# Patient Record
Sex: MALE | Race: BLACK OR AFRICAN AMERICAN | NOT HISPANIC OR LATINO | Employment: OTHER | URBAN - NONMETROPOLITAN AREA
[De-identification: names, ages, dates, MRNs, and addresses within clinical notes are randomized per-mention and may not be internally consistent; named-entity substitution may affect disease eponyms.]

---

## 2017-02-10 ENCOUNTER — OFFICE VISIT (OUTPATIENT)
Dept: OPHTHALMOLOGY | Facility: CLINIC | Age: 57
End: 2017-02-10

## 2017-02-10 DIAGNOSIS — H52.203 ASTIGMATISM, BILATERAL: ICD-10-CM

## 2017-02-10 DIAGNOSIS — E11.9 DIABETES MELLITUS WITHOUT COMPLICATION (HCC): Primary | ICD-10-CM

## 2017-02-10 PROBLEM — H52.209 ASTIGMATISM: Status: ACTIVE | Noted: 2017-02-10

## 2017-02-10 PROCEDURE — 92004 COMPRE OPH EXAM NEW PT 1/>: CPT | Performed by: OPHTHALMOLOGY

## 2017-02-10 NOTE — PROGRESS NOTES
Subjective   Jameel Dozier is a 56 y.o. male.   Chief Complaint   Patient presents with   • Eye Exam     HPI     Eye Exam   Laterality: both eyes   Quality: blurry vision   Severity: moderate           Comments   BV years       Last edited by Castillo Burch MD on 2/10/2017  2:21 PM. (History)          Review of Systems    Objective   Visual Acuity (Snellen - Linear)      Right Left   Dist sc 20/40 -1 20/30 -1   Near sc J16 J16            Manifest Refraction      Sphere Cylinder Axis Dist Add   Right [place] +0.50 150 20/25 +2.25   Left [place] +0.50 030 20/25 +2.25            Final Rx      Sphere Cylinder Axis Add   Right [place] +0.50 150 +2.25   Left [place] +0.50 030 +2.25            Pupils      Pupils   Right PERRL   Left PERRL           Confrontational Visual Fields     Visual Fields      Left Right   Result Full Full                  Extraocular Movement      Right Left   Result Full Full              Tonometry (Applanation, 2:22 PM)     Unable to assess:  Yes           Main Ophthalmology Exam     External Exam      Right Left    External Normal Normal      Slit Lamp Exam      Right Left    Lids/Lashes Normal Normal    Conjunctiva/Sclera White and quiet White and quiet    Cornea Clear Clear    Anterior Chamber Deep and quiet Deep and quiet    Iris Round and reactive Round and reactive    Lens Clear Clear    Vitreous Normal Normal      Fundus Exam      Right Left    Disc Normal Normal    Macula Normal Normal    Vessels Normal Normal    Periphery Normal Normal                Assessment/Plan   Diagnoses and all orders for this visit:    Diabetes mellitus without complication    Astigmatism, bilateral      Eye disease risks with diabetes discussed  Glasses Rx given per refraction      Return in about 1 year (around 2/10/2018), or if symptoms worsen or fail to improve.

## 2017-03-23 RX ORDER — POTASSIUM CHLORIDE 750 MG/1
10 CAPSULE, EXTENDED RELEASE ORAL DAILY
Qty: 35 CAPSULE | Refills: 2 | Status: SHIPPED | OUTPATIENT
Start: 2017-03-23 | End: 2017-05-25 | Stop reason: SDUPTHER

## 2017-03-23 RX ORDER — POTASSIUM CHLORIDE 750 MG/1
CAPSULE, EXTENDED RELEASE ORAL
Qty: 35 CAPSULE | Refills: 1 | OUTPATIENT
Start: 2017-03-23

## 2017-03-28 PROCEDURE — 99283 EMERGENCY DEPT VISIT LOW MDM: CPT

## 2017-03-29 ENCOUNTER — HOSPITAL ENCOUNTER (EMERGENCY)
Facility: HOSPITAL | Age: 57
Discharge: HOME | End: 2017-03-29
Attending: FAMILY MEDICINE | Admitting: FAMILY MEDICINE

## 2017-03-29 VITALS
DIASTOLIC BLOOD PRESSURE: 78 MMHG | TEMPERATURE: 98 F | SYSTOLIC BLOOD PRESSURE: 128 MMHG | RESPIRATION RATE: 18 BRPM | OXYGEN SATURATION: 97 % | WEIGHT: 243 LBS | BODY MASS INDEX: 32.2 KG/M2 | HEART RATE: 98 BPM | HEIGHT: 73 IN

## 2017-03-29 DIAGNOSIS — B02.9 HERPES ZOSTER WITHOUT COMPLICATION: Primary | ICD-10-CM

## 2017-03-29 RX ORDER — HYDROCODONE BITARTRATE AND ACETAMINOPHEN 5; 325 MG/1; MG/1
1 TABLET ORAL EVERY 6 HOURS PRN
Qty: 15 TABLET | Refills: 0 | Status: SHIPPED | OUTPATIENT
Start: 2017-03-29 | End: 2017-05-25

## 2017-03-29 RX ORDER — ACYCLOVIR 400 MG/1
400 TABLET ORAL
Qty: 30 TABLET | Refills: 0 | Status: SHIPPED | OUTPATIENT
Start: 2017-03-29 | End: 2017-05-25 | Stop reason: SDUPTHER

## 2017-03-29 NOTE — ED PROVIDER NOTES
Subjective   Patient is a 56 y.o. male presenting with rash.   History provided by:  Patient  Rash   Location:  Torso  Torso rash location:  L flank and upper back  Quality: blistering, painful and swelling    Pain details:     Quality:  Aching    Onset quality:  Sudden    Timing:  Constant    Progression:  Waxing and waning  Severity:  Moderate  Context: not chemical exposure, not exposure to similar rash and not insect bite/sting    Relieved by:  Nothing  Worsened by:  Nothing  Associated symptoms: no abdominal pain, no diarrhea, no fatigue, no fever, no headaches, no myalgias, no nausea, no periorbital edema, no shortness of breath, no sore throat, no URI, not vomiting and not wheezing        Review of Systems   Constitutional: Negative for appetite change, chills, diaphoresis, fatigue and fever.   HENT: Negative for congestion, ear discharge, ear pain, nosebleeds, rhinorrhea, sinus pressure, sore throat and trouble swallowing.    Eyes: Negative for discharge and redness.   Respiratory: Negative for apnea, cough, chest tightness, shortness of breath and wheezing.    Cardiovascular: Negative for chest pain.   Gastrointestinal: Negative for abdominal pain, diarrhea, nausea and vomiting.   Endocrine: Negative for polyuria.   Genitourinary: Negative for dysuria, frequency and urgency.   Musculoskeletal: Negative for myalgias and neck pain.   Skin: Positive for rash. Negative for color change.   Allergic/Immunologic: Negative for immunocompromised state.   Neurological: Negative for dizziness, seizures, syncope, weakness, light-headedness and headaches.   Hematological: Negative for adenopathy. Does not bruise/bleed easily.   Psychiatric/Behavioral: Negative for behavioral problems and confusion.   All other systems reviewed and are negative.      Past Medical History:   Diagnosis Date   • Abdominal pain    • Acquired achalasia of esophagus    • Adenomatous polyp of colon    • Adverse drug effect    • Alcohol  dependence with alcohol-induced disorder    • Allergic rhinitis    • Anal fissure    • Anemia due to blood loss    • Benign essential hypertension    • Central obesity    • Cerebrovascular disease    • Chronic obstructive lung disease    • Claudication    • Disorder of peripheral nervous system    • Diverticular disease of colon     completed antibx, ? neoplasm on CT   • Dysphagia    • Dyspnea    • ED (erectile dysfunction)    • Epigastric pain    • Esophageal dysmotility    • Esophageal reflux    • Esophagitis    • Essential hypertension    • External hemorrhoids    • Gastritis    • GERD with esophagitis    • Heavy tobacco smoker    • Hemorrhoids    • Hiatal hernia    • Hiccough    • History of colon polyps    • History of echocardiogram     Normal LV funciton with Ef of 65% to 70% without regional wall motion abnormalities.Mild CLVH. Normal RV size and function. No significant valvular regurgitation or stenosis. 09/19/2014       • History of EKG    • History of TIA (transient ischemic attack)    • Hypercholesterolemia    • Hypertension    • Left ventricular hypertrophy    • Male erectile disorder    • Obstructive sleep apnea syndrome    • Primary osteoarthritis of knees, bilateral    • Rectal bleed     painful   • Rectal hemorrhage    • Sleep disorder    • Transient cerebral ischemia    • Upper respiratory infection    • Weakness     Attacks of weakness       Allergies   Allergen Reactions   • Ace Inhibitors Angioedema     hypotension   • Lisinopril    • Vistaril [Hydroxyzine Hcl]        Past Surgical History:   Procedure Laterality Date   • COLONOSCOPY      Internal and external hemorrhoids found. 04/13/2015    • ENDOSCOPY      Internal & external hemorrhoids found. 1 polyp in sigmoid colon; removed by snare cautery polypectomy. 09/26/2012    • ENDOSCOPY      Gastritis found in the stomach. Biopsy taken.Enlarged folds were found in the body of the stomach.Biopsy taken.Normal duodenum.A hiatus hernia was found in  the esophagus. 04/13/2015    • ENDOSCOPY      Hiatus hernia in esophagus. Gastritis in stomach. Biopsy taken. Normal duodenum. Biopsy taken. 09/26/2012           Family History   Problem Relation Age of Onset   • Cancer Mother    • Cirrhosis Father    • Diabetes Other    • Hypertension Other        Social History     Social History   • Marital status: Single     Spouse name: N/A   • Number of children: 0   • Years of education: 12     Occupational History   • retired      Social History Main Topics   • Smoking status: Current Every Day Smoker     Packs/day: 2.00     Years: 40.00     Types: Cigarettes   • Smokeless tobacco: None   • Alcohol use 3.6 oz/week     6 Cans of beer per week      Comment: when can get   • Drug use: No   • Sexual activity: Defer     Other Topics Concern   • None     Social History Narrative   • None           Objective   Physical Exam   Constitutional: He is oriented to person, place, and time. He appears well-developed and well-nourished.   HENT:   Head: Normocephalic and atraumatic.   Nose: Nose normal.   Mouth/Throat: Oropharynx is clear and moist.   Eyes: Conjunctivae and EOM are normal. Pupils are equal, round, and reactive to light. Right eye exhibits no discharge. Left eye exhibits no discharge. No scleral icterus.   Neck: Normal range of motion. Neck supple. No tracheal deviation present.   Cardiovascular: Normal rate, regular rhythm and normal heart sounds.    No murmur heard.  Pulmonary/Chest: Effort normal and breath sounds normal. No stridor. No respiratory distress. He has no wheezes. He has no rales.   Abdominal: Soft. Bowel sounds are normal. He exhibits no distension and no mass. There is no tenderness. There is no rebound and no guarding.   Musculoskeletal: He exhibits no edema.   Neurological: He is alert and oriented to person, place, and time. Coordination normal.   Skin: Skin is warm and dry. Rash noted. Rash is papular and vesicular. No erythema.        Psychiatric: He  has a normal mood and affect. His behavior is normal. Thought content normal.   Nursing note and vitals reviewed.      Procedures         ED Course  ED Course          Labs Reviewed - No data to display    No orders to display                 MDM    Final diagnoses:   Herpes zoster without complication            Flynn Espinoza MD  03/29/17 0224

## 2017-03-29 NOTE — ED NOTES
Pt presents to ED with c/o lt side flank pain, pt stated the pain started about 3-4 days ago, now the pain is worse and he hurts all over.     Michelle Ralph RN  03/29/17 0014

## 2017-03-29 NOTE — ED NOTES
Discharge instructions reviewed with no additional questions at this time. Scripts x2.  NAD.  VSS.  Pt. Alert and oriented x4. No other needs voiced at this time.  Pt. Instructed on medications and pt. Verbalized understanding.  No other questions at this time. Pt. Left ambulatory with steady gait.         Nicole Carpenter RN  03/29/17 0233

## 2017-05-25 ENCOUNTER — OFFICE VISIT (OUTPATIENT)
Dept: FAMILY MEDICINE CLINIC | Facility: CLINIC | Age: 57
End: 2017-05-25

## 2017-05-25 VITALS
BODY MASS INDEX: 34.06 KG/M2 | HEART RATE: 77 BPM | DIASTOLIC BLOOD PRESSURE: 80 MMHG | SYSTOLIC BLOOD PRESSURE: 144 MMHG | OXYGEN SATURATION: 96 % | WEIGHT: 237.9 LBS | HEIGHT: 70 IN

## 2017-05-25 DIAGNOSIS — R73.03 PRE-DIABETES: ICD-10-CM

## 2017-05-25 DIAGNOSIS — E78.00 HYPERCHOLESTEROLEMIA: ICD-10-CM

## 2017-05-25 DIAGNOSIS — E65 CENTRAL OBESITY: ICD-10-CM

## 2017-05-25 DIAGNOSIS — I10 ESSENTIAL HYPERTENSION: ICD-10-CM

## 2017-05-25 DIAGNOSIS — G47.33 OBSTRUCTIVE SLEEP APNEA SYNDROME: ICD-10-CM

## 2017-05-25 DIAGNOSIS — Z00.00 PREVENTATIVE HEALTH CARE: ICD-10-CM

## 2017-05-25 DIAGNOSIS — K21.00 GERD WITH ESOPHAGITIS: ICD-10-CM

## 2017-05-25 PROCEDURE — 99213 OFFICE O/P EST LOW 20 MIN: CPT | Performed by: FAMILY MEDICINE

## 2017-05-25 RX ORDER — ASPIRIN 81 MG/1
81 TABLET ORAL DAILY
Qty: 30 TABLET | Refills: 5 | Status: SHIPPED | OUTPATIENT
Start: 2017-05-25 | End: 2017-10-30 | Stop reason: SDUPTHER

## 2017-05-25 RX ORDER — LOSARTAN POTASSIUM 100 MG/1
100 TABLET ORAL DAILY
Qty: 30 TABLET | Refills: 5 | Status: SHIPPED | OUTPATIENT
Start: 2017-05-25 | End: 2017-10-30 | Stop reason: SDUPTHER

## 2017-05-25 RX ORDER — CETIRIZINE HYDROCHLORIDE 10 MG/1
10 TABLET ORAL DAILY
Qty: 30 TABLET | Refills: 5 | Status: SHIPPED | OUTPATIENT
Start: 2017-05-25 | End: 2017-10-30 | Stop reason: SDUPTHER

## 2017-05-25 RX ORDER — HYDROCHLOROTHIAZIDE 25 MG/1
25 TABLET ORAL DAILY
Qty: 30 TABLET | Refills: 5 | Status: SHIPPED | OUTPATIENT
Start: 2017-05-25 | End: 2017-10-27 | Stop reason: HOSPADM

## 2017-05-25 RX ORDER — FERROUS SULFATE 325(65) MG
325 TABLET ORAL
Qty: 90 TABLET | Refills: 5 | Status: SHIPPED | OUTPATIENT
Start: 2017-05-25 | End: 2018-05-18 | Stop reason: SDUPTHER

## 2017-05-25 RX ORDER — ALBUTEROL SULFATE 90 UG/1
2 AEROSOL, METERED RESPIRATORY (INHALATION) EVERY 4 HOURS PRN
Qty: 3.7 G | Refills: 11 | Status: SHIPPED | OUTPATIENT
Start: 2017-05-25 | End: 2017-10-30 | Stop reason: SDUPTHER

## 2017-05-25 RX ORDER — POTASSIUM CHLORIDE 750 MG/1
10 CAPSULE, EXTENDED RELEASE ORAL DAILY
Qty: 35 CAPSULE | Refills: 2 | Status: SHIPPED | OUTPATIENT
Start: 2017-05-25 | End: 2017-10-26 | Stop reason: SDUPTHER

## 2017-05-25 RX ORDER — OMEPRAZOLE 20 MG/1
20 CAPSULE, DELAYED RELEASE ORAL EVERY MORNING
Qty: 30 CAPSULE | Refills: 5 | Status: SHIPPED | OUTPATIENT
Start: 2017-05-25 | End: 2017-10-30 | Stop reason: SDUPTHER

## 2017-05-25 RX ORDER — LOVASTATIN 40 MG/1
40 TABLET ORAL DAILY
Qty: 30 TABLET | Refills: 5 | Status: SHIPPED | OUTPATIENT
Start: 2017-05-25 | End: 2017-10-30 | Stop reason: SDUPTHER

## 2017-05-25 RX ORDER — ACYCLOVIR 400 MG/1
400 TABLET ORAL
Qty: 30 TABLET | Refills: 0 | Status: SHIPPED | OUTPATIENT
Start: 2017-05-25 | End: 2018-05-18

## 2017-10-20 PROBLEM — E11.8 TYPE 2 DIABETES MELLITUS WITH COMPLICATION, WITHOUT LONG-TERM CURRENT USE OF INSULIN: Status: ACTIVE | Noted: 2017-02-10

## 2017-10-20 RX ORDER — POTASSIUM CHLORIDE 750 MG/1
CAPSULE, EXTENDED RELEASE ORAL
Qty: 35 CAPSULE | Refills: 1 | OUTPATIENT
Start: 2017-10-20

## 2017-10-26 ENCOUNTER — HOSPITAL ENCOUNTER (OUTPATIENT)
Facility: HOSPITAL | Age: 57
Setting detail: OBSERVATION
Discharge: HOME | End: 2017-10-27
Attending: EMERGENCY MEDICINE | Admitting: FAMILY MEDICINE

## 2017-10-26 ENCOUNTER — APPOINTMENT (OUTPATIENT)
Dept: LAB | Facility: HOSPITAL | Age: 57
End: 2017-10-26

## 2017-10-26 DIAGNOSIS — R13.19 OTHER DYSPHAGIA: ICD-10-CM

## 2017-10-26 DIAGNOSIS — R13.10 DYSPHAGIA, UNSPECIFIED TYPE: ICD-10-CM

## 2017-10-26 DIAGNOSIS — I10 HYPERTENSION, UNSPECIFIED TYPE: Primary | ICD-10-CM

## 2017-10-26 DIAGNOSIS — K21.00 GERD WITH ESOPHAGITIS: ICD-10-CM

## 2017-10-26 DIAGNOSIS — E87.6 HYPOKALEMIA: Primary | ICD-10-CM

## 2017-10-26 PROBLEM — R06.2 WHEEZING: Status: ACTIVE | Noted: 2017-10-26

## 2017-10-26 PROBLEM — E83.42 HYPOMAGNESEMIA: Status: ACTIVE | Noted: 2017-10-26

## 2017-10-26 PROBLEM — Z72.0 NICOTINE ABUSE: Status: ACTIVE | Noted: 2017-10-26

## 2017-10-26 LAB
GLUCOSE BLDC GLUCOMTR-MCNC: 122 MG/DL (ref 70–130)
GLUCOSE BLDC GLUCOMTR-MCNC: 87 MG/DL (ref 70–130)
MAGNESIUM SERPL-MCNC: 0.9 MG/DL (ref 1.6–2.3)
MAGNESIUM SERPL-MCNC: 0.9 MG/DL (ref 1.6–2.3)
MAGNESIUM SERPL-MCNC: 1.3 MG/DL (ref 1.6–2.3)
MAGNESIUM SERPL-MCNC: 1.4 MG/DL (ref 1.6–2.3)
POTASSIUM BLD-SCNC: 2.8 MMOL/L (ref 3.5–5.1)
POTASSIUM BLD-SCNC: 3.1 MMOL/L (ref 3.5–5.1)
TROPONIN I SERPL-MCNC: 0.02 NG/ML

## 2017-10-26 PROCEDURE — 96367 TX/PROPH/DG ADDL SEQ IV INF: CPT

## 2017-10-26 PROCEDURE — G0378 HOSPITAL OBSERVATION PER HR: HCPCS

## 2017-10-26 PROCEDURE — 99284 EMERGENCY DEPT VISIT MOD MDM: CPT

## 2017-10-26 PROCEDURE — 25010000002 MAGNESIUM SULFATE 2 GM/50ML SOLUTION: Performed by: STUDENT IN AN ORGANIZED HEALTH CARE EDUCATION/TRAINING PROGRAM

## 2017-10-26 PROCEDURE — 83735 ASSAY OF MAGNESIUM: CPT | Performed by: EMERGENCY MEDICINE

## 2017-10-26 PROCEDURE — 82962 GLUCOSE BLOOD TEST: CPT

## 2017-10-26 PROCEDURE — 93010 ELECTROCARDIOGRAM REPORT: CPT | Performed by: INTERNAL MEDICINE

## 2017-10-26 PROCEDURE — 93005 ELECTROCARDIOGRAM TRACING: CPT | Performed by: EMERGENCY MEDICINE

## 2017-10-26 PROCEDURE — 83735 ASSAY OF MAGNESIUM: CPT | Performed by: STUDENT IN AN ORGANIZED HEALTH CARE EDUCATION/TRAINING PROGRAM

## 2017-10-26 PROCEDURE — 25810000003 SODIUM CHLORIDE 0.9 % WITH KCL 20 MEQ 20-0.9 MEQ/L-% SOLUTION: Performed by: STUDENT IN AN ORGANIZED HEALTH CARE EDUCATION/TRAINING PROGRAM

## 2017-10-26 PROCEDURE — 96366 THER/PROPH/DIAG IV INF ADDON: CPT

## 2017-10-26 PROCEDURE — 96365 THER/PROPH/DIAG IV INF INIT: CPT

## 2017-10-26 PROCEDURE — 84484 ASSAY OF TROPONIN QUANT: CPT | Performed by: EMERGENCY MEDICINE

## 2017-10-26 PROCEDURE — 84132 ASSAY OF SERUM POTASSIUM: CPT | Performed by: STUDENT IN AN ORGANIZED HEALTH CARE EDUCATION/TRAINING PROGRAM

## 2017-10-26 PROCEDURE — 25010000003 POTASSIUM CHLORIDE 10 MEQ/100ML SOLUTION: Performed by: EMERGENCY MEDICINE

## 2017-10-26 RX ORDER — CETIRIZINE HYDROCHLORIDE 10 MG/1
10 TABLET ORAL DAILY
Status: DISCONTINUED | OUTPATIENT
Start: 2017-10-26 | End: 2017-10-27 | Stop reason: HOSPADM

## 2017-10-26 RX ORDER — ALBUTEROL SULFATE 2.5 MG/3ML
2.5 SOLUTION RESPIRATORY (INHALATION) EVERY 4 HOURS PRN
Status: DISCONTINUED | OUTPATIENT
Start: 2017-10-26 | End: 2017-10-27 | Stop reason: HOSPADM

## 2017-10-26 RX ORDER — NICOTINE POLACRILEX 4 MG
15 LOZENGE BUCCAL
Status: DISCONTINUED | OUTPATIENT
Start: 2017-10-26 | End: 2017-10-27 | Stop reason: HOSPADM

## 2017-10-26 RX ORDER — BISACODYL 5 MG/1
5 TABLET, DELAYED RELEASE ORAL DAILY PRN
Status: DISCONTINUED | OUTPATIENT
Start: 2017-10-26 | End: 2017-10-27 | Stop reason: HOSPADM

## 2017-10-26 RX ORDER — POTASSIUM CHLORIDE 750 MG/1
40 CAPSULE, EXTENDED RELEASE ORAL
Qty: 84 CAPSULE | Refills: 2 | Status: SHIPPED | OUTPATIENT
Start: 2017-10-26 | End: 2017-10-27 | Stop reason: HOSPADM

## 2017-10-26 RX ORDER — POTASSIUM CHLORIDE 7.45 MG/ML
10 INJECTION INTRAVENOUS
Status: DISPENSED | OUTPATIENT
Start: 2017-10-26 | End: 2017-10-26

## 2017-10-26 RX ORDER — ASPIRIN 81 MG/1
81 TABLET ORAL DAILY
Status: DISCONTINUED | OUTPATIENT
Start: 2017-10-26 | End: 2017-10-27 | Stop reason: HOSPADM

## 2017-10-26 RX ORDER — SODIUM CHLORIDE 0.9 % (FLUSH) 0.9 %
1-10 SYRINGE (ML) INJECTION AS NEEDED
Status: DISCONTINUED | OUTPATIENT
Start: 2017-10-26 | End: 2017-10-27 | Stop reason: HOSPADM

## 2017-10-26 RX ORDER — ONDANSETRON 2 MG/ML
4 INJECTION INTRAMUSCULAR; INTRAVENOUS EVERY 6 HOURS PRN
Status: DISCONTINUED | OUTPATIENT
Start: 2017-10-26 | End: 2017-10-27 | Stop reason: HOSPADM

## 2017-10-26 RX ORDER — HYDRALAZINE HYDROCHLORIDE 20 MG/ML
10 INJECTION INTRAMUSCULAR; INTRAVENOUS EVERY 6 HOURS PRN
Status: DISCONTINUED | OUTPATIENT
Start: 2017-10-26 | End: 2017-10-27 | Stop reason: HOSPADM

## 2017-10-26 RX ORDER — DEXTROSE MONOHYDRATE 25 G/50ML
25 INJECTION, SOLUTION INTRAVENOUS
Status: DISCONTINUED | OUTPATIENT
Start: 2017-10-26 | End: 2017-10-27 | Stop reason: HOSPADM

## 2017-10-26 RX ORDER — SODIUM CHLORIDE AND POTASSIUM CHLORIDE 150; 900 MG/100ML; MG/100ML
100 INJECTION, SOLUTION INTRAVENOUS CONTINUOUS
Status: DISCONTINUED | OUTPATIENT
Start: 2017-10-26 | End: 2017-10-27 | Stop reason: HOSPADM

## 2017-10-26 RX ORDER — NICOTINE 21 MG/24HR
1 PATCH, TRANSDERMAL 24 HOURS TRANSDERMAL EVERY 24 HOURS
Status: DISCONTINUED | OUTPATIENT
Start: 2017-10-26 | End: 2017-10-27 | Stop reason: HOSPADM

## 2017-10-26 RX ORDER — ACETAMINOPHEN 325 MG/1
650 TABLET ORAL EVERY 4 HOURS PRN
Status: DISCONTINUED | OUTPATIENT
Start: 2017-10-26 | End: 2017-10-27 | Stop reason: HOSPADM

## 2017-10-26 RX ORDER — ALBUTEROL SULFATE 90 UG/1
2 AEROSOL, METERED RESPIRATORY (INHALATION) EVERY 4 HOURS PRN
Status: DISCONTINUED | OUTPATIENT
Start: 2017-10-26 | End: 2017-10-26 | Stop reason: ALTCHOICE

## 2017-10-26 RX ORDER — LOSARTAN POTASSIUM 50 MG/1
100 TABLET ORAL DAILY
Status: DISCONTINUED | OUTPATIENT
Start: 2017-10-26 | End: 2017-10-27 | Stop reason: HOSPADM

## 2017-10-26 RX ORDER — POTASSIUM CHLORIDE 1.5 G/1.77G
40 POWDER, FOR SOLUTION ORAL ONCE
Status: COMPLETED | OUTPATIENT
Start: 2017-10-26 | End: 2017-10-26

## 2017-10-26 RX ORDER — POTASSIUM CHLORIDE 750 MG/1
40 CAPSULE, EXTENDED RELEASE ORAL ONCE
Status: COMPLETED | OUTPATIENT
Start: 2017-10-26 | End: 2017-10-26

## 2017-10-26 RX ORDER — ATORVASTATIN CALCIUM 10 MG/1
10 TABLET, FILM COATED ORAL DAILY
Status: DISCONTINUED | OUTPATIENT
Start: 2017-10-26 | End: 2017-10-27 | Stop reason: HOSPADM

## 2017-10-26 RX ORDER — MAGNESIUM SULFATE HEPTAHYDRATE 40 MG/ML
2 INJECTION, SOLUTION INTRAVENOUS ONCE
Status: COMPLETED | OUTPATIENT
Start: 2017-10-26 | End: 2017-10-26

## 2017-10-26 RX ORDER — CALCIUM CARBONATE 200(500)MG
2 TABLET,CHEWABLE ORAL 2 TIMES DAILY PRN
Status: DISCONTINUED | OUTPATIENT
Start: 2017-10-26 | End: 2017-10-27 | Stop reason: HOSPADM

## 2017-10-26 RX ORDER — PANTOPRAZOLE SODIUM 40 MG/1
40 TABLET, DELAYED RELEASE ORAL EVERY MORNING
Status: DISCONTINUED | OUTPATIENT
Start: 2017-10-27 | End: 2017-10-27 | Stop reason: HOSPADM

## 2017-10-26 RX ADMIN — POTASSIUM CHLORIDE AND SODIUM CHLORIDE 100 ML/HR: 900; 150 INJECTION, SOLUTION INTRAVENOUS at 15:10

## 2017-10-26 RX ADMIN — METOPROLOL TARTRATE 25 MG: 25 TABLET ORAL at 18:21

## 2017-10-26 RX ADMIN — NICOTINE 1 PATCH: 21 PATCH TRANSDERMAL at 18:21

## 2017-10-26 RX ADMIN — MAGNESIUM SULFATE HEPTAHYDRATE 2 G: 40 INJECTION, SOLUTION INTRAVENOUS at 13:07

## 2017-10-26 RX ADMIN — POTASSIUM CHLORIDE 40 MEQ: 1.5 POWDER, FOR SOLUTION ORAL at 18:21

## 2017-10-26 RX ADMIN — METFORMIN HYDROCHLORIDE 500 MG: 500 TABLET ORAL at 18:21

## 2017-10-26 RX ADMIN — POTASSIUM CHLORIDE 40 MEQ: 750 CAPSULE, EXTENDED RELEASE ORAL at 11:50

## 2017-10-26 RX ADMIN — ATORVASTATIN CALCIUM 10 MG: 10 TABLET, FILM COATED ORAL at 18:21

## 2017-10-26 RX ADMIN — POTASSIUM CHLORIDE 10 MEQ: 7.46 INJECTION, SOLUTION INTRAVENOUS at 11:57

## 2017-10-26 NOTE — PROGRESS NOTES
Called pt about lab result. Potassium of 2.3. Very strongly urged pt to come to the ER. Warned of increased risk of fatal cardiac arrhythmias. Pt refused.     Added on Mg to labs. Will send in Rx for potassium 40meq TID. He will see PCP, Dr Flores on 10/30 and get labs rechecked.          This document has been electronically signed by Adarsh Morales MD on October 26, 2017 10:02 AM

## 2017-10-27 VITALS
RESPIRATION RATE: 18 BRPM | WEIGHT: 229.28 LBS | HEART RATE: 83 BPM | HEIGHT: 69 IN | SYSTOLIC BLOOD PRESSURE: 143 MMHG | DIASTOLIC BLOOD PRESSURE: 91 MMHG | TEMPERATURE: 97.5 F | OXYGEN SATURATION: 97 % | BODY MASS INDEX: 33.96 KG/M2

## 2017-10-27 PROBLEM — E83.42 HYPOMAGNESEMIA: Status: RESOLVED | Noted: 2017-10-26 | Resolved: 2017-10-27

## 2017-10-27 PROBLEM — R06.2 WHEEZING: Status: RESOLVED | Noted: 2017-10-26 | Resolved: 2017-10-27

## 2017-10-27 LAB
ANION GAP SERPL CALCULATED.3IONS-SCNC: 13 MMOL/L (ref 5–15)
BUN BLD-MCNC: 8 MG/DL (ref 7–21)
BUN/CREAT SERPL: 8.7 (ref 7–25)
CALCIUM SPEC-SCNC: 8 MG/DL (ref 8.4–10.2)
CHLORIDE SERPL-SCNC: 101 MMOL/L (ref 95–110)
CO2 SERPL-SCNC: 31 MMOL/L (ref 22–31)
CREAT BLD-MCNC: 0.92 MG/DL (ref 0.7–1.3)
GFR SERPL CREATININE-BSD FRML MDRD: 102 ML/MIN/1.73 (ref 56–130)
GLUCOSE BLD-MCNC: 99 MG/DL (ref 60–100)
GLUCOSE BLDC GLUCOMTR-MCNC: 101 MG/DL (ref 70–130)
GLUCOSE BLDC GLUCOMTR-MCNC: 107 MG/DL (ref 70–130)
HBA1C MFR BLD: 5.8 % (ref 4–5.6)
MAGNESIUM SERPL-MCNC: 2 MG/DL (ref 1.6–2.3)
POTASSIUM BLD-SCNC: 3.5 MMOL/L (ref 3.5–5.1)
SODIUM BLD-SCNC: 145 MMOL/L (ref 137–145)

## 2017-10-27 PROCEDURE — 96366 THER/PROPH/DIAG IV INF ADDON: CPT

## 2017-10-27 PROCEDURE — 82962 GLUCOSE BLOOD TEST: CPT

## 2017-10-27 PROCEDURE — 83735 ASSAY OF MAGNESIUM: CPT | Performed by: STUDENT IN AN ORGANIZED HEALTH CARE EDUCATION/TRAINING PROGRAM

## 2017-10-27 PROCEDURE — G8996 SWALLOW CURRENT STATUS: HCPCS | Performed by: SPEECH-LANGUAGE PATHOLOGIST

## 2017-10-27 PROCEDURE — G0378 HOSPITAL OBSERVATION PER HR: HCPCS

## 2017-10-27 PROCEDURE — G8998 SWALLOW D/C STATUS: HCPCS | Performed by: SPEECH-LANGUAGE PATHOLOGIST

## 2017-10-27 PROCEDURE — G8997 SWALLOW GOAL STATUS: HCPCS | Performed by: SPEECH-LANGUAGE PATHOLOGIST

## 2017-10-27 PROCEDURE — 83036 HEMOGLOBIN GLYCOSYLATED A1C: CPT | Performed by: STUDENT IN AN ORGANIZED HEALTH CARE EDUCATION/TRAINING PROGRAM

## 2017-10-27 PROCEDURE — 25810000003 SODIUM CHLORIDE 0.9 % WITH KCL 20 MEQ 20-0.9 MEQ/L-% SOLUTION: Performed by: STUDENT IN AN ORGANIZED HEALTH CARE EDUCATION/TRAINING PROGRAM

## 2017-10-27 PROCEDURE — 25010000002 MAGNESIUM SULFATE 2 GM/50ML SOLUTION: Performed by: FAMILY MEDICINE

## 2017-10-27 PROCEDURE — 80048 BASIC METABOLIC PNL TOTAL CA: CPT | Performed by: STUDENT IN AN ORGANIZED HEALTH CARE EDUCATION/TRAINING PROGRAM

## 2017-10-27 PROCEDURE — 92610 EVALUATE SWALLOWING FUNCTION: CPT | Performed by: SPEECH-LANGUAGE PATHOLOGIST

## 2017-10-27 RX ORDER — MAGNESIUM SULFATE HEPTAHYDRATE 40 MG/ML
2 INJECTION, SOLUTION INTRAVENOUS ONCE
Status: COMPLETED | OUTPATIENT
Start: 2017-10-27 | End: 2017-10-27

## 2017-10-27 RX ORDER — POTASSIUM CHLORIDE 1.5 G/1.77G
60 POWDER, FOR SOLUTION ORAL ONCE
Status: COMPLETED | OUTPATIENT
Start: 2017-10-27 | End: 2017-10-27

## 2017-10-27 RX ORDER — AMLODIPINE BESYLATE 5 MG/1
5 TABLET ORAL DAILY
Qty: 30 TABLET | Refills: 6 | Status: SHIPPED | OUTPATIENT
Start: 2017-10-27 | End: 2018-05-18 | Stop reason: SDUPTHER

## 2017-10-27 RX ORDER — AMLODIPINE BESYLATE 5 MG/1
5 TABLET ORAL
Status: DISCONTINUED | OUTPATIENT
Start: 2017-10-27 | End: 2017-10-27 | Stop reason: HOSPADM

## 2017-10-27 RX ADMIN — POTASSIUM CHLORIDE 60 MEQ: 1.5 POWDER, FOR SOLUTION ORAL at 08:52

## 2017-10-27 RX ADMIN — METOPROLOL TARTRATE 25 MG: 25 TABLET ORAL at 08:46

## 2017-10-27 RX ADMIN — POTASSIUM CHLORIDE 60 MEQ: 1.5 POWDER, FOR SOLUTION ORAL at 03:30

## 2017-10-27 RX ADMIN — POTASSIUM CHLORIDE AND SODIUM CHLORIDE 100 ML/HR: 900; 150 INJECTION, SOLUTION INTRAVENOUS at 03:36

## 2017-10-27 RX ADMIN — AMLODIPINE BESYLATE 5 MG: 5 TABLET ORAL at 08:46

## 2017-10-27 RX ADMIN — LOSARTAN POTASSIUM 100 MG: 50 TABLET, FILM COATED ORAL at 08:46

## 2017-10-27 RX ADMIN — PANTOPRAZOLE SODIUM 40 MG: 40 TABLET, DELAYED RELEASE ORAL at 05:41

## 2017-10-27 RX ADMIN — ASPIRIN 81 MG: 81 TABLET, COATED ORAL at 08:46

## 2017-10-27 RX ADMIN — METFORMIN HYDROCHLORIDE 500 MG: 500 TABLET ORAL at 08:46

## 2017-10-27 RX ADMIN — ATORVASTATIN CALCIUM 10 MG: 10 TABLET, FILM COATED ORAL at 08:46

## 2017-10-27 RX ADMIN — CETIRIZINE HYDROCHLORIDE 10 MG: 10 TABLET, FILM COATED ORAL at 08:46

## 2017-10-27 RX ADMIN — MAGNESIUM SULFATE HEPTAHYDRATE 2 G: 40 INJECTION, SOLUTION INTRAVENOUS at 02:03

## 2017-10-30 ENCOUNTER — APPOINTMENT (OUTPATIENT)
Dept: LAB | Facility: HOSPITAL | Age: 57
End: 2017-10-30

## 2017-10-30 ENCOUNTER — OFFICE VISIT (OUTPATIENT)
Dept: FAMILY MEDICINE CLINIC | Facility: CLINIC | Age: 57
End: 2017-10-30

## 2017-10-30 VITALS
BODY MASS INDEX: 31.68 KG/M2 | DIASTOLIC BLOOD PRESSURE: 86 MMHG | HEIGHT: 72 IN | SYSTOLIC BLOOD PRESSURE: 142 MMHG | HEART RATE: 79 BPM | WEIGHT: 233.9 LBS | OXYGEN SATURATION: 98 %

## 2017-10-30 DIAGNOSIS — I10 ESSENTIAL HYPERTENSION: ICD-10-CM

## 2017-10-30 DIAGNOSIS — F17.210 CIGARETTE SMOKER TWO PACKS A DAY OR LESS: ICD-10-CM

## 2017-10-30 DIAGNOSIS — G47.33 OBSTRUCTIVE SLEEP APNEA SYNDROME: ICD-10-CM

## 2017-10-30 DIAGNOSIS — F17.200 TOBACCO DEPENDENCE: ICD-10-CM

## 2017-10-30 DIAGNOSIS — E87.6 HYPOKALEMIA DUE TO LOSS OF POTASSIUM: Primary | ICD-10-CM

## 2017-10-30 DIAGNOSIS — E65 CENTRAL OBESITY: ICD-10-CM

## 2017-10-30 DIAGNOSIS — K21.00 GERD WITH ESOPHAGITIS: ICD-10-CM

## 2017-10-30 DIAGNOSIS — E11.8 DIABETIC FEET (HCC): ICD-10-CM

## 2017-10-30 DIAGNOSIS — R73.03 PRE-DIABETES: ICD-10-CM

## 2017-10-30 DIAGNOSIS — E78.00 HYPERCHOLESTEROLEMIA: ICD-10-CM

## 2017-10-30 DIAGNOSIS — R06.00 DYSPNEA, UNSPECIFIED TYPE: ICD-10-CM

## 2017-10-30 LAB
ALBUMIN UR-MCNC: 114 MG/L
CREAT UR-MCNC: 421.4 MG/DL
MICROALBUMIN/CREAT UR: 270.5 MG/G (ref 0–30)

## 2017-10-30 PROCEDURE — 82570 ASSAY OF URINE CREATININE: CPT | Performed by: FAMILY MEDICINE

## 2017-10-30 PROCEDURE — 99213 OFFICE O/P EST LOW 20 MIN: CPT | Performed by: FAMILY MEDICINE

## 2017-10-30 PROCEDURE — 82043 UR ALBUMIN QUANTITATIVE: CPT | Performed by: FAMILY MEDICINE

## 2017-10-30 RX ORDER — LOSARTAN POTASSIUM 100 MG/1
100 TABLET ORAL DAILY
Qty: 30 TABLET | Refills: 5 | Status: SHIPPED | OUTPATIENT
Start: 2017-10-30 | End: 2018-05-18 | Stop reason: SDUPTHER

## 2017-10-30 RX ORDER — ALBUTEROL SULFATE 90 UG/1
2 AEROSOL, METERED RESPIRATORY (INHALATION) EVERY 4 HOURS PRN
Qty: 3.7 G | Refills: 11 | Status: SHIPPED | OUTPATIENT
Start: 2017-10-30 | End: 2018-07-17 | Stop reason: SDUPTHER

## 2017-10-30 RX ORDER — CETIRIZINE HYDROCHLORIDE 10 MG/1
10 TABLET ORAL DAILY
Qty: 30 TABLET | Refills: 5 | Status: SHIPPED | OUTPATIENT
Start: 2017-10-30 | End: 2017-12-15 | Stop reason: SDUPTHER

## 2017-10-30 RX ORDER — OMEPRAZOLE 20 MG/1
20 CAPSULE, DELAYED RELEASE ORAL EVERY MORNING
Qty: 30 CAPSULE | Refills: 5 | Status: SHIPPED | OUTPATIENT
Start: 2017-10-30 | End: 2018-05-18 | Stop reason: SDUPTHER

## 2017-10-30 RX ORDER — LOVASTATIN 40 MG/1
40 TABLET ORAL DAILY
Qty: 30 TABLET | Refills: 5 | Status: SHIPPED | OUTPATIENT
Start: 2017-10-30 | End: 2018-05-18 | Stop reason: SDUPTHER

## 2017-10-30 RX ORDER — ASPIRIN 81 MG/1
81 TABLET ORAL DAILY
Qty: 30 TABLET | Refills: 5 | Status: SHIPPED | OUTPATIENT
Start: 2017-10-30 | End: 2018-05-18 | Stop reason: SDUPTHER

## 2017-10-30 NOTE — PATIENT INSTRUCTIONS
"CPAP and BIPAP Information  CPAP and BIPAP are methods of helping you breathe with the use of air pressure. CPAP stands for \"continuous positive airway pressure.\" BIPAP stands for \"bi-level positive airway pressure.\" In both methods, air is blown into your air passages to help keep you breathing well. With CPAP, the amount of pressure stays the same while you breathe in and out. CPAP is most commonly used for obstructive sleep apnea. For obstructive sleep apnea, CPAP works by holding your airways open so that they do not collapse when your muscles relax during sleep. BIPAP is similar to CPAP except the amount of pressure is increased when you inhale. This helps you take larger breaths. Your health care provider will recommend whether CPAP or BIPAP would be more helpful for you.   WHY ARE CPAP AND BIPAP TREATMENTS USED?  CPAP or BIPAP can be helpful if you have:   · Sleep apnea.    · Chronic obstructive pulmonary disease (COPD).    · Diseases that weaken the muscles of the chest, including muscular dystrophy or neurological diseases such as amyotrophic lateral sclerosis (ALS).  · Other problems that cause breathing to be weak, abnormal, or difficult.    HOW IS CPAP OR BIPAP ADMINISTERED?  Both CPAP and BIPAP are provided by a small machine with a flexible plastic tube that attaches to a plastic mask. The mask fits on your face, and air is blown into your air passages through your nose or mouth. The amount of pressure that is used to blow the air into your air passages can be set on the machine. Your health care provider will determine the pressure setting that should be used based on your individual needs.  WHEN SHOULD CPAP OR BIPAP BE USED?  In most cases, the mask is worn only when sleeping. Generally, you will need to wear the mask throughout the night and during the daytime if you take a nap. In a few cases involving certain medical conditions, people also need to wear the mask at other times when they are awake. " Follow your health care provider's instructions for when to use the machine.   USING THE MASK  · Because the mask needs to be snug, some people feel a trapped or closed-in feeling (claustrophobic) when first using the mask. You may need to get used to the mask gradually. To do this, you can first hold the mask loosely over your nose or mouth. Gradually apply the mask more snugly. You can also gradually increase the amount of time that you use the mask.  · Masks are available in various types and sizes. Some fit over your mouth and nose, and some fit over just your nose. If your mask does not fit well, talk to your health care provider about getting a different one.  · If you are using a nasal mask and you tend to breathe through your mouth, a chin strap may be applied to help keep your mouth closed.    · The CPAP and BIPAP machines have alarms that may sound if the mask comes off or develops a leak.  · If you have trouble with the mask, it is very important that you talk to your health care provider about finding a way to make the mask easier to tolerate. Do not stop using the mask. This could have a negative impact on your health.  TIPS FOR USING THE MACHINE  · Place your CPAP or BIPAP machine on a secure table or stand near an electrical outlet.    · Know where the on-off switch is located on the machine.  · Follow your health care provider's instructions for how to set the pressure on your machine and when you should use it.  · Do not eat or drink while the CPAP or BIPAP machine is on. Food or fluids could get pushed into your lungs by the pressure of the CPAP or BIPAP.  · Do not smoke. Tobacco smoke residue can damage the machine.    · For home use, CPAP and BIPAP machines can be rented or purchased through home health care companies. Many different brands of machines are available. Renting a machine before purchasing may help you find out which particular machine works well for you.  SEEK IMMEDIATE MEDICAL CARE  "IF:  · You have redness or open areas around your nose or mouth where the mask fits.    · You have trouble operating the CPAP or BIPAP machine.    · You cannot tolerate wearing the CPAP or BIPAP mask.       This information is not intended to replace advice given to you by your health care provider. Make sure you discuss any questions you have with your health care provider.     Document Released: 09/15/2005 Document Revised: 01/08/2016 Document Reviewed: 07/17/2014  [employer] Interactive Patient Education ©2017 [employer]    Fat and Cholesterol Restricted Diet  Getting too much fat and cholesterol in your diet may cause health problems. Following this diet helps keep your fat and cholesterol at normal levels. This can keep you from getting sick.  WHAT TYPES OF FAT SHOULD I CHOOSE?  · Choose monosaturated and polyunsaturated fats. These are found in foods such as olive oil, canola oil, flaxseeds, walnuts, almonds, and seeds.  · Eat more omega-3 fats. Good choices include salmon, mackerel, sardines, tuna, flaxseed oil, and ground flaxseeds.  · Limit saturated fats. These are in animal products such as meats, butter, and cream. They can also be in plant products such as palm oil, palm kernel oil, and coconut oil.  ·  Avoid foods with partially hydrogenated oils in them. These contain trans fats. Examples of foods that have trans fats are stick margarine, some tub margarines, cookies, crackers, and other baked goods.  WHAT GENERAL GUIDELINES DO I NEED TO FOLLOW?   · Check food labels. Look for the words \"trans fat\" and \"saturated fat.\"  · When preparing a meal:    Fill half of your plate with vegetables and green salads.    Fill one fourth of your plate with whole grains. Look for the word \"whole\" as the first word in the ingredient list.    Fill one fourth of your plate with lean protein foods.  · Limit fruit to two servings a day. Choose fruit instead of juice.  · Eat more foods with soluble fiber. Examples of " foods with this type of fiber are apples, broccoli, carrots, beans, peas, and barley. Try to get 20-30 g (grams) of fiber per day.  · Eat more home-cooked foods. Eat less at restaurants and buffets.  · Limit or avoid alcohol.  · Limit foods high in starch and sugar.  · Limit fried foods.  · Cook foods without frying them. Baking, boiling, grilling, and broiling are all great options.  · Lose weight if you are overweight. Losing even a small amount of weight can help your overall health. It can also help prevent diseases such as diabetes and heart disease.  WHAT FOODS CAN I EAT?  Grains  Whole grains, such as whole wheat or whole grain breads, crackers, cereals, and pasta. Unsweetened oatmeal, bulgur, barley, quinoa, or brown rice. Corn or whole wheat flour tortillas.  Vegetables  Fresh or frozen vegetables (raw, steamed, roasted, or grilled). Green salads.  Fruits  All fresh, canned (in natural juice), or frozen fruits.  Meat and Other Protein Products  Ground beef (85% or leaner), grass-fed beef, or beef trimmed of fat. Skinless chicken or turkey. Ground chicken or turkey. Pork trimmed of fat. All fish and seafood. Eggs. Dried beans, peas, or lentils. Unsalted nuts or seeds. Unsalted canned or dry beans.  Dairy  Low-fat dairy products, such as skim or 1% milk, 2% or reduced-fat cheeses, low-fat ricotta or cottage cheese, or plain low-fat yogurt.  Fats and Oils  Tub margarines without trans fats. Light or reduced-fat mayonnaise and salad dressings. Avocado. Olive, canola, sesame, or safflower oils. Natural peanut or almond butter (choose ones without added sugar and oil).  The items listed above may not be a complete list of recommended foods or beverages. Contact your dietitian for more options.  WHAT FOODS ARE NOT RECOMMENDED?  Grains  White bread. White pasta. White rice. Cornbread. Bagels, pastries, and croissants. Crackers that contain trans fat.  Vegetables  White potatoes. Corn. Creamed or fried vegetables.  Vegetables in a cheese sauce.  Fruits  Dried fruits. Canned fruit in light or heavy syrup. Fruit juice.  Meat and Other Protein Products  Fatty cuts of meat. Ribs, chicken wings, nixon, sausage, bologna, salami, chitterlings, fatback, hot dogs, bratwurst, and packaged luncheon meats. Liver and organ meats.  Dairy  Whole or 2% milk, cream, half-and-half, and cream cheese. Whole milk cheeses. Whole-fat or sweetened yogurt. Full-fat cheeses. Nondairy creamers and whipped toppings. Processed cheese, cheese spreads, or cheese curds.  Sweets and Desserts  Corn syrup, sugars, honey, and molasses. Candy. Jam and jelly. Syrup. Sweetened cereals. Cookies, pies, cakes, donuts, muffins, and ice cream.  Fats and Oils  Butter, stick margarine, lard, shortening, ghee, or nixon fat. Coconut, palm kernel, or palm oils.  Beverages  Alcohol. Sweetened drinks (such as sodas, lemonade, and fruit drinks or punches).  The items listed above may not be a complete list of foods and beverages to avoid. Contact your dietitian for more information.     This information is not intended to replace advice given to you by your health care provider. Make sure you discuss any questions you have with your health care provider.     Document Released: 06/18/2013 Document Revised: 01/08/2016 Document Reviewed: 03/19/2015  [employer] Interactive Patient Education ©2017 [employer]  Food Choices to Lower Your Triglycerides  Triglycerides are a type of fat in your blood. High levels of triglycerides can increase the risk of heart disease and stroke. If your triglyceride levels are high, the foods you eat and your eating habits are very important. Choosing the right foods can help lower your triglycerides.   WHAT GENERAL GUIDELINES DO I NEED TO FOLLOW?  · Lose weight if you are overweight.    · Limit or avoid alcohol.    · Fill one half of your plate with vegetables and green salads.    · Limit fruit to two servings a day. Choose fruit instead of juice.  "   · Make one fourth of your plate whole grains. Look for the word \"whole\" as the first word in the ingredient list.  · Fill one fourth of your plate with lean protein foods.  · Enjoy fatty fish (such as salmon, mackerel, sardines, and tuna) three times a week.    · Choose healthy fats.    · Limit foods high in starch and sugar.  · Eat more home-cooked food and less restaurant, buffet, and fast food.  · Limit fried foods.  · Cook foods using methods other than frying.  · Limit saturated fats.  · Check ingredient lists to avoid foods with partially hydrogenated oils (trans fats) in them.  WHAT FOODS CAN I EAT?   Grains  Whole grains, such as whole wheat or whole grain breads, crackers, cereals, and pasta. Unsweetened oatmeal, bulgur, barley, quinoa, or brown rice. Corn or whole wheat flour tortillas.   Vegetables  Fresh or frozen vegetables (raw, steamed, roasted, or grilled). Green salads.  Fruits  All fresh, canned (in natural juice), or frozen fruits.  Meat and Other Protein Products  Ground beef (85% or leaner), grass-fed beef, or beef trimmed of fat. Skinless chicken or turkey. Ground chicken or turkey. Pork trimmed of fat. All fish and seafood. Eggs. Dried beans, peas, or lentils. Unsalted nuts or seeds. Unsalted canned or dry beans.  Dairy  Low-fat dairy products, such as skim or 1% milk, 2% or reduced-fat cheeses, low-fat ricotta or cottage cheese, or plain low-fat yogurt.  Fats and Oils  Tub margarines without trans fats. Light or reduced-fat mayonnaise and salad dressings. Avocado. Safflower, olive, or canola oils. Natural peanut or almond butter.  The items listed above may not be a complete list of recommended foods or beverages. Contact your dietitian for more options.  WHAT FOODS ARE NOT RECOMMENDED?   Grains  White bread. White pasta. White rice. Cornbread. Bagels, pastries, and croissants. Crackers that contain trans fat.  Vegetables  White potatoes. Corn. Creamed or fried vegetables. Vegetables in a " cheese sauce.  Fruits  Dried fruits. Canned fruit in light or heavy syrup. Fruit juice.  Meat and Other Protein Products  Fatty cuts of meat. Ribs, chicken wings, nixon, sausage, bologna, salami, chitterlings, fatback, hot dogs, bratwurst, and packaged luncheon meats.  Dairy  Whole or 2% milk, cream, half-and-half, and cream cheese. Whole-fat or sweetened yogurt. Full-fat cheeses. Nondairy creamers and whipped toppings. Processed cheese, cheese spreads, or cheese curds.  Sweets and Desserts  Corn syrup, sugars, honey, and molasses. Candy. Jam and jelly. Syrup. Sweetened cereals. Cookies, pies, cakes, donuts, muffins, and ice cream.  Fats and Oils  Butter, stick margarine, lard, shortening, ghee, or nixon fat. Coconut, palm kernel, or palm oils.  Beverages  Alcohol. Sweetened drinks (such as sodas, lemonade, and fruit drinks or punches).  The items listed above may not be a complete list of foods and beverages to avoid. Contact your dietitian for more information.     This information is not intended to replace advice given to you by your health care provider. Make sure you discuss any questions you have with your health care provider.     Document Released: 10/05/2005 Document Revised: 01/08/2016 Document Reviewed: 10/22/2014  [employer] Interactive Patient Education ©2017 [employer]    How to Avoid Diabetes Problems  You can do a lot to prevent or slow down diabetes problems. Following your diabetes plan and taking care of yourself can reduce your risk of serious or life-threatening complications. Below, you will find certain things you can do to prevent diabetes problems.  MANAGE YOUR DIABETES  Follow your health care provider's, nurse educator's, and dietitian's instructions for managing your diabetes. They will teach you the basics of diabetes care. They can help answer questions you may have. Learn about diabetes and make healthy choices regarding eating and physical activity. Monitor your blood glucose level  regularly. Your health care provider will help you decide how often to check your blood glucose level depending on your treatment goals and how well you are meeting them.   DO NOT USE NICOTINE  Nicotine and diabetes are a dangerous combination. Nicotine raises your risk for diabetes problems. If you quit using nicotine, you will lower your risk for heart attack, stroke, nerve disease, and kidney disease. Your cholesterol and your blood pressure levels may improve. Your blood circulation will also improve. Do not use any tobacco products, including cigarettes, chewing tobacco, or electronic cigarettes. If you need help quitting, ask your health care provider.  KEEP YOUR BLOOD PRESSURE UNDER CONTROL  Your health care provider will determine your individualized target blood pressure based on your age, your medicines, how long you have had diabetes, and any other medical conditions you have. Blood pressure consists of two numbers. Generally, the goal is to keep your top number (systolic pressure) at or below 130, and your bottom number (diastolic pressure) at or below 80. Your health care provider may recommend a lower target blood pressure reading, if appropriate. Meal planning, medicines, and exercise can help you reach your target blood pressure. Make sure your health care provider checks your blood pressure at every visit.  KEEP YOUR CHOLESTEROL UNDER CONTROL  Normal cholesterol levels will help prevent heart disease and stroke. These are the biggest health problems for people with diabetes. Keeping cholesterol levels under control can also help with blood flow. Have your cholesterol level checked at least once a year. Your health care provider may prescribe a medicine known as a statin. Statins lower your cholesterol. If you are not taking a statin, ask your health care provider if you should be. Meal planning, exercise, and medicines can help you reach your cholesterol targets.   SCHEDULE AND KEEP YOUR ANNUAL  PHYSICAL EXAMS AND EYE EXAMS  Your health care provider will tell you how often he or she wants to see you depending on your plan of treatment. It is important that you keep these appointments so that possible problems can be identified early and complications can be avoided or treated.  · Every visit with your health care provider should include your weight, blood pressure, and an evaluation of your blood glucose control.  · Your hemoglobin A1c should be checked:    At least twice a year if you are at your goal.    Every 3 months if there are changes in treatment.    If you are not meeting your goals.  · Your blood lipids should be checked yearly. You should also be checked yearly to see if you have protein in your urine (microalbumin).  · If you have type 1 diabetes, have an eye exam 3-5 years after you are diagnosed, and then once per year after your first exam.  · If you have type 2 diabetes, have an eye exam as soon as you are diagnosed, and then once per year after your first exam.  KEEP YOUR VACCINES CURRENT  It is recommended that you receive a flu (influenza) vaccine every year. It is also recommended that you receive a pneumonia (pneumococcal) vaccine and a hepatitis B vaccine. If you are 65 years of age or older and have never received a pneumonia vaccine, this vaccine may be given as a series of two separate shots. Ask your health care provider which additional vaccines may be recommended.  TAKE CARE OF YOUR FEET   Diabetes may cause you to have a poor blood supply (circulation) to your legs and feet. Because of this, the skin may be thinner, break easier, and heal more slowly. You also may have nerve damage in your legs and feet, causing decreased feeling. You may not notice minor injuries to your feet that could lead to serious problems or infections. Taking care of your feet is very important.  Visual foot exams are performed at every routine medical visit. The exams check for cuts, injuries, or other  problems with the feet. A comprehensive foot exam should be done yearly. This includes visual inspection as well as assessing foot pulses and testing for loss of sensation. You should also do the following:  · Inspect your feet daily for cuts, calluses, blisters, ingrown toenails, and signs of infection, such as redness, swelling, or pus.  · Wash and dry your feet thoroughly, especially between the toes.  · Avoid soaking your feet regularly in hot water baths.  · Moisturize dry skin with lotion, avoiding areas between your toes.  · Cut toenails straight across and file the edges.  · Avoid shoes that do not fit well or have areas that irritate your skin.  · Avoid going barefooted or wearing only socks. Your feet need protection.  TAKE CARE OF YOUR TEETH  People with poorly controlled diabetes are more likely to have gum (periodontal) disease. These infections make diabetes harder to control. Periodontal diseases, if left untreated, can lead to tooth loss. Brush your teeth twice a day, floss, and see your dentist for checkups and cleaning every 6 months, or 2 times a year.  ASK YOUR HEALTH CARE PROVIDER ABOUT TAKING ASPIRIN  Taking aspirin daily is recommended to help prevent cardiovascular disease in people with and without diabetes. Ask your health care provider if this would benefit you and what dose he or she would recommend.  DRINK RESPONSIBLY  Moderate amounts of alcohol (less than 1 drink per day for adult women and less than 2 drinks per day for adult men) have a minimal effect on blood glucose if ingested with food. It is important to eat food with alcohol to avoid hypoglycemia. People should avoid alcohol if they have a history of alcohol abuse or dependence, if they are pregnant, and if they have liver disease, pancreatitis, advanced neuropathy, or severe hypertriglyceridemia.  LESSEN STRESS  Living with diabetes can be stressful. When you are under stress, your blood glucose may be affected in two  ways:  · Stress hormones may cause your blood glucose to rise.  · You may be distracted from taking good care of yourself.  It is a good idea to be aware of your stress level and make changes that are necessary to help you better manage challenging situations. Support groups, planned relaxation, a hobby you enjoy, meditation, healthy relationships, and exercise all work to lower your stress level. If your efforts do not seem to be helping, get help from your health care provider or a trained mental health professional.     This information is not intended to replace advice given to you by your health care provider. Make sure you discuss any questions you have with your health care provider.     Document Released: 09/04/2012 Document Revised: 04/10/2017 Document Reviewed: 02/11/2015  [employer] Interactive Patient Education ©2017 [employer]  Hypokalemia  Hypokalemia means that the amount of potassium in the blood is lower than normal. Potassium is a chemical, called an electrolyte, that helps regulate the amount of fluid in the body. It also stimulates muscle contraction and helps nerves function properly. Most of the body's potassium is inside of cells, and only a very small amount is in the blood. Because the amount in the blood is so small, minor changes can be life-threatening.  CAUSES  · Antibiotics.  · Diarrhea or vomiting.  · Using laxatives too much, which can cause diarrhea.  · Chronic kidney disease.  · Water pills (diuretics).  · Eating disorders (bulimia).  · Low magnesium level.  · Sweating a lot.  SIGNS AND SYMPTOMS  · Weakness.  · Constipation.  · Fatigue.  · Muscle cramps.  · Mental confusion.  · Skipped heartbeats or irregular heartbeat (palpitations).  · Tingling or numbness.  DIAGNOSIS   Your health care provider can diagnose hypokalemia with blood tests. In addition to checking your potassium level, your health care provider may also check other lab tests.  TREATMENT  Hypokalemia can be treated  with potassium supplements taken by mouth or adjustments in your current medicines. If your potassium level is very low, you may need to get potassium through a vein (IV) and be monitored in the hospital. A diet high in potassium is also helpful. Foods high in potassium are:  · Nuts, such as peanuts and pistachios.  · Seeds, such as sunflower seeds and pumpkin seeds.  · Peas, lentils, and lima beans.  · Whole grain and bran cereals and breads.  · Fresh fruit and vegetables, such as apricots, avocado, bananas, cantaloupe, kiwi, oranges, tomatoes, asparagus, and potatoes.  · Orange and tomato juices.  · Red meats.  · Fruit yogurt.  HOME CARE INSTRUCTIONS  · Take all medicines as prescribed by your health care provider.  · Maintain a healthy diet by including nutritious food, such as fruits, vegetables, nuts, whole grains, and lean meats.  · If you are taking a laxative, be sure to follow the directions on the label.  SEEK MEDICAL CARE IF:  · Your weakness gets worse.  · You feel your heart pounding or racing.  · You are vomiting or having diarrhea.  · You are diabetic and having trouble keeping your blood glucose in the normal range.  SEEK IMMEDIATE MEDICAL CARE IF:  · You have chest pain, shortness of breath, or dizziness.  · You are vomiting or having diarrhea for more than 2 days.  · You faint.  MAKE SURE YOU:   · Understand these instructions.  · Will watch your condition.  · Will get help right away if you are not doing well or get worse.     This information is not intended to replace advice given to you by your health care provider. Make sure you discuss any questions you have with your health care provider.     Document Released: 12/18/2006 Document Revised: 01/08/2016 Document Reviewed: 06/20/2014  [employer] Interactive Patient Education ©2017 [employer]    Obesity, Adult  Obesity is having too much body fat. If you have a BMI of 30 or more, you are obese. BMI is a number that explains how much body fat you  have. Obesity is often caused by taking in (consuming) more calories than your body uses.  Obesity can cause serious health problems. Changing your lifestyle can help to treat obesity.  HOME CARE  Eating and Drinking  · Follow advice from your doctor about what to eat and drink. Your doctor may tell you to:    Cut down on (limit) fast foods, sweets, and processed snack foods.    Choose low-fat options. For example, choose low-fat milk instead of whole milk.    Eat 5 or more servings of fruits or vegetables every day.    Eat at home more often. This gives you more control over what you eat.    Choose healthy foods when you eat out.    Learn what a healthy portion size is. A portion size is the amount of a certain food that is healthy for you to eat at one time. This is different for each person.    Keep low-fat snacks available.    Avoid sugary drinks. These include soda, fruit juice, iced tea that is sweetened with sugar, and flavored milk.    Eat a healthy breakfast.  · Drink enough water to keep your pee (urine) clear or pale yellow.  · Do not go without eating for long periods of time (do not fast).  · Do not go on popular or trendy diets (fad diets).  Physical Activity  · Exercise often, as told by your doctor. Ask your doctor:    What types of exercise are safe for you.    How often you should exercise.  · Warm up and stretch before being active.  · Do slow stretching after being active (cool down).  · Rest between times of being active.  Lifestyle  · Limit how much time you spend in front of your TV, computer, or video game system (be less sedentary).  · Find ways to reward yourself that do not involve food.  · Limit alcohol intake to no more than 1 drink a day for nonpregnant women and 2 drinks a day for men. One drink equals 12 oz of beer, 5 oz of wine, or 1½ oz of hard liquor.  General Instructions  · Keep a weight loss journal. This can help you keep track of:    The food that you eat.    The exercise that  you do.  · Take over-the-counter and prescription medicines only as told by your doctor.  · Take vitamins and supplements only as told by your doctor.  · Think about joining a support group. Your doctor may be able to help with this.  · Keep all follow-up visits as told by your doctor. This is important.  GET HELP IF:  · You cannot meet your weight loss goal after you have changed your diet and lifestyle for 6 weeks.     This information is not intended to replace advice given to you by your health care provider. Make sure you discuss any questions you have with your health care provider.     Document Released: 03/11/2013 Document Revised: 04/10/2017 Document Reviewed: 10/05/2016  [employer] Interactive Patient Education ©2017 [employer]    Preventing Type 2 Diabetes Mellitus  Type 2 diabetes (type 2 diabetes mellitus) is a long-term (chronic) disease that affects blood sugar (glucose) levels. Normally, a hormone called insulin allows glucose to enter cells in the body. The cells use glucose for energy. In type 2 diabetes, one or both of these problems may be present:  · The body does not make enough insulin.  · The body does not respond properly to insulin that it makes (insulin resistance).  Insulin resistance or lack of insulin causes excess glucose to build up in the blood instead of going into cells. As a result, high blood glucose (hyperglycemia) develops, which can cause many complications. Being overweight or obese and having an inactive (sedentary) lifestyle can increase your risk for diabetes. Type 2 diabetes can be delayed or prevented by making certain nutrition and lifestyle changes.  WHAT NUTRITION CHANGES CAN BE MADE?  · Eat healthy meals and snacks regularly. Keep a healthy snack with you for when you get hungry between meals, such as fruit or a handful of nuts.  · Eat lean meats and proteins that are low in saturated fats, such as chicken, fish, egg whites, and beans. Avoid processed meats.  · Eat  plenty of fruits and vegetables and plenty of grains that have not been processed (whole grains). It is recommended that you eat:    1½-2 cups of fruit every day.    2½-3 cups of vegetables every day.    6-8 oz of whole grains every day, such as oats, whole wheat, bulgur, brown rice, quinoa, and millet.  · Eat low-fat dairy products, such as milk, yogurt, and cheese.  · Eat foods that contain healthy fats, such as nuts, avocado, olive oil, and canola oil.  · Drink water throughout the day. Avoid drinks that contain added sugar, such as soda or sweet tea.  · Follow instructions from your health care provider about specific eating or drinking restrictions.  · Control how much food you eat at a time (portion size).    Check food labels to find out the serving sizes of foods.    Use a kitchen scale to weigh amounts of foods.  · Saute or steam food instead of frying it. Cook with water or broth instead of oils or butter.  · Limit your intake of:    Salt (sodium). Have no more than 1 tsp (2,400 mg) of sodium a day. If you have heart disease or high blood pressure, have less than ½-¾ tsp (1,500 mg) of sodium a day.    Saturated fat. This is fat that is solid at room temperature, such as butter or fat on meat.  WHAT LIFESTYLE CHANGES CAN BE MADE?  Activity  · Do moderate-intensity physical activity for at least 30 minutes on at least 5 days of the week, or as much as told by your health care provider.  · Ask your health care provider what activities are safe for you. A mix of physical activities may be best, such as walking, swimming, cycling, and strength training.  · Try to add physical activity into your day. For example:    Park in spots that are farther away than usual, so that you walk more. For example, park in a far corner of the parking lot when you go to the office or the grocery store.    Take a walk during your lunch break.    Use stairs instead of elevators or escalators.  Weight Loss  · Lose weight as  directed. Your health care provider can determine how much weight loss is best for you and can help you lose weight safely.  · If you are overweight or obese, you may be instructed to lose at least 5-7 % of your body weight.  Alcohol and Tobacco   · Limit alcohol intake to no more than 1 drink a day for nonpregnant women and 2 drinks a day for men. One drink equals 12 oz of beer, 5 oz of wine, or 1½ oz of hard liquor.  · Do not use any tobacco products, such as cigarettes, chewing tobacco, and e-cigarettes. If you need help quitting, ask your health care provider.  Work With Your Health Care Provider  · Have your blood glucose tested regularly, as told by your health care provider.  · Discuss your risk factors and how you can reduce your risk for diabetes.  · Get screening tests as told by your health care provider. You may have screening tests regularly, especially if you have certain risk factors for type 2 diabetes.  · Make an appointment with a diet and nutrition specialist (registered dietitian). A registered dietitian can help you make a healthy eating plan and can help you understand portion sizes and food labels.  WHY ARE THESE CHANGES IMPORTANT?  · It is possible to prevent or delay type 2 diabetes and related health problems by making lifestyle and nutrition changes.  · It can be difficult to recognize signs of type 2 diabetes. The best way to avoid possible damage to your body is to take actions to prevent the disease before you develop symptoms.  WHAT CAN HAPPEN IF CHANGES ARE NOT MADE?  · Your blood glucose levels may keep increasing. Having high blood glucose for a long time is dangerous. Too much glucose in your blood can damage your blood vessels, heart, kidneys, nerves, and eyes.  · You may develop prediabetes or type 2 diabetes. Type 2 diabetes can lead to many chronic health problems and complications, such as:    Heart disease.    Stroke.    Blindness.    Kidney disease.    Depression.    Poor  circulation in the feet and legs, which could lead to surgical removal (amputation) in severe cases.  WHERE TO FIND SUPPORT:  · Ask your health care provider to recommend a registered dietitian, diabetes educator, or weight loss program.  · Look for local or online weight loss groups.  · Join a gym, fitness club, or outdoor activity group, such as a walking club.  WHERE TO FIND MORE INFORMATION:  To learn more about diabetes and diabetes prevention, visit:  · American Diabetes Association (ADA): www.diabetes.org  · National [place] of Diabetes and Digestive and Kidney Diseases: www.niddk.nih.gov/health-information/diabetes  To learn more about healthy eating, visit:  · The U.S. Department of Agriculture ([employer]), Choose My Plate: www.Method.gov/food-groups  · Office of Disease Prevention and Health Promotion (ODPHP), Dietary Guidelines: www.health.gov/dietaryguidelines  SUMMARY  · You can reduce your risk for type 2 diabetes by increasing your physical activity, eating healthy foods, and losing weight as directed.  · Talk with your health care provider about your risk for type 2 diabetes. Ask about any blood tests or screening tests that you need to have.     This information is not intended to replace advice given to you by your health care provider. Make sure you discuss any questions you have with your health care provider.     Document Released: 04/10/2017 Document Reviewed: 02/07/2017  [employer] Interactive Patient Education ©2017 [employer]    Pursed Lip Breathing  Some long-term respiratory conditions like COPD and asthma can make it hard to release all the air in your lungs when you breathe out (exhale). This can result in oxygen-poor air building up in your lungs (air trapping) and make it hard to fill your lungs with fresh air during each breath. This can also cause you to feel short of breath.   Pursed lip breathing can help to relieve some of this shortness of breath. By keeping your airways  open for a longer amount of time during your breath out, you can empty more air out of your lungs. This will make more space for fresh air during the next breath in (inhalation).    HOW TO PERFORM PURSED LIP BREATHING  1. Close your mouth.  2. Breathe in through your nose, taking a normal-sized breath. The rate of breathing in may be your normal rate, or you can try breathing in with a slow count to two or three if you feel you are not getting enough air.  3. Pucker your lips as if you were going to whistle.  4. Gently tighten your stomach muscles to help push the air out.  5. Breathe out slowly through your pursed lips. You should breathe out at least twice as long as you breathe in.  6.  Be sure you breathe out all of the air, but do not force the air out.  GET HELP IF:  Your shortness of breath gets worse.  GET HELP RIGHT AWAY IF:   You are very short of breath.     This information is not intended to replace advice given to you by your health care provider. Make sure you discuss any questions you have with your health care provider.     Document Released: 09/26/2009 Document Revised: 01/08/2016 Document Reviewed: 07/30/2014  [employer] Interactive Patient Education ©2017 [employer] Inc.

## 2017-10-30 NOTE — PROGRESS NOTES
Subjective   Jameel Dozier is a 57 y.o. male.     HPI Comments: Patient had repeat BMP outpatient as part of follow-up for hypokalemia on replacement therapy while on HCTZ prior to scheduled HTN visit and was found to be hypokalemic and hypomagnesemia requiring inpatient potassium and magnesium replacement.  Patient is being seen today for his hospital follow-up and continued monitoring of his potassium, magnesium, and blood pressure control.    Patient has [place] on BiPAP/CPAP, which he does not use.  Patient reports that he has been seen by sleep medicine 3 times for adjustments to no effect and so does not use his machine at all.  He reports consistent issues with insomnia, admitting to difficulty falling asleep, difficulty staying asleep, and having to get up frequently at night to use the restroom.  Says that he sleeps very irregularly, sometimes falling asleep during the day, and then not being able to fall sleep at night.  Certain for poor sleep hygiene in addition to poor compliance for treatment of his [place].   Patient did admit to some dyspnea with frequent albuterol use, but could not describe exacerbating or alleviating factors.  He has significant tobacco exposure history smoking at least 2 packs per day for some time, but says he is now down to a pack per day currently.  Discussed his risk for COPD with recommended referral to pulmonology for testing.  Discussed tobacco cessation for 5-10 minutes.  Patient had significant concerns about affordability of medications to help him quit smoking.  Reports that he has been unable to quit using the patch and lozenges in the past, says they're too expensive.  Patient reports that he trims his nails regularly, but on discussion about his risks for foot care as a prediabetic could convince patient to see podiatry for better foot care.  Patient tends to sit with his tongue sticking slightly out of his mouth, discussed whether this was a new issue.  Patient  reports that he needs a habit that he has.  Denies any swelling of his mouth or lips, any difficulty breathing, or difficulty swallowing, besides issues already discussed.  Discussed risk of angioedema while on losartan, patient says that he's had this for some time and is not  new.       The following portions of the patient's history were reviewed and updated as appropriate: allergies, current medications, past family history, past medical history, past social history, past surgical history and problem list.    Social History     Social History   • Marital status: Single     Spouse name: N/A   • Number of children: 0   • Years of education: 12     Occupational History   • retired      Social History Main Topics   • Smoking status: Current Every Day Smoker     Packs/day: 2.00     Years: 40.00     Types: Cigarettes   • Smokeless tobacco: Never Used   • Alcohol use 3.6 oz/week     6 Cans of beer per week      Comment: when can get   • Drug use: No   • Sexual activity: Defer     Other Topics Concern   • Not on file     Social History Narrative       Immunization History   Administered Date(s) Administered   • Pneumococcal Polysaccharide 08/15/2014   • Tdap 08/15/2014        Current Outpatient Prescriptions on File Prior to Visit   Medication Sig Dispense Refill   • acyclovir (ZOVIRAX) 400 MG tablet Take 1 tablet by mouth 5 (Five) Times a Day. Take no more than 5 doses a day. 30 tablet 0   • amLODIPine (NORVASC) 5 MG tablet Take 1 tablet by mouth Daily. 30 tablet 6   • ferrous sulfate (FEOSOL) 325 (65 FE) MG tablet Take 1 tablet by mouth 3 (Three) Times a Day With Meals. 90 tablet 5   • [DISCONTINUED] albuterol (PROVENTIL HFA;VENTOLIN HFA) 108 (90 BASE) MCG/ACT inhaler Inhale 2 puffs Every 4 (Four) Hours As Needed for Wheezing. 3.7 g 11   • [DISCONTINUED] aspirin 81 MG EC tablet Take 1 tablet by mouth Daily. 30 tablet 5   • [DISCONTINUED] cetirizine (zyrTEC) 10 MG tablet Take 1 tablet by mouth Daily. 30 tablet 5   •  [DISCONTINUED] losartan (COZAAR) 100 MG tablet Take 1 tablet by mouth Daily. 30 tablet 5   • [DISCONTINUED] lovastatin (MEVACOR) 40 MG tablet Take 1 tablet by mouth Daily. 30 tablet 5   • [DISCONTINUED] metFORMIN (GLUCOPHAGE) 500 MG tablet Take 1 tablet by mouth 2 (Two) Times a Day With Meals. 60 tablet 5   • [DISCONTINUED] metoprolol tartrate (LOPRESSOR) 25 MG tablet Take 1 tablet by mouth 2 (Two) Times a Day. 60 tablet 5   • [DISCONTINUED] omeprazole (priLOSEC) 20 MG capsule Take 1 capsule by mouth Every Morning. 30 capsule 5     No current facility-administered medications on file prior to visit.        Review of Systems   Constitutional: Negative for activity change, appetite change, fatigue and fever.   HENT: Positive for trouble swallowing. Negative for ear pain and sore throat.    Eyes: Negative for pain and visual disturbance.   Respiratory: Positive for shortness of breath. Negative for cough.    Cardiovascular: Negative for chest pain and palpitations.   Gastrointestinal: Negative for abdominal pain and nausea.   Endocrine: Negative for cold intolerance and heat intolerance.   Genitourinary: Negative for difficulty urinating and dysuria.   Musculoskeletal: Negative for arthralgias and gait problem.   Skin: Negative for color change and rash.   Neurological: Negative for dizziness, weakness and headaches.   Hematological: Negative for adenopathy. Does not bruise/bleed easily.   Psychiatric/Behavioral: Positive for sleep disturbance. Negative for agitation and confusion.       Objective   Physical Exam   Constitutional: He is oriented to person, place, and time. He appears well-developed and well-nourished.   HENT:   Head: Normocephalic and atraumatic.   Eyes: Conjunctivae, EOM and lids are normal. Pupils are equal, round, and reactive to light.   Neck: Normal range of motion. Neck supple.   Cardiovascular: Normal rate, regular rhythm and normal heart sounds.  Exam reveals no gallop and no friction rub.     No murmur heard.  Pulmonary/Chest: Effort normal and breath sounds normal.   Abdominal: Soft. Normal appearance and bowel sounds are normal. There is no tenderness.   Musculoskeletal: Normal range of motion.    Jameel had a diabetic foot exam performed today.   During the foot exam he had a monofilament test performed.   Skin Integrity  -  He has right foot onychomycosis, callous right foot, right foot ingrown toenail and right heel is dry and cracked.  He hasno right foot ulcer.He has left foot onychomycosis, callous left foot, left foot ingrown toenail and left heel dry and cracked. He has no left foot ulcer..  Neurological: He is alert and oriented to person, place, and time.   Skin: Skin is warm, dry and intact.   Psychiatric: He has a normal mood and affect. His speech is normal and behavior is normal. Judgment and thought content normal. Cognition and memory are normal.   Diabetic foot exam:   Left: Filament test absent   Pulses Dorsalis Pedis:  present  Posterior Tibial:  present   Reflexes 2+    Vibratory sensation normal   Proprioception normal   Sharp/dull discrimination normal       Right: Filament test absent   Pulses Dorsalis Pedis:  present  Posterior Tibial:  present   Reflexes 2+    Vibratory sensation normal   Proprioception normal   Sharp/dull discrimination normal      Lab Results (last 24 hours)     Procedure Component Value Units Date/Time    Microalbumin / Creatinine Urine Ratio - Urine, Clean Catch [[id number]] Collected:  10/30/17 1146    Specimen:  Urine from Urine, Clean Catch Updated:  10/30/17 1146            Vitals:    10/30/17 1017   BP: 142/86   Pulse: 79   SpO2: 98%       Assessment/Plan   Jameel was seen today for diabetes and follow-up.    Diagnoses and all orders for this visit:    Hypokalemia due to loss of potassium  -     Potassium  -     Magnesium    Obstructive sleep apnea syndrome  -     albuterol (PROVENTIL HFA;VENTOLIN HFA) 108 (90 Base) MCG/ACT inhaler; Inhale 2 puffs  Every 4 (Four) Hours As Needed for Wheezing.    Essential hypertension  -     aspirin 81 MG EC tablet; Take 1 tablet by mouth Daily.  -     losartan (COZAAR) 100 MG tablet; Take 1 tablet by mouth Daily.  -     metoprolol tartrate (LOPRESSOR) 25 MG tablet; Take 1 tablet by mouth 2 (Two) Times a Day.    Central obesity  -     aspirin 81 MG EC tablet; Take 1 tablet by mouth Daily.    Hypercholesterolemia  -     aspirin 81 MG EC tablet; Take 1 tablet by mouth Daily.  -     lovastatin (MEVACOR) 40 MG tablet; Take 1 tablet by mouth Daily.    GERD with esophagitis  -     Ambulatory Referral to Gastroenterology  -     cetirizine (zyrTEC) 10 MG tablet; Take 1 tablet by mouth Daily.  -     omeprazole (priLOSEC) 20 MG capsule; Take 1 capsule by mouth Every Morning.    Pre-diabetes  -     Microalbumin / Creatinine Urine Ratio - Urine, Clean Catch  -     metFORMIN (GLUCOPHAGE) 500 MG tablet; Take 1 tablet by mouth 2 (Two) Times a Day With Meals.    Tobacco dependence  -     CT Chest Low Dose Wo; Future  -     Ambulatory Referral to Pulmonology    Diabetic feet  -     Ambulatory Referral to Podiatry    Cigarette smoker two packs a day or less  -     CT Chest Low Dose Wo; Future  -     Ambulatory Referral to Pulmonology    Dyspnea, unspecified type  -     Ambulatory Referral to Pulmonology                   GOALS:  Better control of patients multiple co-morbidities with adherence to treatment. Better meal planning and diet for weight loss as well as improved cardiovascular exercise.    BARRIERS TO GOALS:  Patient is not open to discussion about his concerns with poor trust of physicians. Reports that he's told doctors about issues before and they were ignored as reason he hasn't reported symptoms to me as his PCP. Patient takes significant reassurance and prompting about medical concerns.     Preventative:  Male Preventative: Patient to quit smoking  up to date and documented   Recommended:Influenza  Smoking cessation counseling  was provided.  regular (moderate)  eat more fruits and vegetables, decrease soda or juice intake, increase water intake and increase physical activity    RISK SCORE: 5        This document has been electronically signed by Madonna Flores MD on October 30, 2017 1:44 PM

## 2017-10-30 NOTE — PROGRESS NOTES
I have reviewed the notes, assessments, and/or procedures performed. I concur with her/his documentation of Jameel Dozier.     Riki Kennedy, DO

## 2017-11-08 ENCOUNTER — HOSPITAL ENCOUNTER (OUTPATIENT)
Dept: CT IMAGING | Facility: HOSPITAL | Age: 57
Discharge: HOME | End: 2017-11-08
Attending: FAMILY MEDICINE | Admitting: FAMILY MEDICINE

## 2017-11-08 DIAGNOSIS — F17.210 CIGARETTE SMOKER TWO PACKS A DAY OR LESS: ICD-10-CM

## 2017-11-08 DIAGNOSIS — F17.200 TOBACCO DEPENDENCE: ICD-10-CM

## 2017-11-08 PROCEDURE — G0297 LDCT FOR LUNG CA SCREEN: HCPCS

## 2017-11-14 ENCOUNTER — OFFICE VISIT (OUTPATIENT)
Dept: PODIATRY | Facility: CLINIC | Age: 57
End: 2017-11-14

## 2017-11-14 VITALS
BODY MASS INDEX: 31.68 KG/M2 | DIASTOLIC BLOOD PRESSURE: 103 MMHG | HEART RATE: 104 BPM | HEIGHT: 72 IN | WEIGHT: 233.9 LBS | OXYGEN SATURATION: 97 % | SYSTOLIC BLOOD PRESSURE: 173 MMHG

## 2017-11-14 DIAGNOSIS — M79.674 PAIN IN TOES OF BOTH FEET: ICD-10-CM

## 2017-11-14 DIAGNOSIS — L85.3 XEROSIS CUTIS: ICD-10-CM

## 2017-11-14 DIAGNOSIS — M21.42 PES PLANUS OF BOTH FEET: ICD-10-CM

## 2017-11-14 DIAGNOSIS — M79.675 PAIN IN TOES OF BOTH FEET: ICD-10-CM

## 2017-11-14 DIAGNOSIS — M21.41 PES PLANUS OF BOTH FEET: ICD-10-CM

## 2017-11-14 DIAGNOSIS — E11.8 DIABETIC FOOT (HCC): ICD-10-CM

## 2017-11-14 DIAGNOSIS — B35.1 ONYCHOMYCOSIS: Primary | ICD-10-CM

## 2017-11-14 PROCEDURE — 11721 DEBRIDE NAIL 6 OR MORE: CPT | Performed by: PODIATRIST

## 2017-11-14 PROCEDURE — 99203 OFFICE O/P NEW LOW 30 MIN: CPT | Performed by: PODIATRIST

## 2017-11-14 NOTE — PROGRESS NOTES
Jameel Dozier  1960  57 y.o. male   PCP: Madonna Flores MD 10/30/2017  BS: 107 on 10/27/2017 per labs in chart    11/14/2017  Chief Complaint   Patient presents with   • Left Foot - diabetic foot exam   • Right Foot - diabetic foot exam           History of Present Illness    Jameel Dozier is a 57 y.o. male who presents for routine diabetic foot exam.  He takes metformin for his diabetes.  He is typically well-controlled and his most recent hemoglobin A1c was 5.8.  He denies significant numbness, tingling and burning sensations in his feet.  He denies history of foot ulceration.  He does note some painful dystrophic nails to both of his feet which predominantly cause pain in close toed shoe gear.  He states he does also have a history of flatfoot and dry skin which are all chronic issues for him.  He denies any acute pedal complaints today.      Past Medical History:   Diagnosis Date   • Abdominal pain    • Acquired achalasia of esophagus    • Adenomatous polyp of colon    • Adverse drug effect    • Alcohol dependence with alcohol-induced disorder    • Allergic rhinitis    • Anal fissure    • Anemia due to blood loss    • Benign essential hypertension    • Central obesity    • Cerebrovascular disease    • Chronic obstructive lung disease    • Claudication    • Disorder of peripheral nervous system    • Diverticular disease of colon     completed antibx, ? neoplasm on CT   • Dysphagia    • Dyspnea    • ED (erectile dysfunction)    • Epigastric pain    • Esophageal dysmotility    • Esophageal reflux    • Esophagitis    • Essential hypertension    • External hemorrhoids    • Gastritis    • GERD with esophagitis    • Heavy tobacco smoker    • Hemorrhoids    • Hiatal hernia    • Hiccough    • History of colon polyps    • History of echocardiogram     Normal LV funciton with Ef of 65% to 70% without regional wall motion abnormalities.Mild CLVH. Normal RV size and function. No significant valvular  regurgitation or stenosis. 09/19/2014       • History of EKG    • History of TIA (transient ischemic attack)    • Hypercholesterolemia    • Hypertension    • Left ventricular hypertrophy    • Male erectile disorder    • Obstructive sleep apnea syndrome    • Primary osteoarthritis of knees, bilateral    • Rectal bleed     painful   • Rectal hemorrhage    • Sleep disorder    • Transient cerebral ischemia    • Upper respiratory infection    • Weakness     Attacks of weakness         Past Surgical History:   Procedure Laterality Date   • COLONOSCOPY      Internal and external hemorrhoids found. 04/13/2015    • ENDOSCOPY      Internal & external hemorrhoids found. 1 polyp in sigmoid colon; removed by snare cautery polypectomy. 09/26/2012    • ENDOSCOPY      Gastritis found in the stomach. Biopsy taken.Enlarged folds were found in the body of the stomach.Biopsy taken.Normal duodenum.A hiatus hernia was found in the esophagus. 04/13/2015    • ENDOSCOPY      Hiatus hernia in esophagus. Gastritis in stomach. Biopsy taken. Normal duodenum. Biopsy taken. 09/26/2012             Family History   Problem Relation Age of Onset   • Cancer Mother    • Cirrhosis Father    • Diabetes Other    • Hypertension Other          Social History     Social History   • Marital status: Single     Spouse name: N/A   • Number of children: 0   • Years of education: 12     Occupational History   • retired      Social History Main Topics   • Smoking status: Current Every Day Smoker     Packs/day: 2.00     Years: 40.00     Types: Cigarettes   • Smokeless tobacco: Never Used   • Alcohol use 3.6 oz/week     6 Cans of beer per week      Comment: when can get   • Drug use: No   • Sexual activity: Defer     Other Topics Concern   • Not on file     Social History Narrative         Current Outpatient Prescriptions   Medication Sig Dispense Refill   • acyclovir (ZOVIRAX) 400 MG tablet Take 1 tablet by mouth 5 (Five) Times a Day. Take no more than 5 doses a  "day. 30 tablet 0   • albuterol (PROVENTIL HFA;VENTOLIN HFA) 108 (90 Base) MCG/ACT inhaler Inhale 2 puffs Every 4 (Four) Hours As Needed for Wheezing. 3.7 g 11   • amLODIPine (NORVASC) 5 MG tablet Take 1 tablet by mouth Daily. 30 tablet 6   • aspirin 81 MG EC tablet Take 1 tablet by mouth Daily. 30 tablet 5   • cetirizine (zyrTEC) 10 MG tablet Take 1 tablet by mouth Daily. 30 tablet 5   • ferrous sulfate (FEOSOL) 325 (65 FE) MG tablet Take 1 tablet by mouth 3 (Three) Times a Day With Meals. 90 tablet 5   • losartan (COZAAR) 100 MG tablet Take 1 tablet by mouth Daily. 30 tablet 5   • lovastatin (MEVACOR) 40 MG tablet Take 1 tablet by mouth Daily. 30 tablet 5   • metFORMIN (GLUCOPHAGE) 500 MG tablet Take 1 tablet by mouth 2 (Two) Times a Day With Meals. 60 tablet 5   • metoprolol tartrate (LOPRESSOR) 25 MG tablet Take 1 tablet by mouth 2 (Two) Times a Day. 60 tablet 5   • omeprazole (priLOSEC) 20 MG capsule Take 1 capsule by mouth Every Morning. 30 capsule 5     No current facility-administered medications for this visit.          OBJECTIVE    BP (!) 173/103  Pulse 104  Ht 72\" (182.9 cm)  Wt 233 lb 14.4 oz (106 kg)  SpO2 97%  BMI 31.72 kg/m2      Review of Systems   Constitutional: Negative.    HENT: Positive for trouble swallowing.    Eyes: Positive for visual disturbance.   Respiratory: Positive for cough, choking, chest tightness, shortness of breath, wheezing and stridor.    Cardiovascular: Positive for chest pain.   Gastrointestinal: Positive for blood in stool.   Endocrine: Positive for heat intolerance.   Genitourinary: Negative.    Musculoskeletal: Positive for back pain.   Skin: Negative.    Allergic/Immunologic: Negative.    Neurological: Positive for headaches.   Hematological: Negative.    Psychiatric/Behavioral: Negative.          Physical Exam   Constitutional: he appears well-developed and well-nourished.   HEENT: Normocephalic. Atraumatic  CV: No tenderness. RRR  Resp: Non-labored respiration. No " wheezes.   Psychiatric: he has a normal mood and affect. his   behavior is normal.      Lower Extremity Exam:  Vascular: DP/PT pulses palpable 1+.   Negative hair growth.   Minimal perimalleolar edema  Neuro: Protective sensation intact, b/l.  Returns sensation intact  DTRs intact  Integument: No open wounds or lesions.  Atrophic skin noted b/l with diffuse xerosis  Web spaces dry, intact  No masses  Musculoskeletal: LE muscle strength 5/5.   Gait normal  Mild hammertoe deformity toes 2-5 b/l.  Ankle ROM decreased  Nails 1-5 b/l thickened, elongated with subungual debris. +pain on palpation              ASSESSMENT AND PLAN    Jameel was seen today for diabetic foot exam and diabetic foot exam.    Diagnoses and all orders for this visit:    Onychomycosis    Pain in toes of both feet    Diabetic foot    Pes planus of both feet    Xerosis cutis    -Comprehensive DM foot exam performed. Pt educated on importance of tight glucose control and daily foot checks. Pt education materials dispensed.  -Pt educated on proper extra depth diabetic shoe gear.  Limit barefoot walking.  Skin hydration.  -Nails 1-5 b/l debrided in length and thickness with nail nipper to decrease pain, fungal load and risk of infection  -Follow up 3 months PRN            This document has been electronically signed by Adarsh Chen DPM on November 26, 2017 2:20 PM     EMR Dragon/Transcription disclaimer:   Much of this encounter note is an electronic transcription/translation of spoken language to printed text. The electronic translation of spoken language may permit erroneous, or at times, nonsensical words or phrases to be inadvertently transcribed; Although I have reviewed the note for such errors, some may still exist.    Adarsh Chen DPM  11/26/2017  2:20 PM

## 2017-11-27 ENCOUNTER — OFFICE VISIT (OUTPATIENT)
Dept: PULMONOLOGY | Facility: CLINIC | Age: 57
End: 2017-11-27

## 2017-11-27 VITALS
DIASTOLIC BLOOD PRESSURE: 100 MMHG | OXYGEN SATURATION: 99 % | BODY MASS INDEX: 31.56 KG/M2 | HEART RATE: 88 BPM | HEIGHT: 72 IN | SYSTOLIC BLOOD PRESSURE: 170 MMHG | WEIGHT: 233 LBS

## 2017-11-27 DIAGNOSIS — R06.09 DYSPNEA ON EXERTION: Primary | ICD-10-CM

## 2017-11-27 DIAGNOSIS — J30.89 CHRONIC NON-SEASONAL ALLERGIC RHINITIS, UNSPECIFIED TRIGGER: ICD-10-CM

## 2017-11-27 DIAGNOSIS — J44.9 CHRONIC OBSTRUCTIVE PULMONARY DISEASE, UNSPECIFIED COPD TYPE (HCC): ICD-10-CM

## 2017-11-27 DIAGNOSIS — F17.200 TOBACCO USE DISORDER: ICD-10-CM

## 2017-11-27 DIAGNOSIS — E66.09 CLASS 1 OBESITY DUE TO EXCESS CALORIES WITH BODY MASS INDEX (BMI) OF 31.0 TO 31.9 IN ADULT, UNSPECIFIED WHETHER SERIOUS COMORBIDITY PRESENT: ICD-10-CM

## 2017-11-27 DIAGNOSIS — R53.81 PHYSICAL DECONDITIONING: ICD-10-CM

## 2017-11-27 PROCEDURE — 94060 EVALUATION OF WHEEZING: CPT | Performed by: INTERNAL MEDICINE

## 2017-11-27 PROCEDURE — 99204 OFFICE O/P NEW MOD 45 MIN: CPT | Performed by: INTERNAL MEDICINE

## 2017-11-27 PROCEDURE — 99406 BEHAV CHNG SMOKING 3-10 MIN: CPT | Performed by: INTERNAL MEDICINE

## 2017-11-27 RX ORDER — FLUTICASONE PROPIONATE 50 MCG
2 SPRAY, SUSPENSION (ML) NASAL DAILY
Qty: 1 BOTTLE | Refills: 11 | Status: SHIPPED | OUTPATIENT
Start: 2017-11-27 | End: 2022-07-14

## 2017-11-27 NOTE — PROGRESS NOTES
Pulmonary Consultation    Subjective     Chief Complaint   Patient presents with   • Shortness of Breath     ref by Dr. Flores   • Nicotine Dependence        History of Present Illness  Jameel Dozier is a 57 y.o. male with a PMH significant for tobacco use, [place], obesity, HTN, and GERD who presents for evaluation of dyspnea. Pt states he was referred for his breathing. He admits to progressive dyspnea on minimal exertion for several years. He denies prior history of lung disease but he was given albuterol which he uses rarely. He does not feel that the albuterol helps. Pt admits to intermittent chest pain and chronic issues with nasal allergies for which he takes zyrtec. He continues to have a lot of nasal congestion and postnasal gtt. He admits to wt gain but denies edema. He has [place] but he could not tolerate the CPAP. Pt admits to smoking 1ppd since his teens, but he is pre-contemptlative regarding cessation. He has worked odd labor jobs. There is no known lung disease or lung cancer in the family.    Review of Systems: History obtained from chart review and the patient.  Review of Systems   Constitutional: Positive for fatigue and unexpected weight change. Negative for fever.   HENT: Positive for congestion and postnasal drip.    Respiratory: Positive for cough and shortness of breath. Negative for wheezing.    Cardiovascular: Positive for chest pain. Negative for leg swelling.   Gastrointestinal: Negative for abdominal pain.     As described in the HPI. Otherwise, remainder of ROS (14 systems) were negative.    Patient Active Problem List   Diagnosis   • Central obesity   • Hypercholesterolemia   • Essential hypertension   • GERD with esophagitis   • Astigmatism   • Type 2 diabetes mellitus with complication, without long-term current use of insulin   • Nicotine abuse   • Tobacco use disorder   • Chronic obstructive pulmonary disease   • Class 1 obesity due to excess calories with body mass index  (BMI) of 31.0 to 31.9 in adult   • Physical deconditioning   • Chronic non-seasonal allergic rhinitis         Current Outpatient Prescriptions:   •  acyclovir (ZOVIRAX) 400 MG tablet, Take 1 tablet by mouth 5 (Five) Times a Day. Take no more than 5 doses a day., Disp: 30 tablet, Rfl: 0  •  albuterol (PROVENTIL HFA;VENTOLIN HFA) 108 (90 Base) MCG/ACT inhaler, Inhale 2 puffs Every 4 (Four) Hours As Needed for Wheezing., Disp: 3.7 g, Rfl: 11  •  amLODIPine (NORVASC) 5 MG tablet, Take 1 tablet by mouth Daily., Disp: 30 tablet, Rfl: 6  •  aspirin 81 MG EC tablet, Take 1 tablet by mouth Daily., Disp: 30 tablet, Rfl: 5  •  cetirizine (zyrTEC) 10 MG tablet, Take 1 tablet by mouth Daily., Disp: 30 tablet, Rfl: 5  •  ferrous sulfate (FEOSOL) 325 (65 FE) MG tablet, Take 1 tablet by mouth 3 (Three) Times a Day With Meals., Disp: 90 tablet, Rfl: 5  •  losartan (COZAAR) 100 MG tablet, Take 1 tablet by mouth Daily., Disp: 30 tablet, Rfl: 5  •  lovastatin (MEVACOR) 40 MG tablet, Take 1 tablet by mouth Daily., Disp: 30 tablet, Rfl: 5  •  metFORMIN (GLUCOPHAGE) 500 MG tablet, Take 1 tablet by mouth 2 (Two) Times a Day With Meals., Disp: 60 tablet, Rfl: 5  •  metoprolol tartrate (LOPRESSOR) 25 MG tablet, Take 1 tablet by mouth 2 (Two) Times a Day., Disp: 60 tablet, Rfl: 5  •  omeprazole (priLOSEC) 20 MG capsule, Take 1 capsule by mouth Every Morning., Disp: 30 capsule, Rfl: 5  •  fluticasone (FLONASE) 50 MCG/ACT nasal spray, 2 sprays into each nostril Daily for 30 days., Disp: 1 bottle, Rfl: 11  •  Umeclidinium Bromide 62.5 MCG/INH aerosol powder , Inhale 1 puff Daily., Disp: 1 each, Rfl: 11    Past Medical History:   Diagnosis Date   • Abdominal pain    • Acquired achalasia of esophagus    • Adenomatous polyp of colon    • Adverse drug effect    • Alcohol dependence with alcohol-induced disorder    • Allergic rhinitis    • Anal fissure    • Anemia due to blood loss    • Benign essential hypertension    • Central obesity    •  Cerebrovascular disease    • Chronic obstructive lung disease    • Claudication    • Disorder of peripheral nervous system    • Diverticular disease of colon     completed antibx, ? neoplasm on CT   • Dysphagia    • Dyspnea    • ED (erectile dysfunction)    • Epigastric pain    • Esophageal dysmotility    • Esophageal reflux    • Esophagitis    • Essential hypertension    • External hemorrhoids    • Gastritis    • GERD with esophagitis    • Heavy tobacco smoker    • Hemorrhoids    • Hiatal hernia    • Hiccough    • History of colon polyps    • History of echocardiogram     Normal LV funciton with Ef of 65% to 70% without regional wall motion abnormalities.Mild CLVH. Normal RV size and function. No significant valvular regurgitation or stenosis. 09/19/2014       • History of EKG    • History of TIA (transient ischemic attack)    • Hypercholesterolemia    • Hypertension    • Left ventricular hypertrophy    • Male erectile disorder    • Obstructive sleep apnea syndrome    • Primary osteoarthritis of knees, bilateral    • Rectal bleed     painful   • Rectal hemorrhage    • Sleep disorder    • Transient cerebral ischemia    • Upper respiratory infection    • Weakness     Attacks of weakness     Past Surgical History:   Procedure Laterality Date   • COLONOSCOPY      Internal and external hemorrhoids found. 04/13/2015    • ENDOSCOPY      Internal & external hemorrhoids found. 1 polyp in sigmoid colon; removed by snare cautery polypectomy. 09/26/2012    • ENDOSCOPY      Gastritis found in the stomach. Biopsy taken.Enlarged folds were found in the body of the stomach.Biopsy taken.Normal duodenum.A hiatus hernia was found in the esophagus. 04/13/2015    • ENDOSCOPY      Hiatus hernia in esophagus. Gastritis in stomach. Biopsy taken. Normal duodenum. Biopsy taken. 09/26/2012         Social History     Social History   • Marital status: Single     Spouse name: N/A   • Number of children: 0   • Years of education: 12  "    Occupational History   • retired      Social History Main Topics   • Smoking status: Current Every Day Smoker     Packs/day: 2.00     Years: 40.00     Types: Cigarettes   • Smokeless tobacco: Never Used   • Alcohol use 3.6 oz/week     6 Cans of beer per week      Comment: when can get   • Drug use: No   • Sexual activity: Defer     Other Topics Concern   • None     Social History Narrative     Family History   Problem Relation Age of Onset   • Cancer Mother    • Cirrhosis Father    • Diabetes Other    • Hypertension Other           Objective     Blood pressure 170/100, pulse 88, height 72\" (182.9 cm), weight 233 lb (106 kg), SpO2 99 %.  Physical Exam   Constitutional: He is oriented to person, place, and time. Vital signs are normal. He appears well-developed and well-nourished.   obese   HENT:   Head: Normocephalic and atraumatic.   Nose: Mucosal edema present.   Mouth/Throat: Uvula is midline, oropharynx is clear and moist and mucous membranes are normal.   Mallampati 4, large tongue   Eyes: Conjunctivae, EOM and lids are normal. Pupils are equal, round, and reactive to light.   Neck: Trachea normal and normal range of motion. No tracheal tenderness present. No thyroid mass present.   Cardiovascular: Normal rate, regular rhythm and normal heart sounds.  Exam reveals no gallop.    No murmur heard.  Pulmonary/Chest: Effort normal and breath sounds normal. No respiratory distress. He has no decreased breath sounds. He has no wheezes. He has no rhonchi.   Shallow breathing effort   Abdominal: Soft. Normal appearance and bowel sounds are normal. There is no tenderness.   obese       Vascular Status -  His exam exhibits no right foot edema. His exam exhibits no left foot edema.  Lymphadenopathy:        Head (right side): No submandibular adenopathy present.        Head (left side): No submandibular adenopathy present.     He has no cervical adenopathy.        Right: No supraclavicular adenopathy present.        " Left: No supraclavicular adenopathy present.   Neurological: He is alert and oriented to person, place, and time.   Skin: Skin is warm and dry. No cyanosis. Nails show no clubbing.   Psychiatric: He has a normal mood and affect. His speech is normal and behavior is normal. Judgment normal.   Nursing note and vitals reviewed.      PFTs: 11/27/17  Ratio 89  FVC 2.4/ 55%  FEV1 2.13/ 63%  Poor effort.  Reduced FVC and FEV1 suggesting restriction.  No bronchodilator response.  No comparative data available.     Radiology (independently reviewed and interpreted by me): LDCT 11/8/17 showed motion limitations, mid esophageal thickening, small focus of pleural thickening left lateral hemithorax       Assessment/Plan     Jameel was seen today for shortness of breath and nicotine dependence.    Diagnoses and all orders for this visit:    Dyspnea on exertion  -     Spirometry With Bronchodilator    Chronic obstructive pulmonary disease, unspecified COPD type  -     Umeclidinium Bromide 62.5 MCG/INH aerosol powder ; Inhale 1 puff Daily.    Tobacco use disorder    Class 1 obesity due to excess calories with body mass index (BMI) of 31.0 to 31.9 in adult, unspecified whether serious comorbidity present    Physical deconditioning    Chronic non-seasonal allergic rhinitis, unspecified trigger  -     fluticasone (FLONASE) 50 MCG/ACT nasal spray; 2 sprays into each nostril Daily for 30 days.         Discussion/ Recommendations:   Unfortunately, patient had difficulty with PFT maneuvers so results are unreliable.  I think he does likely have an element of clinical COPD, but the severity is difficult to fully assess.  He also likely has an element of deconditioning contributing to his dyspnea.  I also think he has difficulty with MDI usage which likely limits his perceived benefit from use.  He does continue to smoke and is pre-contemplative regarding cessation.    -Start Incruse daily.  Sample provided and instructed on  use.  -Instructed on proper albuterol MDI technique.  Continue using as needed.  -Counseled on the importance of complete tobacco cessation.  Recommended that he strongly consider cessation and provided with contact information for [facility] [place].  Offered support.  Nicotine patch would be helpful for him once he decides to quit.  Spent 4 minutes.  -Start Flonase in addition to daily Zyrtec.  -Annual LD CT screening in November 2018.  -Counseled on the importance of regular exercise.         Return in about 4 weeks (around 12/25/2017) for Recheck.      Thank you for allowing me to participate in the care of Jameel Woo Dozier. Please do not hesitate to contact me with any questions.         This document has been electronically signed by Yolanda Phan MD on November 27, 2017 2:06 PM      Dictated using Dragon

## 2017-11-27 NOTE — PATIENT INSTRUCTIONS
Consider contacting [facility] [place] at 2-518-QUIT-NOW or [email or web address] for assistance and support with quitting smoking.     Additional resources available at:  https://www.cdc.gov/tobacco/quit_smoking/how_to_quit/resources/index.htm

## 2017-12-14 ENCOUNTER — OFFICE VISIT (OUTPATIENT)
Dept: GASTROENTEROLOGY | Facility: CLINIC | Age: 57
End: 2017-12-14

## 2017-12-14 VITALS
HEART RATE: 83 BPM | SYSTOLIC BLOOD PRESSURE: 158 MMHG | DIASTOLIC BLOOD PRESSURE: 92 MMHG | HEIGHT: 72 IN | WEIGHT: 234.4 LBS | BODY MASS INDEX: 31.75 KG/M2

## 2017-12-14 DIAGNOSIS — R10.10 PAIN OF UPPER ABDOMEN: ICD-10-CM

## 2017-12-14 DIAGNOSIS — R93.3 IMAGING OF GASTROINTESTINAL TRACT ABNORMAL: ICD-10-CM

## 2017-12-14 DIAGNOSIS — K21.00 GASTROESOPHAGEAL REFLUX DISEASE WITH ESOPHAGITIS: Primary | ICD-10-CM

## 2017-12-14 PROCEDURE — 99214 OFFICE O/P EST MOD 30 MIN: CPT | Performed by: PHYSICIAN ASSISTANT

## 2017-12-14 RX ORDER — DEXTROSE AND SODIUM CHLORIDE 5; .45 G/100ML; G/100ML
30 INJECTION, SOLUTION INTRAVENOUS CONTINUOUS PRN
Status: CANCELLED | OUTPATIENT
Start: 2017-12-18

## 2017-12-14 NOTE — PROGRESS NOTES
Chief Complaint   Patient presents with   • Heartburn     Ref. Dr. Flores       ENDO PROCEDURE ORDERED: EGD--attn mid esophagus, abnl on CT imaging    Subjective    Jameel Dozier is a 57 y.o. male. he is being seen for consultation today at the request of Madonna Faust*    History of Present Illness    Patient sent for consultation for GERD, abnormal esophagus on CT imaging by Dr. Flores who saw the patient on 10/30/17.  Patient was last seen for possible achalasia on 7/20/16 underwent EGD with Botox injection on 8/24/16.  Patient states it did help him for several months.  Did not help his chronic hiccups.  He states his hiccups are doing a bit better.  He takes Prilosec 20 mg daily as needed, he thinks his reflux has been doing fairly well.  He denies nausea, vomiting.  He states he is not having significant difficulty swallowing at this time.  He is on iron.  No blood in his stool.  Weight is down 17.5 pounds since last visit.  Last colonoscopy 4/13/15 showed hemorrhoids.    Laboratory on 10/26/17 showed negative cardiac enzymes.  Magnesium was low at 0.9, BMP showed glucose 118, potassium 2.3, CO2 34, otherwise normal.  CBC was fairly normal.  Laboratory on 10/27/17 A1c was 5.8%.  BMP showed a calcium 8.0, magnesium 1.3, otherwise normal.  Low-dose CT scan of the chest on 11/8/17 was limited but showed circumferential wall thickening in the mid esophagus.    A/P: Patient with long-standing reflux, abnormal imaging mid esophagus on CT, possible esophagitis, certainly at risk for an occult lesion, at risk for Guthrie's.  Recommend EGD to evaluate.  Would consider repeat Botox injection as needed.  But we'll see him in follow-up after the above, further pending clinical course and the results of the above.     The following portions of the patient's history were reviewed and updated as appropriate:   Past Medical History:   Diagnosis Date   • Abdominal pain    • Acquired achalasia of  esophagus    • Adenomatous polyp of colon    • Adverse drug effect    • Alcohol dependence with alcohol-induced disorder    • Allergic rhinitis    • Anal fissure    • Anemia due to blood loss    • Benign essential hypertension    • Central obesity    • Cerebrovascular disease    • Chronic obstructive lung disease    • Claudication    • Disorder of peripheral nervous system    • Diverticular disease of colon     completed antibx, ? neoplasm on CT   • Dysphagia    • Dyspnea    • ED (erectile dysfunction)    • Epigastric pain    • Esophageal dysmotility    • Esophageal reflux    • Esophagitis    • Essential hypertension    • External hemorrhoids    • Gastritis    • GERD with esophagitis    • Heavy tobacco smoker    • Hemorrhoids    • Hiatal hernia    • Hiccough    • History of colon polyps    • History of echocardiogram     Normal LV funciton with Ef of 65% to 70% without regional wall motion abnormalities.Mild CLVH. Normal RV size and function. No significant valvular regurgitation or stenosis. 09/19/2014       • History of EKG    • History of TIA (transient ischemic attack)    • Hypercholesterolemia    • Hypertension    • Left ventricular hypertrophy    • Male erectile disorder    • Obstructive sleep apnea syndrome    • Primary osteoarthritis of knees, bilateral    • Rectal bleed     painful   • Rectal hemorrhage    • Sleep disorder    • Transient cerebral ischemia    • Upper respiratory infection    • Weakness     Attacks of weakness     Past Surgical History:   Procedure Laterality Date   • COLONOSCOPY  04/13/2015    Internal and external hemorrhoids found. 04/13/2015    • ENDOSCOPY      Internal & external hemorrhoids found. 1 polyp in sigmoid colon; removed by snare cautery polypectomy. 09/26/2012    • ENDOSCOPY      Gastritis found in the stomach. Biopsy taken.Enlarged folds were found in the body of the stomach.Biopsy taken.Normal duodenum.A hiatus hernia was found in the esophagus. 04/13/2015    • ENDOSCOPY       Hiatus hernia in esophagus. Gastritis in stomach. Biopsy taken. Normal duodenum. Biopsy taken. 09/26/2012       • UPPER GASTROINTESTINAL ENDOSCOPY  08/24/2016   • UPPER GASTROINTESTINAL ENDOSCOPY  04/13/2015     Family History   Problem Relation Age of Onset   • Cancer Mother    • Cirrhosis Father    • Diabetes Other    • Hypertension Other        Allergies   Allergen Reactions   • Ace Inhibitors Angioedema     hypotension   • Lisinopril    • Vistaril [Hydroxyzine Hcl]      Social History     Social History   • Marital status: Single     Spouse name: N/A   • Number of children: 0   • Years of education: 12     Occupational History   • retired      Social History Main Topics   • Smoking status: Current Every Day Smoker     Packs/day: 2.00     Years: 40.00     Types: Cigarettes   • Smokeless tobacco: Never Used   • Alcohol use 3.6 oz/week     6 Cans of beer per week      Comment: when can get   • Drug use: No   • Sexual activity: Defer     Other Topics Concern   • None     Social History Narrative       Current Outpatient Prescriptions:   •  acyclovir (ZOVIRAX) 400 MG tablet, Take 1 tablet by mouth 5 (Five) Times a Day. Take no more than 5 doses a day., Disp: 30 tablet, Rfl: 0  •  albuterol (PROVENTIL HFA;VENTOLIN HFA) 108 (90 Base) MCG/ACT inhaler, Inhale 2 puffs Every 4 (Four) Hours As Needed for Wheezing., Disp: 3.7 g, Rfl: 11  •  amLODIPine (NORVASC) 5 MG tablet, Take 1 tablet by mouth Daily., Disp: 30 tablet, Rfl: 6  •  aspirin 81 MG EC tablet, Take 1 tablet by mouth Daily., Disp: 30 tablet, Rfl: 5  •  cetirizine (zyrTEC) 10 MG tablet, Take 1 tablet by mouth Daily., Disp: 30 tablet, Rfl: 5  •  ferrous sulfate (FEOSOL) 325 (65 FE) MG tablet, Take 1 tablet by mouth 3 (Three) Times a Day With Meals., Disp: 90 tablet, Rfl: 5  •  fluticasone (FLONASE) 50 MCG/ACT nasal spray, 2 sprays into each nostril Daily for 30 days., Disp: 1 bottle, Rfl: 11  •  losartan (COZAAR) 100 MG tablet, Take 1 tablet by mouth Daily.,  "Disp: 30 tablet, Rfl: 5  •  lovastatin (MEVACOR) 40 MG tablet, Take 1 tablet by mouth Daily., Disp: 30 tablet, Rfl: 5  •  metFORMIN (GLUCOPHAGE) 500 MG tablet, Take 1 tablet by mouth 2 (Two) Times a Day With Meals., Disp: 60 tablet, Rfl: 5  •  metoprolol tartrate (LOPRESSOR) 25 MG tablet, Take 1 tablet by mouth 2 (Two) Times a Day., Disp: 60 tablet, Rfl: 5  •  omeprazole (priLOSEC) 20 MG capsule, Take 1 capsule by mouth Every Morning., Disp: 30 capsule, Rfl: 5  •  tiotropium (SPIRIVA) 18 MCG per inhalation capsule, Place 1 capsule into inhaler and inhale Daily., Disp: 30 capsule, Rfl: 11  •  Umeclidinium Bromide 62.5 MCG/INH aerosol powder , Inhale 1 puff Daily., Disp: 1 each, Rfl: 11  Review of Systems  Review of Systems       Objective    /92 (BP Location: Left arm)  Pulse 83  Ht 182.9 cm (72.01\")  Wt 106 kg (234 lb 6.4 oz)  BMI 31.78 kg/m2  Physical Exam   Constitutional: He is oriented to person, place, and time. He appears well-developed and well-nourished. No distress.   AA   HENT:   Head: Normocephalic and atraumatic.   Eyes: EOM are normal. Pupils are equal, round, and reactive to light.   Neck: Normal range of motion.   Cardiovascular: Normal rate, regular rhythm and normal heart sounds.    Pulmonary/Chest: Effort normal and breath sounds normal.   Abdominal: Soft. Bowel sounds are normal. He exhibits no shifting dullness, no distension, no abdominal bruit, no ascites and no mass. There is no hepatosplenomegaly. There is tenderness. There is no rigidity, no rebound, no guarding and no CVA tenderness. No hernia. Hernia confirmed negative in the ventral area.   mild   Musculoskeletal: Normal range of motion.   Neurological: He is alert and oriented to person, place, and time.   Skin: Skin is warm and dry.   Psychiatric: He has a normal mood and affect. His behavior is normal. Judgment and thought content normal.   Nursing note and vitals reviewed.    Assessment/Plan      1. Gastroesophageal reflux " disease with esophagitis    2. Pain of upper abdomen    3. Imaging of gastrointestinal tract abnormal    .   Jameel was seen today for heartburn.    Diagnoses and all orders for this visit:    Gastroesophageal reflux disease with esophagitis  -     Case Request; Standing  -     dextrose 5 % and sodium chloride 0.45 % infusion; Infuse 30 mL/hr into a venous catheter Continuous As Needed (Start Prior to Procedure).  -     Case Request    Pain of upper abdomen  -     Case Request; Standing  -     dextrose 5 % and sodium chloride 0.45 % infusion; Infuse 30 mL/hr into a venous catheter Continuous As Needed (Start Prior to Procedure).  -     Case Request    Imaging of gastrointestinal tract abnormal  Comments:  circumferential thickening mid esophagus on CT  Orders:  -     Case Request; Standing  -     dextrose 5 % and sodium chloride 0.45 % infusion; Infuse 30 mL/hr into a venous catheter Continuous As Needed (Start Prior to Procedure).  -     Case Request    Other orders  -     Obtain Informed Consent; Standing  -     POC Glucose Once; Standing        Orders placed during this encounter include:  No orders of the defined types were placed in this encounter.      Medications prescribed:  No orders of the defined types were placed in this encounter.      Requested Prescriptions      No prescriptions requested or ordered in this encounter       Review and/or summary of lab tests, radiology, procedures, medications. Review and summary of old records and obtaining of history. The risks and benefits of my recommendations, as well as other treatment options were discussed with the patient today. Questions were answered.    Follow-up: Return if symptoms worsen or fail to improve, for After the above.     ESOPHAGOGASTRODUODENOSCOPY--attn mid esophagus, abnl on CT (N/A)      This document has been electronically signed by Tim Chen PA-C on December 15, 2017 2:40 PM      Results for orders placed or performed in visit on  10/30/17   Microalbumin / Creatinine Urine Ratio - Urine, Clean Catch   Result Value Ref Range    Microalbumin/Creatinine Ratio 270.5 (H) 0.0 - 30.0 mg/g    Creatinine, Urine 421.4 mg/dL    Microalbumin, Urine 114.0 mg/L   Results for orders placed or performed during the hospital encounter of 10/26/17   Troponin   Result Value Ref Range    Troponin I 0.019 <=0.034 ng/mL   POC Glucose Fingerstick   Result Value Ref Range    Glucose 107 70 - 130 mg/dL   POC Glucose Fingerstick   Result Value Ref Range    Glucose 101 70 - 130 mg/dL   POC Glucose Fingerstick   Result Value Ref Range    Glucose 122 70 - 130 mg/dL   POC Glucose Fingerstick   Result Value Ref Range    Glucose 87 70 - 130 mg/dL   Potassium   Result Value Ref Range    Potassium 3.1 (L) 3.5 - 5.1 mmol/L   Potassium   Result Value Ref Range    Potassium 2.8 (L) 3.5 - 5.1 mmol/L   Magnesium   Result Value Ref Range    Magnesium 2.0 1.6 - 2.3 mg/dL   Magnesium   Result Value Ref Range    Magnesium 1.3 (L) 1.6 - 2.3 mg/dL   Magnesium   Result Value Ref Range    Magnesium 1.4 (L) 1.6 - 2.3 mg/dL   Magnesium   Result Value Ref Range    Magnesium 0.9 (C) 1.6 - 2.3 mg/dL   Hemoglobin A1c   Result Value Ref Range    Hemoglobin A1C 5.8 (H) 4 - 5.6 %   Basic Metabolic Panel   Result Value Ref Range    Glucose 99 60 - 100 mg/dL    BUN 8 7 - 21 mg/dL    Creatinine 0.92 0.70 - 1.30 mg/dL    Sodium 145 137 - 145 mmol/L    Potassium 3.5 3.5 - 5.1 mmol/L    Chloride 101 95 - 110 mmol/L    CO2 31.0 22.0 - 31.0 mmol/L    Calcium 8.0 (L) 8.4 - 10.2 mg/dL    eGFR  African Amer 102 56 - 130 mL/min/1.73    BUN/Creatinine Ratio 8.7 7.0 - 25.0    Anion Gap 13.0 5.0 - 15.0 mmol/L   Results for orders placed or performed in visit on 10/26/17   Magnesium   Result Value Ref Range    Magnesium 0.9 (C) 1.6 - 2.3 mg/dL   Results for orders placed or performed in visit on 12/13/16   Basic Metabolic Panel   Result Value Ref Range    Glucose 118 (H) 60 - 100 mg/dL    BUN 10 7 - 21 mg/dL     Creatinine 0.92 0.70 - 1.30 mg/dL    Sodium 145 137 - 145 mmol/L    Potassium 2.3 (C) 3.5 - 5.1 mmol/L    Chloride 96 95 - 110 mmol/L    CO2 34.0 (H) 22.0 - 31.0 mmol/L    Calcium 8.6 8.4 - 10.2 mg/dL    eGFR  African Amer 102 56 - 130 mL/min/1.73    BUN/Creatinine Ratio 10.9 7.0 - 25.0    Anion Gap 15.0 5.0 - 15.0 mmol/L   Results for orders placed or performed in visit on 12/09/16   Lipid Panel   Result Value Ref Range    Total Cholesterol 147 0 - 199 mg/dl    Triglycerides 385 (H) 20 - 199 mg/dl    HDL Cholesterol 28 (L) 60 - 200 mg/dl    LDL Cholesterol  42 0 - 129 mg/dl   Basic Metabolic Panel   Result Value Ref Range    Sodium 141 137 - 145 mmol/L    Potassium 3.1 (L) 3.5 - 5.1 mmol/L    Chloride 96 95 - 110 mmol/L    CO2 25 22 - 31 mmol/L    Anion Gap 20.0 (H) 5.0 - 15.0 mmol/L    Glucose 113 (H) 60 - 100 mg/dl    BUN 13 7 - 21 mg/dl    Creatinine 1.1 0.7 - 1.3 mg/dl    GFR MDRD Non  69 56 - 130 mL/min/1.73 sq.M    GFR MDRD  83 56 - 130 mL/min/1.73 sq.M    Calcium 9.6 8.4 - 10.2 mg/dl   Results for orders placed or performed during the hospital encounter of 12/08/16   CBC & Differential   Result Value Ref Range    WBC 6.8 3.2 - 9.8 x1000/uL    RBC 6.01 (H) 4.37 - 5.74 malika/mm3    Hemoglobin 15.5 13.7 - 17.3 gm/dl    Hematocrit 46.9 39.0 - 49.0 %    MCV 78.0 (L) 80.0 - 98.0 fl    MCH 25.8 (L) 26.0 - 34.0 pg    MCHC 33.0 31.5 - 36.3 gm/dl    RDW 17.2 (H) 11.5 - 14.5 %    Platelets 244 150 - 450 x1000/mm3    MPV 10.7 8.0 - 12.0 fl    Neutrophil Rel % 53.1 37.0 - 80.0 %    Lymphocyte Rel % 32.5 10.0 - 50.0 %    Monocyte Rel % 8.5 0.0 - 12.0 %    Eosinophil Rel % 4.8 0.0 - 7.0 %    Basophil Rel % 0.7 0.0 - 2.0 %    Immature Granulocyte Rel % 0.40 0.00 - 0.50 %    Neutrophils Absolute 3.63 2.00 - 8.60 x1000/uL    Lymphocytes Absolute 2.22 0.60 - 4.20 x1000/uL    Monocytes Absolute 0.58 0.00 - 0.90 x1000/uL    Eosinophils Absolute 0.33 0.00 - 0.70 x1000/uL    Basophils Absolute 0.05  0.00 - 0.20 x1000/uL    Immature Granulocytes Absolute 0.030 (H) 0.005 - 0.022 x1000/uL   Results for orders placed or performed in visit on 12/08/16   CBC Auto Differential   Result Value Ref Range    WBC 5.50 3.20 - 9.80 10*3/mm3    RBC 5.67 4.37 - 5.74 10*6/mm3    Hemoglobin 14.3 13.7 - 17.3 g/dL    Hematocrit 44.1 39.0 - 49.0 %    MCV 77.8 (L) 80.0 - 98.0 fL    MCH 25.2 (L) 26.5 - 34.0 pg    MCHC 32.4 31.5 - 36.3 g/dL    RDW 16.1 (H) 11.5 - 14.5 %    RDW-SD 44.2 (H) 35.1 - 43.9 fl    MPV 10.7 8.0 - 12.0 fL    Platelets 207 150 - 450 10*3/mm3    Neutrophil % 50.7 37.0 - 80.0 %    Lymphocyte % 31.3 10.0 - 50.0 %    Monocyte % 10.0 0.0 - 12.0 %    Eosinophil % 7.1 (H) 0.0 - 7.0 %    Basophil % 0.5 0.0 - 2.0 %    Immature Grans % 0.4 0.0 - 0.5 %    Neutrophils, Absolute 2.79 2.00 - 8.60 10*3/mm3    Lymphocytes, Absolute 1.72 0.60 - 4.20 10*3/mm3    Monocytes, Absolute 0.55 0.00 - 0.90 10*3/mm3    Eosinophils, Absolute 0.39 0.00 - 0.70 10*3/mm3    Basophils, Absolute 0.03 0.00 - 0.20 10*3/mm3    Immature Grans, Absolute 0.02 0.00 - 0.02 10*3/mm3   Hepatitis Panel, Acute   Result Value Ref Range    Hepatitis B Surface Ag Negative Negative    Hep C Virus Ab Negative Negative    Hep A IgM Negative Negative    Hep B Core IgM Negative Negative     *Note: Due to a large number of results and/or encounters for the requested time period, some results have not been displayed. A complete set of results can be found in Results Review.       Some portions of this note have been dictated using voice recognition software and may contain errors and/or omissions.

## 2017-12-15 DIAGNOSIS — K21.00 GERD WITH ESOPHAGITIS: ICD-10-CM

## 2017-12-15 RX ORDER — CETIRIZINE HYDROCHLORIDE 10 MG/1
TABLET ORAL
Qty: 30 TABLET | Refills: 4 | Status: SHIPPED | OUTPATIENT
Start: 2017-12-15 | End: 2018-07-09 | Stop reason: SDUPTHER

## 2017-12-18 ENCOUNTER — ANESTHESIA (OUTPATIENT)
Dept: GASTROENTEROLOGY | Facility: HOSPITAL | Age: 57
End: 2017-12-18

## 2017-12-18 ENCOUNTER — ANESTHESIA EVENT (OUTPATIENT)
Dept: GASTROENTEROLOGY | Facility: HOSPITAL | Age: 57
End: 2017-12-18

## 2017-12-18 ENCOUNTER — HOSPITAL ENCOUNTER (OUTPATIENT)
Facility: HOSPITAL | Age: 57
Setting detail: HOSPITAL OUTPATIENT SURGERY
Discharge: HOME | End: 2017-12-18
Attending: INTERNAL MEDICINE | Admitting: INTERNAL MEDICINE

## 2017-12-18 VITALS
TEMPERATURE: 97.7 F | DIASTOLIC BLOOD PRESSURE: 96 MMHG | HEART RATE: 90 BPM | SYSTOLIC BLOOD PRESSURE: 142 MMHG | OXYGEN SATURATION: 96 % | RESPIRATION RATE: 21 BRPM | HEIGHT: 73 IN | WEIGHT: 230 LBS | BODY MASS INDEX: 30.48 KG/M2

## 2017-12-18 DIAGNOSIS — R10.10 PAIN OF UPPER ABDOMEN: ICD-10-CM

## 2017-12-18 DIAGNOSIS — R93.3 IMAGING OF GASTROINTESTINAL TRACT ABNORMAL: ICD-10-CM

## 2017-12-18 DIAGNOSIS — K21.00 GASTROESOPHAGEAL REFLUX DISEASE WITH ESOPHAGITIS: ICD-10-CM

## 2017-12-18 LAB — GLUCOSE BLDC GLUCOMTR-MCNC: 98 MG/DL (ref 70–130)

## 2017-12-18 PROCEDURE — 88305 TISSUE EXAM BY PATHOLOGIST: CPT | Performed by: INTERNAL MEDICINE

## 2017-12-18 PROCEDURE — 88305 TISSUE EXAM BY PATHOLOGIST: CPT | Performed by: PATHOLOGY

## 2017-12-18 PROCEDURE — 25010000002 PROPOFOL 10 MG/ML EMULSION: Performed by: NURSE ANESTHETIST, CERTIFIED REGISTERED

## 2017-12-18 PROCEDURE — 25010000002 MIDAZOLAM PER 1 MG: Performed by: NURSE ANESTHETIST, CERTIFIED REGISTERED

## 2017-12-18 PROCEDURE — 82962 GLUCOSE BLOOD TEST: CPT

## 2017-12-18 PROCEDURE — 43239 EGD BIOPSY SINGLE/MULTIPLE: CPT | Performed by: INTERNAL MEDICINE

## 2017-12-18 RX ORDER — MIDAZOLAM HYDROCHLORIDE 1 MG/ML
INJECTION INTRAMUSCULAR; INTRAVENOUS AS NEEDED
Status: DISCONTINUED | OUTPATIENT
Start: 2017-12-18 | End: 2017-12-18 | Stop reason: SURG

## 2017-12-18 RX ORDER — PROPOFOL 10 MG/ML
VIAL (ML) INTRAVENOUS AS NEEDED
Status: DISCONTINUED | OUTPATIENT
Start: 2017-12-18 | End: 2017-12-18 | Stop reason: SURG

## 2017-12-18 RX ORDER — LIDOCAINE HYDROCHLORIDE 10 MG/ML
INJECTION, SOLUTION INFILTRATION; PERINEURAL AS NEEDED
Status: DISCONTINUED | OUTPATIENT
Start: 2017-12-18 | End: 2017-12-18 | Stop reason: SURG

## 2017-12-18 RX ORDER — DEXTROSE AND SODIUM CHLORIDE 5; .45 G/100ML; G/100ML
30 INJECTION, SOLUTION INTRAVENOUS CONTINUOUS PRN
Status: DISCONTINUED | OUTPATIENT
Start: 2017-12-18 | End: 2017-12-18 | Stop reason: HOSPADM

## 2017-12-18 RX ADMIN — PROPOFOL 30 MG: 10 INJECTION, EMULSION INTRAVENOUS at 12:06

## 2017-12-18 RX ADMIN — PROPOFOL 30 MG: 10 INJECTION, EMULSION INTRAVENOUS at 12:07

## 2017-12-18 RX ADMIN — MIDAZOLAM 2 MG: 1 INJECTION INTRAMUSCULAR; INTRAVENOUS at 12:01

## 2017-12-18 RX ADMIN — LIDOCAINE HYDROCHLORIDE 100 MG: 10 INJECTION, SOLUTION INFILTRATION; PERINEURAL at 12:05

## 2017-12-18 RX ADMIN — PROPOFOL 80 MG: 10 INJECTION, EMULSION INTRAVENOUS at 12:05

## 2017-12-18 RX ADMIN — DEXTROSE AND SODIUM CHLORIDE 30 ML/HR: 5; 450 INJECTION, SOLUTION INTRAVENOUS at 11:44

## 2017-12-18 NOTE — ANESTHESIA PREPROCEDURE EVALUATION
Anesthesia Evaluation     no history of anesthetic complications:  NPO Solid Status: > 8 hours  NPO Liquid Status: > 2 hours     Airway   Mallampati: IV  TM distance: >3 FB  Neck ROM: full  possible difficult intubation  Dental      Comment: No teeth upper, multiple missing lower      Pulmonary - normal exam   (+) a smoker Current Smoked day of surgery, COPD, sleep apnea,   Cardiovascular - normal exam    (+) hypertension well controlled, hyperlipidemia      Neuro/Psych  (+) psychiatric history,    (-) TIA  GI/Hepatic/Renal/Endo    (+) obesity,  hiatal hernia, GERD, diabetes mellitus type 2 well controlled,     Musculoskeletal     Abdominal    Substance History   (+) alcohol use,       Comment: 6 beers 2-3 days weekly   OB/GYN          Other      history of cancer                                    Anesthesia Plan    ASA 3     MAC     intravenous induction   Anesthetic plan and risks discussed with patient.

## 2017-12-18 NOTE — PLAN OF CARE
Problem: GI Endoscopy (Adult)  Goal: Signs and Symptoms of Listed Potential Problems Will be Absent or Manageable (GI Endoscopy)  Outcome: Ongoing (interventions implemented as appropriate)    12/18/17 1227   GI Endoscopy   Problems Assessed (GI Endoscopy) all   Problems Present (GI Endoscopy) none

## 2017-12-18 NOTE — PLAN OF CARE
Problem: Patient Care Overview (Adult)  Goal: Plan of Care Review  Outcome: Ongoing (interventions implemented as appropriate)    12/18/17 1213   Coping/Psychosocial Response Interventions   Plan Of Care Reviewed With patient   Patient Care Overview   Progress no change   Outcome Evaluation   Outcome Summary/Follow up Plan vss         Problem: GI Endoscopy (Adult)  Goal: Signs and Symptoms of Listed Potential Problems Will be Absent or Manageable (GI Endoscopy)  Outcome: Ongoing (interventions implemented as appropriate)    12/18/17 1213   GI Endoscopy   Problems Assessed (GI Endoscopy) all   Problems Present (GI Endoscopy) none

## 2017-12-18 NOTE — PLAN OF CARE
Problem: Patient Care Overview (Adult)  Goal: Plan of Care Review  Outcome: Ongoing (interventions implemented as appropriate)    12/18/17 1227   Coping/Psychosocial Response Interventions   Plan Of Care Reviewed With patient   Patient Care Overview   Progress no change   Outcome Evaluation   Outcome Summary/Follow up Plan vss

## 2017-12-18 NOTE — ANESTHESIA POSTPROCEDURE EVALUATION
Patient: Jameel Dozier    Procedure Summary     Date Anesthesia Start Anesthesia Stop Room / Location    12/18/17 1201 1217 [facility] ENDOSCOPY 1 / [facility] ENDOSCOPY       Procedure Diagnosis Surgeon Provider    ESOPHAGOGASTRODUODENOSCOPY--attn mid esophagus, abnl on CT (N/A Esophagus) Imaging of gastrointestinal tract abnormal; Pain of upper abdomen; Gastroesophageal reflux disease with esophagitis  (Imaging of gastrointestinal tract abnormal [R93.3]; Pain of upper abdomen [R10.10]; Gastroesophageal reflux disease with esophagitis [K21.0]) MD Greta Shine CRNA          Anesthesia Type: MAC  Last vitals  BP   176/98 (12/18/17 1057)   Temp   98.2 °F (36.8 °C) (12/18/17 1057)   Pulse   84 (12/18/17 1057)   Resp   18 (12/18/17 1057)     SpO2   95 % (12/18/17 1057)     Post Anesthesia Care and Evaluation    Patient location during evaluation: bedside  Patient participation: complete - patient participated  Level of consciousness: awake and alert  Pain score: 0  Airway patency: patent  Anesthetic complications: No anesthetic complications  PONV Status: none  Cardiovascular status: acceptable  Respiratory status: acceptable  Hydration status: acceptable

## 2017-12-19 LAB
LAB AP CASE REPORT: NORMAL
Lab: NORMAL
PATH REPORT.FINAL DX SPEC: NORMAL
PATH REPORT.GROSS SPEC: NORMAL

## 2017-12-22 ENCOUNTER — HOSPITAL ENCOUNTER (EMERGENCY)
Facility: HOSPITAL | Age: 57
Discharge: HOME | End: 2017-12-22
Attending: EMERGENCY MEDICINE | Admitting: EMERGENCY MEDICINE

## 2017-12-22 ENCOUNTER — APPOINTMENT (OUTPATIENT)
Dept: GENERAL RADIOLOGY | Facility: HOSPITAL | Age: 57
End: 2017-12-22

## 2017-12-22 VITALS
OXYGEN SATURATION: 99 % | DIASTOLIC BLOOD PRESSURE: 92 MMHG | HEART RATE: 100 BPM | TEMPERATURE: 99.5 F | RESPIRATION RATE: 18 BRPM | SYSTOLIC BLOOD PRESSURE: 174 MMHG | BODY MASS INDEX: 31.64 KG/M2 | WEIGHT: 226 LBS | HEIGHT: 71 IN

## 2017-12-22 DIAGNOSIS — R05.9 COUGH: ICD-10-CM

## 2017-12-22 DIAGNOSIS — J10.1 INFLUENZA A: Primary | ICD-10-CM

## 2017-12-22 DIAGNOSIS — R50.9 FEVER AND CHILLS: ICD-10-CM

## 2017-12-22 LAB
FLUAV AG NPH QL: POSITIVE
FLUBV AG NPH QL IA: NEGATIVE
HOLD SPECIMEN: NORMAL
HOLD SPECIMEN: NORMAL
WHOLE BLOOD HOLD SPECIMEN: NORMAL
WHOLE BLOOD HOLD SPECIMEN: NORMAL

## 2017-12-22 PROCEDURE — 99283 EMERGENCY DEPT VISIT LOW MDM: CPT

## 2017-12-22 PROCEDURE — 25010000002 HYDRALAZINE PER 20 MG: Performed by: PHYSICIAN ASSISTANT

## 2017-12-22 PROCEDURE — 96361 HYDRATE IV INFUSION ADD-ON: CPT

## 2017-12-22 PROCEDURE — 25010000002 METOCLOPRAMIDE PER 10 MG: Performed by: PHYSICIAN ASSISTANT

## 2017-12-22 PROCEDURE — 96375 TX/PRO/DX INJ NEW DRUG ADDON: CPT

## 2017-12-22 PROCEDURE — 71010 HC CHEST PA OR AP: CPT

## 2017-12-22 PROCEDURE — 99284 EMERGENCY DEPT VISIT MOD MDM: CPT

## 2017-12-22 PROCEDURE — 25010000002 DIPHENHYDRAMINE PER 50 MG: Performed by: PHYSICIAN ASSISTANT

## 2017-12-22 PROCEDURE — 96374 THER/PROPH/DIAG INJ IV PUSH: CPT

## 2017-12-22 PROCEDURE — 87804 INFLUENZA ASSAY W/OPTIC: CPT | Performed by: EMERGENCY MEDICINE

## 2017-12-22 PROCEDURE — 25010000002 METHYLPREDNISOLONE PER 125 MG: Performed by: PHYSICIAN ASSISTANT

## 2017-12-22 RX ORDER — OSELTAMIVIR PHOSPHATE 75 MG/1
75 CAPSULE ORAL EVERY 12 HOURS
Qty: 14 CAPSULE | Refills: 0 | Status: SHIPPED | OUTPATIENT
Start: 2017-12-22 | End: 2017-12-29

## 2017-12-22 RX ORDER — SODIUM CHLORIDE 0.9 % (FLUSH) 0.9 %
10 SYRINGE (ML) INJECTION AS NEEDED
Status: DISCONTINUED | OUTPATIENT
Start: 2017-12-22 | End: 2017-12-22 | Stop reason: HOSPADM

## 2017-12-22 RX ORDER — BROMPHENIRAMINE MALEATE, PSEUDOEPHEDRINE HYDROCHLORIDE, AND DEXTROMETHORPHAN HYDROBROMIDE 2; 30; 10 MG/5ML; MG/5ML; MG/5ML
5 SYRUP ORAL 4 TIMES DAILY PRN
Qty: 118 ML | Refills: 0 | Status: SHIPPED | OUTPATIENT
Start: 2017-12-22 | End: 2017-12-29

## 2017-12-22 RX ORDER — METHYLPREDNISOLONE SODIUM SUCCINATE 125 MG/2ML
125 INJECTION, POWDER, LYOPHILIZED, FOR SOLUTION INTRAMUSCULAR; INTRAVENOUS ONCE
Status: COMPLETED | OUTPATIENT
Start: 2017-12-22 | End: 2017-12-22

## 2017-12-22 RX ORDER — ONDANSETRON 8 MG/1
8 TABLET, ORALLY DISINTEGRATING ORAL EVERY 8 HOURS PRN
Qty: 15 TABLET | Refills: 0 | Status: SHIPPED | OUTPATIENT
Start: 2017-12-22 | End: 2017-12-27

## 2017-12-22 RX ORDER — DIPHENHYDRAMINE HYDROCHLORIDE 50 MG/ML
25 INJECTION INTRAMUSCULAR; INTRAVENOUS ONCE
Status: COMPLETED | OUTPATIENT
Start: 2017-12-22 | End: 2017-12-22

## 2017-12-22 RX ORDER — METOCLOPRAMIDE HYDROCHLORIDE 5 MG/ML
10 INJECTION INTRAMUSCULAR; INTRAVENOUS ONCE
Status: COMPLETED | OUTPATIENT
Start: 2017-12-22 | End: 2017-12-22

## 2017-12-22 RX ORDER — ALBUTEROL SULFATE 90 UG/1
2 AEROSOL, METERED RESPIRATORY (INHALATION)
Qty: 8 G | Refills: 0 | Status: SHIPPED | OUTPATIENT
Start: 2017-12-22 | End: 2017-12-27

## 2017-12-22 RX ORDER — IPRATROPIUM BROMIDE AND ALBUTEROL SULFATE 2.5; .5 MG/3ML; MG/3ML
3 SOLUTION RESPIRATORY (INHALATION) ONCE
Status: COMPLETED | OUTPATIENT
Start: 2017-12-22 | End: 2017-12-22

## 2017-12-22 RX ORDER — HYDRALAZINE HYDROCHLORIDE 20 MG/ML
20 INJECTION INTRAMUSCULAR; INTRAVENOUS ONCE
Status: COMPLETED | OUTPATIENT
Start: 2017-12-22 | End: 2017-12-22

## 2017-12-22 RX ADMIN — METHYLPREDNISOLONE SODIUM SUCCINATE 125 MG: 125 INJECTION, POWDER, FOR SOLUTION INTRAMUSCULAR; INTRAVENOUS at 20:13

## 2017-12-22 RX ADMIN — SODIUM CHLORIDE 1000 ML: 900 INJECTION, SOLUTION INTRAVENOUS at 20:13

## 2017-12-22 RX ADMIN — METOCLOPRAMIDE 10 MG: 5 INJECTION, SOLUTION INTRAMUSCULAR; INTRAVENOUS at 20:44

## 2017-12-22 RX ADMIN — DIPHENHYDRAMINE HYDROCHLORIDE 25 MG: 50 INJECTION INTRAMUSCULAR; INTRAVENOUS at 20:44

## 2017-12-22 RX ADMIN — IPRATROPIUM BROMIDE AND ALBUTEROL SULFATE 3 ML: .5; 3 SOLUTION RESPIRATORY (INHALATION) at 20:07

## 2017-12-22 RX ADMIN — HYDRALAZINE HYDROCHLORIDE 20 MG: 20 INJECTION INTRAMUSCULAR; INTRAVENOUS at 20:13

## 2017-12-23 NOTE — ED PROVIDER NOTES
Subjective   Patient is a 57 y.o. male presenting with flu symptoms.   History provided by:  Patient   used: No    Flu Symptoms   Presenting symptoms: cough, diarrhea, fatigue, fever, headache, myalgias, nausea, sore throat and vomiting    Presenting symptoms: no rhinorrhea and no shortness of breath    Severity:  Moderate  Onset quality:  Gradual  Duration:  1 day  Progression:  Worsening  Chronicity:  New  Relieved by:  Nothing  Worsened by:  Nothing  Ineffective treatments:  None tried  Associated symptoms: chills, decreased appetite and nasal congestion    Associated symptoms: no decrease in physical activity, no ear pain, no mental status change, no neck stiffness and no syncope    Risk factors: heart disease    Risk factors: not elderly, no diabetes problem, no immunocompromised state, no kidney disease, no liver disease, not pregnant and no sick contacts        Review of Systems   Constitutional: Positive for chills, decreased appetite, fatigue and fever. Negative for activity change, appetite change, diaphoresis and unexpected weight change.   HENT: Positive for congestion and sore throat. Negative for dental problem, drooling, ear discharge, ear pain, facial swelling, hearing loss, mouth sores, nosebleeds, postnasal drip, rhinorrhea, sinus pain, sinus pressure, sneezing, tinnitus, trouble swallowing and voice change.    Eyes: Negative.    Respiratory: Positive for cough. Negative for apnea, chest tightness, shortness of breath, wheezing and stridor.    Gastrointestinal: Positive for diarrhea, nausea and vomiting. Negative for abdominal distention, abdominal pain, anal bleeding, blood in stool, constipation and rectal pain.   Endocrine: Negative.    Genitourinary: Negative.    Musculoskeletal: Positive for myalgias. Negative for arthralgias, back pain, gait problem, joint swelling, neck pain and neck stiffness.   Skin: Negative.    Allergic/Immunologic: Negative.    Neurological: Positive  for headaches. Negative for dizziness, tremors, seizures, syncope, facial asymmetry, speech difficulty, weakness, light-headedness and numbness.   Hematological: Negative.    Psychiatric/Behavioral: Negative.        Past Medical History:   Diagnosis Date   • Abdominal pain    • Acquired achalasia of esophagus    • Adenomatous polyp of colon    • Adverse drug effect    • Alcohol dependence with alcohol-induced disorder    • Allergic rhinitis    • Anal fissure    • Anemia due to blood loss    • Benign essential hypertension    • Central obesity    • Cerebrovascular disease    • Chronic obstructive lung disease    • Claudication    • Disorder of peripheral nervous system    • Diverticular disease of colon     completed antibx, ? neoplasm on CT   • Dysphagia    • Dyspnea    • ED (erectile dysfunction)    • Epigastric pain    • Esophageal dysmotility    • Esophageal reflux    • Esophagitis    • Essential hypertension    • External hemorrhoids    • Gastritis    • GERD with esophagitis    • Heavy tobacco smoker    • Hemorrhoids    • Hiatal hernia    • Hiccough    • History of colon polyps    • History of echocardiogram     Normal LV funciton with Ef of 65% to 70% without regional wall motion abnormalities.Mild CLVH. Normal RV size and function. No significant valvular regurgitation or stenosis. 09/19/2014       • History of EKG    • History of TIA (transient ischemic attack)    • Hypercholesterolemia    • Hypertension    • Left ventricular hypertrophy    • Male erectile disorder    • Obstructive sleep apnea syndrome    • Primary osteoarthritis of knees, bilateral    • Rectal bleed     painful   • Rectal hemorrhage    • Sleep disorder    • Transient cerebral ischemia    • Upper respiratory infection    • Weakness     Attacks of weakness       Allergies   Allergen Reactions   • Ace Inhibitors Angioedema     hypotension   • Lisinopril    • Vistaril [Hydroxyzine Hcl]        Past Surgical History:   Procedure Laterality Date    • COLONOSCOPY  04/13/2015    Internal and external hemorrhoids found. 04/13/2015    • ENDOSCOPY      Internal & external hemorrhoids found. 1 polyp in sigmoid colon; removed by snare cautery polypectomy. 09/26/2012    • ENDOSCOPY      Gastritis found in the stomach. Biopsy taken.Enlarged folds were found in the body of the stomach.Biopsy taken.Normal duodenum.A hiatus hernia was found in the esophagus. 04/13/2015    • ENDOSCOPY      Hiatus hernia in esophagus. Gastritis in stomach. Biopsy taken. Normal duodenum. Biopsy taken. 09/26/2012       • ENDOSCOPY N/A 12/18/2017    Procedure: ESOPHAGOGASTRODUODENOSCOPY--attn mid esophagus, abnl on CT;  Surgeon: Alli Dockery MD;  Location: [facility] ENDOSCOPY;  Service:    • UPPER GASTROINTESTINAL ENDOSCOPY  08/24/2016   • UPPER GASTROINTESTINAL ENDOSCOPY  04/13/2015       Family History   Problem Relation Age of Onset   • Cancer Mother    • Cirrhosis Father    • Diabetes Other    • Hypertension Other        Social History     Social History   • Marital status: Single     Spouse name: N/A   • Number of children: 0   • Years of education: 12     Occupational History   • retired      Social History Main Topics   • Smoking status: Current Every Day Smoker     Packs/day: 2.00     Years: 40.00     Types: Cigarettes   • Smokeless tobacco: Never Used   • Alcohol use 3.6 oz/week     6 Cans of beer per week      Comment: when can get   • Drug use: No   • Sexual activity: Defer     Other Topics Concern   • None     Social History Narrative           Objective   Physical Exam   Constitutional: He is oriented to person, place, and time. He appears well-developed and well-nourished. No distress.   HENT:   Head: Normocephalic and atraumatic.   Right Ear: Tympanic membrane normal.   Left Ear: Tympanic membrane normal.   Nose: Mucosal edema and rhinorrhea present. No nose lacerations, sinus tenderness, nasal deformity, septal deviation or nasal septal hematoma. No epistaxis.  No foreign  bodies. Right sinus exhibits no maxillary sinus tenderness and no frontal sinus tenderness. Left sinus exhibits no maxillary sinus tenderness and no frontal sinus tenderness.   Mouth/Throat: Uvula is midline and mucous membranes are normal. Posterior oropharyngeal erythema present. No oropharyngeal exudate, posterior oropharyngeal edema or tonsillar abscesses. Tonsils are 1+ on the right. Tonsils are 1+ on the left. No tonsillar exudate.   Eyes: EOM are normal. Pupils are equal, round, and reactive to light. Right eye exhibits no discharge. Left eye exhibits no discharge. No scleral icterus.   Neck: Normal range of motion. Neck supple. No JVD present. No tracheal deviation present. No thyromegaly present.   Cardiovascular: Normal rate, regular rhythm, normal heart sounds and intact distal pulses.  Exam reveals no gallop and no friction rub.    No murmur heard.  Pulmonary/Chest: Effort normal. No stridor. No respiratory distress. He has wheezes. He has no rales. He exhibits no tenderness.   Abdominal: Soft. Bowel sounds are normal. He exhibits no distension and no mass. There is tenderness. There is no rebound and no guarding. No hernia.   Musculoskeletal: Normal range of motion. He exhibits no edema, tenderness or deformity.   Neurological: He is alert and oriented to person, place, and time. He has normal reflexes. He displays normal reflexes. No cranial nerve deficit. He exhibits normal muscle tone. Coordination normal.   Skin: Skin is warm and dry. No rash noted. He is not diaphoretic. No erythema. No pallor.   Psychiatric: He has a normal mood and affect. His behavior is normal. Judgment and thought content normal.   Nursing note and vitals reviewed.      Procedures         ED Course  ED Course        Xr Chest 1 View    Result Date: 12/22/2017  Narrative: [facility] [place] Patient Name: MARSHA RUSSELL ORDERING: MALICK GUERRA ATTENDING: NOAH SUMMERS REFERRING: MALICK GUERRA  ----------------------- PROCEDURE: Chest Single View TECHNIQUE: Single AP view of the chest COMPARISON: 12/2/2013 HISTORY: cough FINDINGS:  Life-support devices: None Lungs/pleura: No consolidation, effusion, or pneumothorax. Heart, hilar and mediastinal structures: The heart size is borderline normal. Mediastinal contours within limits of normal. The trachea is midline. Skeletal Structures: No acute findings demonstrated. No free air beneath the diaphragm.     Impression: No acute pulmonary or pleural finding. Electronically signed by:  Robert Bourne MD  12/22/2017 8:24 PM [employer] Workstation: [id number]    Labs Reviewed   INFLUENZA ANTIGEN, RAPID - Abnormal; Notable for the following:        Result Value    Influenza A Ag, EIA Positive (*)     All other components within normal limits   RAINBOW DRAW    Narrative:     The following orders were created for panel order [facility] Draw.  Procedure                               Abnormality         Status                     ---------                               -----------         ------                     Light Blue Top[[id number]]                                   Final result               Green Top (Gel)[[id number]]                                  Final result               Lavender Top[[id number]]                                     Final result               Gold Top - SST[[id number]]                                   Final result                 Please view results for these tests on the individual orders.   LIGHT BLUE TOP   GREEN TOP   LAVENDER TOP   GOLD TOP - SST               MDM  Number of Diagnoses or Management Options  Cough:   Fever and chills:   Influenza A:       Final diagnoses:   Influenza A   Cough   Fever and chills            ARRON Sheets  12/23/17 0919

## 2017-12-26 ENCOUNTER — TELEPHONE (OUTPATIENT)
Dept: FAMILY MEDICINE CLINIC | Facility: CLINIC | Age: 57
End: 2017-12-26

## 2017-12-29 ENCOUNTER — OFFICE VISIT (OUTPATIENT)
Dept: FAMILY MEDICINE CLINIC | Facility: CLINIC | Age: 57
End: 2017-12-29

## 2017-12-29 VITALS
SYSTOLIC BLOOD PRESSURE: 142 MMHG | HEIGHT: 72 IN | HEART RATE: 86 BPM | OXYGEN SATURATION: 96 % | WEIGHT: 228 LBS | BODY MASS INDEX: 30.88 KG/M2 | DIASTOLIC BLOOD PRESSURE: 80 MMHG

## 2017-12-29 DIAGNOSIS — E78.00 HYPERCHOLESTEROLEMIA: ICD-10-CM

## 2017-12-29 DIAGNOSIS — Z13.220 SCREENING FOR HYPERLIPIDEMIA: Primary | ICD-10-CM

## 2017-12-29 DIAGNOSIS — I10 ESSENTIAL HYPERTENSION: ICD-10-CM

## 2017-12-29 PROCEDURE — 99213 OFFICE O/P EST LOW 20 MIN: CPT | Performed by: FAMILY MEDICINE

## 2017-12-29 NOTE — PROGRESS NOTES
"Subjective   Jameel Dozier is a 57 y.o. male.     History of Present Illness  Patient is a 57-year-old -American male who is presenting today as an ER follow-up.  Patient was seen in the emergency room December 22 was diagnosed with type a flu and was given a prescription for Tamiflu.  Patient states he has finishes prescription of Tamiflu and states that he has had no fevers in the past 4 days has had no other complaints at this time.  Patient denies any sore throat, nasal drainage, nasal congestion, or any productive cough at this time.  Patient states that he believes he has gotten a lot better since the prescription and has no other acute concerns at this time.  Did indicate to the patient that he is due for a lipid panel and patient is understanding and agreeable to getting this done today.  The following portions of the patient's history were reviewed and updated as appropriate: allergies, current medications, past family history, past medical history, past social history, past surgical history and problem list.    Review of Systems      Constitutional: no weight loss, fever, chills, weakness  HEENT: no visual loss, blurred vision, double vision, yellow scalarea  Skin: no rash, no discoloration   CVS: no chest pain, palpitations  Chest: no shortness of air, cough, dyspnea  GI: no abdominal pain, blood in stool, no nausea, vomiting, diarrhea  : no burning in urination, change in odor, no change in frequency  Neuro: no headache, dizziness, syncope, paralysis, ataxia, numbness  Musculoskeletal: no muscle, back pain, joint pain, stiffness  Lymphatics: no enlarged nodes  Endo: no reports of sweating, cold or heat intolerance, no polyuria      Objective   Physical Exam  /80 (BP Location: Left arm, Patient Position: Sitting, Cuff Size: Adult)  Pulse 86  Ht 182.9 cm (72\")  Wt 103 kg (228 lb)  SpO2 96%  BMI 30.92 kg/m2    GENERAL APPEARANCE: Well developed, well nourished, in no acute " distress.  SKIN: Inspection of the skin reveals no rashes, ulcerations or petechiae.  NECK: Supple and symmetric. There was no thyroid enlargement, and no tenderness, or masses were felt.  CHEST: Normal AP diameter and normal contour without any kyphoscoliosis.  LUNGS: Auscultation of the lungs revealed normal breath sounds without any other adventitious sounds or rubs.  CARDIOVASCULAR: There was a regular rate and rhythm without any murmurs, gallops, rubs. The carotid pulses were normal and 2+ bilaterally without bruits. Peripheral pulses were 2+ and symmetric.  MUSCULOSKELETAL: Gait was normal. There was no tenderness or effusions noted. Muscle strength and tone were normal.  EXTREMITIES: No cyanosis, clubbing or edema.  NEUROLOGIC: Alert and oriented x 3. Normal affect. Gait was normal.     Assessment/Plan   Jameel was seen today for influenza.    Diagnoses and all orders for this visit:    Screening for hyperlipidemia  -     Lipid Panel    Hypercholesterolemia    Essential hypertension             This document has been electronically signed by Topher Leung MD on December 29, 2017 9:50 AM

## 2017-12-29 NOTE — PROGRESS NOTES
I have reviewed the notes, assessments, and/or procedures performed by Dr. Leung, I concur with her/his documentation of Jameel Dozier.

## 2018-01-05 ENCOUNTER — OFFICE VISIT (OUTPATIENT)
Dept: PULMONOLOGY | Facility: CLINIC | Age: 58
End: 2018-01-05

## 2018-01-05 VITALS
BODY MASS INDEX: 31.29 KG/M2 | HEIGHT: 72 IN | OXYGEN SATURATION: 98 % | DIASTOLIC BLOOD PRESSURE: 108 MMHG | SYSTOLIC BLOOD PRESSURE: 172 MMHG | HEART RATE: 96 BPM | WEIGHT: 231 LBS

## 2018-01-05 DIAGNOSIS — J44.9 CHRONIC OBSTRUCTIVE PULMONARY DISEASE, UNSPECIFIED COPD TYPE (HCC): Primary | ICD-10-CM

## 2018-01-05 DIAGNOSIS — F17.200 TOBACCO USE DISORDER: ICD-10-CM

## 2018-01-05 DIAGNOSIS — J30.89 CHRONIC NON-SEASONAL ALLERGIC RHINITIS, UNSPECIFIED TRIGGER: ICD-10-CM

## 2018-01-05 DIAGNOSIS — R53.81 PHYSICAL DECONDITIONING: ICD-10-CM

## 2018-01-05 PROCEDURE — 99214 OFFICE O/P EST MOD 30 MIN: CPT | Performed by: INTERNAL MEDICINE

## 2018-01-05 PROCEDURE — 99406 BEHAV CHNG SMOKING 3-10 MIN: CPT | Performed by: INTERNAL MEDICINE

## 2018-01-05 RX ORDER — MAGNESIUM OXIDE 400 MG/1
400 TABLET ORAL DAILY
COMMUNITY
End: 2018-05-18 | Stop reason: SDUPTHER

## 2018-01-05 RX ORDER — FLUTICASONE PROPIONATE 50 MCG
2 SPRAY, SUSPENSION (ML) NASAL DAILY
COMMUNITY
End: 2018-07-17 | Stop reason: SDUPTHER

## 2018-01-05 NOTE — PROGRESS NOTES
Pulmonary Office Follow-up    Subjective     Jameel Dozier is seen today at the office for   Chief Complaint   Patient presents with   • Follow-up     4 week         HPI  Jameel Dozier is a 57 y.o. male with a PMH significant for COPD, tobacco use, [place], obesity, HTN, and GERD who presents for follow-up of COPD. he was last seen on 11/77/17, at which time I recommended starting Incruse daily and counseled on proper albuterol technique.  I also counseled him on the importance of complete tobacco cessation.  He was having persistent rhinitis symptoms so I recommend starting Flonase in addition to his daily Zyrtec. Unfortunately, his insurance did not cover the Incruse so he got Spiriva handihaler instead. He states he feels it does not work as well as the albuterol. He did not get his flu vaccine, but he did test positive for influenza A a week ago.  Patient states that he is recovering from his illness.      Patient Active Problem List   Diagnosis   • Central obesity   • Hypercholesterolemia   • Essential hypertension   • GERD with esophagitis   • Astigmatism   • Type 2 diabetes mellitus with complication, without long-term current use of insulin   • Nicotine abuse   • Tobacco use disorder   • Chronic obstructive pulmonary disease   • Class 1 obesity due to excess calories with body mass index (BMI) of 31.0 to 31.9 in adult   • Physical deconditioning   • Chronic non-seasonal allergic rhinitis   • Imaging of gastrointestinal tract abnormal   • Pain of upper abdomen   • Gastroesophageal reflux disease with esophagitis   • Screening for hyperlipidemia       Review of Systems  Review of Systems   Constitutional: Positive for fatigue. Negative for fever.   HENT: Positive for congestion.    Respiratory: Positive for cough and shortness of breath. Negative for wheezing.    Cardiovascular: Negative for leg swelling.   Gastrointestinal: Negative for abdominal pain.     As described in the HPI. Otherwise,  remainder of ROS (14 systems) were negative.    Medications, Allergies, Social, and Family Histories reviewed as per EMR.    Objective     Vitals:    01/05/18 1047   BP: (!) 172/108   Pulse: 96   SpO2: 98%     Physical Exam   Constitutional: He is oriented to person, place, and time. Vital signs are normal. He appears well-developed and well-nourished.   obese   HENT:   Head: Normocephalic and atraumatic.   Mouth/Throat: Uvula is midline, oropharynx is clear and moist and mucous membranes are normal. Abnormal dentition.   Mallampati 4, large tongue   Eyes: Conjunctivae, EOM and lids are normal.   Neck: Trachea normal and normal range of motion. No tracheal tenderness present. No thyroid mass present.   Cardiovascular: Normal rate, regular rhythm and normal heart sounds.  Exam reveals no gallop.    No murmur heard.  Pulmonary/Chest: Effort normal and breath sounds normal. No respiratory distress. He has no decreased breath sounds. He has no wheezes. He has no rhonchi.   Shallow breathing effort   Abdominal: Soft. Normal appearance and bowel sounds are normal. There is no tenderness.   obese       Vascular Status -  His exam exhibits no right foot edema. His exam exhibits no left foot edema.  Lymphadenopathy:        Head (right side): No submandibular adenopathy present.        Head (left side): No submandibular adenopathy present.     He has no cervical adenopathy.        Right: No supraclavicular adenopathy present.        Left: No supraclavicular adenopathy present.   Neurological: He is alert and oriented to person, place, and time.   Skin: Skin is warm and dry. No cyanosis. Nails show no clubbing.   Psychiatric: He has a normal mood and affect. His speech is normal and behavior is normal. Judgment normal.   Nursing note and vitals reviewed.          Assessment/Plan     Jameel was seen today for follow-up.    Diagnoses and all orders for this visit:    Chronic obstructive pulmonary disease, unspecified COPD  type    Chronic non-seasonal allergic rhinitis, unspecified trigger    Tobacco use disorder    Physical deconditioning         Discussion/ Recommendations:   Although he does not feel like he is getting medicine off his breathing, he has had reduced albuterol use indicating that it is helping.  I would like to stay the course on his current regimen.  Unfortunately, he does continue to smoke and is pre-contemplative regarding cessation.    -Continue Spiriva daily and albuterol as needed.  Demonstrated proper use of Spiriva HandiHaler to ensure he is receiving medicine.  -Again, stressed importance of complete tobacco cessation.  Offered support.  Spent 3 minutes.  -Annual LD CT screening in November 2018.  -Counseled on the importance of regular exercise.    Return in about 3 months (around 4/5/2018) for Recheck.          This document has been electronically signed by Yolanda Phan MD on January 5, 2018 11:04 AM      Dictated using Dragon

## 2018-01-08 ENCOUNTER — OFFICE VISIT (OUTPATIENT)
Dept: GASTROENTEROLOGY | Facility: CLINIC | Age: 58
End: 2018-01-08

## 2018-01-08 VITALS
HEIGHT: 72 IN | BODY MASS INDEX: 31.07 KG/M2 | DIASTOLIC BLOOD PRESSURE: 88 MMHG | HEART RATE: 89 BPM | SYSTOLIC BLOOD PRESSURE: 152 MMHG | WEIGHT: 229.4 LBS

## 2018-01-08 DIAGNOSIS — R10.10 PAIN OF UPPER ABDOMEN: ICD-10-CM

## 2018-01-08 DIAGNOSIS — K21.00 GASTROESOPHAGEAL REFLUX DISEASE WITH ESOPHAGITIS: Primary | ICD-10-CM

## 2018-01-08 PROCEDURE — 99213 OFFICE O/P EST LOW 20 MIN: CPT | Performed by: PHYSICIAN ASSISTANT

## 2018-01-08 NOTE — PROGRESS NOTES
Chief Complaint   Patient presents with   • Heartburn   • Abdominal Pain       ENDO PROCEDURE ORDERED:    Subjective    Jameel Dozier is a 57 y.o. male. he is here today for follow-up.    History of Present Illness    The patient seen on a recheck of his GERD, abdominal pain, abnormal imaging of his esophagus.  Last seen 12/14/2017.  The patient underwent EGD on 12/18/2017 and showed a small benign stenosis of the esophagus with mild inflammation.  No dilatation was performed, I confirmed with Dr. Dockery.  The patient did have multiple biopsies of the lower esophagus which showed mild inflammation, no other abnormalities seen.  The patient denies significant reflux.  He feels he is doing well on the Prilosec.  He still has occasional difficulty swallowing.  He continues to have chronic hiccups.  He is iron.  His bowels are moving without blood.  Weight is down 5 pounds since last visit.  Last colonoscopy 04/13/2015.      A/P:  Patient with mild stenosis, denied significant dysphagia.  Would consider repeat dilatation, consider repeat Botox injection if he has persistent symptoms.  At this point, would continue to avoid gastric irritants.  Would continue on the Prilosec.  He has tried higher doses of the medication without improvement.  He did not show any significant abnormality of the esophagus other than noted above.  As long as he is doing well, recommend no further follow up at this time.  He will be due for repeat colonoscopy in 2020.  Otherwise, he will follow up with his primary care provider.           The following portions of the patient's history were reviewed and updated as appropriate:   Past Medical History:   Diagnosis Date   • Abdominal pain    • Acquired achalasia of esophagus    • Adenomatous polyp of colon    • Adverse drug effect    • Alcohol dependence with alcohol-induced disorder    • Allergic rhinitis    • Anal fissure    • Anemia due to blood loss    • Benign essential hypertension     • Central obesity    • Cerebrovascular disease    • Chronic obstructive lung disease    • Claudication    • Disorder of peripheral nervous system    • Diverticular disease of colon     completed antibx, ? neoplasm on CT   • Dysphagia    • Dyspnea    • ED (erectile dysfunction)    • Epigastric pain    • Esophageal dysmotility    • Esophageal reflux    • Esophagitis    • Essential hypertension    • External hemorrhoids    • Gastritis    • GERD with esophagitis    • Heavy tobacco smoker    • Hemorrhoids    • Hiatal hernia    • Hiccough    • History of colon polyps    • History of echocardiogram     Normal LV funciton with Ef of 65% to 70% without regional wall motion abnormalities.Mild CLVH. Normal RV size and function. No significant valvular regurgitation or stenosis. 09/19/2014       • History of EKG    • History of TIA (transient ischemic attack)    • Hypercholesterolemia    • Hypertension    • Left ventricular hypertrophy    • Male erectile disorder    • Obstructive sleep apnea syndrome    • Primary osteoarthritis of knees, bilateral    • Rectal bleed     painful   • Rectal hemorrhage    • Sleep disorder    • Transient cerebral ischemia    • Upper respiratory infection    • Weakness     Attacks of weakness     Past Surgical History:   Procedure Laterality Date   • COLONOSCOPY  04/13/2015    Internal and external hemorrhoids found. 04/13/2015    • ENDOSCOPY      Internal & external hemorrhoids found. 1 polyp in sigmoid colon; removed by snare cautery polypectomy. 09/26/2012    • ENDOSCOPY      Gastritis found in the stomach. Biopsy taken.Enlarged folds were found in the body of the stomach.Biopsy taken.Normal duodenum.A hiatus hernia was found in the esophagus. 04/13/2015    • ENDOSCOPY      Hiatus hernia in esophagus. Gastritis in stomach. Biopsy taken. Normal duodenum. Biopsy taken. 09/26/2012       • ENDOSCOPY N/A 12/18/2017    Procedure: ESOPHAGOGASTRODUODENOSCOPY--attn mid esophagus, abnl on CT;  Surgeon:  Alli Dockery MD;  Location: [facility] ENDOSCOPY;  Service:    • UPPER GASTROINTESTINAL ENDOSCOPY  08/24/2016   • UPPER GASTROINTESTINAL ENDOSCOPY  04/13/2015   • UPPER GASTROINTESTINAL ENDOSCOPY  12/18/2017     Family History   Problem Relation Age of Onset   • Cancer Mother    • Cirrhosis Father    • Diabetes Other    • Hypertension Other        Allergies   Allergen Reactions   • Ace Inhibitors Angioedema     hypotension   • Lisinopril    • Vistaril [Hydroxyzine Hcl]      Social History     Social History   • Marital status: Single     Spouse name: N/A   • Number of children: 0   • Years of education: 12     Occupational History   • retired      Social History Main Topics   • Smoking status: Current Every Day Smoker     Packs/day: 2.00     Years: 40.00     Types: Cigarettes   • Smokeless tobacco: Never Used   • Alcohol use 3.6 oz/week     6 Cans of beer per week      Comment: when can get   • Drug use: No   • Sexual activity: Defer     Other Topics Concern   • None     Social History Narrative       Current Outpatient Prescriptions:   •  acyclovir (ZOVIRAX) 400 MG tablet, Take 1 tablet by mouth 5 (Five) Times a Day. Take no more than 5 doses a day., Disp: 30 tablet, Rfl: 0  •  albuterol (PROVENTIL HFA;VENTOLIN HFA) 108 (90 Base) MCG/ACT inhaler, Inhale 2 puffs Every 4 (Four) Hours As Needed for Wheezing., Disp: 3.7 g, Rfl: 11  •  amLODIPine (NORVASC) 5 MG tablet, Take 1 tablet by mouth Daily., Disp: 30 tablet, Rfl: 6  •  aspirin 81 MG EC tablet, Take 1 tablet by mouth Daily., Disp: 30 tablet, Rfl: 5  •  cetirizine (zyrTEC) 10 MG tablet, TAKE 1 TAB DAILY IF NEEDED FOR ALLERGIES., Disp: 30 tablet, Rfl: 4  •  ferrous sulfate (FEOSOL) 325 (65 FE) MG tablet, Take 1 tablet by mouth 3 (Three) Times a Day With Meals., Disp: 90 tablet, Rfl: 5  •  fluticasone (FLONASE) 50 MCG/ACT nasal spray, 2 sprays into each nostril Daily., Disp: , Rfl:   •  losartan (COZAAR) 100 MG tablet, Take 1 tablet by mouth Daily., Disp: 30 tablet,  "Rfl: 5  •  lovastatin (MEVACOR) 40 MG tablet, Take 1 tablet by mouth Daily., Disp: 30 tablet, Rfl: 5  •  magnesium oxide (MAG-OX) 400 MG tablet, Take 400 mg by mouth Daily., Disp: , Rfl:   •  metFORMIN (GLUCOPHAGE) 500 MG tablet, Take 1 tablet by mouth 2 (Two) Times a Day With Meals., Disp: 60 tablet, Rfl: 5  •  metoprolol tartrate (LOPRESSOR) 25 MG tablet, Take 1 tablet by mouth 2 (Two) Times a Day., Disp: 60 tablet, Rfl: 5  •  omeprazole (priLOSEC) 20 MG capsule, Take 1 capsule by mouth Every Morning., Disp: 30 capsule, Rfl: 5  •  tiotropium (SPIRIVA) 18 MCG per inhalation capsule, Place 1 capsule into inhaler and inhale Daily., Disp: 30 capsule, Rfl: 11  Review of Systems  Review of Systems       Objective    /88 (BP Location: Left arm)  Pulse 89  Ht 182.9 cm (72.01\")  Wt 104 kg (229 lb 6.4 oz)  BMI 31.11 kg/m2  Physical Exam   Constitutional: He is oriented to person, place, and time. He appears well-developed and well-nourished. No distress.   AA   HENT:   Head: Normocephalic and atraumatic.   Eyes: EOM are normal. Pupils are equal, round, and reactive to light.   Neck: Normal range of motion.   Cardiovascular: Normal rate, regular rhythm and normal heart sounds.    Pulmonary/Chest: Effort normal and breath sounds normal.   Abdominal: Soft. Bowel sounds are normal. He exhibits no shifting dullness, no distension, no abdominal bruit, no ascites and no mass. There is no hepatosplenomegaly. There is tenderness. There is no rigidity, no rebound, no guarding and no CVA tenderness. No hernia. Hernia confirmed negative in the ventral area.   mild   Musculoskeletal: Normal range of motion.   Neurological: He is alert and oriented to person, place, and time.   Skin: Skin is warm and dry.   Psychiatric: He has a normal mood and affect. His behavior is normal. Judgment and thought content normal.   Nursing note and vitals reviewed.    Assessment/Plan      1. Gastroesophageal reflux disease with esophagitis    2. " Pain of upper abdomen    .   Jameel was seen today for heartburn and abdominal pain.    Diagnoses and all orders for this visit:    Gastroesophageal reflux disease with esophagitis    Pain of upper abdomen        Orders placed during this encounter include:  No orders of the defined types were placed in this encounter.      Medications prescribed:  No orders of the defined types were placed in this encounter.      Requested Prescriptions      No prescriptions requested or ordered in this encounter       Review and/or summary of lab tests, radiology, procedures, medications. Review and summary of old records and obtaining of history. The risks and benefits of my recommendations, as well as other treatment options were discussed with the patient today. Questions were answered.    Follow-up: Return if symptoms worsen or fail to improve.     * Surgery not found *      This document has been electronically signed by Tim Chen PA-C on January 8, 2018 12:54 PM      Results for orders placed or performed during the hospital encounter of 12/22/17   Gold Top - SST   Result Value Ref Range    Extra Tube Hold for add-ons.    Green Top (Gel)   Result Value Ref Range    Extra Tube Hold for add-ons.    Lavender Top   Result Value Ref Range    Extra Tube hold for add-on    Light Blue Top   Result Value Ref Range    Extra Tube hold for add-on    Influenza Antigen, Rapid - Swab, Nasopharynx   Result Value Ref Range    Influenza A Ag, EIA Positive (A) Negative    Influenza B Ag, EIA Negative Negative   Results for orders placed or performed during the hospital encounter of 12/18/17   Tissue Pathology Exam - Tissue, Esophagus, Mid   Result Value Ref Range    Case Report       Surgical Pathology Report                         Case: [id number]                                  Authorizing Provider:  Alli Dockery MD         Collected:           12/18/2017 12:14 PM          Ordering Location:     [facility]              Received:            12/18/2017 12:43 PM                                 [facility]                                                     Pathologist:           Roberto Calrke MD                                                         Specimen:    Esophagus, Mid                                                                             Final Diagnosis       MID ESOPHAGUS, MUCOSAL BIOPSY:   SEGMENTS OF MILDLY INFLAMED STRATIFIED SQUAMOUS EPITHELIUM.        Gross Description       The specimen consists of a mucosal biopsy measuring up to 0.3 cm in greatest dimension.  All embedded.       Embedded Images     POC Glucose Once   Result Value Ref Range    Glucose 98 70 - 130 mg/dL   Results for orders placed or performed in visit on 10/30/17   Microalbumin / Creatinine Urine Ratio - Urine, Clean Catch   Result Value Ref Range    Microalbumin/Creatinine Ratio 270.5 (H) 0.0 - 30.0 mg/g    Creatinine, Urine 421.4 mg/dL    Microalbumin, Urine 114.0 mg/L   Results for orders placed or performed during the hospital encounter of 10/26/17   Troponin   Result Value Ref Range    Troponin I 0.019 <=0.034 ng/mL   POC Glucose Fingerstick   Result Value Ref Range    Glucose 107 70 - 130 mg/dL   POC Glucose Fingerstick   Result Value Ref Range    Glucose 101 70 - 130 mg/dL   POC Glucose Fingerstick   Result Value Ref Range    Glucose 122 70 - 130 mg/dL   POC Glucose Fingerstick   Result Value Ref Range    Glucose 87 70 - 130 mg/dL   Potassium   Result Value Ref Range    Potassium 3.1 (L) 3.5 - 5.1 mmol/L   Potassium   Result Value Ref Range    Potassium 2.8 (L) 3.5 - 5.1 mmol/L   Magnesium   Result Value Ref Range    Magnesium 2.0 1.6 - 2.3 mg/dL   Magnesium   Result Value Ref Range    Magnesium 1.3 (L) 1.6 - 2.3 mg/dL   Magnesium   Result Value Ref Range    Magnesium 1.4 (L) 1.6 - 2.3 mg/dL   Magnesium   Result Value Ref Range    Magnesium 0.9 (C) 1.6 - 2.3 mg/dL   Hemoglobin A1c   Result Value Ref Range    Hemoglobin  A1C 5.8 (H) 4 - 5.6 %   Basic Metabolic Panel   Result Value Ref Range    Glucose 99 60 - 100 mg/dL    BUN 8 7 - 21 mg/dL    Creatinine 0.92 0.70 - 1.30 mg/dL    Sodium 145 137 - 145 mmol/L    Potassium 3.5 3.5 - 5.1 mmol/L    Chloride 101 95 - 110 mmol/L    CO2 31.0 22.0 - 31.0 mmol/L    Calcium 8.0 (L) 8.4 - 10.2 mg/dL    eGFR  African Amer 102 56 - 130 mL/min/1.73    BUN/Creatinine Ratio 8.7 7.0 - 25.0    Anion Gap 13.0 5.0 - 15.0 mmol/L   Results for orders placed or performed in visit on 10/26/17   Magnesium   Result Value Ref Range    Magnesium 0.9 (C) 1.6 - 2.3 mg/dL   Results for orders placed or performed in visit on 12/13/16   Basic Metabolic Panel   Result Value Ref Range    Glucose 118 (H) 60 - 100 mg/dL    BUN 10 7 - 21 mg/dL    Creatinine 0.92 0.70 - 1.30 mg/dL    Sodium 145 137 - 145 mmol/L    Potassium 2.3 (C) 3.5 - 5.1 mmol/L    Chloride 96 95 - 110 mmol/L    CO2 34.0 (H) 22.0 - 31.0 mmol/L    Calcium 8.6 8.4 - 10.2 mg/dL    eGFR  African Amer 102 56 - 130 mL/min/1.73    BUN/Creatinine Ratio 10.9 7.0 - 25.0    Anion Gap 15.0 5.0 - 15.0 mmol/L   Results for orders placed or performed in visit on 12/09/16   Lipid Panel   Result Value Ref Range    Total Cholesterol 147 0 - 199 mg/dl    Triglycerides 385 (H) 20 - 199 mg/dl    HDL Cholesterol 28 (L) 60 - 200 mg/dl    LDL Cholesterol  42 0 - 129 mg/dl   Basic Metabolic Panel   Result Value Ref Range    Sodium 141 137 - 145 mmol/L    Potassium 3.1 (L) 3.5 - 5.1 mmol/L    Chloride 96 95 - 110 mmol/L    CO2 25 22 - 31 mmol/L    Anion Gap 20.0 (H) 5.0 - 15.0 mmol/L    Glucose 113 (H) 60 - 100 mg/dl    BUN 13 7 - 21 mg/dl    Creatinine 1.1 0.7 - 1.3 mg/dl    GFR MDRD Non  69 56 - 130 mL/min/1.73 sq.M    GFR MDRD  83 56 - 130 mL/min/1.73 sq.M    Calcium 9.6 8.4 - 10.2 mg/dl   Results for orders placed or performed during the hospital encounter of 12/08/16   CBC & Differential   Result Value Ref Range    WBC 6.8 3.2 - 9.8 x1000/uL     RBC 6.01 (H) 4.37 - 5.74 malika/mm3    Hemoglobin 15.5 13.7 - 17.3 gm/dl    Hematocrit 46.9 39.0 - 49.0 %    MCV 78.0 (L) 80.0 - 98.0 fl    MCH 25.8 (L) 26.0 - 34.0 pg    MCHC 33.0 31.5 - 36.3 gm/dl    RDW 17.2 (H) 11.5 - 14.5 %    Platelets 244 150 - 450 x1000/mm3    MPV 10.7 8.0 - 12.0 fl    Neutrophil Rel % 53.1 37.0 - 80.0 %    Lymphocyte Rel % 32.5 10.0 - 50.0 %    Monocyte Rel % 8.5 0.0 - 12.0 %    Eosinophil Rel % 4.8 0.0 - 7.0 %    Basophil Rel % 0.7 0.0 - 2.0 %    Immature Granulocyte Rel % 0.40 0.00 - 0.50 %    Neutrophils Absolute 3.63 2.00 - 8.60 x1000/uL    Lymphocytes Absolute 2.22 0.60 - 4.20 x1000/uL    Monocytes Absolute 0.58 0.00 - 0.90 x1000/uL    Eosinophils Absolute 0.33 0.00 - 0.70 x1000/uL    Basophils Absolute 0.05 0.00 - 0.20 x1000/uL    Immature Granulocytes Absolute 0.030 (H) 0.005 - 0.022 x1000/uL   Results for orders placed or performed in visit on 12/08/16   CBC Auto Differential   Result Value Ref Range    WBC 5.50 3.20 - 9.80 10*3/mm3    RBC 5.67 4.37 - 5.74 10*6/mm3    Hemoglobin 14.3 13.7 - 17.3 g/dL    Hematocrit 44.1 39.0 - 49.0 %    MCV 77.8 (L) 80.0 - 98.0 fL    MCH 25.2 (L) 26.5 - 34.0 pg    MCHC 32.4 31.5 - 36.3 g/dL    RDW 16.1 (H) 11.5 - 14.5 %    RDW-SD 44.2 (H) 35.1 - 43.9 fl    MPV 10.7 8.0 - 12.0 fL    Platelets 207 150 - 450 10*3/mm3    Neutrophil % 50.7 37.0 - 80.0 %    Lymphocyte % 31.3 10.0 - 50.0 %    Monocyte % 10.0 0.0 - 12.0 %    Eosinophil % 7.1 (H) 0.0 - 7.0 %    Basophil % 0.5 0.0 - 2.0 %    Immature Grans % 0.4 0.0 - 0.5 %    Neutrophils, Absolute 2.79 2.00 - 8.60 10*3/mm3    Lymphocytes, Absolute 1.72 0.60 - 4.20 10*3/mm3    Monocytes, Absolute 0.55 0.00 - 0.90 10*3/mm3    Eosinophils, Absolute 0.39 0.00 - 0.70 10*3/mm3    Basophils, Absolute 0.03 0.00 - 0.20 10*3/mm3    Immature Grans, Absolute 0.02 0.00 - 0.02 10*3/mm3     *Note: Due to a large number of results and/or encounters for the requested time period, some results have not been displayed. A  complete set of results can be found in Results Review.       Some portions of this note have been dictated using voice recognition software and may contain errors and/or omissions.

## 2018-05-16 ENCOUNTER — OFFICE VISIT (OUTPATIENT)
Dept: PULMONOLOGY | Facility: CLINIC | Age: 58
End: 2018-05-16

## 2018-05-16 VITALS
BODY MASS INDEX: 29.33 KG/M2 | SYSTOLIC BLOOD PRESSURE: 148 MMHG | HEART RATE: 96 BPM | OXYGEN SATURATION: 98 % | DIASTOLIC BLOOD PRESSURE: 80 MMHG | HEIGHT: 72 IN | WEIGHT: 216.56 LBS

## 2018-05-16 DIAGNOSIS — F17.210 CIGARETTE NICOTINE DEPENDENCE, UNCOMPLICATED: ICD-10-CM

## 2018-05-16 DIAGNOSIS — J44.9 CHRONIC OBSTRUCTIVE PULMONARY DISEASE, UNSPECIFIED COPD TYPE (HCC): Primary | ICD-10-CM

## 2018-05-16 DIAGNOSIS — J30.89 CHRONIC NON-SEASONAL ALLERGIC RHINITIS, UNSPECIFIED TRIGGER: ICD-10-CM

## 2018-05-16 DIAGNOSIS — E66.09 CLASS 1 OBESITY DUE TO EXCESS CALORIES WITH BODY MASS INDEX (BMI) OF 31.0 TO 31.9 IN ADULT, UNSPECIFIED WHETHER SERIOUS COMORBIDITY PRESENT: ICD-10-CM

## 2018-05-16 PROCEDURE — 99406 BEHAV CHNG SMOKING 3-10 MIN: CPT | Performed by: INTERNAL MEDICINE

## 2018-05-16 PROCEDURE — 99214 OFFICE O/P EST MOD 30 MIN: CPT | Performed by: INTERNAL MEDICINE

## 2018-05-16 RX ORDER — NICOTINE 21 MG/24HR
1 PATCH, TRANSDERMAL 24 HOURS TRANSDERMAL EVERY 24 HOURS
Qty: 28 EACH | Refills: 4 | Status: SHIPPED | OUTPATIENT
Start: 2018-05-16 | End: 2019-02-25

## 2018-05-16 NOTE — PROGRESS NOTES
Pulmonary Office Follow-up    Subjective     Jameel Dozier is seen today at the office for   Chief Complaint   Patient presents with   • COPD         HPI  Jameel Dozier is a 57 y.o. male with a PMH significant for COPD, tobacco use, [place], obesity, HTN, and GERD who presents for follow-up of COPD. he was last seen on 1/5/18, at which time I recommended continuing Spiriva daily and albuterol as needed.  I also stressed the importance of complete tobacco cessation and recommended annual LD CT screening to resume in November 2018. Pt states he is doing well but he thinks if he would stop smoking. He is still taking Spiriva which helps but he will still need his albuterol every few days. Unfortunately, he does continue to smoke at states that he wants to quit.  He has tried the nicotine patch in the past with success, but he states he is unable to afford it currently.  He does have occasional cough, but denies chest pain, wheeze, or recent exacerbations.  He also denies having any reflux symptoms.  Patient reports that he is walking regularly.     Patient Active Problem List   Diagnosis   • Central obesity   • Hypercholesterolemia   • Essential hypertension   • GERD with esophagitis   • Astigmatism   • Type 2 diabetes mellitus with complication, without long-term current use of insulin   • Nicotine abuse   • Tobacco use disorder   • Chronic obstructive pulmonary disease   • Class 1 obesity due to excess calories with body mass index (BMI) of 31.0 to 31.9 in adult   • Physical deconditioning   • Chronic non-seasonal allergic rhinitis   • Imaging of gastrointestinal tract abnormal   • Pain of upper abdomen   • Gastroesophageal reflux disease with esophagitis   • Screening for hyperlipidemia   • Weakness   • Upper respiratory infection   • Transient cerebral ischemia   • Sleep disorder   • Rectal hemorrhage   • Rectal bleed   • Primary osteoarthritis of knees, bilateral   • Obstructive sleep apnea syndrome   •  Male erectile disorder   • Left ventricular hypertrophy   • Hypertension   • History of TIA (transient ischemic attack)   • History of EKG   • History of echocardiogram   • History of colon polyps   • Hiccough   • Hiatal hernia   • Hemorrhoids   • Heavy tobacco smoker   • Gastritis   • External hemorrhoids   • Esophagitis   • Esophageal reflux   • Esophageal dysmotility   • Epigastric pain   • ED (erectile dysfunction)   • Dyspnea   • Dysphagia   • Diverticular disease of colon   • Disorder of peripheral nervous system   • Claudication   • Chronic obstructive lung disease   • Cerebrovascular disease   • Benign essential hypertension   • Anemia due to blood loss   • Anal fissure   • Allergic rhinitis   • Alcohol dependence with alcohol-induced disorder   • Adenomatous polyp of colon   • Acquired achalasia of esophagus   • Abdominal pain   • Adverse drug effect       Review of Systems  Review of Systems   Constitutional: Positive for fatigue. Negative for fever and unexpected weight change.   Respiratory: Positive for cough and shortness of breath. Negative for wheezing.    Cardiovascular: Negative for chest pain and leg swelling.   Gastrointestinal: Negative for abdominal pain.     As described in the HPI. Otherwise, remainder of ROS (14 systems) were negative.    Medications, Allergies, Social, and Family Histories reviewed as per EMR.    Objective     Vitals:    05/16/18 1029   BP: 148/80   Pulse: 96   SpO2: 98%     Physical Exam   Constitutional: He is oriented to person, place, and time. Vital signs are normal. He appears well-developed and well-nourished.   obese   HENT:   Head: Normocephalic and atraumatic.   Mouth/Throat: Uvula is midline, oropharynx is clear and moist and mucous membranes are normal. Abnormal dentition.   Mallampati 4, large tongue   Eyes: Conjunctivae, EOM and lids are normal. Pupils are equal, round, and reactive to light.   Neck: Trachea normal and normal range of motion. No tracheal  tenderness present. No thyroid mass present.   Cardiovascular: Normal rate, regular rhythm and normal heart sounds.  PMI is not displaced.  Exam reveals no gallop.    No murmur heard.  Pulmonary/Chest: Effort normal and breath sounds normal. No respiratory distress. He has no decreased breath sounds. He has no wheezes. He has no rhonchi. He exhibits no tenderness.   Shallow breathing effort   Abdominal: Soft. Normal appearance and bowel sounds are normal. There is no hepatomegaly. There is no tenderness.   obese   Musculoskeletal:   Normal gait, no extremity edema     Vascular Status -  His right foot exhibits no edema. His left foot exhibits no edema.  Lymphadenopathy:        Head (right side): No submandibular adenopathy present.        Head (left side): No submandibular adenopathy present.     He has no cervical adenopathy.        Right: No supraclavicular adenopathy present.        Left: No supraclavicular adenopathy present.   Neurological: He is alert and oriented to person, place, and time.   Skin: Skin is warm and dry. No cyanosis. Nails show no clubbing.   Psychiatric: He has a normal mood and affect. His speech is normal and behavior is normal. Judgment normal.   Nursing note and vitals reviewed.          Assessment/Plan     Jameel was seen today for copd.    Diagnoses and all orders for this visit:    Chronic obstructive pulmonary disease, unspecified COPD type    Chronic non-seasonal allergic rhinitis, unspecified trigger    Class 1 obesity due to excess calories with body mass index (BMI) of 31.0 to 31.9 in adult, unspecified whether serious comorbidity present    Cigarette nicotine dependence, uncomplicated  -     nicotine (NICOTINE STEP 1) 21 MG/24HR patch; Place 1 patch on the skin Daily.         Discussion/ Recommendations:   His COPD is doing fairly well on Spiriva alone with infrequent albuterol use.  He does continue to smoke, but states he wants to try quitting.    -Continue Spiriva daily and  albuterol as needed.    -If he begins to require his albuterol more often, we will consider escalation to Anoro.  -Continue Flonase and Zyrtec daily.  -I advised the patient of the risks in continuing to use tobacco, and I provided this patient with smoking cessation educational materials.  Recommended he try the nicotine patch as well as picking a quit date, such as his upcoming birthday.  During this visit, I spent 4 minutes counseling the patient regarding smoking cessation.  -Annual LD CT screening in November 2018.  -Encouraged ongoing regular exercise.    Patient's Body mass index is 29.37 kg/m². BMI is above normal parameters. Recommendations include: exercise counseling and nutrition counseling.      Return in about 3 months (around 8/16/2018) for Recheck.          This document has been electronically signed by Yolanda Phan MD on May 16, 2018 10:44 AM      Dictated using Dragon

## 2018-05-18 ENCOUNTER — APPOINTMENT (OUTPATIENT)
Dept: LAB | Facility: HOSPITAL | Age: 58
End: 2018-05-18

## 2018-05-18 ENCOUNTER — TELEPHONE (OUTPATIENT)
Dept: FAMILY MEDICINE CLINIC | Facility: CLINIC | Age: 58
End: 2018-05-18

## 2018-05-18 ENCOUNTER — OFFICE VISIT (OUTPATIENT)
Dept: FAMILY MEDICINE CLINIC | Facility: CLINIC | Age: 58
End: 2018-05-18

## 2018-05-18 VITALS
HEIGHT: 72 IN | DIASTOLIC BLOOD PRESSURE: 90 MMHG | OXYGEN SATURATION: 97 % | SYSTOLIC BLOOD PRESSURE: 158 MMHG | HEART RATE: 88 BPM | BODY MASS INDEX: 29.27 KG/M2 | WEIGHT: 216.1 LBS

## 2018-05-18 DIAGNOSIS — Z00.00 PREVENTATIVE HEALTH CARE: ICD-10-CM

## 2018-05-18 DIAGNOSIS — E78.00 HYPERCHOLESTEROLEMIA: ICD-10-CM

## 2018-05-18 DIAGNOSIS — E83.42 HYPOMAGNESEMIA: ICD-10-CM

## 2018-05-18 DIAGNOSIS — E87.6 HYPOKALEMIA: ICD-10-CM

## 2018-05-18 DIAGNOSIS — E11.8 TYPE 2 DIABETES MELLITUS WITH COMPLICATION, WITHOUT LONG-TERM CURRENT USE OF INSULIN (HCC): Primary | ICD-10-CM

## 2018-05-18 DIAGNOSIS — I10 ESSENTIAL HYPERTENSION: ICD-10-CM

## 2018-05-18 DIAGNOSIS — E65 CENTRAL OBESITY: ICD-10-CM

## 2018-05-18 DIAGNOSIS — K21.00 GERD WITH ESOPHAGITIS: ICD-10-CM

## 2018-05-18 LAB
HBA1C MFR BLD: 5.7 % (ref 4–5.6)
MAGNESIUM SERPL-MCNC: 0.9 MG/DL (ref 1.6–2.3)
POTASSIUM BLD-SCNC: 3.2 MMOL/L (ref 3.5–5.1)

## 2018-05-18 PROCEDURE — 84132 ASSAY OF SERUM POTASSIUM: CPT | Performed by: FAMILY MEDICINE

## 2018-05-18 PROCEDURE — 99213 OFFICE O/P EST LOW 20 MIN: CPT | Performed by: FAMILY MEDICINE

## 2018-05-18 PROCEDURE — 36415 COLL VENOUS BLD VENIPUNCTURE: CPT | Performed by: FAMILY MEDICINE

## 2018-05-18 PROCEDURE — 83735 ASSAY OF MAGNESIUM: CPT | Performed by: FAMILY MEDICINE

## 2018-05-18 PROCEDURE — 83036 HEMOGLOBIN GLYCOSYLATED A1C: CPT | Performed by: FAMILY MEDICINE

## 2018-05-18 RX ORDER — FERROUS SULFATE 325(65) MG
325 TABLET ORAL
Qty: 90 TABLET | Refills: 5 | Status: SHIPPED | OUTPATIENT
Start: 2018-05-18 | End: 2018-07-17 | Stop reason: SDUPTHER

## 2018-05-18 RX ORDER — LOVASTATIN 40 MG/1
40 TABLET ORAL DAILY
Qty: 30 TABLET | Refills: 5 | Status: SHIPPED | OUTPATIENT
Start: 2018-05-18 | End: 2018-07-17 | Stop reason: SDUPTHER

## 2018-05-18 RX ORDER — MAGNESIUM OXIDE 400 MG/1
800 TABLET ORAL 2 TIMES DAILY
Qty: 12 TABLET | Refills: 0 | Status: SHIPPED | OUTPATIENT
Start: 2018-05-18 | End: 2018-05-21

## 2018-05-18 RX ORDER — ASPIRIN 81 MG/1
81 TABLET ORAL DAILY
Qty: 30 TABLET | Refills: 5 | Status: SHIPPED | OUTPATIENT
Start: 2018-05-18 | End: 2018-07-09 | Stop reason: SDUPTHER

## 2018-05-18 RX ORDER — AMLODIPINE BESYLATE 10 MG/1
10 TABLET ORAL DAILY
Qty: 30 TABLET | Refills: 6 | Status: SHIPPED | OUTPATIENT
Start: 2018-05-18 | End: 2018-07-17 | Stop reason: SDUPTHER

## 2018-05-18 RX ORDER — POTASSIUM CHLORIDE 20 MEQ/1
20 TABLET, EXTENDED RELEASE ORAL ONCE
Qty: 2 TABLET | Refills: 0 | Status: SHIPPED | OUTPATIENT
Start: 2018-05-18 | End: 2018-05-18

## 2018-05-18 RX ORDER — OMEPRAZOLE 20 MG/1
20 CAPSULE, DELAYED RELEASE ORAL EVERY MORNING
Qty: 30 CAPSULE | Refills: 5 | Status: SHIPPED | OUTPATIENT
Start: 2018-05-18 | End: 2018-07-17 | Stop reason: SDUPTHER

## 2018-05-18 RX ORDER — LOSARTAN POTASSIUM 100 MG/1
100 TABLET ORAL DAILY
Qty: 30 TABLET | Refills: 5 | Status: SHIPPED | OUTPATIENT
Start: 2018-05-18 | End: 2018-07-17 | Stop reason: SDUPTHER

## 2018-05-18 NOTE — PATIENT INSTRUCTIONS
Obesity Hypoventilation Syndrome  Obesity hypoventilation syndrome (OHS) means that you are not breathing well enough to get air in and out of your lungs efficiently (ventilation). This causes a low oxygen level and a high carbon dioxide level in your blood (hypoventilation). Having too much total body fat (obesity) is a significant risk factor for developing OHS.  OHS makes it harder for your heart to pump oxygen-rich blood to your body. It can cause sleep disturbances and make you feel sleepy during the day. Over time, OHS can increase your risk for:  · Heart disease.  · High blood pressure (hypertension).  · Reduced ability to absorb sugar from the bloodstream (insulin resistance).  · Heart failure. Over time, OHS weakens your heart and can lead to heart failure.  What are the causes?  The exact cause of OHS is not known. Possible causes include:  · Pressure on the lungs from excess body weight.  · Obesity-related changes in how much air the lungs can hold (lung capacity) and how much they can expand (lung compliance).  · Failure of the brain to regulate oxygen and carbon dioxide levels properly.  · Chemicals (hormones) produced by excess fat cells interfering with breathing regulation.  · A breathing condition in which breathing pauses or becomes shallow during sleep (sleep apnea). This condition can eventually cause the body to ventilate poorly and to hold onto carbon dioxide during the day.  What increases the risk?  You may have a greater risk for OHS if you:  · Have a BMI of 30 or higher. BMI is an estimate of body fat that is calculated from height and weight. For adults, a BMI of 30 or higher is considered obese.  · Are 40?60 years old.  · Carry most of your excess weight around your waist.  · Experience moderate symptoms of sleep apnea.  What are the signs or symptoms?  The most common symptoms of OHS are:  · Daytime sleepiness.  · Lack of energy.  · Shortness of breath.  · Morning headaches.  · Sleep  apnea.  · Trouble concentrating.  · Irritability, mood swings, or depression.  · Swollen veins in the neck.  · Swelling of the legs.  How is this diagnosed?  Your health care provider may suspect OHS if you are obese and have poor breathing during the day and at night. Your health care provider will also do a physical exam. You may have tests to:  · Measure your BMI.  · Measure your blood oxygen level with a sensor placed on your finger (pulse oximetry).  · Measure blood oxygen and carbon dioxide in a blood sample.  · Measure the amount of red blood cells in a blood sample. OHS causes the number of red blood cells you have to increase (polycythemia).  · Check your breathing ability (pulmonary function testing).  · Check your breathing ability, breathing patterns, and oxygen level while you sleep (sleep study).  You may also have a chest X-ray to rule out other breathing problems. You may have an electrocardiogram (ECG) and or echocardiogram to check for signs of heart failure.  How is this treated?  Weight loss is the most important part of treatment for OHS, and it may be the only treatment that you need. Other treatments may include:  · Using a device to open your airway while you sleep, such as a continuous positive airway pressure (CPAP) machine that delivers oxygen to your airway through a mask.  · Surgery (gastric bypass surgery) to lower your BMI. This may be needed if:  ¨ You are very obese.  ¨ Other treatments have not worked for you.  ¨ Your OHS is very severe and is causing organ damage, such as heart failure.  Follow these instructions at home:  Medicines   · Take over-the-counter and prescription medicines only as told by your health care provider.  · Ask your health care provider what medicines are safe for you. You may be told to avoid medicines that can impair breathing and make OHS worse, such as sedatives and narcotics.  Sleeping habits   · If you are prescribed a CPAP machine, make sure you  understand and use the machine as directed.  · Try to get 8 hours of sleep every night.  · Go to bed at the same time every night, and get up at the same time every day.  General instructions   · Work with your health care provider to make a diet and exercise plan that helps you reach and maintain a healthy weight.  · Eat a healthy diet.  · Avoid smoking.  · Exercise regularly as told by your health care provider.  · During the evening, do not drink caffeine and do not eat heavy meals.  · Keep all follow-up visits as told by your health care provider. This is important.  Contact a health care provider if:    · You experience new or worsening shortness of breath.  · You have chest pain.  · You have an irregular heartbeat (palpitations).  · You have dizziness.  · You faint.  · You develop a cough.  · You have a fever.  · You have chest pain when you breathe (pleurisy).  This information is not intended to replace advice given to you by your health care provider. Make sure you discuss any questions you have with your health care provider.  Document Released: 05/29/2017 Document Revised: 07/07/2017 Document Reviewed: 05/29/2017  [employer] Interactive Patient Education © 2017 [employer]    Sleep Apnea  Sleep apnea is a condition in which breathing pauses or becomes shallow during sleep. Episodes of sleep apnea usually last 10 seconds or longer, and they may occur as many as 20 times an hour. Sleep apnea disrupts your sleep and keeps your body from getting the rest that it needs. This condition can increase your risk of certain health problems, including:  · Heart attack.  · Stroke.  · Obesity.  · Diabetes.  · Heart failure.  · Irregular heartbeat.  There are three kinds of sleep apnea:  · Obstructive sleep apnea. This kind is caused by a blocked or collapsed airway.  · Central sleep apnea. This kind happens when the part of the brain that controls breathing does not send the correct signals to the muscles that control  breathing.  · Mixed sleep apnea. This is a combination of obstructive and central sleep apnea.  What are the causes?  The most common cause of this condition is a collapsed or blocked airway. An airway can collapse or become blocked if:  · Your throat muscles are abnormally relaxed.  · Your tongue and tonsils are larger than normal.  · You are overweight.  · Your airway is smaller than normal.  What increases the risk?  This condition is more likely to develop in people who:  · Are overweight.  · Smoke.  · Have a smaller than normal airway.  · Are elderly.  · Are male.  · Drink alcohol.  · Take sedatives or tranquilizers.  · Have a family history of sleep apnea.  What are the signs or symptoms?  Symptoms of this condition include:  · Trouble staying asleep.  · Daytime sleepiness and tiredness.  · Irritability.  · Loud snoring.  · Morning headaches.  · Trouble concentrating.  · Forgetfulness.  · Decreased interest in sex.  · Unexplained sleepiness.  · Mood swings.  · Personality changes.  · Feelings of depression.  · Waking up often during the night to urinate.  · Dry mouth.  · Sore throat.  How is this diagnosed?  This condition may be diagnosed with:  · A medical history.  · A physical exam.  · A series of tests that are done while you are sleeping (sleep study). These tests are usually done in a sleep lab, but they may also be done at home.  How is this treated?  Treatment for this condition aims to restore normal breathing and to ease symptoms during sleep. It may involve managing health issues that can affect breathing, such as high blood pressure or obesity. Treatment may include:  · Sleeping on your side.  · Using a decongestant if you have nasal congestion.  · Avoiding the use of depressants, including alcohol, sedatives, and narcotics.  · Losing weight if you are overweight.  · Making changes to your diet.  · Quitting smoking.  · Using a device to open your airway while you sleep, such as:  ¨ An oral  appliance. This is a custom-made mouthpiece that shifts your lower jaw forward.  ¨ A continuous positive airway pressure (CPAP) device. This device delivers oxygen to your airway through a mask.  ¨ A nasal expiratory positive airway pressure (EPAP) device. This device has valves that you put into each nostril.  ¨ A bi-level positive airway pressure (BPAP) device. This device delivers oxygen to your airway through a mask.  · Surgery if other treatments do not work. During surgery, excess tissue is removed to create a wider airway.  It is important to get treatment for sleep apnea. Without treatment, this condition can lead to:  · High blood pressure.  · Coronary artery disease.  · (Men) An inability to achieve or maintain an erection (impotence).  · Reduced thinking abilities.  Follow these instructions at home:  · Make any lifestyle changes that your health care provider recommends.  · Eat a healthy, well-balanced diet.  · Take over-the-counter and prescription medicines only as told by your health care provider.  · Avoid using depressants, including alcohol, sedatives, and narcotics.  · Take steps to lose weight if you are overweight.  · If you were given a device to open your airway while you sleep, use it only as told by your health care provider.  · Do not use any tobacco products, such as cigarettes, chewing tobacco, and e-cigarettes. If you need help quitting, ask your health care provider.  · Keep all follow-up visits as told by your health care provider. This is important.  Contact a health care provider if:  · The device that you received to open your airway during sleep is uncomfortable or does not seem to be working.  · Your symptoms do not improve.  · Your symptoms get worse.  Get help right away if:  · You develop chest pain.  · You develop shortness of breath.  · You develop discomfort in your back, arms, or stomach.  · You have trouble speaking.  · You have weakness on one side of your body.  · You  have drooping in your face.  These symptoms may represent a serious problem that is an emergency. Do not wait to see if the symptoms will go away. Get medical help right away. Call your local emergency services (911 in the [place] Do not drive yourself to the hospital.  This information is not intended to replace advice given to you by your health care provider. Make sure you discuss any questions you have with your health care provider.  Document Released: 12/08/2003 Document Revised: 08/13/2017 Document Reviewed: 09/26/2016  [employer] Interactive Patient Education © 2017 [employer]    Tobacco Use Disorder  Tobacco use disorder (TUD) is a mental disorder. It is the long-term use of tobacco in spite of related health problems or difficulty with normal life activities. Tobacco is most commonly smoked as cigarettes and less commonly as cigars or pipes. Smokeless chewing tobacco and snuff are also popular. People with TUD get a feeling of extreme pleasure (euphoria) from using tobacco and have a desire to use it again and again. Repeated use of tobacco can cause problems.  The addictive effects of tobacco are due mainly to the ingredient nicotine. Nicotine also causes a rush of adrenaline (epinephrine) in the body. This leads to increased blood pressure, heart rate, and breathing rate. These changes may cause problems for people with high blood pressure, weak hearts, or lung disease. High doses of nicotine in children and pets can lead to seizures and death.  Tobacco contains a number of other unsafe chemicals. These chemicals are especially harmful when inhaled as smoke and can damage almost every organ in the body. Smokers live shorter lives than nonsmokers and are at risk of dying from a number of diseases and cancers. Tobacco smoke can also cause health problems for nonsmokers (due to inhaling secondhand smoke). Smoking is also a fire hazard.  TUD usually starts in the late teenage years and is most common in  young adults between the ages of 18 and 25 years. People who start smoking earlier in life are more likely to continue smoking as adults. TUD is somewhat more common in men than women. People with TUD are at higher risk for using alcohol and other drugs of abuse.  What increases the risk?  Risk factors for TUD include:  · Having family members with the disorder.  · Being around people who use tobacco.  · Having an existing mental health issue such as schizophrenia, depression, bipolar disorder, ADHD, or posttraumatic stress disorder (PTSD).  What are the signs or symptoms?  People with tobacco use disorder have two or more of the following signs and symptoms within 12 months:  · Use of more tobacco over a longer period than intended.  · Not able to cut down or control tobacco use.  · A lot of time spent obtaining or using tobacco.  · Strong desire or urge to use tobacco (craving). Cravings may last for 6 months or longer after quitting.  · Use of tobacco even when use leads to major problems at work, school, or home.  · Use of tobacco even when use leads to relationship problems.  · Giving up or cutting down on important life activities because of tobacco use.  · Repeatedly using tobacco in situations where it puts you or others in physical danger, like smoking in bed.  · Use of tobacco even when it is known that a physical or mental problem is likely related to tobacco use.  ¨ Physical problems are numerous and may include chronic bronchitis, emphysema, lung and other cancers, gum disease, high blood pressure, heart disease, and stroke.  ¨ Mental problems caused by tobacco may include difficulty sleeping and anxiety.  · Need to use greater amounts of tobacco to get the same effect. This means you have developed a tolerance.  · Withdrawal symptoms as a result of stopping or rapidly cutting back use. These symptoms may last a month or more after quitting and include the following:  ¨ Depressed, anxious, or irritable  mood.  ¨ Difficulty concentrating.  ¨ Increased appetite.  ¨ Restlessness or trouble sleeping.  ¨ Use of tobacco to avoid withdrawal symptoms.  How is this diagnosed?  Tobacco use disorder is diagnosed by your health care provider. A diagnosis may be made by:  · Your health care provider asking questions about your tobacco use and any problems it may be causing.  · A physical exam.  · Lab tests.  · You may be referred to a mental health professional or addiction specialist.  The severity of tobacco use disorder depends on the number of signs and symptoms you have:  · Mild--Two or three symptoms.  · Moderate--Four or five symptoms.  · Severe--Six or more symptoms.  How is this treated?  Many people with tobacco use disorder are unable to quit on their own and need help. Treatment options include the following:  · Nicotine replacement therapy (NRT). NRT provides nicotine without the other harmful chemicals in tobacco. NRT gradually lowers the dosage of nicotine in the body and reduces withdrawal symptoms. NRT is available in over-the-counter forms (gum, lozenges, and skin patches) as well as prescription forms (mouth inhaler and nasal spray).  · Medicines. This may include:  ¨ Antidepressant medicine that may reduce nicotine cravings.  ¨ A medicine that acts on nicotine receptors in the brain to reduce cravings and withdrawal symptoms. It may also block the effects of tobacco in people with TUD who relapse.  · Counseling or talk therapy. A form of talk therapy called behavioral therapy is commonly used to treat people with TUD. Behavioral therapy looks at triggers for tobacco use, how to avoid them, and how to cope with cravings. It is most effective in person or by phone but is also available in self-help forms (books and Internet websites).  · Support groups. These provide emotional support, advice, and guidance for quitting tobacco.  The most effective treatment for TUD is usually a combination of medicine, talk  "therapy, and support groups.  Follow these instructions at home:  · Keep all follow-up visits as directed by your health care provider. This is important.  · Take medicines only as directed by your health care provider.  · Check with your health care provider before starting new prescription or over-the-counter medicines.  Contact a health care provider if:  · You are not able to take your medicines as prescribed.  · Treatment is not helping your TUD and your symptoms get worse.  Get help right away if:  · You have serious thoughts about hurting yourself or others.  · You have trouble breathing, chest pain, sudden weakness, or sudden numbness in part of your body.  This information is not intended to replace advice given to you by your health care provider. Make sure you discuss any questions you have with your health care provider.  Document Released: 08/23/2005 Document Revised: 08/20/2017 Document Reviewed: 02/13/2015  [employer] Interactive Patient Education © 2017 [employer]    Hypertension  Hypertension, commonly called high blood pressure, is when the force of blood pumping through the arteries is too strong. The arteries are the blood vessels that carry blood from the heart throughout the body. Hypertension forces the heart to work harder to pump blood and may cause arteries to become narrow or stiff. Having untreated or uncontrolled hypertension can cause heart attacks, strokes, kidney disease, and other problems.  A blood pressure reading consists of a higher number over a lower number. Ideally, your blood pressure should be below 120/80. The first (\"top\") number is called the systolic pressure. It is a measure of the pressure in your arteries as your heart beats. The second (\"bottom\") number is called the diastolic pressure. It is a measure of the pressure in your arteries as the heart relaxes.  What are the causes?  The cause of this condition is not known.  What increases the risk?  Some risk factors " for high blood pressure are under your control. Others are not.  Factors you can change   · Smoking.  · Having type 2 diabetes mellitus, high cholesterol, or both.  · Not getting enough exercise or physical activity.  · Being overweight.  · Having too much fat, sugar, calories, or salt (sodium) in your diet.  · Drinking too much alcohol.  Factors that are difficult or impossible to change   · Having chronic kidney disease.  · Having a family history of high blood pressure.  · Age. Risk increases with age.  · Race. You may be at higher risk if you are -American.  · Gender. Men are at higher risk than women before age 45. After age 65, women are at higher risk than men.  · Having obstructive sleep apnea.  · Stress.  What are the signs or symptoms?  Extremely high blood pressure (hypertensive crisis) may cause:  · Headache.  · Anxiety.  · Shortness of breath.  · Nosebleed.  · Nausea and vomiting.  · Severe chest pain.  · Jerky movements you cannot control (seizures).  How is this diagnosed?  This condition is diagnosed by measuring your blood pressure while you are seated, with your arm resting on a surface. The cuff of the blood pressure monitor will be placed directly against the skin of your upper arm at the level of your heart. It should be measured at least twice using the same arm. Certain conditions can cause a difference in blood pressure between your right and left arms.  Certain factors can cause blood pressure readings to be lower or higher than normal (elevated) for a short period of time:  · When your blood pressure is higher when you are in a health care provider's office than when you are at home, this is called white coat hypertension. Most people with this condition do not need medicines.  · When your blood pressure is higher at home than when you are in a health care provider's office, this is called masked hypertension. Most people with this condition may need medicines to control blood  pressure.  If you have a high blood pressure reading during one visit or you have normal blood pressure with other risk factors:  · You may be asked to return on a different day to have your blood pressure checked again.  · You may be asked to monitor your blood pressure at home for 1 week or longer.  If you are diagnosed with hypertension, you may have other blood or imaging tests to help your health care provider understand your overall risk for other conditions.  How is this treated?  This condition is treated by making healthy lifestyle changes, such as eating healthy foods, exercising more, and reducing your alcohol intake. Your health care provider may prescribe medicine if lifestyle changes are not enough to get your blood pressure under control, and if:  · Your systolic blood pressure is above 130.  · Your diastolic blood pressure is above 80.  Your personal target blood pressure may vary depending on your medical conditions, your age, and other factors.  Follow these instructions at home:  Eating and drinking   · Eat a diet that is high in fiber and potassium, and low in sodium, added sugar, and fat. An example eating plan is called the DASH (Dietary Approaches to Stop Hypertension) diet. To eat this way:  ¨ Eat plenty of fresh fruits and vegetables. Try to fill half of your plate at each meal with fruits and vegetables.  ¨ Eat whole grains, such as whole wheat pasta, brown rice, or whole grain bread. Fill about one quarter of your plate with whole grains.  ¨ Eat or drink low-fat dairy products, such as skim milk or low-fat yogurt.  ¨ Avoid fatty cuts of meat, processed or cured meats, and poultry with skin. Fill about one quarter of your plate with lean proteins, such as fish, chicken without skin, beans, eggs, and tofu.  ¨ Avoid premade and processed foods. These tend to be higher in sodium, added sugar, and fat.  · Reduce your daily sodium intake. Most people with hypertension should eat less than 1,500  mg of sodium a day.  · Limit alcohol intake to no more than 1 drink a day for nonpregnant women and 2 drinks a day for men. One drink equals 12 oz of beer, 5 oz of wine, or 1½ oz of hard liquor.  Lifestyle   · Work with your health care provider to maintain a healthy body weight or to lose weight. Ask what an ideal weight is for you.  · Get at least 30 minutes of exercise that causes your heart to beat faster (aerobic exercise) most days of the week. Activities may include walking, swimming, or biking.  · Include exercise to strengthen your muscles (resistance exercise), such as pilates or lifting weights, as part of your weekly exercise routine. Try to do these types of exercises for 30 minutes at least 3 days a week.  · Do not use any products that contain nicotine or tobacco, such as cigarettes and e-cigarettes. If you need help quitting, ask your health care provider.  · Monitor your blood pressure at home as told by your health care provider.  · Keep all follow-up visits as told by your health care provider. This is important.  Medicines   · Take over-the-counter and prescription medicines only as told by your health care provider. Follow directions carefully. Blood pressure medicines must be taken as prescribed.  · Do not skip doses of blood pressure medicine. Doing this puts you at risk for problems and can make the medicine less effective.  · Ask your health care provider about side effects or reactions to medicines that you should watch for.  Contact a health care provider if:  · You think you are having a reaction to a medicine you are taking.  · You have headaches that keep coming back (recurring).  · You feel dizzy.  · You have swelling in your ankles.  · You have trouble with your vision.  Get help right away if:  · You develop a severe headache or confusion.  · You have unusual weakness or numbness.  · You feel faint.  · You have severe pain in your chest or abdomen.  · You vomit repeatedly.  · You  have trouble breathing.  Summary  · Hypertension is when the force of blood pumping through your arteries is too strong. If this condition is not controlled, it may put you at risk for serious complications.  · Your personal target blood pressure may vary depending on your medical conditions, your age, and other factors. For most people, a normal blood pressure is less than 120/80.  · Hypertension is treated with lifestyle changes, medicines, or a combination of both. Lifestyle changes include weight loss, eating a healthy, low-sodium diet, exercising more, and limiting alcohol.  This information is not intended to replace advice given to you by your health care provider. Make sure you discuss any questions you have with your health care provider.  Document Released: 12/18/2006 Document Revised: 11/15/2017 Document Reviewed: 11/15/2017  Elsevier Interactive Patient Education © 2017 [employer]

## 2018-05-18 NOTE — TELEPHONE ENCOUNTER
----- Message from Madonna Flores MD sent at 5/18/2018 11:13 AM CDT -----  Patients magnesium was 0.9. He is to take 800mg Mag BID over the weekend. He is written for three days. On Sunday night/monday morning he is to take 40mg potassium. Repeat labs are ordered for home to get done on Monday. He also needs to abstain from alcohol. I tried to call but was unable to get in contact with him or leave a message.     DHRUV    2:48 PM spoke to patient and his caregiver. Gave them the above information.          This document has been electronically signed by Adarsh Morales MD on May 18, 2018 2:48 PM

## 2018-05-18 NOTE — PROGRESS NOTES
Subjective   Jameel Dozier is a 57 y.o. male.     Patient refuses to use his CPAP, says that he tried using it a couple of times and did not like how it felt. Reports that the settings were adjusted a couple of times and he continued to not tolerate the machine. Encouraged patient to get his machine cleaned and attempt to use it for the benefit of his hypertension.  Continues to smoke cigarettes but has recently gotten nicotine patches and is motivated to start using them. Reinforced with patient that we will never get his BP under control if he continues to smoke and be noncompliant with his C[place]AND treatment.         The following portions of the patient's history were reviewed and updated as appropriate: allergies, current medications, past family history, past medical history, past social history, past surgical history and problem list.    Social History     Social History   • Marital status: Single     Spouse name: N/A   • Number of children: 0   • Years of education: 12     Occupational History   • retired      Social History Main Topics   • Smoking status: Current Every Day Smoker     Packs/day: 2.00     Years: 40.00     Types: Cigarettes   • Smokeless tobacco: Never Used   • Alcohol use 3.6 oz/week     6 Cans of beer per week      Comment: when can get   • Drug use: No   • Sexual activity: Defer     Other Topics Concern   • Not on file     Social History Narrative   • No narrative on file       Immunization History   Administered Date(s) Administered   • Pneumococcal Polysaccharide (PPSV23) 08/15/2014   • Tdap 08/15/2014        Current Outpatient Prescriptions on File Prior to Visit   Medication Sig Dispense Refill   • albuterol (PROVENTIL HFA;VENTOLIN HFA) 108 (90 Base) MCG/ACT inhaler Inhale 2 puffs Every 4 (Four) Hours As Needed for Wheezing. 3.7 g 11   • amLODIPine (NORVASC) 5 MG tablet Take 1 tablet by mouth Daily. 30 tablet 6   • aspirin 81 MG EC tablet Take 1 tablet by mouth Daily. 30 tablet 5   •  cetirizine (zyrTEC) 10 MG tablet TAKE 1 TAB DAILY IF NEEDED FOR ALLERGIES. 30 tablet 4   • ferrous sulfate (FEOSOL) 325 (65 FE) MG tablet Take 1 tablet by mouth 3 (Three) Times a Day With Meals. 90 tablet 5   • fluticasone (FLONASE) 50 MCG/ACT nasal spray 2 sprays into each nostril Daily.     • losartan (COZAAR) 100 MG tablet Take 1 tablet by mouth Daily. 30 tablet 5   • lovastatin (MEVACOR) 40 MG tablet Take 1 tablet by mouth Daily. 30 tablet 5   • magnesium oxide (MAG-OX) 400 MG tablet Take 400 mg by mouth Daily.     • metFORMIN (GLUCOPHAGE) 500 MG tablet Take 1 tablet by mouth 2 (Two) Times a Day With Meals. 60 tablet 5   • metoprolol tartrate (LOPRESSOR) 25 MG tablet Take 1 tablet by mouth 2 (Two) Times a Day. 60 tablet 5   • nicotine (NICOTINE STEP 1) 21 MG/24HR patch Place 1 patch on the skin Daily. 28 each 4   • omeprazole (priLOSEC) 20 MG capsule Take 1 capsule by mouth Every Morning. 30 capsule 5   • tiotropium (SPIRIVA) 18 MCG per inhalation capsule Place 1 capsule into inhaler and inhale Daily. 30 capsule 11   • [DISCONTINUED] acyclovir (ZOVIRAX) 400 MG tablet Take 1 tablet by mouth 5 (Five) Times a Day. Take no more than 5 doses a day. 30 tablet 0     No current facility-administered medications on file prior to visit.        Review of Systems   Constitutional: Negative for activity change, appetite change, fatigue and fever.   HENT: Positive for trouble swallowing (but improving). Negative for ear pain and sore throat.    Eyes: Negative for pain and visual disturbance.   Respiratory: Negative for cough and shortness of breath.    Cardiovascular: Negative for chest pain and palpitations.   Gastrointestinal: Negative for abdominal pain and nausea.   Endocrine: Positive for polydipsia, polyphagia and polyuria. Negative for cold intolerance and heat intolerance.   Genitourinary: Negative for difficulty urinating and dysuria.   Musculoskeletal: Negative for arthralgias and gait problem.   Skin: Negative for  color change and rash.   Neurological: Negative for dizziness, weakness and headaches.   Hematological: Negative for adenopathy. Does not bruise/bleed easily.   Psychiatric/Behavioral: Negative for agitation, confusion and sleep disturbance.       Objective   Physical Exam   Constitutional: He is oriented to person, place, and time. He appears well-developed and well-nourished.   HENT:   Head: Normocephalic and atraumatic.   Mouth/Throat: No oropharyngeal exudate.   Eyes: Conjunctivae, EOM and lids are normal. Pupils are equal, round, and reactive to light. Right eye exhibits no discharge. Left eye exhibits no discharge. No scleral icterus.   Neck: Normal range of motion. Neck supple. No JVD present. No thyromegaly present.   Cardiovascular: Normal rate, regular rhythm and normal heart sounds.  Exam reveals no gallop and no friction rub.    No murmur heard.  Pulmonary/Chest: Effort normal and breath sounds normal. No stridor. No respiratory distress. He has no wheezes. He has no rales. He exhibits no tenderness.   Abdominal: Soft. Normal appearance and bowel sounds are normal. He exhibits no distension and no mass. There is no tenderness. There is no rebound and no guarding. No hernia.   Musculoskeletal: Normal range of motion. He exhibits no edema, tenderness or deformity.   Lymphadenopathy:     He has cervical adenopathy.   Neurological: He is alert and oriented to person, place, and time. No cranial nerve deficit. Coordination normal.   Skin: Skin is warm, dry and intact. Capillary refill takes less than 2 seconds.   Psychiatric: He has a normal mood and affect. His speech is normal and behavior is normal. Judgment and thought content normal. Cognition and memory are normal.       Lab Results (most recent)     None          Vitals:    05/18/18 0905   BP: 158/90   Pulse: 88   SpO2: 97%       Assessment/Plan   Jameel was seen today for hypertension.    Diagnoses and all orders for this visit:    Type 2 diabetes  mellitus with complication, without long-term current use of insulin  -     Hemoglobin A1c    Hypercholesterolemia  -     Cancel: Lipid Panel  -     aspirin 81 MG EC tablet; Take 1 tablet by mouth Daily.  -     lovastatin (MEVACOR) 40 MG tablet; Take 1 tablet by mouth Daily.    Essential hypertension  -     aspirin 81 MG EC tablet; Take 1 tablet by mouth Daily.  -     losartan (COZAAR) 100 MG tablet; Take 1 tablet by mouth Daily.  -     metoprolol tartrate (LOPRESSOR) 25 MG tablet; Take 1 tablet by mouth Every 12 (Twelve) Hours.    Central obesity  -     aspirin 81 MG EC tablet; Take 1 tablet by mouth Daily.    Preventative health care  -     ferrous sulfate (FEOSOL) 325 (65 FE) MG tablet; Take 1 tablet by mouth 3 (Three) Times a Day With Meals.    GERD with esophagitis  -     omeprazole (priLOSEC) 20 MG capsule; Take 1 capsule by mouth Every Morning.    Other orders  -     amLODIPine (NORVASC) 10 MG tablet; Take 1 tablet by mouth Daily.      Patient reports medication compliance to all of his BP medications.  Patient had standing order for Mg and K from last fall which demonstrated significant hypomagensemia at 0.9. Patient was written for replacement and called. He did not answer his phone and a message was unable to be left.             GOALS:  Goals        Patient Stated    • Quit smoking / using tobacco (pt-stated)            BARRIERS TO GOALS:  Long time tobacco user, has not started using the patches yet. Discussed tobacco cessation for 3-5 minutes.     Preventative:  Male Preventative: Colon cancer screening is up to date  Patient's Body mass index is 29.31 kg/m². BMI is above normal parameters. Recommendations include: educational material and exercise counseling.    up to date and documented   Recommended:zoster vaccine  Smoking cessation counseling was provided.  regular (moderate)  eat more fruits and vegetables, decrease soda or juice intake, increase water intake and increase physical activity    RISK  SCORE: 4        This document has been electronically signed by Madonna Flores MD on May 18, 2018 9:09 AM

## 2018-05-29 ENCOUNTER — TELEPHONE (OUTPATIENT)
Dept: FAMILY MEDICINE CLINIC | Facility: CLINIC | Age: 58
End: 2018-05-29

## 2018-05-31 ENCOUNTER — LAB (OUTPATIENT)
Dept: LAB | Facility: HOSPITAL | Age: 58
End: 2018-05-31

## 2018-05-31 ENCOUNTER — OFFICE VISIT (OUTPATIENT)
Dept: GASTROENTEROLOGY | Facility: CLINIC | Age: 58
End: 2018-05-31

## 2018-05-31 VITALS
SYSTOLIC BLOOD PRESSURE: 142 MMHG | HEIGHT: 69 IN | BODY MASS INDEX: 30.9 KG/M2 | DIASTOLIC BLOOD PRESSURE: 88 MMHG | HEART RATE: 94 BPM | WEIGHT: 208.6 LBS

## 2018-05-31 DIAGNOSIS — Z12.5 ENCOUNTER FOR SCREENING FOR MALIGNANT NEOPLASM OF PROSTATE: ICD-10-CM

## 2018-05-31 DIAGNOSIS — K21.00 GASTROESOPHAGEAL REFLUX DISEASE WITH ESOPHAGITIS: Primary | ICD-10-CM

## 2018-05-31 DIAGNOSIS — R63.4 WEIGHT LOSS, ABNORMAL: ICD-10-CM

## 2018-05-31 DIAGNOSIS — E87.6 HYPOKALEMIA: ICD-10-CM

## 2018-05-31 DIAGNOSIS — E83.42 HYPOMAGNESEMIA: ICD-10-CM

## 2018-05-31 DIAGNOSIS — R93.3 IMAGING OF GASTROINTESTINAL TRACT ABNORMAL: ICD-10-CM

## 2018-05-31 DIAGNOSIS — R13.10 DYSPHAGIA, UNSPECIFIED TYPE: ICD-10-CM

## 2018-05-31 LAB
ALBUMIN SERPL-MCNC: 4.7 G/DL (ref 3.4–4.8)
ALBUMIN/GLOB SERPL: 1.3 G/DL (ref 1.1–1.8)
ALP SERPL-CCNC: 58 U/L (ref 38–126)
ALT SERPL W P-5'-P-CCNC: 53 U/L (ref 21–72)
ANION GAP SERPL CALCULATED.3IONS-SCNC: 17 MMOL/L (ref 5–15)
AST SERPL-CCNC: 42 U/L (ref 17–59)
BASOPHILS # BLD AUTO: 0.07 10*3/MM3 (ref 0–0.2)
BASOPHILS NFR BLD AUTO: 1.4 % (ref 0–2)
BILIRUB SERPL-MCNC: 0.8 MG/DL (ref 0.2–1.3)
BUN BLD-MCNC: 8 MG/DL (ref 7–21)
BUN/CREAT SERPL: 8.9 (ref 7–25)
CALCIUM SPEC-SCNC: 9.7 MG/DL (ref 8.4–10.2)
CHLORIDE SERPL-SCNC: 107 MMOL/L (ref 95–110)
CO2 SERPL-SCNC: 23 MMOL/L (ref 22–31)
CREAT BLD-MCNC: 0.9 MG/DL (ref 0.7–1.3)
DEPRECATED RDW RBC AUTO: 48.3 FL (ref 35.1–43.9)
EOSINOPHIL # BLD AUTO: 0.28 10*3/MM3 (ref 0–0.7)
EOSINOPHIL NFR BLD AUTO: 5.6 % (ref 0–7)
ERYTHROCYTE [DISTWIDTH] IN BLOOD BY AUTOMATED COUNT: 17.2 % (ref 11.5–14.5)
GFR SERPL CREATININE-BSD FRML MDRD: 105 ML/MIN/1.73 (ref 56–130)
GLOBULIN UR ELPH-MCNC: 3.7 GM/DL (ref 2.3–3.5)
GLUCOSE BLD-MCNC: 111 MG/DL (ref 60–100)
HCT VFR BLD AUTO: 44.2 % (ref 39–49)
HGB BLD-MCNC: 14.9 G/DL (ref 13.7–17.3)
IMM GRANULOCYTES # BLD: 0.01 10*3/MM3 (ref 0–0.02)
IMM GRANULOCYTES NFR BLD: 0.2 % (ref 0–0.5)
LYMPHOCYTES # BLD AUTO: 1.52 10*3/MM3 (ref 0.6–4.2)
LYMPHOCYTES NFR BLD AUTO: 30.6 % (ref 10–50)
MAGNESIUM SERPL-MCNC: 1.2 MG/DL (ref 1.6–2.3)
MCH RBC QN AUTO: 26.2 PG (ref 26.5–34)
MCHC RBC AUTO-ENTMCNC: 33.7 G/DL (ref 31.5–36.3)
MCV RBC AUTO: 77.7 FL (ref 80–98)
MONOCYTES # BLD AUTO: 0.43 10*3/MM3 (ref 0–0.9)
MONOCYTES NFR BLD AUTO: 8.7 % (ref 0–12)
NEUTROPHILS # BLD AUTO: 2.65 10*3/MM3 (ref 2–8.6)
NEUTROPHILS NFR BLD AUTO: 53.5 % (ref 37–80)
PLATELET # BLD AUTO: 254 10*3/MM3 (ref 150–450)
PMV BLD AUTO: 10.6 FL (ref 8–12)
POTASSIUM BLD-SCNC: 3 MMOL/L (ref 3.5–5.1)
PROT SERPL-MCNC: 8.4 G/DL (ref 6.3–8.6)
PSA SERPL-MCNC: 0.47 NG/ML (ref 0–4)
RBC # BLD AUTO: 5.69 10*6/MM3 (ref 4.37–5.74)
SODIUM BLD-SCNC: 147 MMOL/L (ref 137–145)
T4 FREE SERPL-MCNC: 0.8 NG/DL (ref 0.78–2.19)
TSH SERPL DL<=0.05 MIU/L-ACNC: 0.41 MIU/ML (ref 0.46–4.68)
WBC NRBC COR # BLD: 4.96 10*3/MM3 (ref 3.2–9.8)

## 2018-05-31 PROCEDURE — 84481 FREE ASSAY (FT-3): CPT | Performed by: PHYSICIAN ASSISTANT

## 2018-05-31 PROCEDURE — 83735 ASSAY OF MAGNESIUM: CPT

## 2018-05-31 PROCEDURE — 80053 COMPREHEN METABOLIC PANEL: CPT | Performed by: PHYSICIAN ASSISTANT

## 2018-05-31 PROCEDURE — 84443 ASSAY THYROID STIM HORMONE: CPT | Performed by: PHYSICIAN ASSISTANT

## 2018-05-31 PROCEDURE — 84439 ASSAY OF FREE THYROXINE: CPT | Performed by: PHYSICIAN ASSISTANT

## 2018-05-31 PROCEDURE — G0103 PSA SCREENING: HCPCS | Performed by: PHYSICIAN ASSISTANT

## 2018-05-31 PROCEDURE — 85025 COMPLETE CBC W/AUTO DIFF WBC: CPT | Performed by: PHYSICIAN ASSISTANT

## 2018-05-31 PROCEDURE — 36415 COLL VENOUS BLD VENIPUNCTURE: CPT | Performed by: PHYSICIAN ASSISTANT

## 2018-05-31 PROCEDURE — 99214 OFFICE O/P EST MOD 30 MIN: CPT | Performed by: PHYSICIAN ASSISTANT

## 2018-05-31 RX ORDER — POTASSIUM CITRATE 5 MEQ/1
20 TABLET, EXTENDED RELEASE ORAL DAILY
COMMUNITY
End: 2019-09-16 | Stop reason: SDDI

## 2018-06-01 LAB — T3FREE SERPL-MCNC: 3.2 PG/ML (ref 2–4.4)

## 2018-06-12 ENCOUNTER — TELEPHONE (OUTPATIENT)
Dept: FAMILY MEDICINE CLINIC | Facility: CLINIC | Age: 58
End: 2018-06-12

## 2018-06-12 NOTE — TELEPHONE ENCOUNTER
Pt called said that [facility] is saying that he has no refills on   Zyrtec  Metformin  Aspirin 81mg      Pt would like to know if you could please send this in to [facility] please      Thanks

## 2018-07-09 DIAGNOSIS — I10 ESSENTIAL HYPERTENSION: ICD-10-CM

## 2018-07-09 DIAGNOSIS — E78.00 HYPERCHOLESTEROLEMIA: ICD-10-CM

## 2018-07-09 DIAGNOSIS — E65 CENTRAL OBESITY: ICD-10-CM

## 2018-07-09 DIAGNOSIS — R73.03 PRE-DIABETES: ICD-10-CM

## 2018-07-09 DIAGNOSIS — K21.00 GERD WITH ESOPHAGITIS: ICD-10-CM

## 2018-07-09 RX ORDER — CETIRIZINE HYDROCHLORIDE 10 MG/1
10 TABLET ORAL DAILY
Qty: 30 TABLET | Refills: 4 | Status: SHIPPED | OUTPATIENT
Start: 2018-07-09 | End: 2018-07-17 | Stop reason: SDUPTHER

## 2018-07-09 RX ORDER — ASPIRIN 81 MG/1
81 TABLET ORAL DAILY
Qty: 30 TABLET | Refills: 5 | Status: SHIPPED | OUTPATIENT
Start: 2018-07-09 | End: 2018-07-17 | Stop reason: SDUPTHER

## 2018-07-17 ENCOUNTER — OFFICE VISIT (OUTPATIENT)
Dept: FAMILY MEDICINE CLINIC | Facility: CLINIC | Age: 58
End: 2018-07-17

## 2018-07-17 VITALS
WEIGHT: 197.31 LBS | BODY MASS INDEX: 29.22 KG/M2 | HEIGHT: 69 IN | OXYGEN SATURATION: 98 % | DIASTOLIC BLOOD PRESSURE: 92 MMHG | SYSTOLIC BLOOD PRESSURE: 142 MMHG | HEART RATE: 77 BPM

## 2018-07-17 DIAGNOSIS — E65 CENTRAL OBESITY: ICD-10-CM

## 2018-07-17 DIAGNOSIS — G47.33 OBSTRUCTIVE SLEEP APNEA SYNDROME: ICD-10-CM

## 2018-07-17 DIAGNOSIS — E78.00 HYPERCHOLESTEROLEMIA: ICD-10-CM

## 2018-07-17 DIAGNOSIS — I10 ESSENTIAL HYPERTENSION: ICD-10-CM

## 2018-07-17 DIAGNOSIS — R73.03 PRE-DIABETES: ICD-10-CM

## 2018-07-17 DIAGNOSIS — Z00.00 PREVENTATIVE HEALTH CARE: ICD-10-CM

## 2018-07-17 DIAGNOSIS — E11.8 TYPE 2 DIABETES MELLITUS WITH COMPLICATION, WITHOUT LONG-TERM CURRENT USE OF INSULIN (HCC): Primary | ICD-10-CM

## 2018-07-17 DIAGNOSIS — K21.00 GERD WITH ESOPHAGITIS: ICD-10-CM

## 2018-07-17 PROCEDURE — 99213 OFFICE O/P EST LOW 20 MIN: CPT | Performed by: FAMILY MEDICINE

## 2018-07-17 RX ORDER — ASPIRIN 81 MG/1
81 TABLET ORAL DAILY
Qty: 30 TABLET | Refills: 5 | Status: SHIPPED | OUTPATIENT
Start: 2018-07-17 | End: 2018-12-21 | Stop reason: SDUPTHER

## 2018-07-17 RX ORDER — CETIRIZINE HYDROCHLORIDE 10 MG/1
10 TABLET ORAL DAILY
Qty: 30 TABLET | Refills: 4 | Status: SHIPPED | OUTPATIENT
Start: 2018-07-17 | End: 2018-11-20 | Stop reason: SDUPTHER

## 2018-07-17 RX ORDER — NEBULIZER AND COMPRESSOR
EACH MISCELLANEOUS
Qty: 1 EACH | Refills: 0 | Status: SHIPPED | OUTPATIENT
Start: 2018-07-17 | End: 2019-08-09 | Stop reason: SDUPTHER

## 2018-07-17 RX ORDER — HYDRALAZINE HYDROCHLORIDE 10 MG/1
10 TABLET, FILM COATED ORAL 3 TIMES DAILY
Qty: 90 TABLET | Refills: 2 | Status: SHIPPED | OUTPATIENT
Start: 2018-07-17 | End: 2019-04-23 | Stop reason: SDUPTHER

## 2018-07-17 RX ORDER — ALBUTEROL SULFATE 90 UG/1
2 AEROSOL, METERED RESPIRATORY (INHALATION) EVERY 4 HOURS PRN
Qty: 3.7 G | Refills: 11 | Status: SHIPPED | OUTPATIENT
Start: 2018-07-17 | End: 2019-05-14 | Stop reason: SDUPTHER

## 2018-07-17 RX ORDER — OMEPRAZOLE 20 MG/1
20 CAPSULE, DELAYED RELEASE ORAL EVERY MORNING
Qty: 30 CAPSULE | Refills: 5 | Status: SHIPPED | OUTPATIENT
Start: 2018-07-17 | End: 2019-08-09 | Stop reason: SDUPTHER

## 2018-07-17 RX ORDER — FLUTICASONE PROPIONATE 50 MCG
2 SPRAY, SUSPENSION (ML) NASAL DAILY
Qty: 15.8 ML | Refills: 6 | Status: SHIPPED | OUTPATIENT
Start: 2018-07-17 | End: 2019-11-08 | Stop reason: SDUPTHER

## 2018-07-17 RX ORDER — AMLODIPINE BESYLATE 10 MG/1
10 TABLET ORAL DAILY
Qty: 30 TABLET | Refills: 6 | Status: SHIPPED | OUTPATIENT
Start: 2018-07-17 | End: 2019-09-16 | Stop reason: SDDI

## 2018-07-17 RX ORDER — FERROUS SULFATE 325(65) MG
325 TABLET ORAL
Qty: 90 TABLET | Refills: 5 | Status: SHIPPED | OUTPATIENT
Start: 2018-07-17 | End: 2019-11-08 | Stop reason: SDUPTHER

## 2018-07-17 RX ORDER — LOSARTAN POTASSIUM 100 MG/1
100 TABLET ORAL DAILY
Qty: 30 TABLET | Refills: 5 | Status: SHIPPED | OUTPATIENT
Start: 2018-07-17 | End: 2019-07-18 | Stop reason: SDUPTHER

## 2018-07-17 RX ORDER — LOVASTATIN 40 MG/1
40 TABLET ORAL DAILY
Qty: 30 TABLET | Refills: 5 | Status: SHIPPED | OUTPATIENT
Start: 2018-07-17 | End: 2019-09-16 | Stop reason: SDDI

## 2018-07-17 NOTE — PROGRESS NOTES
Subjective   Jameel Dozier is a 58 y.o. male.     Hypertension   This is a chronic problem. The current episode started more than 1 year ago. The problem has been waxing and waning since onset. The problem is uncontrolled. Pertinent negatives include no palpitations or shortness of breath. There are no associated agents to hypertension. Risk factors for coronary artery disease include diabetes mellitus, dyslipidemia, obesity, post-menopausal state, smoking/tobacco exposure and sedentary lifestyle. Past treatments include ACE inhibitors, calcium channel blockers, beta blockers, diuretics and angiotensin blockers. Current antihypertension treatment includes angiotensin blockers, beta blockers and calcium channel blockers. The current treatment provides moderate improvement. Compliance problems include exercise and diet.  Hypertensive end-organ damage includes kidney disease, CVA, heart failure and left ventricular hypertrophy. Identifiable causes of hypertension include sleep apnea.      Patient reports that he has poor sleep quality with nocturia and believes he does snore. He takes 1-2 naps during the day. He has seen sleep medicine with a diagnosis of sleep apnea and was prescribed a sleep mask but he refuses to use it.     The following portions of the patient's history were reviewed and updated as appropriate: allergies, current medications, past family history, past medical history, past social history, past surgical history and problem list.    Social History     Social History   • Marital status: Single     Spouse name: N/A   • Number of children: 0   • Years of education: 12     Occupational History   • retired      Social History Main Topics   • Smoking status: Current Every Day Smoker     Packs/day: 1.00     Years: 40.00     Types: Cigarettes   • Smokeless tobacco: Never Used   • Alcohol use 3.6 oz/week     6 Cans of beer per week      Comment: when can get   • Drug use: No   • Sexual activity: Defer      Other Topics Concern   • Not on file     Social History Narrative   • No narrative on file       Immunization History   Administered Date(s) Administered   • Pneumococcal Polysaccharide (PPSV23) 08/15/2014   • Tdap 08/15/2014        Current Outpatient Prescriptions on File Prior to Visit   Medication Sig Dispense Refill   • magnesium oxide (MAGOX) 400 (241.3 Mg) MG tablet tablet Take 400 mg by mouth Daily.     • nicotine (NICOTINE STEP 1) 21 MG/24HR patch Place 1 patch on the skin Daily. 28 each 4   • potassium citrate (UROCIT-K) 5 MEQ (540 MG) CR tablet Take 20 mEq by mouth Daily.     • [DISCONTINUED] albuterol (PROVENTIL HFA;VENTOLIN HFA) 108 (90 Base) MCG/ACT inhaler Inhale 2 puffs Every 4 (Four) Hours As Needed for Wheezing. 3.7 g 11   • [DISCONTINUED] amLODIPine (NORVASC) 10 MG tablet Take 1 tablet by mouth Daily. 30 tablet 6   • [DISCONTINUED] aspirin 81 MG EC tablet Take 1 tablet by mouth Daily. 30 tablet 5   • [DISCONTINUED] cetirizine (zyrTEC) 10 MG tablet Take 1 tablet by mouth Daily. 30 tablet 4   • [DISCONTINUED] ferrous sulfate (FEOSOL) 325 (65 FE) MG tablet Take 1 tablet by mouth 3 (Three) Times a Day With Meals. 90 tablet 5   • [DISCONTINUED] fluticasone (FLONASE) 50 MCG/ACT nasal spray 2 sprays into each nostril Daily.     • [DISCONTINUED] losartan (COZAAR) 100 MG tablet Take 1 tablet by mouth Daily. 30 tablet 5   • [DISCONTINUED] lovastatin (MEVACOR) 40 MG tablet Take 1 tablet by mouth Daily. 30 tablet 5   • [DISCONTINUED] metFORMIN (GLUCOPHAGE) 500 MG tablet Take 1 tablet by mouth 2 (Two) Times a Day With Meals. 60 tablet 5   • [DISCONTINUED] metoprolol tartrate (LOPRESSOR) 25 MG tablet Take 1 tablet by mouth Every 12 (Twelve) Hours. 60 tablet 5   • [DISCONTINUED] omeprazole (priLOSEC) 20 MG capsule Take 1 capsule by mouth Every Morning. 30 capsule 5   • [DISCONTINUED] tiotropium (SPIRIVA) 18 MCG per inhalation capsule Place 1 capsule into inhaler and inhale Daily. 30 capsule 11     No current  facility-administered medications on file prior to visit.        Review of Systems   Constitutional: Negative for activity change, appetite change and fever.   HENT: Negative for ear pain and sore throat.    Eyes: Negative for pain and visual disturbance.   Respiratory: Negative for cough and shortness of breath.    Cardiovascular: Negative for palpitations.   Gastrointestinal: Negative for abdominal pain and nausea.   Endocrine: Negative for cold intolerance and heat intolerance.   Genitourinary: Negative for difficulty urinating and dysuria.   Musculoskeletal: Negative for arthralgias and gait problem.   Skin: Negative for color change and rash.   Hematological: Negative for adenopathy. Does not bruise/bleed easily.   Psychiatric/Behavioral: Negative for agitation and sleep disturbance.       Objective   Physical Exam   Constitutional: He is oriented to person, place, and time. He appears well-developed and well-nourished.   HENT:   Head: Normocephalic and atraumatic.   Eyes: Conjunctivae, EOM and lids are normal. Pupils are equal, round, and reactive to light.   Neck: Normal range of motion. Neck supple.   Cardiovascular: Normal rate, regular rhythm and normal heart sounds.  Exam reveals no gallop and no friction rub.    No murmur heard.  Pulmonary/Chest: Effort normal and breath sounds normal.   Abdominal: Soft. Normal appearance and bowel sounds are normal. There is no tenderness.   Musculoskeletal: Normal range of motion.   Neurological: He is alert and oriented to person, place, and time.   Skin: Skin is warm, dry and intact.   Psychiatric: He has a normal mood and affect. His speech is normal and behavior is normal. Judgment and thought content normal. Cognition and memory are normal.       Lab Results (most recent)     None          Vitals:    07/17/18 1130   BP: 142/92   Pulse: 77   SpO2: 98%       Assessment/Plan   Jameel was seen today for diabetes and hypertension.    Diagnoses and all orders for this  visit:    Type 2 diabetes mellitus with complication, without long-term current use of insulin (CMS/[facility])  -     Lipid Panel  -     Ambulatory Referral for Diabetic Eye Exam-Ophthalmology    Essential hypertension  -     TSH  -     T4, free  -     Potassium  -     Magnesium  -     hydrALAZINE (APRESOLINE) 10 MG tablet; Take 1 tablet by mouth 3 (Three) Times a Day.  -     Blood Pressure Monitoring (ADULT BLOOD PRESSURE CUFF LG) kit; Check BP daily  -     aspirin 81 MG EC tablet; Take 1 tablet by mouth Daily.  -     losartan (COZAAR) 100 MG tablet; Take 1 tablet by mouth Daily.  -     metoprolol tartrate (LOPRESSOR) 25 MG tablet; Take 1 tablet by mouth Every 12 (Twelve) Hours.    Hypercholesterolemia  -     Lipid Panel  -     aspirin 81 MG EC tablet; Take 1 tablet by mouth Daily.  -     lovastatin (MEVACOR) 40 MG tablet; Take 1 tablet by mouth Daily.    Central obesity  -     aspirin 81 MG EC tablet; Take 1 tablet by mouth Daily.    GERD with esophagitis  -     cetirizine (zyrTEC) 10 MG tablet; Take 1 tablet by mouth Daily.  -     omeprazole (priLOSEC) 20 MG capsule; Take 1 capsule by mouth Every Morning.    Obstructive sleep apnea syndrome  -     albuterol (PROVENTIL HFA;VENTOLIN HFA) 108 (90 Base) MCG/ACT inhaler; Inhale 2 puffs Every 4 (Four) Hours As Needed for Wheezing.    Preventative health care  -     ferrous sulfate (FEOSOL) 325 (65 FE) MG tablet; Take 1 tablet by mouth 3 (Three) Times a Day With Meals.    Pre-diabetes  -     metFORMIN (GLUCOPHAGE) 500 MG tablet; Take 1 tablet by mouth Daily With Breakfast.    Other orders  -     amLODIPine (NORVASC) 10 MG tablet; Take 1 tablet by mouth Daily.  -     fluticasone (FLONASE) 50 MCG/ACT nasal spray; 2 sprays into each nostril Daily.  -     tiotropium (SPIRIVA) 18 MCG per inhalation capsule; Place 1 capsule into inhaler and inhale Daily.                   GOALS:  Goals        Patient Stated    • Quit smoking / using tobacco (pt-stated)          BARRIERS TO  GOALS:  Patient has a history of medical self neglect. I have discussed with him on multiple occasions about my concerns for his untreated [place] as he refused to sleep with a mask device. He has been evaluated by sleep medicine. He does report that he has cut down from two packs per day to one pack per day. He reports otherwise medication compliance.     Preventative:  Male Preventative: Colon cancer screening is up to date  Patient's Body mass index is 29.14 kg/m². BMI is above normal parameters. Recommendations include: exercise counseling and nutrition counseling.    up to date and documented   Recommended:shingrix  Smoking cessation counseling was provided.  regular (heavy)  decrease soda or juice intake, increase water intake, increase physical activity, reduce portion size, cut out extra servings, reduce fast food intake, plan meals and have 3 meals a day    RISK SCORE: 5        This document has been electronically signed by Madonna Flores MD on July 17, 2018 3:08 PM

## 2018-07-17 NOTE — PATIENT INSTRUCTIONS
Hypotension  As your heart beats, it forces blood through your body. This force is called blood pressure. If you have hypotension, you have low blood pressure. When your blood pressure is too low, you may not get enough blood to your brain. You may feel weak, feel light-headed, have a fast heartbeat, or even pass out (faint).  Follow these instructions at home:  Eating and drinking  · Drink enough fluids to keep your pee (urine) clear or pale yellow.  · Eat a healthy diet, and follow instructions from your doctor about eating or drinking restrictions. A healthy diet includes:  ? Fresh fruits and vegetables.  ? Whole grains.  ? Low-fat (lean) meats.  ? Low-fat dairy products.  · Eat extra salt only as told. Do not add extra salt to your diet unless your doctor tells you to.  · Eat small meals often.  · Avoid standing up quickly after you eat.  Medicines  · Take over-the-counter and prescription medicines only as told by your doctor.  ? Follow instructions from your doctor about changing how much you take (the dosage) of your medicines, if this applies.  ? Do not stop or change your medicine on your own.  General instructions  · Wear compression stockings as told by your doctor.  · Get up slowly from lying down or sitting.  · Avoid hot showers and a lot of heat as told by your doctor.  · Return to your normal activities as told by your doctor. Ask what activities are safe for you.  · Do not use any products that contain nicotine or tobacco, such as cigarettes and e-cigarettes. If you need help quitting, ask your doctor.  · Keep all follow-up visits as told by your doctor. This is important.  Contact a doctor if:  · You throw up (vomit).  · You have watery poop (diarrhea).  · You have a fever for more than 2-3 days.  · You feel more thirsty than normal.  · You feel weak and tired.  Get help right away if:  · You have chest pain.  · You have a fast or irregular heartbeat.  · You lose feeling (get numbness) in any part  of your body.  · You cannot move your arms or your legs.  · You have trouble talking.  · You get sweaty or feel light-headed.  · You faint.  · You have trouble breathing.  · You have trouble staying awake.  · You feel confused.  This information is not intended to replace advice given to you by your health care provider. Make sure you discuss any questions you have with your health care provider.  Document Released: 03/14/2011 Document Revised: 09/05/2017 Document Reviewed: 09/05/2017  [employer] Interactive Patient Education © 2017 [employer]    Orthostatic Hypotension  Orthostatic hypotension is a sudden drop in blood pressure that happens when you quickly change positions, such as when you get up from a seated or lying position. Blood pressure is a measurement of how strongly, or weakly, your blood is pressing against the walls of your arteries. Arteries are blood vessels that carry blood from your heart throughout your body. When blood pressure is too low, you may not get enough blood to your brain or to the rest of your organs. This can cause weakness, light-headedness, rapid heartbeat, and fainting. This can last for just a few seconds or for up to a few minutes.  Orthostatic hypotension is usually not a serious problem. However, if it happens frequently or gets worse, it may be a sign of something more serious.  What are the causes?  This condition may be caused by:  · Sudden changes in posture, such as standing up quickly after you have been sitting or lying down.  · Blood loss.  · Loss of body fluids (dehydration).  · Heart problems.  · Hormone (endocrine) problems.  · Pregnancy.  · Severe infection.  · Lack of certain nutrients.  · Severe allergic reactions (anaphylaxis).  · Certain medicines, such as blood pressure medicine or medicines that make the body lose excess fluids (diuretics). Sometimes, this condition can be caused by not taking medicine as directed, such as taking too much of a certain  "medicine.    What increases the risk?  Certain factors can make you more likely to develop orthostatic hypotension, including:  · Age. Risk increases as you get older.  · Conditions that affect the heart or the central nervous system.  · Taking certain medicines, such as blood pressure medicine or diuretics.  · Being pregnant.    What are the signs or symptoms?  Symptoms of this condition may include:  · Weakness.  · Light-headedness.  · Dizziness.  · Blurred vision.  · Fatigue.  · Rapid heartbeat.  · Fainting, in severe cases.    How is this diagnosed?  This condition is diagnosed based on:  · Your medical history.  · Your symptoms.  · Your blood pressure measurement. Your health care provider will check your blood pressure when you are:  ? Lying down.  ? Sitting.  ? Standing.    A blood pressure reading is recorded as two numbers, such as \"120 over 80\" (or 120/80). The first (\"top\") number is called the systolic pressure. It is a measure of the pressure in your arteries as your heart beats. The second (\"bottom\") number is called the diastolic pressure. It is a measure of the pressure in your arteries when your heart relaxes between beats. Blood pressure is measured in a unit called mm Hg. Healthy blood pressure for adults is 120/80. If your blood pressure is below 90/60, you may be diagnosed with hypotension.  Other information or tests that may be used to diagnose orthostatic hypotension include:  · Your other vital signs, such as your heart rate and temperature.  · Blood tests.  · Tilt table test. For this test, you will be safely secured to a table that moves you from a lying position to an upright position. Your heart rhythm and blood pressure will be monitored during the test.    How is this treated?  Treatment for this condition may include:  · Changing your diet. This may involve eating more salt (sodium) or drinking more water.  · Taking medicines to raise your blood pressure.  · Changing the dosage of " certain medicines you are taking that might be lowering your blood pressure.  · Wearing compression stockings. These stockings help to prevent blood clots and reduce swelling in your legs.    In some cases, you may need to go to the hospital for:  · Fluid replacement. This means you will receive fluids through an IV tube.  · Blood replacement. This means you will receive donated blood through an IV tube (transfusion).  · Treating an infection or heart problems, if this applies.  · Monitoring. You may need to be monitored while medicines that you are taking wear off.    Follow these instructions at home:  Eating and drinking    · Drink enough fluid to keep your urine clear or pale yellow.  · Eat a healthy diet and follow instructions from your health care provider about eating or drinking restrictions. A healthy diet includes:  ? Fresh fruits and vegetables.  ? Whole grains.  ? Lean meats.  ? Low-fat dairy products.  · Eat extra salt only as directed. Do not add extra salt to your diet unless your health care provider told you to do that.  · Eat frequent, small meals.  · Avoid standing up suddenly after eating.  Medicines  · Take over-the-counter and prescription medicines only as told by your health care provider.  ? Follow instructions from your health care provider about changing the dosage of your current medicines, if this applies.  ? Do not stop or adjust any of your medicines on your own.  General instructions  · Wear compression stockings as told by your health care provider.  · Get up slowly from lying down or sitting positions. This gives your blood pressure a chance to adjust.  · Avoid hot showers and excessive heat as directed by your health care provider.  · Return to your normal activities as told by your health care provider. Ask your health care provider what activities are safe for you.  · Do not use any products that contain nicotine or tobacco, such as cigarettes and e-cigarettes. If you need help  quitting, ask your health care provider.  · Keep all follow-up visits as told by your health care provider. This is important.  Contact a health care provider if:  · You vomit.  · You have diarrhea.  · You have a fever for more than 2-3 days.  · You feel more thirsty than usual.  · You feel weak and tired.  Get help right away if:  · You have chest pain.  · You have a fast or irregular heartbeat.  · You develop numbness in any part of your body.  · You cannot move your arms or your legs.  · You have trouble speaking.  · You become sweaty or feel lightheaded.  · You faint.  · You feel short of breath.  · You have trouble staying awake.  · You feel confused.  This information is not intended to replace advice given to you by your health care provider. Make sure you discuss any questions you have with your health care provider.  Document Released: 12/08/2003 Document Revised: 09/05/2017 Document Reviewed: 06/09/2017  [employer] Interactive Patient Education © 2018 [employer]    Hypokalemia  Hypokalemia means that the amount of potassium in the blood is lower than normal. Potassium is a chemical that helps regulate the amount of fluid in the body (electrolyte). It also stimulates muscle tightening (contraction) and helps nerves work properly. Normally, most of the body’s potassium is inside of cells, and only a very small amount is in the blood. Because the amount in the blood is so small, minor changes to potassium levels in the blood can be life-threatening.  What are the causes?  This condition may be caused by:  · Antibiotic medicine.  · Diarrhea or vomiting. Taking too much of a medicine that helps you have a bowel movement (laxative) can cause diarrhea and lead to hypokalemia.  · Chronic kidney disease (CKD).  · Medicines that help the body get rid of excess fluid (diuretics).  · Eating disorders, such as bulimia.  · Low magnesium levels in the body.  · Sweating a lot.    What are the signs or symptoms?  Symptoms  of this condition include:  · Weakness.  · Constipation.  · Fatigue.  · Muscle cramps.  · Mental confusion.  · Skipped heartbeats or irregular heartbeat (palpitations).  · Tingling or numbness.    How is this diagnosed?  This condition is diagnosed with a blood test.  How is this treated?  Hypokalemia can be treated by taking potassium supplements by mouth or adjusting the medicines that you take. Treatment may also include eating more foods that contain a lot of potassium. If your potassium level is very low, you may need to get potassium through an IV tube in one of your veins and be monitored in the hospital.  Follow these instructions at home:  · Take over-the-counter and prescription medicines only as told by your health care provider. This includes vitamins and supplements.  · Eat a healthy diet. A healthy diet includes fresh fruits and vegetables, whole grains, healthy fats, and lean proteins.  · If instructed, eat more foods that contain a lot of potassium, such as:  ? Nuts, such as peanuts and pistachios.  ? Seeds, such as sunflower seeds and pumpkin seeds.  ? Peas, lentils, and lima beans.  ? Whole grain and bran cereals and breads.  ? Fresh fruits and vegetables, such as apricots, avocado, bananas, cantaloupe, kiwi, oranges, tomatoes, asparagus, and potatoes.  ? Orange juice.  ? Tomato juice.  ? Red meats.  ? Yogurt.  · Keep all follow-up visits as told by your health care provider. This is important.  Contact a health care provider if:  · You have weakness that gets worse.  · You feel your heart pounding or racing.  · You vomit.  · You have diarrhea.  · You have diabetes (diabetes mellitus) and you have trouble keeping your blood sugar (glucose) in your target range.  Get help right away if:  · You have chest pain.  · You have shortness of breath.  · You have vomiting or diarrhea that lasts for more than 2 days.  · You faint.  This information is not intended to replace advice given to you by your health  care provider. Make sure you discuss any questions you have with your health care provider.  Document Released: 12/18/2006 Document Revised: 08/05/2017 Document Reviewed: 08/05/2017  [employer] Interactive Patient Education © 2018 [employer]  Hypomagnesemia  Hypomagnesemia is a condition in which the level of magnesium in the blood is low. Magnesium is a mineral that is found in many foods. It is used in many different processes in the body. Hypomagnesemia can affect every organ in the body. It can cause life-threatening problems.  What are the causes?  Causes of hypomagnesemia include:  · Not getting enough magnesium in your diet.  · Malnutrition.  · Problems with absorbing magnesium from the intestines.  · Dehydration.  · Alcohol abuse.  · Vomiting.  · Severe diarrhea.  · Some medicines, including medicines that make you urinate more.  · Certain diseases, such as kidney disease, diabetes, and overactive thyroid.    What are the signs or symptoms?  · Involuntary shaking or trembling of a body part (tremor).  · Confusion.  · Muscle weakness.  · Sensitivity to light, sound, and touch.  · Psychiatric issues, such as depression, irritability, or psychosis.  · Sudden tightening of muscles (muscle spasms).  · Tingling in the arms and legs.  · A feeling of fluttering of the heart.  These symptoms are more severe if magnesium levels drop suddenly.  How is this diagnosed?  To make a diagnosis, your health care provider will do a physical exam and order blood and urine tests.  How is this treated?  Treatment will depend on the cause and the severity of your condition. It may involve:  · A magnesium supplement. This can be taken in pill form. It can also be given through an IV tube. This is usually done if the condition is severe.  · Changes to your diet. You may be directed to eat foods that have a lot of magnesium, such as green leafy vegetables, peas, beans, and nuts.  · Eliminating alcohol from your diet.    Follow these  instructions at home:  · Include foods with magnesium in your diet. Foods that are rich in magnesium include green vegetables, beans, nuts and seeds, and whole grains.  · Take medicines only as directed by your health care provider.  · Take magnesium supplements if your health care provider instructs you to do that. Take them as directed.  · Have your magnesium levels monitored as directed by your health care provider.  · When you are active, drink fluids that contain electrolytes.  · Keep all follow-up visits as directed by your health care provider. This is important.  Contact a health care provider if:  · You get worse instead of better.  · Your symptoms return.  Get help right away if:  · Your symptoms are severe.  This information is not intended to replace advice given to you by your health care provider. Make sure you discuss any questions you have with your health care provider.  Document Released: 09/13/2006 Document Revised: 05/25/2017 Document Reviewed: 08/03/2015  [employer] Interactive Patient Education © 2018 [employer]

## 2018-07-17 NOTE — PROGRESS NOTES
I have reviewed the notes, assessments, and/or procedures performed by dr Davidson, I agree with her/his documentation and assessment and plan for Jameel Dozier.

## 2018-08-15 NOTE — PROGRESS NOTES
Pulmonary Office Follow-up    Subjective     Jameel Dozier is seen today at the office for   Chief Complaint   Patient presents with   • COPD     3 MO F/U         HPI  Jameel Dozier is a 58 y.o. male with a PMH significant for COPD, tobacco use, [place], obesity, HTN, and GERD who presents for follow-up of COPD.  He was last seen on 5/16/18, at which time he continued to do well on Spiriva daily so I counseled him on the importance of complete tobacco cessation.  He states that he continues to do well on Spiriva and is not needing his albuterol.  Patient does complain that he's had increased nasal congestion with the weather changes but he admits he is not using his Flonase regularly.  He has occasional cough, but denies sputum, chest pain, wheeze, or leg swelling.  He has had some weight loss but attributes it to being more careful about his dietary intake.  He denies any recent exacerbations or steroid use.  Unfortunately, he does continue to smoke around one pack a day and he is pre-contemplative regarding cessation.    Patient Active Problem List   Diagnosis   • Central obesity   • Hypercholesterolemia   • Essential hypertension   • GERD with esophagitis   • Astigmatism   • Type 2 diabetes mellitus with complication, without long-term current use of insulin (CMS/[facility])   • Cigarette nicotine dependence without complication   • Tobacco use disorder   • Chronic obstructive pulmonary disease (CMS/[facility])   • Class 1 obesity due to excess calories with body mass index (BMI) of 31.0 to 31.9 in adult   • Physical deconditioning   • Chronic non-seasonal allergic rhinitis   • Imaging of gastrointestinal tract abnormal   • Pain of upper abdomen   • Gastroesophageal reflux disease with esophagitis   • Screening for hyperlipidemia   • Weakness   • Upper respiratory infection   • Transient cerebral ischemia   • Sleep disorder   • Rectal hemorrhage   • Rectal bleed   • Primary osteoarthritis of knees, bilateral   •  Obstructive sleep apnea syndrome   • Male erectile disorder   • Left ventricular hypertrophy   • Hypertension   • History of TIA (transient ischemic attack)   • History of EKG   • History of echocardiogram   • History of colon polyps   • Hiccough   • Hiatal hernia   • Hemorrhoids   • Heavy tobacco smoker   • Gastritis   • External hemorrhoids   • Esophagitis   • Esophageal reflux   • Esophageal dysmotility   • Epigastric pain   • ED (erectile dysfunction)   • Dyspnea   • Dysphagia   • Diverticular disease of colon   • Disorder of peripheral nervous system   • Claudication (CMS/HCC)   • Chronic obstructive lung disease (CMS/HCC)   • Cerebrovascular disease   • Benign essential hypertension   • Anemia due to blood loss   • Anal fissure   • Allergic rhinitis   • Alcohol dependence with alcohol-induced disorder (CMS/HCC)   • Adenomatous polyp of colon   • Acquired achalasia of esophagus   • Abdominal pain   • Adverse drug effect       Review of Systems  Review of Systems   Constitutional: Positive for unexpected weight change. Negative for fever.   HENT: Positive for congestion. Negative for postnasal drip.    Respiratory: Positive for cough and shortness of breath. Negative for chest tightness and wheezing.    Cardiovascular: Negative for chest pain and leg swelling.     As described in the HPI. Otherwise, remainder of ROS (14 systems) were negative.    Medications, Allergies, Social, and Family Histories reviewed as per EMR.    Objective     Vitals:    08/16/18 1013   BP: 152/94   Pulse: 86   SpO2: 97%     Physical Exam   Constitutional: He is oriented to person, place, and time. Vital signs are normal. He appears well-developed and well-nourished.   obese   HENT:   Head: Normocephalic and atraumatic.   Nose: Mucosal edema and rhinorrhea present.   Mouth/Throat: Uvula is midline, oropharynx is clear and moist and mucous membranes are normal. Abnormal dentition.   Mallampati 4, large tongue   Eyes: Pupils are equal,  round, and reactive to light. Conjunctivae, EOM and lids are normal.   Neck: Trachea normal and normal range of motion. No tracheal tenderness present. No thyroid mass present.   Cardiovascular: Normal rate, regular rhythm and normal heart sounds.  PMI is not displaced.  Exam reveals no gallop.    No murmur heard.  Pulmonary/Chest: Effort normal and breath sounds normal. No respiratory distress. He has no decreased breath sounds. He has no wheezes. He has no rhonchi. He exhibits no tenderness.   Shallow breathing effort   Abdominal: Soft. Normal appearance and bowel sounds are normal. There is no hepatomegaly. There is no tenderness.   obese   Musculoskeletal:   Normal gait, no extremity edema     Vascular Status -  His right foot exhibits no edema. His left foot exhibits no edema.  Lymphadenopathy:        Head (right side): No submandibular adenopathy present.        Head (left side): No submandibular adenopathy present.     He has no cervical adenopathy.        Right: No supraclavicular adenopathy present.        Left: No supraclavicular adenopathy present.   Neurological: He is alert and oriented to person, place, and time.   Skin: Skin is warm and dry. No cyanosis. Nails show no clubbing.   Psychiatric: He has a normal mood and affect. His speech is normal and behavior is normal. Judgment normal.   Nursing note and vitals reviewed.          Assessment/Plan     Jameel was seen today for copd.    Diagnoses and all orders for this visit:    Chronic obstructive pulmonary disease, unspecified COPD type (CMS/HCC)    Chronic nonseasonal allergic rhinitis due to pollen    Obstructive sleep apnea syndrome    Class 1 obesity due to excess calories with body mass index (BMI) of 31.0 to 31.9 in adult, unspecified whether serious comorbidity present    Cigarette nicotine dependence without complication  -     CT Chest Low Dose Wo; Future         Discussion/ Recommendations:   He continues to be stable on Spiriva daily with  infrequent albuterol use.  I think his current nasal congestion is related to fall allergies and he'll benefit from more regular use of his nasal steroid.  Unfortunately, he does continue to smoke and is pre-contemplative about cessation.    -Continue Spiriva daily and albuterol as needed.    -If he begins to require his albuterol more often, we will consider escalation to Anoro.  -Encouraged him to start using his Flonase on a daily basis.  -Continue Zyrtec daily.  -I advised Jameel of the risks of continuing to use tobacco, and I provided him with tobacco cessation educational materials in the After Visit Summary.  He remains pre-contemplative.  Offered support.  During this visit, I spent 3 minutes counseling the patient regarding tobacco cessation.  -The patient meets criteria for lung cancer screening CT (LDCT); 1ppd x 45yrs still smoking with no symptoms of lung cancer.  Reviewed benefits of such screening including, early detection of potentially curable lung cancer.  Also, discussed risks of screening including, radiation exposure, test anxiety, false positives, risk associated with future procedures, and possibility of clinically significant incidental findings.  The patient does agree to undergo lung cancer screening.  -Encourage regular exercise.  -Advised to get flu vaccine when available this fall.    Patient's Body mass index is 29.76 kg/m². BMI is above normal parameters. Recommendations include: exercise counseling and nutrition counseling.      Return in about 3 months (around 11/16/2018) for Recheck COPD.          This document has been electronically signed by Yolanda Phan MD on August 16, 2018 10:17 AM      Dictated using Dragon

## 2018-08-16 ENCOUNTER — OFFICE VISIT (OUTPATIENT)
Dept: PULMONOLOGY | Facility: CLINIC | Age: 58
End: 2018-08-16

## 2018-08-16 VITALS
DIASTOLIC BLOOD PRESSURE: 94 MMHG | HEART RATE: 86 BPM | BODY MASS INDEX: 29.84 KG/M2 | WEIGHT: 201.5 LBS | OXYGEN SATURATION: 97 % | SYSTOLIC BLOOD PRESSURE: 152 MMHG | HEIGHT: 69 IN

## 2018-08-16 DIAGNOSIS — E66.09 CLASS 1 OBESITY DUE TO EXCESS CALORIES WITH BODY MASS INDEX (BMI) OF 31.0 TO 31.9 IN ADULT, UNSPECIFIED WHETHER SERIOUS COMORBIDITY PRESENT: ICD-10-CM

## 2018-08-16 DIAGNOSIS — G47.33 OBSTRUCTIVE SLEEP APNEA SYNDROME: ICD-10-CM

## 2018-08-16 DIAGNOSIS — F17.210 CIGARETTE NICOTINE DEPENDENCE WITHOUT COMPLICATION: ICD-10-CM

## 2018-08-16 DIAGNOSIS — J44.9 CHRONIC OBSTRUCTIVE PULMONARY DISEASE, UNSPECIFIED COPD TYPE (HCC): Primary | ICD-10-CM

## 2018-08-16 DIAGNOSIS — J30.89 CHRONIC NONSEASONAL ALLERGIC RHINITIS DUE TO POLLEN: ICD-10-CM

## 2018-08-16 PROCEDURE — G0296 VISIT TO DETERM LDCT ELIG: HCPCS | Performed by: INTERNAL MEDICINE

## 2018-08-16 PROCEDURE — 99406 BEHAV CHNG SMOKING 3-10 MIN: CPT | Performed by: INTERNAL MEDICINE

## 2018-08-16 PROCEDURE — 99214 OFFICE O/P EST MOD 30 MIN: CPT | Performed by: INTERNAL MEDICINE

## 2018-11-12 ENCOUNTER — HOSPITAL ENCOUNTER (OUTPATIENT)
Dept: CT IMAGING | Facility: HOSPITAL | Age: 58
Discharge: HOME | End: 2018-11-12
Attending: INTERNAL MEDICINE | Admitting: INTERNAL MEDICINE

## 2018-11-12 DIAGNOSIS — F17.210 CIGARETTE NICOTINE DEPENDENCE WITHOUT COMPLICATION: ICD-10-CM

## 2018-11-12 PROCEDURE — G0297 LDCT FOR LUNG CA SCREEN: HCPCS

## 2018-11-16 ENCOUNTER — OFFICE VISIT (OUTPATIENT)
Dept: PULMONOLOGY | Facility: CLINIC | Age: 58
End: 2018-11-16

## 2018-11-16 VITALS
HEIGHT: 69 IN | WEIGHT: 204.3 LBS | BODY MASS INDEX: 30.26 KG/M2 | DIASTOLIC BLOOD PRESSURE: 86 MMHG | HEART RATE: 91 BPM | SYSTOLIC BLOOD PRESSURE: 134 MMHG | OXYGEN SATURATION: 98 %

## 2018-11-16 DIAGNOSIS — F17.210 CIGARETTE NICOTINE DEPENDENCE WITHOUT COMPLICATION: ICD-10-CM

## 2018-11-16 DIAGNOSIS — E66.09 CLASS 1 OBESITY DUE TO EXCESS CALORIES WITH SERIOUS COMORBIDITY AND BODY MASS INDEX (BMI) OF 30.0 TO 30.9 IN ADULT: ICD-10-CM

## 2018-11-16 DIAGNOSIS — J44.9 CHRONIC OBSTRUCTIVE PULMONARY DISEASE, UNSPECIFIED COPD TYPE (HCC): Primary | ICD-10-CM

## 2018-11-16 DIAGNOSIS — J30.89 CHRONIC NON-SEASONAL ALLERGIC RHINITIS: ICD-10-CM

## 2018-11-16 DIAGNOSIS — G47.33 OBSTRUCTIVE SLEEP APNEA SYNDROME: ICD-10-CM

## 2018-11-16 PROBLEM — E66.3 OVERWEIGHT (BMI 25.0-29.9): Status: ACTIVE | Noted: 2017-11-27

## 2018-11-16 PROCEDURE — 99214 OFFICE O/P EST MOD 30 MIN: CPT | Performed by: INTERNAL MEDICINE

## 2018-11-16 PROCEDURE — 99406 BEHAV CHNG SMOKING 3-10 MIN: CPT | Performed by: INTERNAL MEDICINE

## 2018-11-16 RX ORDER — ECHINACEA PURPUREA EXTRACT 125 MG
1 TABLET ORAL AS NEEDED
Qty: 1 EACH | Refills: 12
Start: 2018-11-16 | End: 2019-09-16 | Stop reason: SDDI

## 2018-11-16 NOTE — PROGRESS NOTES
Pulmonary Office Follow-up    Subjective     Jameel Dozier is seen today at the office for   Chief Complaint   Patient presents with   • COPD     3 mo f/u         HPI  Jameel Dozier is a 58 y.o. male with a PMH significant for COPD, tobacco use, [place], obesity, HTN, and GERD who presents for follow-up of COPD.  He was last seen on 8/16/18, at which time I recommended he continue Spiriva daily and encouraged him to use his Flonase on a daily basis due to persistent rhinitis.  I also discussed the importance of complete tobacco cessation and recommended that he undergo lung cancer screening.  He states that his breathing continues to be stable and he has not needed his albuterol.  Patient reports that he exercises regularly and does not have any issues with his breathing while walking.  He does admit that he occasionally awakens short of breath at night.  His chronic rhinitis is well controlled with using Flonase daily.  He has occasional cough productive of sputum, but denies wheeze, chest pain, weight changes, or leg edema.  Unfortunately, he does continue to smoke and is pre-contemplative regarding cessation.  He has not gotten a flu vaccine as he states he knows people who have gotten the flu vaccine and ended up in the hospital.    Patient Active Problem List   Diagnosis   • Central obesity   • Hypercholesterolemia   • Essential hypertension   • GERD with esophagitis   • Astigmatism   • Type 2 diabetes mellitus with complication, without long-term current use of insulin (CMS/[facility])   • Cigarette nicotine dependence without complication   • Tobacco use disorder   • Chronic obstructive pulmonary disease (CMS/[facility])   • Overweight (BMI 25.0-29.9)   • Physical deconditioning   • Chronic non-seasonal allergic rhinitis   • Imaging of gastrointestinal tract abnormal   • Pain of upper abdomen   • Gastroesophageal reflux disease with esophagitis   • Screening for hyperlipidemia   • Weakness   • Upper respiratory  infection   • Transient cerebral ischemia   • Sleep disorder   • Rectal hemorrhage   • Rectal bleed   • Primary osteoarthritis of knees, bilateral   • Obstructive sleep apnea syndrome   • Male erectile disorder   • Left ventricular hypertrophy   • Hypertension   • History of TIA (transient ischemic attack)   • History of EKG   • History of echocardiogram   • History of colon polyps   • Hiccough   • Hiatal hernia   • Hemorrhoids   • Heavy tobacco smoker   • Gastritis   • External hemorrhoids   • Esophagitis   • Esophageal reflux   • Esophageal dysmotility   • Epigastric pain   • ED (erectile dysfunction)   • Dyspnea   • Dysphagia   • Diverticular disease of colon   • Disorder of peripheral nervous system   • Claudication (CMS/HCC)   • Chronic obstructive lung disease (CMS/HCC)   • Cerebrovascular disease   • Benign essential hypertension   • Anemia due to blood loss   • Anal fissure   • Allergic rhinitis   • Alcohol dependence with alcohol-induced disorder (CMS/HCC)   • Adenomatous polyp of colon   • Acquired achalasia of esophagus   • Abdominal pain   • Adverse drug effect       Review of Systems  Review of Systems   Constitutional: Positive for unexpected weight change. Negative for fever.   HENT: Negative for congestion and postnasal drip.    Respiratory: Positive for cough and shortness of breath. Negative for chest tightness and wheezing.    Cardiovascular: Negative for chest pain and leg swelling.     As described in the HPI. Otherwise, remainder of ROS (14 systems) were negative.    Medications, Allergies, Social, and Family Histories reviewed as per EMR.    Objective     Vitals:    11/16/18 0942   BP: 134/86   Pulse: 91   SpO2: 98%     Physical Exam   Constitutional: He is oriented to person, place, and time. Vital signs are normal. He appears well-developed and well-nourished.   obese   HENT:   Head: Normocephalic and atraumatic.   Nose: Rhinorrhea present. No mucosal edema.   Mouth/Throat: Uvula is midline,  oropharynx is clear and moist and mucous membranes are normal. Abnormal dentition.   Mallampati 4, large tongue   Eyes: Conjunctivae, EOM and lids are normal. Pupils are equal, round, and reactive to light.   Neck: Trachea normal and normal range of motion. No tracheal tenderness present. No thyroid mass present.   Cardiovascular: Normal rate, regular rhythm and normal heart sounds. PMI is not displaced. Exam reveals no gallop.   No murmur heard.  Pulmonary/Chest: Effort normal and breath sounds normal. No respiratory distress. He has no decreased breath sounds. He has no wheezes. He has no rhonchi. He exhibits no tenderness.   Shallow breathing effort   Abdominal: Soft. Normal appearance and bowel sounds are normal. There is no hepatomegaly. There is no tenderness.   obese   Musculoskeletal:   Normal gait, no extremity edema     Vascular Status -  His right foot exhibits no edema. His left foot exhibits no edema.  Lymphadenopathy:        Head (right side): No submandibular adenopathy present.        Head (left side): No submandibular adenopathy present.     He has no cervical adenopathy.        Right: No supraclavicular adenopathy present.        Left: No supraclavicular adenopathy present.   Neurological: He is alert and oriented to person, place, and time.   Skin: Skin is warm and dry. No cyanosis. Nails show no clubbing.   Psychiatric: He has a normal mood and affect. His speech is normal and behavior is normal. Judgment normal.   Nursing note and vitals reviewed.      IMAGING: LDCT 11/12/18 (independently reviewed and interpreted by me) showed no nodules      Assessment/Plan     Jameel was seen today for copd.    Diagnoses and all orders for this visit:    Chronic obstructive pulmonary disease, unspecified COPD type (CMS/HCC)    Chronic non-seasonal allergic rhinitis  -     sodium chloride (OCEAN NASAL SPRAY) 0.65 % nasal spray; 1 spray into the nostril(s) as directed by provider As Needed for  Congestion.    Obstructive sleep apnea syndrome    Class 1 obesity due to excess calories with serious comorbidity and body mass index (BMI) of 30.0 to 30.9 in adult    Cigarette nicotine dependence without complication         Discussion/ Recommendations:   He remained stable on Spiriva daily with infrequent albuterol use.  Also, his chronic rhinitis is well controlled with the nasal steroid and nonsedating antihistamine daily.  Unfortunately, he does continue to smoke and remains pre-contemplative regarding cessation.    -Continue Spiriva handihaler daily and albuterol as needed for dyspnea or wheeze.    -If he begins to require his albuterol more often, we will consider escalation to Anoro.  -Continue Zyrtec and Flonase daily.  -Recommended that he get some nasal saline and start using it a minimum of 2 times daily to prevent excessive dryness and epistaxis.  -I advised Jameel of the risks of continuing to use tobacco, and I provided him with tobacco cessation educational materials in the After Visit Summary.  He remains pre-contemplative.  During this visit, I spent 3 minutes counseling the patient regarding tobacco cessation.  -Annual LD CT November 2019 (1ppd x 45yrs still smoking with no symptoms of lung cancer)  -Encouraged him to continue with regular exercise.  -Recommended he get the flu vaccine soon.  Briefly, counseled on risks/benefits of the flu vaccine.    Patient's Body mass index is 30.17 kg/m². BMI is above normal parameters. Recommendations include: exercise counseling.      Return in about 3 months (around 2/16/2019) for Recheck COPD.          This document has been electronically signed by Yolanda Phan MD on November 16, 2018 10:00 AM      Dictated using Dragon

## 2018-11-20 DIAGNOSIS — K21.00 GERD WITH ESOPHAGITIS: ICD-10-CM

## 2018-11-20 RX ORDER — CETIRIZINE HYDROCHLORIDE 10 MG/1
TABLET ORAL
Qty: 30 TABLET | Refills: 3 | Status: SHIPPED | OUTPATIENT
Start: 2018-11-20 | End: 2019-07-18 | Stop reason: SDUPTHER

## 2018-11-30 ENCOUNTER — TELEPHONE (OUTPATIENT)
Dept: FAMILY MEDICINE CLINIC | Facility: CLINIC | Age: 58
End: 2018-11-30

## 2018-11-30 NOTE — TELEPHONE ENCOUNTER
Pt wants to know if you will prescribe Viagra for him.He said he talked to you about this before.  If you will, please send to [facility].     Thanks  Inga

## 2018-12-10 RX ORDER — SILDENAFIL 25 MG/1
25 TABLET, FILM COATED ORAL DAILY PRN
Qty: 30 TABLET | Refills: 1 | Status: SHIPPED | OUTPATIENT
Start: 2018-12-10 | End: 2019-09-16 | Stop reason: SDDI

## 2018-12-21 DIAGNOSIS — I10 ESSENTIAL HYPERTENSION: ICD-10-CM

## 2018-12-21 DIAGNOSIS — E65 CENTRAL OBESITY: ICD-10-CM

## 2018-12-21 DIAGNOSIS — E78.00 HYPERCHOLESTEROLEMIA: ICD-10-CM

## 2018-12-21 RX ORDER — ASPIRIN 81 MG/1
TABLET, COATED ORAL
Qty: 30 TABLET | Refills: 4 | Status: SHIPPED | OUTPATIENT
Start: 2018-12-21 | End: 2019-08-09 | Stop reason: SDUPTHER

## 2018-12-26 ENCOUNTER — HOSPITAL ENCOUNTER (EMERGENCY)
Facility: HOSPITAL | Age: 58
Discharge: LEFT BEFORE DISCHARGE | End: 2018-12-26

## 2018-12-26 VITALS
BODY MASS INDEX: 28.35 KG/M2 | HEIGHT: 70 IN | OXYGEN SATURATION: 97 % | TEMPERATURE: 97.2 F | SYSTOLIC BLOOD PRESSURE: 164 MMHG | HEART RATE: 95 BPM | RESPIRATION RATE: 18 BRPM | WEIGHT: 198 LBS | DIASTOLIC BLOOD PRESSURE: 97 MMHG

## 2018-12-27 ENCOUNTER — OFFICE VISIT (OUTPATIENT)
Dept: FAMILY MEDICINE CLINIC | Facility: CLINIC | Age: 58
End: 2018-12-27

## 2018-12-27 VITALS
DIASTOLIC BLOOD PRESSURE: 76 MMHG | OXYGEN SATURATION: 98 % | TEMPERATURE: 98.1 F | BODY MASS INDEX: 28.47 KG/M2 | HEART RATE: 89 BPM | HEIGHT: 69 IN | SYSTOLIC BLOOD PRESSURE: 138 MMHG | WEIGHT: 192.19 LBS

## 2018-12-27 DIAGNOSIS — J06.9 UPPER RESPIRATORY TRACT INFECTION, UNSPECIFIED TYPE: Primary | ICD-10-CM

## 2018-12-27 DIAGNOSIS — R11.14 BILIOUS VOMITING WITH NAUSEA: ICD-10-CM

## 2018-12-27 LAB
EXPIRATION DATE: NORMAL
FLUAV AG NPH QL: NEGATIVE
FLUBV AG NPH QL: NEGATIVE
INTERNAL CONTROL: NORMAL
Lab: NORMAL

## 2018-12-27 PROCEDURE — 87804 INFLUENZA ASSAY W/OPTIC: CPT | Performed by: STUDENT IN AN ORGANIZED HEALTH CARE EDUCATION/TRAINING PROGRAM

## 2018-12-27 PROCEDURE — 99213 OFFICE O/P EST LOW 20 MIN: CPT | Performed by: STUDENT IN AN ORGANIZED HEALTH CARE EDUCATION/TRAINING PROGRAM

## 2018-12-27 RX ORDER — ONDANSETRON 4 MG/1
4 TABLET, FILM COATED ORAL EVERY 8 HOURS PRN
Qty: 30 TABLET | Refills: 2 | Status: SHIPPED | OUTPATIENT
Start: 2018-12-27 | End: 2019-09-16 | Stop reason: SDDI

## 2018-12-27 NOTE — PROGRESS NOTES
I have reviewed the notes, assessments, and/or procedures performed by Dr. Abarca, I concur with her/his documentation of Jameel Dozier.

## 2018-12-27 NOTE — PROGRESS NOTES
ID: Jameel Dozier    CC:   Chief Complaint   Patient presents with   • Nasal Congestion       Subjective:     Jameel Dozier is a 58 y.o. male who presents for nasal congestion.    URI    This is a new problem. The current episode started in the past 7 days. The problem has been unchanged. There has been no fever. Associated symptoms include abdominal pain, congestion, rhinorrhea and vomiting. Pertinent negatives include no chest pain, coughing, diarrhea, dysuria, ear pain, headaches, nausea, plugged ear sensation, rash, sneezing, sore throat or wheezing. He has tried sleep for the symptoms. The treatment provided no relief.          Past Medical Hx:  Past Medical History:   Diagnosis Date   • Abdominal pain    • Acquired achalasia of esophagus    • Adenomatous polyp of colon    • Adverse drug effect    • Alcohol dependence with alcohol-induced disorder (CMS/HCC)    • Allergic rhinitis    • Anal fissure    • Anemia due to blood loss    • Benign essential hypertension    • Central obesity    • Cerebrovascular disease    • Chronic obstructive lung disease (CMS/HCC)    • Claudication (CMS/HCC)    • Disorder of peripheral nervous system    • Diverticular disease of colon     completed antibx, ? neoplasm on CT   • Dysphagia    • Dyspnea    • ED (erectile dysfunction)    • Epigastric pain    • Esophageal dysmotility    • Esophageal reflux    • Esophagitis    • Essential hypertension    • External hemorrhoids    • Gastritis    • GERD with esophagitis    • Heavy tobacco smoker    • Hemorrhoids    • Hiatal hernia    • Hiccough    • History of colon polyps    • History of echocardiogram     Normal LV funciton with Ef of 65% to 70% without regional wall motion abnormalities.Mild CLVH. Normal RV size and function. No significant valvular regurgitation or stenosis. 09/19/2014       • History of EKG    • History of TIA (transient ischemic attack)    • Hypercholesterolemia    • Hypertension    • Left ventricular  hypertrophy    • Male erectile disorder    • Obstructive sleep apnea syndrome    • Primary osteoarthritis of knees, bilateral    • Rectal bleed     painful   • Rectal hemorrhage    • Sleep disorder    • Transient cerebral ischemia    • Upper respiratory infection    • Weakness     Attacks of weakness       Past Surgical Hx:  Past Surgical History:   Procedure Laterality Date   • COLONOSCOPY  04/13/2015    Internal and external hemorrhoids found. 04/13/2015    • ENDOSCOPY      Internal & external hemorrhoids found. 1 polyp in sigmoid colon; removed by snare cautery polypectomy. 09/26/2012    • ENDOSCOPY      Gastritis found in the stomach. Biopsy taken.Enlarged folds were found in the body of the stomach.Biopsy taken.Normal duodenum.A hiatus hernia was found in the esophagus. 04/13/2015    • ENDOSCOPY      Hiatus hernia in esophagus. Gastritis in stomach. Biopsy taken. Normal duodenum. Biopsy taken. 09/26/2012       • ENDOSCOPY N/A 12/18/2017    Procedure: ESOPHAGOGASTRODUODENOSCOPY--attn mid esophagus, abnl on CT;  Surgeon: Alli Dockery MD;  Location: [facility] ENDOSCOPY;  Service:    • UPPER GASTROINTESTINAL ENDOSCOPY  08/24/2016   • UPPER GASTROINTESTINAL ENDOSCOPY  04/13/2015   • UPPER GASTROINTESTINAL ENDOSCOPY  12/18/2017       Health Maintenance:  Health Maintenance   Topic Date Due   • HEPATITIS A VACCINE ADULT (1 of 2) 07/17/1976   • ZOSTER VACCINE (1 of 2) 07/17/2008   • MEDICARE ANNUAL WELLNESS  12/08/2016   • LIPID PANEL  12/12/2017   • DIABETIC EYE EXAM  02/10/2018   • INFLUENZA VACCINE  08/01/2018   • DIABETIC FOOT EXAM  10/30/2018   • URINE MICROALBUMIN  10/30/2018   • HEMOGLOBIN A1C  11/18/2018   • LUNG CANCER SCREENING  11/12/2019   • TDAP/TD VACCINES (2 - Td) 08/15/2024   • COLONOSCOPY  04/13/2025   • PNEUMOCOCCAL VACCINE (19-64 MEDIUM RISK)  Completed   • HEPATITIS C SCREENING  Completed       Current Meds:    Current Outpatient Medications:   •  albuterol (PROVENTIL HFA;VENTOLIN HFA) 108 (90 Base)  MCG/ACT inhaler, Inhale 2 puffs Every 4 (Four) Hours As Needed for Wheezing., Disp: 3.7 g, Rfl: 11  •  amLODIPine (NORVASC) 10 MG tablet, Take 1 tablet by mouth Daily., Disp: 30 tablet, Rfl: 6  •  ASPIRIN LOW DOSE 81 MG EC tablet, TAKE 1 TAB DAILY FOR PREVENTION OF BLOOD CLOTTING, Disp: 30 tablet, Rfl: 4  •  Blood Pressure Monitoring (ADULT BLOOD PRESSURE CUFF LG) kit, Check BP daily, Disp: 1 each, Rfl: 0  •  cetirizine (zyrTEC) 10 MG tablet, TAKE 1 TAB DAILY IF NEEDED FOR ALLERGIES., Disp: 30 tablet, Rfl: 3  •  ferrous sulfate (FEOSOL) 325 (65 FE) MG tablet, Take 1 tablet by mouth 3 (Three) Times a Day With Meals., Disp: 90 tablet, Rfl: 5  •  fluticasone (FLONASE) 50 MCG/ACT nasal spray, 2 sprays into each nostril Daily., Disp: 15.8 mL, Rfl: 6  •  hydrALAZINE (APRESOLINE) 10 MG tablet, Take 1 tablet by mouth 3 (Three) Times a Day., Disp: 90 tablet, Rfl: 2  •  losartan (COZAAR) 100 MG tablet, Take 1 tablet by mouth Daily., Disp: 30 tablet, Rfl: 5  •  lovastatin (MEVACOR) 40 MG tablet, Take 1 tablet by mouth Daily., Disp: 30 tablet, Rfl: 5  •  magnesium oxide (MAGOX) 400 (241.3 Mg) MG tablet tablet, Take 400 mg by mouth Daily., Disp: , Rfl:   •  metFORMIN (GLUCOPHAGE) 500 MG tablet, Take 1 tablet by mouth Daily With Breakfast., Disp: 30 tablet, Rfl: 5  •  metoprolol tartrate (LOPRESSOR) 25 MG tablet, Take 1 tablet by mouth Every 12 (Twelve) Hours., Disp: 60 tablet, Rfl: 5  •  nicotine (NICOTINE STEP 1) 21 MG/24HR patch, Place 1 patch on the skin Daily., Disp: 28 each, Rfl: 4  •  omeprazole (priLOSEC) 20 MG capsule, Take 1 capsule by mouth Every Morning., Disp: 30 capsule, Rfl: 5  •  potassium citrate (UROCIT-K) 5 MEQ (540 MG) CR tablet, Take 20 mEq by mouth Daily., Disp: , Rfl:   •  sildenafil (VIAGRA) 25 MG tablet, Take 1 tablet by mouth Daily As Needed for erectile dysfunction., Disp: 30 tablet, Rfl: 1  •  sodium chloride (OCEAN NASAL SPRAY) 0.65 % nasal spray, 1 spray into the nostril(s) as directed by provider As  Needed for Congestion., Disp: 1 each, Rfl: 12  •  tiotropium (SPIRIVA) 18 MCG per inhalation capsule, Place 1 capsule into inhaler and inhale Daily., Disp: 30 capsule, Rfl: 11  •  ondansetron (ZOFRAN) 4 MG tablet, Take 1 tablet by mouth Every 8 (Eight) Hours As Needed for Nausea or Vomiting., Disp: 30 tablet, Rfl: 2    Allergies:  Ace inhibitors; Lisinopril; and Vistaril [hydroxyzine hcl]    Family Hx:  Family History   Problem Relation Age of Onset   • Cancer Mother    • Cirrhosis Father    • Diabetes Other    • Hypertension Other         Social History:  Social History     Socioeconomic History   • Marital status: Single     Spouse name: Not on file   • Number of children: 0   • Years of education: 12   • Highest education level: Not on file   Social Needs   • Financial resource strain: Not on file   • Food insecurity - worry: Not on file   • Food insecurity - inability: Not on file   • Transportation needs - medical: Not on file   • Transportation needs - non-medical: Not on file   Occupational History   • Occupation: retired   Tobacco Use   • Smoking status: Current Every Day Smoker     Packs/day: 1.00     Years: 40.00     Pack years: 40.00     Types: Cigarettes   • Smokeless tobacco: Never Used   Substance and Sexual Activity   • Alcohol use: Yes     Alcohol/week: 3.6 oz     Types: 6 Cans of beer per week     Comment: when can get   • Drug use: No   • Sexual activity: Defer     Partners: Female   Other Topics Concern   • Not on file   Social History Narrative   • Not on file       Review of Systems   Constitutional: Positive for appetite change and fatigue. Negative for activity change, chills, diaphoresis and fever.   HENT: Positive for congestion and rhinorrhea. Negative for ear pain, sneezing and sore throat.    Eyes: Negative for pain, redness, itching and visual disturbance.   Respiratory: Negative for cough, choking, chest tightness, shortness of breath, wheezing and stridor.    Cardiovascular: Negative  "for chest pain, palpitations and leg swelling.   Gastrointestinal: Positive for abdominal pain and vomiting. Negative for abdominal distention, blood in stool, constipation, diarrhea, nausea and rectal pain.   Genitourinary: Negative for difficulty urinating, dysuria, flank pain and frequency.   Skin: Negative for color change and rash.   Neurological: Negative for dizziness, seizures, syncope, weakness, light-headedness, numbness and headaches.   Psychiatric/Behavioral: Positive for sleep disturbance. Negative for agitation, confusion and decreased concentration. The patient is not nervous/anxious.    All other systems reviewed and are negative.          Objective:     /76 (BP Location: Left arm, Patient Position: Sitting, Cuff Size: Adult)   Pulse 89   Temp 98.1 °F (36.7 °C) (Tympanic)   Ht 175.3 cm (69\")   Wt 87.2 kg (192 lb 3 oz)   SpO2 98%   BMI 28.38 kg/m²     Physical Exam   Constitutional: He is oriented to person, place, and time. He appears well-developed and well-nourished. He is active.  Non-toxic appearance. He does not have a sickly appearance. He appears ill. No distress.   HENT:   Head: Normocephalic.   Right Ear: External ear normal. No lacerations. No swelling or tenderness.   Left Ear: External ear normal. No lacerations. No swelling or tenderness.   Nose: Mucosal edema and rhinorrhea present. No nose lacerations.   Mouth/Throat: Uvula is midline, oropharynx is clear and moist and mucous membranes are normal. No oropharyngeal exudate or posterior oropharyngeal erythema.   Eyes: Conjunctivae, EOM and lids are normal. Pupils are equal, round, and reactive to light.   Cardiovascular: Normal heart sounds and intact distal pulses.   Pulmonary/Chest: Effort normal and breath sounds normal. No accessory muscle usage. No respiratory distress. He has no decreased breath sounds. He has no wheezes. He has no rhonchi. He has no rales. He exhibits no tenderness.   Abdominal: Soft. Bowel sounds are " normal. There is no tenderness. There is no rigidity, no rebound and no guarding.   Musculoskeletal:        Right lower leg: He exhibits no swelling.        Left lower leg: He exhibits no swelling.        Right foot: There is no swelling.        Left foot: There is no swelling.     Vascular Status -  His right foot exhibits no edema. His left foot exhibits no edema.  Lymphadenopathy:     He has no cervical adenopathy.   Neurological: He is alert and oriented to person, place, and time. He has normal strength. He is not disoriented. No cranial nerve deficit (grossly intact). He exhibits normal muscle tone. GCS eye subscore is 4. GCS verbal subscore is 5. GCS motor subscore is 6.   Skin: Skin is warm and dry. Capillary refill takes less than 2 seconds. No rash noted. He is not diaphoretic.   Nursing note and vitals reviewed.             Assessment/Plan:   Jameel Dozier is a 58 y.o. male who was seen in clinic for:     Diagnosis Plan   1. Upper respiratory tract infection, unspecified type  POCT Influenza A/B  Over-the-counter Flonase    2. Bilious vomiting with nausea  ondansetron (ZOFRAN) 4 MG tablet         Follow-up:     Return in about 4 weeks (around 1/24/2019), or if symptoms worsen or fail to improve, for Annual.      Goals: improve congestion  Barriers to goals: pt compliance    Health Maintenance   Topic Date Due   • HEPATITIS A VACCINE ADULT (1 of 2) 07/17/1976   • ZOSTER VACCINE (1 of 2) 07/17/2008   • MEDICARE ANNUAL WELLNESS  12/08/2016   • LIPID PANEL  12/12/2017   • DIABETIC EYE EXAM  02/10/2018   • INFLUENZA VACCINE  08/01/2018   • DIABETIC FOOT EXAM  10/30/2018   • URINE MICROALBUMIN  10/30/2018   • HEMOGLOBIN A1C  11/18/2018   • LUNG CANCER SCREENING  11/12/2019   • TDAP/TD VACCINES (2 - Td) 08/15/2024   • COLONOSCOPY  04/13/2025   • PNEUMOCOCCAL VACCINE (19-64 MEDIUM RISK)  Completed   • HEPATITIS C SCREENING  Completed   --defer to annual exam--    Tobacco: Smoking cessation counseling was  provided.  Alcohol: does not drink  Lifestyle: Body mass index is 28.38 kg/m². eat more fruits and vegetables, decrease soda or juice intake, increase water intake, reduce screen time, reduce portion size, cut out extra servings, family to eat at dinner table more often, keep TV off during meals and eat breakfast    RISK SCORE: 4        This document has been electronically signed by Remy Abarca MD on December 27, 2018 11:38 AM

## 2019-02-07 ENCOUNTER — APPOINTMENT (OUTPATIENT)
Dept: LAB | Facility: HOSPITAL | Age: 59
End: 2019-02-07

## 2019-02-07 ENCOUNTER — OFFICE VISIT (OUTPATIENT)
Dept: FAMILY MEDICINE CLINIC | Facility: CLINIC | Age: 59
End: 2019-02-07

## 2019-02-07 VITALS
WEIGHT: 190.31 LBS | HEIGHT: 69 IN | OXYGEN SATURATION: 97 % | HEART RATE: 87 BPM | SYSTOLIC BLOOD PRESSURE: 130 MMHG | BODY MASS INDEX: 28.19 KG/M2 | DIASTOLIC BLOOD PRESSURE: 72 MMHG

## 2019-02-07 DIAGNOSIS — E11.8 TYPE 2 DIABETES MELLITUS WITH COMPLICATION, WITHOUT LONG-TERM CURRENT USE OF INSULIN (HCC): ICD-10-CM

## 2019-02-07 DIAGNOSIS — R13.10 DYSPHAGIA, UNSPECIFIED TYPE: Primary | ICD-10-CM

## 2019-02-07 DIAGNOSIS — F17.200 TOBACCO DEPENDENCE: ICD-10-CM

## 2019-02-07 LAB
ALBUMIN UR-MCNC: >114 MG/L
ARTICHOKE IGE QN: 82 MG/DL (ref 1–129)
CHOLEST SERPL-MCNC: 198 MG/DL (ref 0–199)
CREAT UR-MCNC: 220 MG/DL
HBA1C MFR BLD: 5.7 % (ref 4–5.6)
HDLC SERPL-MCNC: 102 MG/DL (ref 60–200)
LDLC/HDLC SERPL: 0.82 {RATIO} (ref 0–3.55)
MAGNESIUM SERPL-MCNC: 1.4 MG/DL (ref 1.6–2.3)
MICROALBUMIN/CREAT UR: NORMAL MG/G (ref 0–30)
POTASSIUM BLD-SCNC: 3.3 MMOL/L (ref 3.5–5.1)
PROT UR-MCNC: 396 MG/DL
T4 FREE SERPL-MCNC: 0.99 NG/DL (ref 0.78–2.19)
TRIGL SERPL-MCNC: 61 MG/DL (ref 20–199)
TSH SERPL DL<=0.05 MIU/L-ACNC: 0.47 MIU/ML (ref 0.46–4.68)

## 2019-02-07 PROCEDURE — 83735 ASSAY OF MAGNESIUM: CPT | Performed by: FAMILY MEDICINE

## 2019-02-07 PROCEDURE — 84132 ASSAY OF SERUM POTASSIUM: CPT | Performed by: FAMILY MEDICINE

## 2019-02-07 PROCEDURE — 99406 BEHAV CHNG SMOKING 3-10 MIN: CPT | Performed by: FAMILY MEDICINE

## 2019-02-07 PROCEDURE — 84443 ASSAY THYROID STIM HORMONE: CPT | Performed by: FAMILY MEDICINE

## 2019-02-07 PROCEDURE — 82570 ASSAY OF URINE CREATININE: CPT | Performed by: FAMILY MEDICINE

## 2019-02-07 PROCEDURE — 99213 OFFICE O/P EST LOW 20 MIN: CPT | Performed by: FAMILY MEDICINE

## 2019-02-07 PROCEDURE — 80061 LIPID PANEL: CPT | Performed by: FAMILY MEDICINE

## 2019-02-07 PROCEDURE — 84156 ASSAY OF PROTEIN URINE: CPT | Performed by: FAMILY MEDICINE

## 2019-02-07 PROCEDURE — 83036 HEMOGLOBIN GLYCOSYLATED A1C: CPT | Performed by: FAMILY MEDICINE

## 2019-02-07 PROCEDURE — 82043 UR ALBUMIN QUANTITATIVE: CPT | Performed by: FAMILY MEDICINE

## 2019-02-07 PROCEDURE — 36415 COLL VENOUS BLD VENIPUNCTURE: CPT | Performed by: FAMILY MEDICINE

## 2019-02-07 PROCEDURE — 84439 ASSAY OF FREE THYROXINE: CPT | Performed by: FAMILY MEDICINE

## 2019-02-07 NOTE — PROGRESS NOTES
Subjective:     Jameel Dozier is a 58 y.o. male who presents for follow up for dysphasia    Patient reports he continues to have dysphasia.  He was seen by Dr. Dockery with EGD and dilation in December 2017.  Patient reports that he has had no improvement in his symptoms since that time.  He continues to have intermittent regurgitation of food.  He denies any noticeable pattern with foods besides possible rushing with his eating.  He had an appointment last May with Tim Chen MS, PA-C.  He has not done the labs that were ordered from that visit.  He continues to smoke 1-2 packs/day, he says that he has tried patches before but was not successful and is pre-contemplative in smoking cessation now.  We will provide him a referral to follow-up with Dr. Dockery for follow-up.  He had an appointment in October to which he no showed.      Past Medical Hx:  Past Medical History:   Diagnosis Date   • Abdominal pain    • Acquired achalasia of esophagus    • Adenomatous polyp of colon    • Adverse drug effect    • Alcohol dependence with alcohol-induced disorder (CMS/HCC)    • Allergic rhinitis    • Anal fissure    • Anemia due to blood loss    • Benign essential hypertension    • Central obesity    • Cerebrovascular disease    • Chronic obstructive lung disease (CMS/HCC)    • Claudication (CMS/HCC)    • Disorder of peripheral nervous system    • Diverticular disease of colon     completed antibx, ? neoplasm on CT   • Dysphagia    • Dyspnea    • ED (erectile dysfunction)    • Epigastric pain    • Esophageal dysmotility    • Esophageal reflux    • Esophagitis    • Essential hypertension    • External hemorrhoids    • Gastritis    • GERD with esophagitis    • Heavy tobacco smoker    • Hemorrhoids    • Hiatal hernia    • Hiccough    • History of colon polyps    • History of echocardiogram     Normal LV funciton with Ef of 65% to 70% without regional wall motion abnormalities.Mild CLVH. Normal RV size and function. No  significant valvular regurgitation or stenosis. 09/19/2014       • History of EKG    • History of TIA (transient ischemic attack)    • Hypercholesterolemia    • Hypertension    • Left ventricular hypertrophy    • Male erectile disorder    • Obstructive sleep apnea syndrome    • Primary osteoarthritis of knees, bilateral    • Rectal bleed     painful   • Rectal hemorrhage    • Sleep disorder    • Transient cerebral ischemia    • Upper respiratory infection    • Weakness     Attacks of weakness       Past Surgical Hx:  Past Surgical History:   Procedure Laterality Date   • COLONOSCOPY  04/13/2015    Internal and external hemorrhoids found. 04/13/2015    • ENDOSCOPY      Internal & external hemorrhoids found. 1 polyp in sigmoid colon; removed by snare cautery polypectomy. 09/26/2012    • ENDOSCOPY      Gastritis found in the stomach. Biopsy taken.Enlarged folds were found in the body of the stomach.Biopsy taken.Normal duodenum.A hiatus hernia was found in the esophagus. 04/13/2015    • ENDOSCOPY      Hiatus hernia in esophagus. Gastritis in stomach. Biopsy taken. Normal duodenum. Biopsy taken. 09/26/2012       • ENDOSCOPY N/A 12/18/2017    Procedure: ESOPHAGOGASTRODUODENOSCOPY--attn mid esophagus, abnl on CT;  Surgeon: Alli Dockery MD;  Location: [facility] ENDOSCOPY;  Service:    • UPPER GASTROINTESTINAL ENDOSCOPY  08/24/2016   • UPPER GASTROINTESTINAL ENDOSCOPY  04/13/2015   • UPPER GASTROINTESTINAL ENDOSCOPY  12/18/2017       Health Maintenance:  Health Maintenance   Topic Date Due   • ZOSTER VACCINE (1 of 2) 07/17/2008   • MEDICARE ANNUAL WELLNESS  12/08/2016   • LIPID PANEL  12/12/2017   • DIABETIC EYE EXAM  02/10/2018   • INFLUENZA VACCINE  08/01/2018   • URINE MICROALBUMIN  10/30/2018   • HEMOGLOBIN A1C  11/18/2018   • LUNG CANCER SCREENING  11/12/2019   • DIABETIC FOOT EXAM  02/07/2020   • TDAP/TD VACCINES (2 - Td) 08/15/2024   • COLONOSCOPY  04/13/2025   • PNEUMOCOCCAL VACCINE (19-64 MEDIUM RISK)  Completed   •  HEPATITIS C SCREENING  Completed       Current Meds:    Current Outpatient Medications:   •  albuterol (PROVENTIL HFA;VENTOLIN HFA) 108 (90 Base) MCG/ACT inhaler, Inhale 2 puffs Every 4 (Four) Hours As Needed for Wheezing., Disp: 3.7 g, Rfl: 11  •  amLODIPine (NORVASC) 10 MG tablet, Take 1 tablet by mouth Daily., Disp: 30 tablet, Rfl: 6  •  ASPIRIN LOW DOSE 81 MG EC tablet, TAKE 1 TAB DAILY FOR PREVENTION OF BLOOD CLOTTING, Disp: 30 tablet, Rfl: 4  •  Blood Pressure Monitoring (ADULT BLOOD PRESSURE CUFF LG) kit, Check BP daily, Disp: 1 each, Rfl: 0  •  cetirizine (zyrTEC) 10 MG tablet, TAKE 1 TAB DAILY IF NEEDED FOR ALLERGIES., Disp: 30 tablet, Rfl: 3  •  ferrous sulfate (FEOSOL) 325 (65 FE) MG tablet, Take 1 tablet by mouth 3 (Three) Times a Day With Meals., Disp: 90 tablet, Rfl: 5  •  fluticasone (FLONASE) 50 MCG/ACT nasal spray, 2 sprays into each nostril Daily., Disp: 15.8 mL, Rfl: 6  •  hydrALAZINE (APRESOLINE) 10 MG tablet, Take 1 tablet by mouth 3 (Three) Times a Day., Disp: 90 tablet, Rfl: 2  •  losartan (COZAAR) 100 MG tablet, Take 1 tablet by mouth Daily., Disp: 30 tablet, Rfl: 5  •  lovastatin (MEVACOR) 40 MG tablet, Take 1 tablet by mouth Daily., Disp: 30 tablet, Rfl: 5  •  magnesium oxide (MAGOX) 400 (241.3 Mg) MG tablet tablet, Take 400 mg by mouth Daily., Disp: , Rfl:   •  metFORMIN (GLUCOPHAGE) 500 MG tablet, Take 1 tablet by mouth Daily With Breakfast., Disp: 30 tablet, Rfl: 5  •  metoprolol tartrate (LOPRESSOR) 25 MG tablet, Take 1 tablet by mouth Every 12 (Twelve) Hours., Disp: 60 tablet, Rfl: 5  •  nicotine (NICOTINE STEP 1) 21 MG/24HR patch, Place 1 patch on the skin Daily., Disp: 28 each, Rfl: 4  •  omeprazole (priLOSEC) 20 MG capsule, Take 1 capsule by mouth Every Morning., Disp: 30 capsule, Rfl: 5  •  ondansetron (ZOFRAN) 4 MG tablet, Take 1 tablet by mouth Every 8 (Eight) Hours As Needed for Nausea or Vomiting., Disp: 30 tablet, Rfl: 2  •  potassium citrate (UROCIT-K) 5 MEQ (540 MG) CR  tablet, Take 20 mEq by mouth Daily., Disp: , Rfl:   •  sildenafil (VIAGRA) 25 MG tablet, Take 1 tablet by mouth Daily As Needed for erectile dysfunction., Disp: 30 tablet, Rfl: 1  •  sodium chloride (OCEAN NASAL SPRAY) 0.65 % nasal spray, 1 spray into the nostril(s) as directed by provider As Needed for Congestion., Disp: 1 each, Rfl: 12  •  tiotropium (SPIRIVA) 18 MCG per inhalation capsule, Place 1 capsule into inhaler and inhale Daily., Disp: 30 capsule, Rfl: 11    Allergies:  Ace inhibitors; Lisinopril; and Vistaril [hydroxyzine hcl]    Family Hx:  Family History   Problem Relation Age of Onset   • Cancer Mother    • Cirrhosis Father    • Diabetes Other    • Hypertension Other         Social History:  Social History     Socioeconomic History   • Marital status: Single     Spouse name: Not on file   • Number of children: 0   • Years of education: 12   • Highest education level: Not on file   Social Needs   • Financial resource strain: Not on file   • Food insecurity - worry: Not on file   • Food insecurity - inability: Not on file   • Transportation needs - medical: Not on file   • Transportation needs - non-medical: Not on file   Occupational History   • Occupation: retired   Tobacco Use   • Smoking status: Current Every Day Smoker     Packs/day: 1.00     Years: 40.00     Pack years: 40.00     Types: Cigarettes   • Smokeless tobacco: Never Used   Substance and Sexual Activity   • Alcohol use: Yes     Alcohol/week: 3.6 oz     Types: 6 Cans of beer per week     Comment: when can get   • Drug use: No   • Sexual activity: Defer     Partners: Female   Other Topics Concern   • Not on file   Social History Narrative   • Not on file       Review of Systems  Review of Systems   Constitutional: Negative for activity change, appetite change, fatigue and fever.   HENT: Positive for trouble swallowing. Negative for ear pain and sore throat.    Eyes: Negative for pain and visual disturbance.   Respiratory: Negative for cough  "and shortness of breath.    Cardiovascular: Negative for chest pain and palpitations.   Gastrointestinal: Negative for abdominal pain and nausea.   Endocrine: Negative for cold intolerance and heat intolerance.   Genitourinary: Negative for difficulty urinating and dysuria.   Musculoskeletal: Negative for arthralgias and gait problem.   Skin: Negative for color change and rash.   Neurological: Negative for dizziness, weakness and headaches.   Hematological: Negative for adenopathy. Does not bruise/bleed easily.   Psychiatric/Behavioral: Negative for agitation, confusion and sleep disturbance.       Objective:     /72 (BP Location: Left arm, Patient Position: Sitting, Cuff Size: Adult)   Pulse 87   Ht 175.3 cm (69\")   Wt 86.3 kg (190 lb 5 oz)   SpO2 97%   BMI 28.10 kg/m²     Physical Exam   Constitutional: He is oriented to person, place, and time. He appears well-developed and well-nourished.   HENT:   Head: Normocephalic and atraumatic.   Eyes: Conjunctivae, EOM and lids are normal. Pupils are equal, round, and reactive to light.   Neck: Normal range of motion. Neck supple.   Cardiovascular: Normal rate, regular rhythm and normal heart sounds. Exam reveals no gallop and no friction rub.   No murmur heard.  Pulmonary/Chest: Effort normal and breath sounds normal.   Abdominal: Soft. Normal appearance and bowel sounds are normal. There is no tenderness.   Musculoskeletal: Normal range of motion.    Jameel had a diabetic foot exam performed today.   During the foot exam he had a monofilament test performed (Negative).    Neurological Sensory Findings -  No right ankle/foot altered proprioception and no left ankle/foot altered proprioception  Vascular Status -  His right foot exhibits normal foot vasculature  and no edema. His left foot exhibits normal foot vasculature  and no edema.  Skin Integrity  -  His right foot skin is intact.His left foot skin is intact..  Neurological: He is alert and oriented to " person, place, and time.   Skin: Skin is warm, dry and intact.   Psychiatric: He has a normal mood and affect. His speech is normal and behavior is normal. Judgment and thought content normal. Cognition and memory are normal.         Assessment/Plan:     Jameel was seen today for diabetes and hypertension.    Diagnoses and all orders for this visit:    Dysphagia, unspecified type  -     Ambulatory Referral to Gastroenterology    Type 2 diabetes mellitus with complication, without long-term current use of insulin (CMS/[facility])  -     Hemoglobin A1c  -     Microalbumin / Creatinine Urine Ratio - Urine, Clean Catch  -     Ambulatory Referral for Diabetic Eye Exam-Optometry  -     Protein, Urine, Random - Urine, Clean Catch; Future  -     Protein, Urine, Random - Urine, Clean Catch    Tobacco dependence    Patient is not currently interested in initiating tobacco cessation treatment.  Discussed importance of tobacco cessation as a contributor to to multiple symptoms including his dysphasia, risk of oropharyngeal and esophageal cancers, worsening of his COPD.  Patient has had low-dose chest CT which was negative for lung cancer.      Follow-up:     Return in about 6 months (around 8/7/2019).      Goals        Patient Stated    • Quit smoking / using tobacco (pt-stated)           Preventative:    Vaccines Recommended at this visit:   Influenza    Screenings Recommended at this visit:  Diabetic Eye Exam and Lipid Panel    Smoking Status:  Patient is current smoker. Patient is not interested in smoking cessation. and 3-5 minutes were taken to discuss importance of cessation    Alcohol Intake:  Regular (moderate)    Patient's Body mass index is 28.1 kg/m². BMI is above normal parameters. Recommendations include: exercise counseling and nutrition counseling.        RISK SCORE: 4      Signature:  Madonna Flores MD [facility] PGY3  Family Medicine Residency  [address]  [address]  Office: [phone]          This document has been electronically signed by Madonna Flores MD on February 7, 2019 5:03 PM

## 2019-02-11 DIAGNOSIS — R73.03 PRE-DIABETES: ICD-10-CM

## 2019-02-11 NOTE — TELEPHONE ENCOUNTER
Pharmacy called and needs clarification on a medication order.     They need clarification on Metformin. He has previously been on 500 once a day; changed to twice a day.     Just wanted to double check that this was correct.     Please call the pharmacy at [phone].

## 2019-02-13 ENCOUNTER — OFFICE VISIT (OUTPATIENT)
Dept: GASTROENTEROLOGY | Facility: CLINIC | Age: 59
End: 2019-02-13

## 2019-02-13 VITALS
DIASTOLIC BLOOD PRESSURE: 92 MMHG | HEIGHT: 69 IN | WEIGHT: 185.8 LBS | BODY MASS INDEX: 27.52 KG/M2 | SYSTOLIC BLOOD PRESSURE: 138 MMHG | HEART RATE: 101 BPM

## 2019-02-13 DIAGNOSIS — R11.2 NON-INTRACTABLE VOMITING WITH NAUSEA, UNSPECIFIED VOMITING TYPE: ICD-10-CM

## 2019-02-13 DIAGNOSIS — R06.6 INTRACTABLE HICCUPS: ICD-10-CM

## 2019-02-13 DIAGNOSIS — K21.00 GASTROESOPHAGEAL REFLUX DISEASE WITH ESOPHAGITIS: ICD-10-CM

## 2019-02-13 DIAGNOSIS — R10.10 PAIN OF UPPER ABDOMEN: ICD-10-CM

## 2019-02-13 DIAGNOSIS — R13.10 DYSPHAGIA, UNSPECIFIED TYPE: Primary | ICD-10-CM

## 2019-02-13 PROCEDURE — 99214 OFFICE O/P EST MOD 30 MIN: CPT | Performed by: PHYSICIAN ASSISTANT

## 2019-02-13 RX ORDER — DEXTROSE AND SODIUM CHLORIDE 5; .45 G/100ML; G/100ML
30 INJECTION, SOLUTION INTRAVENOUS CONTINUOUS PRN
Status: CANCELLED | OUTPATIENT
Start: 2019-03-06

## 2019-02-13 NOTE — PATIENT INSTRUCTIONS

## 2019-02-13 NOTE — PROGRESS NOTES
Chief Complaint   Patient presents with   • Difficulty Swallowing     Ref. Dr. Flores   • Hiccups       ENDO PROCEDURE ORDERED: EGDw/dilation dysphagia, gerd, chronic hiccups    Subjective    Jameel Dozier is a 58 y.o. male. he is here today for follow-up.    History of Present Illness    Patient is seen on a recheck of his chronic hiccups, GERD, recurrent dysphagia. Last seen 05/31/2018. He did not return for a followup. Laboratories from that visit did show normal T3, T4, magnesium 1.2; normal PSA. TSH was low at 0.41. CBC normal. CMP showed glucose 111, sodium 147, potassium 3.0. He had a low-dose CT scan of the chest showing atherosclerosis of the LAD on 11/12/2018.     Patient saw Dr. Flores on 02/07/2019. Laboratories showed an A1c 5.7. Cholesterol panel was noted. Normal TSH, T4; magnesium 1.4, potassium 3.3.     Patient still has chronic hiccups. He states he has had some nausea and vomiting recently. He has been having just increasing dysphagia to solids. He is on Prilosec 20 mg daily and denies heartburn. His bowel movements are fairly regular without blood or mucus. Weight is down 23 pounds since his last visit and he could not tell me clearly why he has continued to lose weight. He had a previous colon polyp in 2012. Last colonoscopy showed hemorrhoids on 04/13/2015.     ASSESSMENT/PLAN:  Patient with chronic hiccups, nausea, vomiting, dysphagia. Suspect peptic stricture. Previous imaging showed thickening of his esophagus. We have tried other treatments for his hiccups without improvement. Recommend EGD with possible dilatation. We will do biopsies if indicated. Currently LFTs are normal. We will plan followup after the above; further pending clinical course and the results of the above.       The following portions of the patient's history were reviewed and updated as appropriate:   Past Medical History:   Diagnosis Date   • Abdominal pain    • Acquired achalasia of esophagus    •  Adenomatous polyp of colon    • Adverse drug effect    • Alcohol dependence with alcohol-induced disorder (CMS/HCC)    • Allergic rhinitis    • Anal fissure    • Anemia due to blood loss    • Benign essential hypertension    • Central obesity    • Cerebrovascular disease    • Chronic obstructive lung disease (CMS/HCC)    • Claudication (CMS/HCC)    • Disorder of peripheral nervous system    • Diverticular disease of colon     completed antibx, ? neoplasm on CT   • Dysphagia    • Dyspnea    • ED (erectile dysfunction)    • Epigastric pain    • Esophageal dysmotility    • Esophageal reflux    • Esophagitis    • Essential hypertension    • External hemorrhoids    • Gastritis    • GERD with esophagitis    • Heavy tobacco smoker    • Hemorrhoids    • Hiatal hernia    • Hiccough    • History of colon polyps    • History of echocardiogram     Normal LV funciton with Ef of 65% to 70% without regional wall motion abnormalities.Mild CLVH. Normal RV size and function. No significant valvular regurgitation or stenosis. 09/19/2014       • History of EKG    • History of TIA (transient ischemic attack)    • Hypercholesterolemia    • Hypertension    • Left ventricular hypertrophy    • Male erectile disorder    • Obstructive sleep apnea syndrome    • Primary osteoarthritis of knees, bilateral    • Rectal bleed     painful   • Rectal hemorrhage    • Sleep disorder    • Transient cerebral ischemia    • Upper respiratory infection    • Weakness     Attacks of weakness     Past Surgical History:   Procedure Laterality Date   • COLONOSCOPY  04/13/2015    Internal and external hemorrhoids found. 04/13/2015    • ENDOSCOPY      Internal & external hemorrhoids found. 1 polyp in sigmoid colon; removed by snare cautery polypectomy. 09/26/2012    • ENDOSCOPY      Gastritis found in the stomach. Biopsy taken.Enlarged folds were found in the body of the stomach.Biopsy taken.Normal duodenum.A hiatus hernia was found in the esophagus. 04/13/2015     • ENDOSCOPY      Hiatus hernia in esophagus. Gastritis in stomach. Biopsy taken. Normal duodenum. Biopsy taken. 09/26/2012       • ENDOSCOPY N/A 12/18/2017    Procedure: ESOPHAGOGASTRODUODENOSCOPY--attn mid esophagus, abnl on CT;  Surgeon: Alli Dockery MD;  Location: [facility] ENDOSCOPY;  Service:    • UPPER GASTROINTESTINAL ENDOSCOPY  08/24/2016   • UPPER GASTROINTESTINAL ENDOSCOPY  04/13/2015   • UPPER GASTROINTESTINAL ENDOSCOPY  12/18/2017     Family History   Problem Relation Age of Onset   • Cancer Mother    • Cirrhosis Father    • Diabetes Other    • Hypertension Other        Allergies   Allergen Reactions   • Ace Inhibitors Angioedema     hypotension   • Lisinopril Other (See Comments)     cough   • Vistaril [Hydroxyzine Hcl] Other (See Comments)     Patient is unsure      Social History     Socioeconomic History   • Marital status: Single     Spouse name: Not on file   • Number of children: 0   • Years of education: 12   • Highest education level: Not on file   Occupational History   • Occupation: retired   Tobacco Use   • Smoking status: Current Every Day Smoker     Packs/day: 1.00     Years: 40.00     Pack years: 40.00     Types: Cigarettes   • Smokeless tobacco: Never Used   Substance and Sexual Activity   • Alcohol use: Yes     Alcohol/week: 3.6 oz     Types: 6 Cans of beer per week     Comment: when can get   • Drug use: No   • Sexual activity: Defer     Partners: Female       Current Outpatient Medications:   •  albuterol (PROVENTIL HFA;VENTOLIN HFA) 108 (90 Base) MCG/ACT inhaler, Inhale 2 puffs Every 4 (Four) Hours As Needed for Wheezing., Disp: 3.7 g, Rfl: 11  •  amLODIPine (NORVASC) 10 MG tablet, Take 1 tablet by mouth Daily., Disp: 30 tablet, Rfl: 6  •  ASPIRIN LOW DOSE 81 MG EC tablet, TAKE 1 TAB DAILY FOR PREVENTION OF BLOOD CLOTTING, Disp: 30 tablet, Rfl: 4  •  Blood Pressure Monitoring (ADULT BLOOD PRESSURE CUFF LG) kit, Check BP daily, Disp: 1 each, Rfl: 0  •  cetirizine (zyrTEC) 10 MG  "tablet, TAKE 1 TAB DAILY IF NEEDED FOR ALLERGIES., Disp: 30 tablet, Rfl: 3  •  ferrous sulfate (FEOSOL) 325 (65 FE) MG tablet, Take 1 tablet by mouth 3 (Three) Times a Day With Meals., Disp: 90 tablet, Rfl: 5  •  fluticasone (FLONASE) 50 MCG/ACT nasal spray, 2 sprays into each nostril Daily., Disp: 15.8 mL, Rfl: 6  •  hydrALAZINE (APRESOLINE) 10 MG tablet, Take 1 tablet by mouth 3 (Three) Times a Day., Disp: 90 tablet, Rfl: 2  •  losartan (COZAAR) 100 MG tablet, Take 1 tablet by mouth Daily., Disp: 30 tablet, Rfl: 5  •  lovastatin (MEVACOR) 40 MG tablet, Take 1 tablet by mouth Daily., Disp: 30 tablet, Rfl: 5  •  magnesium oxide (MAGOX) 400 (241.3 Mg) MG tablet tablet, Take 400 mg by mouth Daily., Disp: , Rfl:   •  metFORMIN (GLUCOPHAGE) 500 MG tablet, TAKE 1 TABLET DAILY WITH BREAKFAST, Disp: 30 tablet, Rfl: 4  •  metoprolol tartrate (LOPRESSOR) 25 MG tablet, Take 1 tablet by mouth Every 12 (Twelve) Hours., Disp: 60 tablet, Rfl: 5  •  nicotine (NICOTINE STEP 1) 21 MG/24HR patch, Place 1 patch on the skin Daily., Disp: 28 each, Rfl: 4  •  omeprazole (priLOSEC) 20 MG capsule, Take 1 capsule by mouth Every Morning., Disp: 30 capsule, Rfl: 5  •  ondansetron (ZOFRAN) 4 MG tablet, Take 1 tablet by mouth Every 8 (Eight) Hours As Needed for Nausea or Vomiting., Disp: 30 tablet, Rfl: 2  •  potassium citrate (UROCIT-K) 5 MEQ (540 MG) CR tablet, Take 20 mEq by mouth Daily., Disp: , Rfl:   •  sildenafil (VIAGRA) 25 MG tablet, Take 1 tablet by mouth Daily As Needed for erectile dysfunction., Disp: 30 tablet, Rfl: 1  •  sodium chloride (OCEAN NASAL SPRAY) 0.65 % nasal spray, 1 spray into the nostril(s) as directed by provider As Needed for Congestion., Disp: 1 each, Rfl: 12  •  tiotropium (SPIRIVA) 18 MCG per inhalation capsule, Place 1 capsule into inhaler and inhale Daily., Disp: 30 capsule, Rfl: 11  Review of Systems  Review of Systems       Objective    /92 (BP Location: Left arm)   Pulse 101   Ht 175.3 cm (69\")   Wt " 84.3 kg (185 lb 12.8 oz)   BMI 27.44 kg/m²   Physical Exam   Constitutional: He is oriented to person, place, and time. He appears well-developed and well-nourished. No distress.   AA   HENT:   Head: Normocephalic and atraumatic.   Eyes: EOM are normal. Pupils are equal, round, and reactive to light.   Neck: Normal range of motion.   Cardiovascular: Normal rate, regular rhythm and normal heart sounds.   Pulmonary/Chest: Effort normal and breath sounds normal.   Abdominal: Soft. Bowel sounds are normal. He exhibits no shifting dullness, no distension, no abdominal bruit, no ascites and no mass. There is no hepatosplenomegaly. There is tenderness. There is no rigidity, no rebound, no guarding and no CVA tenderness. No hernia. Hernia confirmed negative in the ventral area.   mild   Musculoskeletal: Normal range of motion.   Neurological: He is alert and oriented to person, place, and time.   Skin: Skin is warm and dry.   Psychiatric: He has a normal mood and affect. His behavior is normal. Judgment and thought content normal.   Nursing note and vitals reviewed.    Assessment/Plan      1. Dysphagia, unspecified type    2. Intractable hiccups    3. Pain of upper abdomen    4. Non-intractable vomiting with nausea, unspecified vomiting type    5. Gastroesophageal reflux disease with esophagitis    .   Jameel was seen today for difficulty swallowing and hiccups.    Diagnoses and all orders for this visit:    Dysphagia, unspecified type  -     Case Request; Standing  -     dextrose 5 % and sodium chloride 0.45 % infusion; Infuse 30 mL/hr into a venous catheter Continuous As Needed (Start Prior to Procedure).  -     Case Request    Intractable hiccups  -     Case Request; Standing  -     dextrose 5 % and sodium chloride 0.45 % infusion; Infuse 30 mL/hr into a venous catheter Continuous As Needed (Start Prior to Procedure).  -     Case Request    Pain of upper abdomen  -     Case Request; Standing  -     dextrose 5 % and  sodium chloride 0.45 % infusion; Infuse 30 mL/hr into a venous catheter Continuous As Needed (Start Prior to Procedure).  -     Case Request    Non-intractable vomiting with nausea, unspecified vomiting type  -     Case Request; Standing  -     dextrose 5 % and sodium chloride 0.45 % infusion; Infuse 30 mL/hr into a venous catheter Continuous As Needed (Start Prior to Procedure).  -     Case Request    Gastroesophageal reflux disease with esophagitis  -     Case Request; Standing  -     dextrose 5 % and sodium chloride 0.45 % infusion; Infuse 30 mL/hr into a venous catheter Continuous As Needed (Start Prior to Procedure).  -     Case Request    Other orders  -     Follow Anesthesia Guidelines / Standing Orders; Future  -     Obtain Informed Consent; Standing  -     POC Glucose Once; Standing        Orders placed during this encounter include:  Orders Placed This Encounter   Procedures   • Follow Anesthesia Guidelines / Standing Orders     Standing Status:   Future       Medications prescribed:  No orders of the defined types were placed in this encounter.      Requested Prescriptions      No prescriptions requested or ordered in this encounter       Review and/or summary of lab tests, radiology, procedures, medications. Review and summary of old records and obtaining of history. The risks and benefits of my recommendations, as well as other treatment options were discussed with the patient today. Questions were answered.    Follow-up: Return if symptoms worsen or fail to improve, for After the above.     ESOPHAGOGASTRODUODENOSCOPY WITH DILATATION (N/A)      This document has been electronically signed by Tim Chen PA-C on February 13, 2019 6:43 PM      Results for orders placed or performed in visit on 02/07/19   Microalbumin / Creatinine Urine Ratio - Urine, Clean Catch   Result Value Ref Range    Microalbumin/Creatinine Ratio  0.0 - 30.0 mg/g    Creatinine, Urine 220.0 mg/dL    Microalbumin, Urine >114.0  mg/L   Protein, Urine, Random - Urine, Clean Catch   Result Value Ref Range    Total Protein, Urine 396.0 mg/dL   Hemoglobin A1c   Result Value Ref Range    Hemoglobin A1C 5.7 (H) 4 - 5.6 %   Results for orders placed or performed in visit on 12/27/18   POCT Influenza A/B   Result Value Ref Range    Rapid Influenza A Ag Negative Negative    Rapid Influenza B Ag Negative Negative    Internal Control Passed Passed    Lot Number [id number]     Expiration Date 05/14/2020    Results for orders placed or performed in visit on 07/17/18   TSH   Result Value Ref Range    TSH 0.470 0.460 - 4.680 mIU/mL   T4, free   Result Value Ref Range    Free T4 0.99 0.78 - 2.19 ng/dL   Potassium   Result Value Ref Range    Potassium 3.3 (L) 3.5 - 5.1 mmol/L   Magnesium   Result Value Ref Range    Magnesium 1.4 (L) 1.6 - 2.3 mg/dL   Lipid Panel   Result Value Ref Range    Total Cholesterol 198 0 - 199 mg/dL    Triglycerides 61 20 - 199 mg/dL    HDL Cholesterol 102 60 - 200 mg/dL    LDL Cholesterol  82 1 - 129 mg/dL    LDL/HDL Ratio 0.82 0.00 - 3.55   Results for orders placed or performed in visit on 05/31/18   Magnesium   Result Value Ref Range    Magnesium 1.2 (L) 1.6 - 2.3 mg/dL   Results for orders placed or performed in visit on 05/31/18   PSA Screen   Result Value Ref Range    PSA 0.470 0.000 - 4.000 ng/mL   CBC Auto Differential   Result Value Ref Range    WBC 4.96 3.20 - 9.80 10*3/mm3    RBC 5.69 4.37 - 5.74 10*6/mm3    Hemoglobin 14.9 13.7 - 17.3 g/dL    Hematocrit 44.2 39.0 - 49.0 %    MCV 77.7 (L) 80.0 - 98.0 fL    MCH 26.2 (L) 26.5 - 34.0 pg    MCHC 33.7 31.5 - 36.3 g/dL    RDW 17.2 (H) 11.5 - 14.5 %    RDW-SD 48.3 (H) 35.1 - 43.9 fl    MPV 10.6 8.0 - 12.0 fL    Platelets 254 150 - 450 10*3/mm3    Neutrophil % 53.5 37.0 - 80.0 %    Lymphocyte % 30.6 10.0 - 50.0 %    Monocyte % 8.7 0.0 - 12.0 %    Eosinophil % 5.6 0.0 - 7.0 %    Basophil % 1.4 0.0 - 2.0 %    Immature Grans % 0.2 0.0 - 0.5 %    Neutrophils, Absolute 2.65 2.00 -  8.60 10*3/mm3    Lymphocytes, Absolute 1.52 0.60 - 4.20 10*3/mm3    Monocytes, Absolute 0.43 0.00 - 0.90 10*3/mm3    Eosinophils, Absolute 0.28 0.00 - 0.70 10*3/mm3    Basophils, Absolute 0.07 0.00 - 0.20 10*3/mm3    Immature Grans, Absolute 0.01 0.00 - 0.02 10*3/mm3   T3, Free   Result Value Ref Range    T3, Free 3.2 2.0 - 4.4 pg/mL   TSH   Result Value Ref Range    TSH 0.410 (L) 0.460 - 4.680 mIU/mL   T4, Free   Result Value Ref Range    Free T4 0.80 0.78 - 2.19 ng/dL   Comprehensive Metabolic Panel   Result Value Ref Range    Glucose 111 (H) 60 - 100 mg/dL    BUN 8 7 - 21 mg/dL    Creatinine 0.90 0.70 - 1.30 mg/dL    Sodium 147 (H) 137 - 145 mmol/L    Potassium 3.0 (L) 3.5 - 5.1 mmol/L    Chloride 107 95 - 110 mmol/L    CO2 23.0 22.0 - 31.0 mmol/L    Calcium 9.7 8.4 - 10.2 mg/dL    Total Protein 8.4 6.3 - 8.6 g/dL    Albumin 4.70 3.40 - 4.80 g/dL    ALT (SGPT) 53 21 - 72 U/L    AST (SGOT) 42 17 - 59 U/L    Alkaline Phosphatase 58 38 - 126 U/L    Total Bilirubin 0.8 0.2 - 1.3 mg/dL    eGFR  African Amer 105 56 - 130 mL/min/1.73    Globulin 3.7 (H) 2.3 - 3.5 gm/dL    A/G Ratio 1.3 1.1 - 1.8 g/dL    BUN/Creatinine Ratio 8.9 7.0 - 25.0    Anion Gap 17.0 (H) 5.0 - 15.0 mmol/L   Results for orders placed or performed in visit on 05/18/18   Hemoglobin A1c   Result Value Ref Range    Hemoglobin A1C 5.7 (H) 4 - 5.6 %   Results for orders placed or performed during the hospital encounter of 12/22/17   Gold Top - SST   Result Value Ref Range    Extra Tube Hold for add-ons.    Green Top (Gel)   Result Value Ref Range    Extra Tube Hold for add-ons.    Lavender Top   Result Value Ref Range    Extra Tube hold for add-on    Light Blue Top   Result Value Ref Range    Extra Tube hold for add-on    Influenza Antigen, Rapid - Swab, Nasopharynx   Result Value Ref Range    Influenza A Ag, EIA Positive (A) Negative    Influenza B Ag, EIA Negative Negative   Results for orders placed or performed during the hospital encounter of  12/18/17   Tissue Pathology Exam - Tissue, Esophagus, Mid   Result Value Ref Range    Case Report       Surgical Pathology Report                         Case: [id number]                                  Authorizing Provider:  Alli Dockery MD         Collected:           12/18/2017 12:14 PM          Ordering Location:     [facility]             Received:            12/18/2017 12:43 PM                                 [facility]                                                     Pathologist:           Roberto Clarke MD                                                         Specimen:    Esophagus, Mid                                                                             Final Diagnosis       MID ESOPHAGUS, MUCOSAL BIOPSY:   SEGMENTS OF MILDLY INFLAMED STRATIFIED SQUAMOUS EPITHELIUM.        Gross Description       The specimen consists of a mucosal biopsy measuring up to 0.3 cm in greatest dimension.  All embedded.       Embedded Images     POC Glucose Once   Result Value Ref Range    Glucose 98 70 - 130 mg/dL   Results for orders placed or performed in visit on 10/30/17   Microalbumin / Creatinine Urine Ratio - Urine, Clean Catch   Result Value Ref Range    Microalbumin/Creatinine Ratio 270.5 (H) 0.0 - 30.0 mg/g    Creatinine, Urine 421.4 mg/dL    Microalbumin, Urine 114.0 mg/L   Potassium   Result Value Ref Range    Potassium 3.2 (L) 3.5 - 5.1 mmol/L   Magnesium   Result Value Ref Range    Magnesium 0.9 (C) 1.6 - 2.3 mg/dL   Results for orders placed or performed during the hospital encounter of 10/26/17   Troponin   Result Value Ref Range    Troponin I 0.019 <=0.034 ng/mL   POC Glucose Fingerstick   Result Value Ref Range    Glucose 107 70 - 130 mg/dL   POC Glucose Fingerstick   Result Value Ref Range    Glucose 101 70 - 130 mg/dL   POC Glucose Fingerstick   Result Value Ref Range    Glucose 122 70 - 130 mg/dL   POC Glucose Fingerstick   Result Value Ref Range    Glucose 87 70 - 130  mg/dL   Potassium   Result Value Ref Range    Potassium 3.1 (L) 3.5 - 5.1 mmol/L   Potassium   Result Value Ref Range    Potassium 2.8 (L) 3.5 - 5.1 mmol/L   Magnesium   Result Value Ref Range    Magnesium 2.0 1.6 - 2.3 mg/dL   Magnesium   Result Value Ref Range    Magnesium 1.3 (L) 1.6 - 2.3 mg/dL   Magnesium   Result Value Ref Range    Magnesium 1.4 (L) 1.6 - 2.3 mg/dL   Magnesium   Result Value Ref Range    Magnesium 0.9 (C) 1.6 - 2.3 mg/dL   Hemoglobin A1c   Result Value Ref Range    Hemoglobin A1C 5.8 (H) 4 - 5.6 %   Basic Metabolic Panel   Result Value Ref Range    Glucose 99 60 - 100 mg/dL    BUN 8 7 - 21 mg/dL    Creatinine 0.92 0.70 - 1.30 mg/dL    Sodium 145 137 - 145 mmol/L    Potassium 3.5 3.5 - 5.1 mmol/L    Chloride 101 95 - 110 mmol/L    CO2 31.0 22.0 - 31.0 mmol/L    Calcium 8.0 (L) 8.4 - 10.2 mg/dL    eGFR  African Amer 102 56 - 130 mL/min/1.73    BUN/Creatinine Ratio 8.7 7.0 - 25.0    Anion Gap 13.0 5.0 - 15.0 mmol/L     *Note: Due to a large number of results and/or encounters for the requested time period, some results have not been displayed. A complete set of results can be found in Results Review.       Some portions of this note have been dictated using voice recognition software and may contain errors and/or omissions.

## 2019-02-22 NOTE — PROGRESS NOTES
Pulmonary Office Follow-up    Subjective     Jameel Dozier is seen today at the office for   Chief Complaint   Patient presents with   • COPD         HPI  Jameel Dozier is a 58 y.o. male with a PMH significant for COPD, tobacco use, [place], obesity, HTN, and GERD who presents for follow-up of COPD.  He was last seen on 11/16/18, at which time he was stable on Spiriva daily with infrequent albuterol use but he did continue to smoke.  He reports that he has been doing well with regards to his COPD.  He does continue on Spiriva daily and reports that he rarely needs his albuterol.  He does have some cough, but is nonproductive and he attributes to his ongoing smoking.  Patient has also had a rare wheeze but has not been bothering him lately.  He denies any recent fever, chills, sputum, or chest pain.  His rhinitis is currently controlled on Flonase and Zyrtec.  Unfortunately, he does continue to smoke and he is not interested in quitting at this time.  He denies any recent exacerbations or ED visits.  Patient refuses to get the flu vaccine.    Patient Active Problem List   Diagnosis   • Central obesity   • Hypercholesterolemia   • Essential hypertension   • Astigmatism   • Type 2 diabetes mellitus with complication, without long-term current use of insulin (CMS/[facility])   • Cigarette nicotine dependence without complication   • Tobacco use disorder   • Chronic obstructive pulmonary disease (CMS/[facility])   • Overweight (BMI 25.0-29.9)   • Physical deconditioning   • Chronic non-seasonal allergic rhinitis   • Imaging of gastrointestinal tract abnormal   • Pain of upper abdomen   • Gastroesophageal reflux disease with esophagitis   • Screening for hyperlipidemia   • Weakness   • Upper respiratory infection   • Transient cerebral ischemia   • Sleep disorder   • Rectal hemorrhage   • Rectal bleed   • Primary osteoarthritis of knees, bilateral   • Obstructive sleep apnea syndrome   • Male erectile disorder   • Left  ventricular hypertrophy   • Hypertension   • History of TIA (transient ischemic attack)   • History of EKG   • History of echocardiogram   • History of colon polyps   • Hiccough   • Hiatal hernia   • Hemorrhoids   • Heavy tobacco smoker   • Gastritis   • External hemorrhoids   • Esophagitis   • Esophageal reflux   • Esophageal dysmotility   • Epigastric pain   • ED (erectile dysfunction)   • Dyspnea   • Dysphagia   • Diverticular disease of colon   • Disorder of peripheral nervous system   • Claudication (CMS/HCC)   • Chronic obstructive lung disease (CMS/HCC)   • Cerebrovascular disease   • Benign essential hypertension   • Anemia due to blood loss   • Anal fissure   • Allergic rhinitis   • Alcohol dependence with alcohol-induced disorder (CMS/HCC)   • Adenomatous polyp of colon   • Acquired achalasia of esophagus   • Abdominal pain   • Adverse drug effect   • Intractable hiccups   • Non-intractable vomiting with nausea       Review of Systems  Review of Systems   Constitutional: Negative for fever and unexpected weight change.   HENT: Negative for congestion and postnasal drip.    Respiratory: Positive for cough and shortness of breath. Negative for chest tightness and wheezing.    Cardiovascular: Negative for chest pain and leg swelling.     As described in the HPI. Otherwise, remainder of ROS (14 systems) were negative.    Medications, Allergies, Social, and Family Histories reviewed as per EMR.    Objective     Vitals:    02/25/19 1046   BP: 154/92   Pulse: 94   SpO2: 98%     Physical Exam   Constitutional: He is oriented to person, place, and time. Vital signs are normal. He appears well-developed and well-nourished.   obese   HENT:   Head: Normocephalic and atraumatic.   Nose: Nose normal. No mucosal edema or rhinorrhea.   Mouth/Throat: Uvula is midline, oropharynx is clear and moist and mucous membranes are normal. Abnormal dentition.   Mallampati 4, large tongue   Eyes: Conjunctivae, EOM and lids are  normal. Pupils are equal, round, and reactive to light.   Neck: Trachea normal and normal range of motion. No tracheal tenderness present. No thyroid mass present.   Cardiovascular: Normal rate, regular rhythm and normal heart sounds. PMI is not displaced. Exam reveals no gallop.   No murmur heard.  Pulmonary/Chest: Effort normal and breath sounds normal. No respiratory distress. He has no decreased breath sounds. He has no wheezes. He has no rhonchi. He exhibits no tenderness.   Abdominal: Soft. Normal appearance and bowel sounds are normal. There is no hepatomegaly. There is no tenderness.   obese   Musculoskeletal:   Normal gait, no extremity edema     Vascular Status -  His right foot exhibits no edema. His left foot exhibits no edema.  Lymphadenopathy:        Head (right side): No submandibular adenopathy present.        Head (left side): No submandibular adenopathy present.     He has no cervical adenopathy.        Right: No supraclavicular adenopathy present.        Left: No supraclavicular adenopathy present.   Neurological: He is alert and oriented to person, place, and time.   Skin: Skin is warm and dry. No cyanosis. Nails show no clubbing.   Psychiatric: He has a normal mood and affect. His speech is normal and behavior is normal. Judgment normal.   Nursing note and vitals reviewed.      IMAGING: LDCT 11/12/18 (independently reviewed and interpreted by me) showed no nodules      Assessment/Plan     Jameel was seen today for copd.    Diagnoses and all orders for this visit:    Chronic obstructive pulmonary disease, unspecified COPD type (CMS/HCC)    Chronic non-seasonal allergic rhinitis    Obstructive sleep apnea syndrome    Cigarette nicotine dependence without complication    Physical deconditioning         Discussion/ Recommendations:   His COPD is stable on Spiriva and he has infrequent albuterol use.  Also, his chronic rhinitis is well controlled on Flonase.  Unfortunately, he does continue to smoke  and he is not interested in quitting at this time.    -Continue Spiriva handihaler daily   -Use Albuterol as needed for dyspnea or wheeze.    -Continue Zyrtec and Flonase daily.  -I advised Jameel of the risks of continuing to use tobacco, and I provided him with tobacco cessation educational materials in the After Visit Summary.  Counseled on detriments of ongoing tobacco smoking.  He remains pre-contemplative.  During this visit, I spent 3 minutes counseling the patient regarding tobacco cessation.  -Annual LD CT November 2019 (1ppd x 45yrs still smoking with no symptoms of lung cancer)  -Uses flu vaccination.  Counseled on the importance of hygienic measures throughout flu season.    Patient's Body mass index is 27.62 kg/m². BMI is above normal parameters. Recommendations include: exercise counseling.      Return in about 3 months (around 5/25/2019) for Recheck COPD.          This document has been electronically signed by Yolanda Phan MD on February 25, 2019 11:09 AM      Dictated using Dragon

## 2019-02-25 ENCOUNTER — OFFICE VISIT (OUTPATIENT)
Dept: PULMONOLOGY | Facility: CLINIC | Age: 59
End: 2019-02-25

## 2019-02-25 VITALS
OXYGEN SATURATION: 98 % | SYSTOLIC BLOOD PRESSURE: 154 MMHG | HEART RATE: 94 BPM | BODY MASS INDEX: 27.7 KG/M2 | WEIGHT: 187 LBS | HEIGHT: 69 IN | DIASTOLIC BLOOD PRESSURE: 92 MMHG

## 2019-02-25 DIAGNOSIS — G47.33 OBSTRUCTIVE SLEEP APNEA SYNDROME: ICD-10-CM

## 2019-02-25 DIAGNOSIS — J30.89 CHRONIC NON-SEASONAL ALLERGIC RHINITIS: ICD-10-CM

## 2019-02-25 DIAGNOSIS — F17.210 CIGARETTE NICOTINE DEPENDENCE WITHOUT COMPLICATION: ICD-10-CM

## 2019-02-25 DIAGNOSIS — J44.9 CHRONIC OBSTRUCTIVE PULMONARY DISEASE, UNSPECIFIED COPD TYPE (HCC): Primary | ICD-10-CM

## 2019-02-25 DIAGNOSIS — R53.81 PHYSICAL DECONDITIONING: ICD-10-CM

## 2019-02-25 PROCEDURE — 99214 OFFICE O/P EST MOD 30 MIN: CPT | Performed by: INTERNAL MEDICINE

## 2019-02-25 PROCEDURE — 99406 BEHAV CHNG SMOKING 3-10 MIN: CPT | Performed by: INTERNAL MEDICINE

## 2019-03-06 ENCOUNTER — HOSPITAL ENCOUNTER (OUTPATIENT)
Facility: HOSPITAL | Age: 59
Setting detail: HOSPITAL OUTPATIENT SURGERY
Discharge: HOME | End: 2019-03-06
Attending: INTERNAL MEDICINE | Admitting: INTERNAL MEDICINE

## 2019-03-06 ENCOUNTER — ANESTHESIA (OUTPATIENT)
Dept: GASTROENTEROLOGY | Facility: HOSPITAL | Age: 59
End: 2019-03-06

## 2019-03-06 ENCOUNTER — ANESTHESIA EVENT (OUTPATIENT)
Dept: GASTROENTEROLOGY | Facility: HOSPITAL | Age: 59
End: 2019-03-06

## 2019-03-06 VITALS
TEMPERATURE: 97.4 F | OXYGEN SATURATION: 96 % | HEART RATE: 112 BPM | RESPIRATION RATE: 19 BRPM | HEIGHT: 68 IN | DIASTOLIC BLOOD PRESSURE: 106 MMHG | WEIGHT: 182.54 LBS | SYSTOLIC BLOOD PRESSURE: 172 MMHG | BODY MASS INDEX: 27.67 KG/M2

## 2019-03-06 DIAGNOSIS — R11.2 NON-INTRACTABLE VOMITING WITH NAUSEA, UNSPECIFIED VOMITING TYPE: ICD-10-CM

## 2019-03-06 DIAGNOSIS — R13.10 DYSPHAGIA, UNSPECIFIED TYPE: ICD-10-CM

## 2019-03-06 DIAGNOSIS — R06.6 INTRACTABLE HICCUPS: ICD-10-CM

## 2019-03-06 DIAGNOSIS — R10.10 PAIN OF UPPER ABDOMEN: ICD-10-CM

## 2019-03-06 DIAGNOSIS — K21.00 GASTROESOPHAGEAL REFLUX DISEASE WITH ESOPHAGITIS: ICD-10-CM

## 2019-03-06 PROCEDURE — 88305 TISSUE EXAM BY PATHOLOGIST: CPT | Performed by: PATHOLOGY

## 2019-03-06 PROCEDURE — 88305 TISSUE EXAM BY PATHOLOGIST: CPT | Performed by: INTERNAL MEDICINE

## 2019-03-06 PROCEDURE — 43239 EGD BIOPSY SINGLE/MULTIPLE: CPT | Performed by: INTERNAL MEDICINE

## 2019-03-06 PROCEDURE — 25010000002 PROPOFOL 10 MG/ML EMULSION: Performed by: NURSE ANESTHETIST, CERTIFIED REGISTERED

## 2019-03-06 PROCEDURE — 43248 EGD GUIDE WIRE INSERTION: CPT | Performed by: INTERNAL MEDICINE

## 2019-03-06 RX ORDER — PROPOFOL 10 MG/ML
VIAL (ML) INTRAVENOUS AS NEEDED
Status: DISCONTINUED | OUTPATIENT
Start: 2019-03-06 | End: 2019-03-06 | Stop reason: SURG

## 2019-03-06 RX ORDER — LIDOCAINE HYDROCHLORIDE 10 MG/ML
INJECTION, SOLUTION INFILTRATION; PERINEURAL AS NEEDED
Status: DISCONTINUED | OUTPATIENT
Start: 2019-03-06 | End: 2019-03-06 | Stop reason: SURG

## 2019-03-06 RX ORDER — DEXTROSE AND SODIUM CHLORIDE 5; .45 G/100ML; G/100ML
30 INJECTION, SOLUTION INTRAVENOUS CONTINUOUS PRN
Status: DISCONTINUED | OUTPATIENT
Start: 2019-03-06 | End: 2019-03-06 | Stop reason: HOSPADM

## 2019-03-06 RX ADMIN — PROPOFOL 50 MG: 10 INJECTION, EMULSION INTRAVENOUS at 13:52

## 2019-03-06 RX ADMIN — PROPOFOL 100 MG: 10 INJECTION, EMULSION INTRAVENOUS at 13:50

## 2019-03-06 RX ADMIN — PROPOFOL 50 MG: 10 INJECTION, EMULSION INTRAVENOUS at 13:54

## 2019-03-06 RX ADMIN — DEXTROSE AND SODIUM CHLORIDE 30 ML/HR: 5; 450 INJECTION, SOLUTION INTRAVENOUS at 12:50

## 2019-03-06 RX ADMIN — LIDOCAINE HYDROCHLORIDE 100 MG: 10 INJECTION, SOLUTION INFILTRATION; PERINEURAL at 13:50

## 2019-03-06 NOTE — ANESTHESIA POSTPROCEDURE EVALUATION
Patient: Jameel Dozier    Procedure Summary     Date:  03/06/19 Room / Location:  [facility] ENDOSCOPY 1 / [facility] ENDOSCOPY    Anesthesia Start:  1352 Anesthesia Stop:  1401    Procedure:  ESOPHAGOGASTRODUODENOSCOPY WITH DILATATION (N/A ) Diagnosis:       Dysphagia, unspecified type      Intractable hiccups      Pain of upper abdomen      Non-intractable vomiting with nausea, unspecified vomiting type      Gastroesophageal reflux disease with esophagitis      (Dysphagia, unspecified type [R13.10])      (Intractable hiccups [R06.6])      (Pain of upper abdomen [R10.10])      (Non-intractable vomiting with nausea, unspecified vomiting type [R11.2])      (Gastroesophageal reflux disease with esophagitis [K21.0])    Surgeon:  Alli Dockery MD Provider:  Arvind Vaz CRNA    Anesthesia Type:  MAC ASA Status:  4          Anesthesia Type: MAC  Last vitals  BP   (!) 177/122 (03/06/19 1238)   Temp   97.5 °F (36.4 °C) (03/06/19 1238)   Pulse   108 (03/06/19 1238)   Resp   16 (03/06/19 1238)     SpO2   98 % (03/06/19 1238)     Post Anesthesia Care and Evaluation    Patient location during evaluation: bedside  Patient participation: complete - patient participated  Level of consciousness: awake  Pain score: 0  Pain management: adequate  Airway patency: patent  Anesthetic complications: No anesthetic complications  PONV Status: none  Cardiovascular status: acceptable  Respiratory status: acceptable  Hydration status: acceptable

## 2019-03-06 NOTE — ANESTHESIA PREPROCEDURE EVALUATION
Anesthesia Evaluation     Patient summary reviewed and Nursing notes reviewed   NPO Solid Status: > 8 hours  NPO Liquid Status: > 2 hours           Airway   Mallampati: II  TM distance: >3 FB  Neck ROM: full  No difficulty expected  Dental    (+) poor dentition    Pulmonary - normal exam   (+) a smoker Current Smoked day of surgery,   Cardiovascular     Rhythm: regular  Rate: normal        Neuro/Psych  GI/Hepatic/Renal/Endo      Musculoskeletal (-) negative ROS    Abdominal    Substance History   (+) alcohol use,      OB/GYN          Other                        Anesthesia Plan    ASA 4     MAC     intravenous induction   Anesthetic plan, all risks, benefits, and alternatives have been provided, discussed and informed consent has been obtained with: patient.    Plan discussed with CRNA.

## 2019-03-07 LAB
LAB AP CASE REPORT: NORMAL
PATH REPORT.FINAL DX SPEC: NORMAL
PATH REPORT.GROSS SPEC: NORMAL

## 2019-03-15 NOTE — H&P
Review of Systems   Constitutional: Negative for activity change, appetite change, chills, diaphoresis, fatigue, fever and unexpected weight change.   HENT: Negative for congestion, sore throat and trouble swallowing.    Respiratory: Negative for apnea, cough, choking, chest tightness, shortness of breath, wheezing and stridor.    Cardiovascular: Negative for chest pain, palpitations and leg swelling.   Gastrointestinal: Negative for abdominal distention, abdominal pain, anal bleeding, blood in stool, constipation, diarrhea, nausea, rectal pain and vomiting.   Musculoskeletal: Negative for arthralgias.   Skin: Negative for color change, pallor, rash and wound.   Neurological: Negative for dizziness, syncope, weakness, light-headedness and headaches.   Psychiatric/Behavioral: Negative for agitation, behavioral problems, confusion and decreased concentration.

## 2019-03-26 NOTE — TELEPHONE ENCOUNTER
[facility] called and said they are needing a refill on the Mag Ox for pt.     Thank you

## 2019-04-23 DIAGNOSIS — I10 ESSENTIAL HYPERTENSION: ICD-10-CM

## 2019-04-23 RX ORDER — HYDRALAZINE HYDROCHLORIDE 10 MG/1
TABLET, FILM COATED ORAL
Qty: 90 TABLET | Refills: 1 | Status: SHIPPED | OUTPATIENT
Start: 2019-04-23 | End: 2019-09-10 | Stop reason: SDUPTHER

## 2019-04-23 NOTE — TELEPHONE ENCOUNTER
Pharmacy called to get a refill on patients medication.      hydrALAZINE (APRESOLINE) 10 MG tablet    ThanksEssie

## 2019-05-14 DIAGNOSIS — G47.33 OBSTRUCTIVE SLEEP APNEA SYNDROME: ICD-10-CM

## 2019-05-14 RX ORDER — ALBUTEROL SULFATE 90 UG/1
AEROSOL, METERED RESPIRATORY (INHALATION)
Qty: 18 G | Refills: 10 | Status: SHIPPED | OUTPATIENT
Start: 2019-05-14 | End: 2019-08-09 | Stop reason: SDUPTHER

## 2019-07-18 ENCOUNTER — TELEPHONE (OUTPATIENT)
Dept: FAMILY MEDICINE CLINIC | Facility: CLINIC | Age: 59
End: 2019-07-18

## 2019-07-18 DIAGNOSIS — K21.00 GERD WITH ESOPHAGITIS: ICD-10-CM

## 2019-07-18 DIAGNOSIS — I10 ESSENTIAL HYPERTENSION: ICD-10-CM

## 2019-07-18 DIAGNOSIS — R73.03 PRE-DIABETES: ICD-10-CM

## 2019-07-18 NOTE — TELEPHONE ENCOUNTER
[facility] called and the patient called them and is needing refills on his cetirizine (zyrTEC) 10 MG tablet, losartan (COZAAR) 100 MG tablet, metoprolol tartrate (LOPRESSOR) 25 MG tablet, metFORMIN (GLUCOPHAGE) 500 MG tablet, tiotropium (SPIRIVA) 18 MCG per inhalation capsule. He is on the list to be called for August schedule. If any questions they said that someone can call them back.        Thank you,      Tonya

## 2019-07-19 DIAGNOSIS — K21.00 GERD WITH ESOPHAGITIS: ICD-10-CM

## 2019-07-19 DIAGNOSIS — I10 ESSENTIAL HYPERTENSION: ICD-10-CM

## 2019-07-19 DIAGNOSIS — R73.03 PRE-DIABETES: ICD-10-CM

## 2019-07-19 RX ORDER — LOSARTAN POTASSIUM 100 MG/1
100 TABLET ORAL DAILY
Qty: 30 TABLET | Refills: 5 | Status: SHIPPED | OUTPATIENT
Start: 2019-07-19 | End: 2019-07-19 | Stop reason: SDUPTHER

## 2019-07-19 RX ORDER — CETIRIZINE HYDROCHLORIDE 10 MG/1
10 TABLET ORAL DAILY
Qty: 30 TABLET | Refills: 3 | Status: SHIPPED | OUTPATIENT
Start: 2019-07-19 | End: 2019-08-01 | Stop reason: SDUPTHER

## 2019-07-19 RX ORDER — CETIRIZINE HYDROCHLORIDE 10 MG/1
10 TABLET ORAL DAILY
Qty: 30 TABLET | Refills: 3 | Status: SHIPPED | OUTPATIENT
Start: 2019-07-19 | End: 2019-07-19 | Stop reason: SDUPTHER

## 2019-07-19 RX ORDER — LOSARTAN POTASSIUM 100 MG/1
100 TABLET ORAL DAILY
Qty: 30 TABLET | Refills: 5 | Status: SHIPPED | OUTPATIENT
Start: 2019-07-19 | End: 2019-08-07 | Stop reason: SDUPTHER

## 2019-07-26 DIAGNOSIS — R73.03 PRE-DIABETES: ICD-10-CM

## 2019-08-01 ENCOUNTER — OFFICE VISIT (OUTPATIENT)
Dept: GASTROENTEROLOGY | Facility: CLINIC | Age: 59
End: 2019-08-01

## 2019-08-01 VITALS
DIASTOLIC BLOOD PRESSURE: 92 MMHG | SYSTOLIC BLOOD PRESSURE: 148 MMHG | HEART RATE: 89 BPM | BODY MASS INDEX: 25.37 KG/M2 | HEIGHT: 73 IN | WEIGHT: 191.4 LBS

## 2019-08-01 DIAGNOSIS — I85.00 ESOPHAGEAL VARICES WITHOUT BLEEDING, UNSPECIFIED ESOPHAGEAL VARICES TYPE (HCC): Primary | ICD-10-CM

## 2019-08-01 DIAGNOSIS — K22.2 PEPTIC STRICTURE OF ESOPHAGUS: ICD-10-CM

## 2019-08-01 DIAGNOSIS — K74.00 HEPATIC FIBROSIS: ICD-10-CM

## 2019-08-01 DIAGNOSIS — K70.0 ALCOHOLIC FATTY LIVER: ICD-10-CM

## 2019-08-01 DIAGNOSIS — K21.00 GERD WITH ESOPHAGITIS: ICD-10-CM

## 2019-08-01 DIAGNOSIS — K76.0 FATTY (CHANGE OF) LIVER, NOT ELSEWHERE CLASSIFIED: ICD-10-CM

## 2019-08-01 DIAGNOSIS — K21.00 GASTROESOPHAGEAL REFLUX DISEASE WITH ESOPHAGITIS: ICD-10-CM

## 2019-08-01 DIAGNOSIS — E71.30 DISORDER OF FATTY-ACID METABOLISM: ICD-10-CM

## 2019-08-01 PROCEDURE — 99214 OFFICE O/P EST MOD 30 MIN: CPT | Performed by: PHYSICIAN ASSISTANT

## 2019-08-01 RX ORDER — CETIRIZINE HYDROCHLORIDE 10 MG/1
10 TABLET ORAL DAILY
Qty: 30 TABLET | Refills: 1 | Status: SHIPPED | OUTPATIENT
Start: 2019-08-01 | End: 2019-08-01 | Stop reason: SDUPTHER

## 2019-08-01 RX ORDER — CETIRIZINE HYDROCHLORIDE 10 MG/1
10 TABLET ORAL DAILY
Qty: 30 TABLET | Refills: 1 | Status: SHIPPED | OUTPATIENT
Start: 2019-08-01 | End: 2019-08-09 | Stop reason: SDUPTHER

## 2019-08-01 NOTE — PATIENT INSTRUCTIONS

## 2019-08-01 NOTE — PROGRESS NOTES
Chief Complaint   Patient presents with   • Difficulty Swallowing   • Nausea   • Vomiting       ENDO PROCEDURE ORDERED:    Subjective    Jameel Dozier is a 59 y.o. male. he is here today for follow-up.    History of Present Illness    Patient is seen on a recheck of his nausea, vomiting, GERD, chronic hiccups, and dysphagia.  Last seen 02/13/2019.  Patient underwent EGD on 03/06/2019 that showed an esophageal stricture dilated to a 54-Albanian.  Also showed grade 1 varices as well as gastritis.  Biopsies showed mild chronic inflammation and negative for H. pylori.       Patient states his dysphagia is doing some better.  He is on Prilosec.  He denied nausea or vomiting.  Bowels are moving without blood or mucus.  Weight is up 6 pounds since last visit.  Last colonoscopy showed hemorrhoids on 04/13/2015.  I did review his prior endoscopies on 08/24/2016.  He had a stricture injected with Botox.  He had a stricture dilated on 12/18/2017.  On the last imaging of his liver in 2015, he had a CT scan showing a fatty liver with hepatomegaly.  He did have a recent low dose CT scan of the chest.  I did review that.  The images are somewhat limited, noncontrast through the liver.  I did not see any significant abnormalities.  The liver does appear to be slightly enlarged.         Patient with apparent peptic stricture improved postdilatation.  With grade 1 varices, may be related to cardiac versus liver disease.  I did recommend an abdominal ultrasound.  Will check a CBC, CMP, AFP, INR, and REYNOSO FibroSure.  May need to consider beta blocker for these varices.  They appear to be small.  Will plan follow-up after the above.  Further pending clinical course and the results of the above.            The following portions of the patient's history were reviewed and updated as appropriate:   Past Medical History:   Diagnosis Date   • Abdominal pain    • Acquired achalasia of esophagus    • Adenomatous polyp of colon    •  Adverse drug effect    • Alcohol dependence with alcohol-induced disorder (CMS/HCC)    • Allergic rhinitis    • Anal fissure    • Anemia due to blood loss    • Benign essential hypertension    • Central obesity    • Cerebrovascular disease    • Chronic obstructive lung disease (CMS/HCC)    • Claudication (CMS/HCC)    • Disorder of peripheral nervous system    • Diverticular disease of colon     completed antibx, ? neoplasm on CT   • Dysphagia    • Dyspnea    • ED (erectile dysfunction)    • Epigastric pain    • Esophageal dysmotility    • Esophageal reflux    • Esophagitis    • Essential hypertension    • External hemorrhoids    • Gastritis    • GERD with esophagitis    • Heavy tobacco smoker    • Hemorrhoids    • Hiatal hernia    • Hiccough    • History of colon polyps    • History of echocardiogram     Normal LV funciton with Ef of 65% to 70% without regional wall motion abnormalities.Mild CLVH. Normal RV size and function. No significant valvular regurgitation or stenosis. 09/19/2014       • History of EKG    • History of TIA (transient ischemic attack)    • Hypercholesterolemia    • Hypertension    • Left ventricular hypertrophy    • Male erectile disorder    • Obstructive sleep apnea syndrome    • Primary osteoarthritis of knees, bilateral    • Rectal bleed     painful   • Rectal hemorrhage    • Sleep disorder    • Transient cerebral ischemia    • Upper respiratory infection    • Weakness     Attacks of weakness     Past Surgical History:   Procedure Laterality Date   • COLONOSCOPY  04/13/2015    Internal and external hemorrhoids found. 04/13/2015    • ENDOSCOPY      Internal & external hemorrhoids found. 1 polyp in sigmoid colon; removed by snare cautery polypectomy. 09/26/2012    • ENDOSCOPY      Gastritis found in the stomach. Biopsy taken.Enlarged folds were found in the body of the stomach.Biopsy taken.Normal duodenum.A hiatus hernia was found in the esophagus. 04/13/2015    • ENDOSCOPY      Hiatus hernia  in esophagus. Gastritis in stomach. Biopsy taken. Normal duodenum. Biopsy taken. 09/26/2012       • ENDOSCOPY N/A 12/18/2017    Procedure: ESOPHAGOGASTRODUODENOSCOPY--attn mid esophagus, abnl on CT;  Surgeon: Alli Dockery MD;  Location: [facility] ENDOSCOPY;  Service:    • ENDOSCOPY N/A 3/6/2019    Procedure: ESOPHAGOGASTRODUODENOSCOPY WITH DILATATION;  Surgeon: Alli Dockery MD;  Location: [facility] ENDOSCOPY;  Service: Gastroenterology   • UPPER GASTROINTESTINAL ENDOSCOPY  08/24/2016   • UPPER GASTROINTESTINAL ENDOSCOPY  04/13/2015   • UPPER GASTROINTESTINAL ENDOSCOPY  12/18/2017   • UPPER GASTROINTESTINAL ENDOSCOPY  03/06/2019     Family History   Problem Relation Age of Onset   • Cancer Mother    • Cirrhosis Father    • Diabetes Other    • Hypertension Other        Allergies   Allergen Reactions   • Ace Inhibitors Angioedema     hypotension   • Lisinopril Other (See Comments)     cough   • Vistaril [Hydroxyzine Hcl] Other (See Comments)     Patient is unsure      Social History     Socioeconomic History   • Marital status: Single     Spouse name: Not on file   • Number of children: 0   • Years of education: 12   • Highest education level: Not on file   Occupational History   • Occupation: retired   Tobacco Use   • Smoking status: Current Every Day Smoker     Packs/day: 1.00     Years: 40.00     Pack years: 40.00     Types: Cigarettes   • Smokeless tobacco: Never Used   Substance and Sexual Activity   • Alcohol use: Yes     Alcohol/week: 3.6 oz     Types: 6 Cans of beer per week     Comment: when can get   • Drug use: No   • Sexual activity: Defer     Partners: Female     Current Medications:  Prior to Admission medications    Medication Sig Start Date End Date Taking? Authorizing Provider   ASPIRIN LOW DOSE 81 MG EC tablet TAKE 1 TAB DAILY FOR PREVENTION OF BLOOD CLOTTING 12/21/18  Yes Madonna Flores MD   Blood Pressure Monitoring (ADULT BLOOD PRESSURE CUFF LG) kit Check BP daily 7/17/18  Yes  Madonna Flores MD   cetirizine (zyrTEC) 10 MG tablet Take 1 tablet by mouth Daily. 7/19/19  Yes Jeri Danielson MD   ferrous sulfate (FEOSOL) 325 (65 FE) MG tablet Take 1 tablet by mouth 3 (Three) Times a Day With Meals. 7/17/18  Yes Madonna Flores MD   losartan (COZAAR) 100 MG tablet Take 1 tablet by mouth Daily. 7/19/19  Yes Jeri Danielson MD   magnesium oxide (MAGOX) 400 (241.3 Mg) MG tablet tablet Take 1 tablet by mouth Daily. 3/26/19  Yes Madonna Flores MD   metFORMIN (GLUCOPHAGE) 500 MG tablet Take 1 tablet by mouth Daily With Breakfast. 7/26/19  Yes Jeri Danielson MD   metoprolol tartrate (LOPRESSOR) 25 MG tablet Take 1 tablet by mouth Every 12 (Twelve) Hours. 7/19/19  Yes Jeri Danielson MD   omeprazole (priLOSEC) 20 MG capsule Take 1 capsule by mouth Every Morning. 7/17/18  Yes Madonna Flores MD   tiotropium (SPIRIVA) 18 MCG per inhalation capsule Place 1 capsule into inhaler and inhale Daily. 7/19/19  Yes Jeri Danielson MD   VENTOLIN  (90 Base) MCG/ACT inhaler 2 PUFFS EVERY 4 HRS AS NEEDED FOR WHEEZING 5/14/19  Yes Madonna Flores MD   amLODIPine (NORVASC) 10 MG tablet Take 1 tablet by mouth Daily. 7/17/18   Madonna Flores MD   fluticasone (FLONASE) 50 MCG/ACT nasal spray 2 sprays into each nostril Daily. 7/17/18   Madonna Flores MD   hydrALAZINE (APRESOLINE) 10 MG tablet TAKE 1 TABLET THREE  TIMES DAILY 4/23/19   Madonna Flores MD   lovastatin (MEVACOR) 40 MG tablet Take 1 tablet by mouth Daily. 7/17/18   Madonna Flores MD   ondansetron (ZOFRAN) 4 MG tablet Take 1 tablet by mouth Every 8 (Eight) Hours As Needed for Nausea or Vomiting. 12/27/18   Remy Abarca MD   potassium citrate (UROCIT-K) 5 MEQ (540 MG) CR tablet Take 20 mEq by mouth Daily.    Provider, MD Radha   sildenafil (VIAGRA) 25 MG tablet Take 1 tablet by mouth Daily As Needed for erectile  "dysfunction. 12/10/18   Madonna Flores MD   sodium chloride (OCEAN NASAL SPRAY) 0.65 % nasal spray 1 spray into the nostril(s) as directed by provider As Needed for Congestion. 11/16/18   Yolanda Phan MD     Review of Systems  Review of Systems       Objective    /92 (BP Location: Left arm)   Pulse 89   Ht 185.4 cm (73\")   Wt 86.8 kg (191 lb 6.4 oz)   BMI 25.25 kg/m²   Physical Exam   Constitutional: He is oriented to person, place, and time. He appears well-developed and well-nourished. No distress.   AA   HENT:   Head: Normocephalic and atraumatic.   Eyes: EOM are normal. Pupils are equal, round, and reactive to light.   Neck: Normal range of motion.   Cardiovascular: Normal rate, regular rhythm and normal heart sounds.   Pulmonary/Chest: Effort normal and breath sounds normal.   Abdominal: Soft. Bowel sounds are normal. He exhibits no shifting dullness, no distension, no abdominal bruit, no ascites and no mass. There is no hepatosplenomegaly. There is tenderness. There is no rigidity, no rebound, no guarding and no CVA tenderness. No hernia. Hernia confirmed negative in the ventral area.   mild   Musculoskeletal: Normal range of motion.   Neurological: He is alert and oriented to person, place, and time.   Skin: Skin is warm and dry.   Psychiatric: He has a normal mood and affect. His behavior is normal. Judgment and thought content normal.   Nursing note and vitals reviewed.    Assessment/Plan      1. Esophageal varices without bleeding, unspecified esophageal varices type (CMS/HCC)    2. Peptic stricture of esophagus    3. Gastroesophageal reflux disease with esophagitis    4. Alcoholic fatty liver    5. GERD with esophagitis    6. Fatty (change of) liver, not elsewhere classified     7. Disorder of fatty-acid metabolism     8. Hepatic fibrosis     .   Jameel was seen today for difficulty swallowing, nausea and vomiting.    Diagnoses and all orders for this visit:    Esophageal " varices without bleeding, unspecified esophageal varices type (CMS/HCC)  -     Comprehensive Metabolic Panel  -     CBC & Differential  -     Protime-INR  -     REYNOSO Fibrosure  -     AFP Tumor Marker  -     Hepatitis Panel, Acute  -     US Abdomen Complete    Peptic stricture of esophagus  -     US Abdomen Complete    Gastroesophageal reflux disease with esophagitis  -     US Abdomen Complete    Alcoholic fatty liver  -     Comprehensive Metabolic Panel  -     CBC & Differential  -     Protime-INR  -     REYNOSO Fibrosure  -     AFP Tumor Marker  -     Hepatitis Panel, Acute  -     US Abdomen Complete    GERD with esophagitis  -     Discontinue: cetirizine (zyrTEC) 10 MG tablet; Take 1 tablet by mouth Daily.  -     US Abdomen Complete  -     cetirizine (zyrTEC) 10 MG tablet; Take 1 tablet by mouth Daily.    Fatty (change of) liver, not elsewhere classified   -     REYNOSO Fibrosure  -     US Abdomen Complete    Disorder of fatty-acid metabolism   -     ERYNOSO Fibrosure  -     US Abdomen Complete    Hepatic fibrosis   -     AFP Tumor Marker  -     US Abdomen Complete        Orders placed during this encounter include:  Orders Placed This Encounter   Procedures   • US Abdomen Complete     Order Specific Question:   Reason for Exam:     Answer:   abd pain, varices, fatty liver   • Comprehensive Metabolic Panel   • Protime-INR   • REYNOSO Fibrosure   • AFP Tumor Marker   • Hepatitis Panel, Acute   • CBC & Differential     Order Specific Question:   Manual Differential     Answer:   No       Medications prescribed:  New Medications Ordered This Visit   Medications   • cetirizine (zyrTEC) 10 MG tablet     Sig: Take 1 tablet by mouth Daily.     Dispense:  30 tablet     Refill:  1       Requested Prescriptions     Signed Prescriptions Disp Refills   • cetirizine (zyrTEC) 10 MG tablet 30 tablet 1     Sig: Take 1 tablet by mouth Daily.       Review and/or summary of lab tests, radiology, procedures, medications. Review and summary of  old records and obtaining of history. The risks and benefits of my recommendations, as well as other treatment options were discussed with the patient today. Questions were answered.    Follow-up: Return if symptoms worsen or fail to improve, for After the above.     * Surgery not found *      This document has been electronically signed by Tim Chen PA-C on August 2, 2019 3:24 PM      Results for orders placed or performed during the hospital encounter of 03/06/19   Tissue Pathology Exam   Result Value Ref Range    Case Report       Surgical Pathology Report                         Case: [id number]                                  Authorizing Provider:  Alli Dockery MD        Collected:           03/06/2019 01:58 PM          Ordering Location:     [facility]             Received:            03/06/2019 02:45 PM                                 [facility]                                                     Pathologist:           Roberto Clarke MD                                                        Specimen:    Gastric, Antrum                                                                            Final Diagnosis       GASTRIC ANTRUM, MUCOSAL BIOPSY:  MILD CHRONIC GASTRITIS.  NO EVIDENCE OF HELICOBACTER PYLORI.      Gross Description       The specimen consists of a mucosal biopsy measuring up to 0.3 cm in greatest dimension.  All embedded as 1A.     Results for orders placed or performed in visit on 02/07/19   Microalbumin / Creatinine Urine Ratio - Urine, Clean Catch   Result Value Ref Range    Microalbumin/Creatinine Ratio  0.0 - 30.0 mg/g    Creatinine, Urine 220.0 mg/dL    Microalbumin, Urine >114.0 mg/L   Protein, Urine, Random - Urine, Clean Catch   Result Value Ref Range    Total Protein, Urine 396.0 mg/dL   Hemoglobin A1c   Result Value Ref Range    Hemoglobin A1C 5.7 (H) 4 - 5.6 %   Results for orders placed or performed in visit on 12/27/18   POCT Influenza A/B    Result Value Ref Range    Rapid Influenza A Ag Negative Negative    Rapid Influenza B Ag Negative Negative    Internal Control Passed Passed    Lot Number [id number]     Expiration Date 05/14/2020    Results for orders placed or performed in visit on 07/17/18   TSH   Result Value Ref Range    TSH 0.470 0.460 - 4.680 mIU/mL   T4, free   Result Value Ref Range    Free T4 0.99 0.78 - 2.19 ng/dL   Potassium   Result Value Ref Range    Potassium 3.3 (L) 3.5 - 5.1 mmol/L   Magnesium   Result Value Ref Range    Magnesium 1.4 (L) 1.6 - 2.3 mg/dL   Lipid Panel   Result Value Ref Range    Total Cholesterol 198 0 - 199 mg/dL    Triglycerides 61 20 - 199 mg/dL    HDL Cholesterol 102 60 - 200 mg/dL    LDL Cholesterol  82 1 - 129 mg/dL    LDL/HDL Ratio 0.82 0.00 - 3.55   Results for orders placed or performed in visit on 05/31/18   Magnesium   Result Value Ref Range    Magnesium 1.2 (L) 1.6 - 2.3 mg/dL   Results for orders placed or performed in visit on 05/31/18   PSA Screen   Result Value Ref Range    PSA 0.470 0.000 - 4.000 ng/mL   CBC Auto Differential   Result Value Ref Range    WBC 4.96 3.20 - 9.80 10*3/mm3    RBC 5.69 4.37 - 5.74 10*6/mm3    Hemoglobin 14.9 13.7 - 17.3 g/dL    Hematocrit 44.2 39.0 - 49.0 %    MCV 77.7 (L) 80.0 - 98.0 fL    MCH 26.2 (L) 26.5 - 34.0 pg    MCHC 33.7 31.5 - 36.3 g/dL    RDW 17.2 (H) 11.5 - 14.5 %    RDW-SD 48.3 (H) 35.1 - 43.9 fl    MPV 10.6 8.0 - 12.0 fL    Platelets 254 150 - 450 10*3/mm3    Neutrophil % 53.5 37.0 - 80.0 %    Lymphocyte % 30.6 10.0 - 50.0 %    Monocyte % 8.7 0.0 - 12.0 %    Eosinophil % 5.6 0.0 - 7.0 %    Basophil % 1.4 0.0 - 2.0 %    Immature Grans % 0.2 0.0 - 0.5 %    Neutrophils, Absolute 2.65 2.00 - 8.60 10*3/mm3    Lymphocytes, Absolute 1.52 0.60 - 4.20 10*3/mm3    Monocytes, Absolute 0.43 0.00 - 0.90 10*3/mm3    Eosinophils, Absolute 0.28 0.00 - 0.70 10*3/mm3    Basophils, Absolute 0.07 0.00 - 0.20 10*3/mm3    Immature Grans, Absolute 0.01 0.00 - 0.02 10*3/mm3   T3,  Free   Result Value Ref Range    T3, Free 3.2 2.0 - 4.4 pg/mL   TSH   Result Value Ref Range    TSH 0.410 (L) 0.460 - 4.680 mIU/mL   T4, Free   Result Value Ref Range    Free T4 0.80 0.78 - 2.19 ng/dL   Comprehensive Metabolic Panel   Result Value Ref Range    Glucose 111 (H) 60 - 100 mg/dL    BUN 8 7 - 21 mg/dL    Creatinine 0.90 0.70 - 1.30 mg/dL    Sodium 147 (H) 137 - 145 mmol/L    Potassium 3.0 (L) 3.5 - 5.1 mmol/L    Chloride 107 95 - 110 mmol/L    CO2 23.0 22.0 - 31.0 mmol/L    Calcium 9.7 8.4 - 10.2 mg/dL    Total Protein 8.4 6.3 - 8.6 g/dL    Albumin 4.70 3.40 - 4.80 g/dL    ALT (SGPT) 53 21 - 72 U/L    AST (SGOT) 42 17 - 59 U/L    Alkaline Phosphatase 58 38 - 126 U/L    Total Bilirubin 0.8 0.2 - 1.3 mg/dL    eGFR  African Amer 105 56 - 130 mL/min/1.73    Globulin 3.7 (H) 2.3 - 3.5 gm/dL    A/G Ratio 1.3 1.1 - 1.8 g/dL    BUN/Creatinine Ratio 8.9 7.0 - 25.0    Anion Gap 17.0 (H) 5.0 - 15.0 mmol/L   Results for orders placed or performed in visit on 05/18/18   Hemoglobin A1c   Result Value Ref Range    Hemoglobin A1C 5.7 (H) 4 - 5.6 %   Results for orders placed or performed during the hospital encounter of 12/22/17   Gold Top - SST   Result Value Ref Range    Extra Tube Hold for add-ons.    Green Top (Gel)   Result Value Ref Range    Extra Tube Hold for add-ons.    Lavender Top   Result Value Ref Range    Extra Tube hold for add-on    Light Blue Top   Result Value Ref Range    Extra Tube hold for add-on    Influenza Antigen, Rapid - Swab, Nasopharynx   Result Value Ref Range    Influenza A Ag, EIA Positive (A) Negative    Influenza B Ag, EIA Negative Negative   Results for orders placed or performed during the hospital encounter of 12/18/17   Tissue Pathology Exam - Tissue, Esophagus, Mid   Result Value Ref Range    Case Report       Surgical Pathology Report                         Case: [id number]                                  Authorizing Provider:  Alli Dockery MD         Collected:            12/18/2017 12:14 PM          Ordering Location:     [facility]             Received:            12/18/2017 12:43 PM                                 [place] [facility]                                                     Pathologist:           Roberto Clarke MD                                                         Specimen:    Esophagus, Mid                                                                             Final Diagnosis       MID ESOPHAGUS, MUCOSAL BIOPSY:   SEGMENTS OF MILDLY INFLAMED STRATIFIED SQUAMOUS EPITHELIUM.        Gross Description       The specimen consists of a mucosal biopsy measuring up to 0.3 cm in greatest dimension.  All embedded.       Embedded Images     POC Glucose Once   Result Value Ref Range    Glucose 98 70 - 130 mg/dL   Results for orders placed or performed in visit on 10/30/17   Microalbumin / Creatinine Urine Ratio - Urine, Clean Catch   Result Value Ref Range    Microalbumin/Creatinine Ratio 270.5 (H) 0.0 - 30.0 mg/g    Creatinine, Urine 421.4 mg/dL    Microalbumin, Urine 114.0 mg/L   Potassium   Result Value Ref Range    Potassium 3.2 (L) 3.5 - 5.1 mmol/L   Magnesium   Result Value Ref Range    Magnesium 0.9 (C) 1.6 - 2.3 mg/dL   Results for orders placed or performed during the hospital encounter of 10/26/17   Troponin   Result Value Ref Range    Troponin I 0.019 <=0.034 ng/mL   POC Glucose Fingerstick   Result Value Ref Range    Glucose 107 70 - 130 mg/dL   POC Glucose Fingerstick   Result Value Ref Range    Glucose 101 70 - 130 mg/dL   POC Glucose Fingerstick   Result Value Ref Range    Glucose 122 70 - 130 mg/dL   POC Glucose Fingerstick   Result Value Ref Range    Glucose 87 70 - 130 mg/dL   Potassium   Result Value Ref Range    Potassium 3.1 (L) 3.5 - 5.1 mmol/L   Potassium   Result Value Ref Range    Potassium 2.8 (L) 3.5 - 5.1 mmol/L   Magnesium   Result Value Ref Range    Magnesium 2.0 1.6 - 2.3 mg/dL   Magnesium   Result Value Ref Range    Magnesium  1.3 (L) 1.6 - 2.3 mg/dL   Magnesium   Result Value Ref Range    Magnesium 1.4 (L) 1.6 - 2.3 mg/dL   Magnesium   Result Value Ref Range    Magnesium 0.9 (C) 1.6 - 2.3 mg/dL   Hemoglobin A1c   Result Value Ref Range    Hemoglobin A1C 5.8 (H) 4 - 5.6 %   Basic Metabolic Panel   Result Value Ref Range    Glucose 99 60 - 100 mg/dL    BUN 8 7 - 21 mg/dL    Creatinine 0.92 0.70 - 1.30 mg/dL    Sodium 145 137 - 145 mmol/L    Potassium 3.5 3.5 - 5.1 mmol/L    Chloride 101 95 - 110 mmol/L    CO2 31.0 22.0 - 31.0 mmol/L    Calcium 8.0 (L) 8.4 - 10.2 mg/dL    eGFR  African Amer 102 56 - 130 mL/min/1.73    BUN/Creatinine Ratio 8.7 7.0 - 25.0    Anion Gap 13.0 5.0 - 15.0 mmol/L     *Note: Due to a large number of results and/or encounters for the requested time period, some results have not been displayed. A complete set of results can be found in Results Review.       Some portions of this note have been dictated using voice recognition software and may contain errors and/or omissions.

## 2019-08-07 DIAGNOSIS — I10 ESSENTIAL HYPERTENSION: ICD-10-CM

## 2019-08-07 RX ORDER — LOSARTAN POTASSIUM 100 MG/1
TABLET ORAL
Qty: 30 TABLET | Refills: 4 | Status: CANCELLED | OUTPATIENT
Start: 2019-08-07

## 2019-08-08 RX ORDER — LOSARTAN POTASSIUM 100 MG/1
100 TABLET ORAL DAILY
Qty: 30 TABLET | Refills: 5 | Status: SHIPPED | OUTPATIENT
Start: 2019-08-08 | End: 2019-08-09 | Stop reason: SDUPTHER

## 2019-08-09 ENCOUNTER — OFFICE VISIT (OUTPATIENT)
Dept: FAMILY MEDICINE CLINIC | Facility: CLINIC | Age: 59
End: 2019-08-09

## 2019-08-09 ENCOUNTER — LAB (OUTPATIENT)
Dept: LAB | Facility: HOSPITAL | Age: 59
End: 2019-08-09

## 2019-08-09 VITALS
HEART RATE: 78 BPM | OXYGEN SATURATION: 97 % | HEIGHT: 73 IN | WEIGHT: 194.6 LBS | DIASTOLIC BLOOD PRESSURE: 80 MMHG | SYSTOLIC BLOOD PRESSURE: 138 MMHG | BODY MASS INDEX: 25.79 KG/M2

## 2019-08-09 DIAGNOSIS — E11.8 TYPE 2 DIABETES MELLITUS WITH COMPLICATION, WITHOUT LONG-TERM CURRENT USE OF INSULIN (HCC): Primary | ICD-10-CM

## 2019-08-09 DIAGNOSIS — I10 ESSENTIAL HYPERTENSION: ICD-10-CM

## 2019-08-09 DIAGNOSIS — K21.00 GERD WITH ESOPHAGITIS: ICD-10-CM

## 2019-08-09 DIAGNOSIS — E78.00 HYPERCHOLESTEROLEMIA: ICD-10-CM

## 2019-08-09 DIAGNOSIS — R73.03 PRE-DIABETES: ICD-10-CM

## 2019-08-09 DIAGNOSIS — E65 CENTRAL OBESITY: ICD-10-CM

## 2019-08-09 DIAGNOSIS — R23.8 SKIN IRRITATION: ICD-10-CM

## 2019-08-09 DIAGNOSIS — G47.33 OBSTRUCTIVE SLEEP APNEA SYNDROME: ICD-10-CM

## 2019-08-09 DIAGNOSIS — E11.8 TYPE 2 DIABETES MELLITUS WITH COMPLICATION, WITHOUT LONG-TERM CURRENT USE OF INSULIN (HCC): ICD-10-CM

## 2019-08-09 LAB
ALBUMIN SERPL-MCNC: 4.4 G/DL (ref 3.5–5.2)
ALBUMIN/GLOB SERPL: 1.4 G/DL
ALP SERPL-CCNC: 54 U/L (ref 39–117)
ALPHA-FETOPROTEIN: 1.43 NG/ML (ref 0–8.3)
ALT SERPL W P-5'-P-CCNC: 14 U/L (ref 1–41)
ANION GAP SERPL CALCULATED.3IONS-SCNC: 14 MMOL/L (ref 5–15)
AST SERPL-CCNC: 17 U/L (ref 1–40)
BASOPHILS # BLD AUTO: 0.09 10*3/MM3 (ref 0–0.2)
BASOPHILS NFR BLD AUTO: 1.8 % (ref 0–1.5)
BILIRUB SERPL-MCNC: 0.6 MG/DL (ref 0.2–1.2)
BUN BLD-MCNC: 7 MG/DL (ref 8–23)
BUN/CREAT SERPL: 8.9 (ref 7–25)
CALCIUM SPEC-SCNC: 9.3 MG/DL (ref 8.6–10.5)
CHLORIDE SERPL-SCNC: 98 MMOL/L (ref 98–107)
CO2 SERPL-SCNC: 27 MMOL/L (ref 22–29)
CREAT BLD-MCNC: 0.79 MG/DL (ref 0.76–1.27)
DEPRECATED RDW RBC AUTO: 41.5 FL (ref 37–54)
EOSINOPHIL # BLD AUTO: 0.24 10*3/MM3 (ref 0–0.4)
EOSINOPHIL NFR BLD AUTO: 4.7 % (ref 0.3–6.2)
ERYTHROCYTE [DISTWIDTH] IN BLOOD BY AUTOMATED COUNT: 15.2 % (ref 12.3–15.4)
GFR SERPL CREATININE-BSD FRML MDRD: 121 ML/MIN/1.73
GLOBULIN UR ELPH-MCNC: 3.1 GM/DL
GLUCOSE BLD-MCNC: 93 MG/DL (ref 65–99)
HAV IGM SERPL QL IA: NORMAL
HBA1C MFR BLD: 5 % (ref 4.8–5.6)
HBV CORE IGM SERPL QL IA: NORMAL
HBV SURFACE AG SERPL QL IA: NORMAL
HCT VFR BLD AUTO: 49 % (ref 37.5–51)
HCV AB SER DONR QL: NORMAL
HGB BLD-MCNC: 15.6 G/DL (ref 13–17.7)
IMM GRANULOCYTES # BLD AUTO: 0.02 10*3/MM3 (ref 0–0.05)
IMM GRANULOCYTES NFR BLD AUTO: 0.4 % (ref 0–0.5)
INR PPP: 1.02 (ref 0.8–1.2)
LYMPHOCYTES # BLD AUTO: 1.62 10*3/MM3 (ref 0.7–3.1)
LYMPHOCYTES NFR BLD AUTO: 32 % (ref 19.6–45.3)
MCH RBC QN AUTO: 25.2 PG (ref 26.6–33)
MCHC RBC AUTO-ENTMCNC: 31.8 G/DL (ref 31.5–35.7)
MCV RBC AUTO: 79.3 FL (ref 79–97)
MONOCYTES # BLD AUTO: 0.45 10*3/MM3 (ref 0.1–0.9)
MONOCYTES NFR BLD AUTO: 8.9 % (ref 5–12)
NEUTROPHILS # BLD AUTO: 2.64 10*3/MM3 (ref 1.7–7)
NEUTROPHILS NFR BLD AUTO: 52.2 % (ref 42.7–76)
NRBC BLD AUTO-RTO: 0.2 /100 WBC (ref 0–0.2)
PLATELET # BLD AUTO: 238 10*3/MM3 (ref 140–450)
PMV BLD AUTO: 13.1 FL (ref 6–12)
POTASSIUM BLD-SCNC: 3.4 MMOL/L (ref 3.5–5.2)
PROT SERPL-MCNC: 7.5 G/DL (ref 6–8.5)
PROTHROMBIN TIME: 13.2 SECONDS (ref 11.1–15.3)
RBC # BLD AUTO: 6.18 10*6/MM3 (ref 4.14–5.8)
SODIUM BLD-SCNC: 139 MMOL/L (ref 136–145)
WBC NRBC COR # BLD: 5.06 10*3/MM3 (ref 3.4–10.8)

## 2019-08-09 PROCEDURE — 82105 ALPHA-FETOPROTEIN SERUM: CPT | Performed by: PHYSICIAN ASSISTANT

## 2019-08-09 PROCEDURE — 84478 ASSAY OF TRIGLYCERIDES: CPT | Performed by: PHYSICIAN ASSISTANT

## 2019-08-09 PROCEDURE — 83010 ASSAY OF HAPTOGLOBIN QUANT: CPT | Performed by: PHYSICIAN ASSISTANT

## 2019-08-09 PROCEDURE — 84460 ALANINE AMINO (ALT) (SGPT): CPT | Performed by: PHYSICIAN ASSISTANT

## 2019-08-09 PROCEDURE — 83883 ASSAY NEPHELOMETRY NOT SPEC: CPT | Performed by: PHYSICIAN ASSISTANT

## 2019-08-09 PROCEDURE — 82247 BILIRUBIN TOTAL: CPT | Performed by: PHYSICIAN ASSISTANT

## 2019-08-09 PROCEDURE — 82172 ASSAY OF APOLIPOPROTEIN: CPT | Performed by: PHYSICIAN ASSISTANT

## 2019-08-09 PROCEDURE — 36415 COLL VENOUS BLD VENIPUNCTURE: CPT | Performed by: PHYSICIAN ASSISTANT

## 2019-08-09 PROCEDURE — 82977 ASSAY OF GGT: CPT | Performed by: PHYSICIAN ASSISTANT

## 2019-08-09 PROCEDURE — 82947 ASSAY GLUCOSE BLOOD QUANT: CPT | Performed by: PHYSICIAN ASSISTANT

## 2019-08-09 PROCEDURE — 85610 PROTHROMBIN TIME: CPT | Performed by: PHYSICIAN ASSISTANT

## 2019-08-09 PROCEDURE — 82465 ASSAY BLD/SERUM CHOLESTEROL: CPT | Performed by: PHYSICIAN ASSISTANT

## 2019-08-09 PROCEDURE — 85025 COMPLETE CBC W/AUTO DIFF WBC: CPT | Performed by: PHYSICIAN ASSISTANT

## 2019-08-09 PROCEDURE — 80053 COMPREHEN METABOLIC PANEL: CPT | Performed by: PHYSICIAN ASSISTANT

## 2019-08-09 PROCEDURE — 83036 HEMOGLOBIN GLYCOSYLATED A1C: CPT

## 2019-08-09 PROCEDURE — 80074 ACUTE HEPATITIS PANEL: CPT | Performed by: PHYSICIAN ASSISTANT

## 2019-08-09 PROCEDURE — 99213 OFFICE O/P EST LOW 20 MIN: CPT | Performed by: STUDENT IN AN ORGANIZED HEALTH CARE EDUCATION/TRAINING PROGRAM

## 2019-08-09 PROCEDURE — 84450 TRANSFERASE (AST) (SGOT): CPT | Performed by: PHYSICIAN ASSISTANT

## 2019-08-09 RX ORDER — ASPIRIN 81 MG/1
81 TABLET ORAL DAILY
Qty: 30 TABLET | Refills: 4 | Status: SHIPPED | OUTPATIENT
Start: 2019-08-09 | End: 2020-04-29

## 2019-08-09 RX ORDER — OMEPRAZOLE 20 MG/1
20 CAPSULE, DELAYED RELEASE ORAL EVERY MORNING
Qty: 30 CAPSULE | Refills: 5 | Status: SHIPPED | OUTPATIENT
Start: 2019-08-09 | End: 2019-11-08 | Stop reason: SDUPTHER

## 2019-08-09 RX ORDER — NEBULIZER AND COMPRESSOR
EACH MISCELLANEOUS
Qty: 1 EACH | Refills: 0 | Status: SHIPPED | OUTPATIENT
Start: 2019-08-09 | End: 2021-07-14 | Stop reason: SDUPTHER

## 2019-08-09 RX ORDER — LOSARTAN POTASSIUM 100 MG/1
100 TABLET ORAL DAILY
Qty: 30 TABLET | Refills: 5 | Status: SHIPPED | OUTPATIENT
Start: 2019-08-09 | End: 2019-08-12 | Stop reason: SDUPTHER

## 2019-08-09 RX ORDER — ALBUTEROL SULFATE 90 UG/1
2 AEROSOL, METERED RESPIRATORY (INHALATION) EVERY 4 HOURS PRN
Qty: 18 G | Refills: 10 | Status: SHIPPED | OUTPATIENT
Start: 2019-08-09 | End: 2019-09-16 | Stop reason: SDDI

## 2019-08-09 RX ORDER — CETIRIZINE HYDROCHLORIDE 10 MG/1
10 TABLET ORAL DAILY
Qty: 30 TABLET | Refills: 1 | Status: SHIPPED | OUTPATIENT
Start: 2019-08-09 | End: 2019-11-08 | Stop reason: SDUPTHER

## 2019-08-09 NOTE — PROGRESS NOTES
Subjective   Jameel Dozier is a 59 y.o. male who presents for medication refill of his HTN and DMT2 medication. Pt states his HTN is currently well controlled. He checks his BP occasionally at home and is usually in the 130/80 range. He states his DMt2 is also well controlled. Last A1c was 5.7 6 months ago. Will order repeat at this visit. Pt also complains of 2 day hx of itchy patch of skin on his right elbow. States he only noticed it yesterday, there is no pain. No other concerns at this time.       Past Medical Hx:  Past Medical History:   Diagnosis Date   • Abdominal pain    • Acquired achalasia of esophagus    • Adenomatous polyp of colon    • Adverse drug effect    • Alcohol dependence with alcohol-induced disorder (CMS/HCC)    • Allergic rhinitis    • Anal fissure    • Anemia due to blood loss    • Benign essential hypertension    • Central obesity    • Cerebrovascular disease    • Chronic obstructive lung disease (CMS/HCC)    • Claudication (CMS/HCC)    • Disorder of peripheral nervous system    • Diverticular disease of colon     completed antibx, ? neoplasm on CT   • Dysphagia    • Dyspnea    • ED (erectile dysfunction)    • Epigastric pain    • Esophageal dysmotility    • Esophageal reflux    • Esophagitis    • Essential hypertension    • External hemorrhoids    • Gastritis    • GERD with esophagitis    • Heavy tobacco smoker    • Hemorrhoids    • Hiatal hernia    • Hiccough    • History of colon polyps    • History of echocardiogram     Normal LV funciton with Ef of 65% to 70% without regional wall motion abnormalities.Mild CLVH. Normal RV size and function. No significant valvular regurgitation or stenosis. 09/19/2014       • History of EKG    • History of TIA (transient ischemic attack)    • Hypercholesterolemia    • Hypertension    • Left ventricular hypertrophy    • Male erectile disorder    • Obstructive sleep apnea syndrome    • Primary osteoarthritis of knees, bilateral     • Rectal bleed     painful   • Rectal hemorrhage    • Sleep disorder    • Transient cerebral ischemia    • Upper respiratory infection    • Weakness     Attacks of weakness       Past Surgical Hx:  Past Surgical History:   Procedure Laterality Date   • COLONOSCOPY  04/13/2015    Internal and external hemorrhoids found. 04/13/2015    • ENDOSCOPY      Internal & external hemorrhoids found. 1 polyp in sigmoid colon; removed by snare cautery polypectomy. 09/26/2012    • ENDOSCOPY      Gastritis found in the stomach. Biopsy taken.Enlarged folds were found in the body of the stomach.Biopsy taken.Normal duodenum.A hiatus hernia was found in the esophagus. 04/13/2015    • ENDOSCOPY      Hiatus hernia in esophagus. Gastritis in stomach. Biopsy taken. Normal duodenum. Biopsy taken. 09/26/2012       • ENDOSCOPY N/A 12/18/2017    Procedure: ESOPHAGOGASTRODUODENOSCOPY--attn mid esophagus, abnl on CT;  Surgeon: Alli Dockery MD;  Location: [facility] ENDOSCOPY;  Service:    • ENDOSCOPY N/A 3/6/2019    Procedure: ESOPHAGOGASTRODUODENOSCOPY WITH DILATATION;  Surgeon: Alli Dockery MD;  Location: [facility] ENDOSCOPY;  Service: Gastroenterology   • UPPER GASTROINTESTINAL ENDOSCOPY  08/24/2016   • UPPER GASTROINTESTINAL ENDOSCOPY  04/13/2015   • UPPER GASTROINTESTINAL ENDOSCOPY  12/18/2017   • UPPER GASTROINTESTINAL ENDOSCOPY  03/06/2019       Health Maintenance:  Health Maintenance   Topic Date Due   • ZOSTER VACCINE (1 of 2) 07/17/2008   • MEDICARE ANNUAL WELLNESS  12/08/2016   • DIABETIC EYE EXAM  02/10/2018   • HEMOGLOBIN A1C  08/07/2019   • INFLUENZA VACCINE  08/01/2019   • LUNG CANCER SCREENING  11/12/2019   • DIABETIC FOOT EXAM  02/07/2020   • LIPID PANEL  02/07/2020   • URINE MICROALBUMIN  02/07/2020   • COLONOSCOPY  04/13/2020   • TDAP/TD VACCINES (2 - Td) 08/15/2024   • PNEUMOCOCCAL VACCINE (19-64 MEDIUM RISK)  Completed   • HEPATITIS C SCREENING  Completed       Current Meds:    Current Outpatient Medications:   •   albuterol sulfate HFA (VENTOLIN HFA) 108 (90 Base) MCG/ACT inhaler, Inhale 2 puffs Every 4 (Four) Hours As Needed for Wheezing., Disp: 18 g, Rfl: 10  •  amLODIPine (NORVASC) 10 MG tablet, Take 1 tablet by mouth Daily., Disp: 30 tablet, Rfl: 6  •  aspirin (ASPIRIN LOW DOSE) 81 MG EC tablet, Take 1 tablet by mouth Daily., Disp: 30 tablet, Rfl: 4  •  Blood Pressure Monitoring (ADULT BLOOD PRESSURE CUFF LG) kit, Check BP daily, Disp: 1 each, Rfl: 0  •  cetirizine (zyrTEC) 10 MG tablet, Take 1 tablet by mouth Daily., Disp: 30 tablet, Rfl: 1  •  ferrous sulfate (FEOSOL) 325 (65 FE) MG tablet, Take 1 tablet by mouth 3 (Three) Times a Day With Meals., Disp: 90 tablet, Rfl: 5  •  fluticasone (FLONASE) 50 MCG/ACT nasal spray, 2 sprays into each nostril Daily., Disp: 15.8 mL, Rfl: 6  •  hydrALAZINE (APRESOLINE) 10 MG tablet, TAKE 1 TABLET THREE  TIMES DAILY, Disp: 90 tablet, Rfl: 1  •  losartan (COZAAR) 100 MG tablet, Take 1 tablet by mouth Daily., Disp: 30 tablet, Rfl: 5  •  lovastatin (MEVACOR) 40 MG tablet, Take 1 tablet by mouth Daily., Disp: 30 tablet, Rfl: 5  •  magnesium oxide (MAGOX) 400 (241.3 Mg) MG tablet tablet, Take 1 tablet by mouth Daily., Disp: 30 tablet, Rfl: 5  •  metFORMIN (GLUCOPHAGE) 500 MG tablet, Take 1 tablet by mouth Daily With Breakfast., Disp: 30 tablet, Rfl: 4  •  metoprolol tartrate (LOPRESSOR) 25 MG tablet, Take 1 tablet by mouth Every 12 (Twelve) Hours., Disp: 60 tablet, Rfl: 5  •  omeprazole (priLOSEC) 20 MG capsule, Take 1 capsule by mouth Every Morning., Disp: 30 capsule, Rfl: 5  •  ondansetron (ZOFRAN) 4 MG tablet, Take 1 tablet by mouth Every 8 (Eight) Hours As Needed for Nausea or Vomiting., Disp: 30 tablet, Rfl: 2  •  potassium citrate (UROCIT-K) 5 MEQ (540 MG) CR tablet, Take 20 mEq by mouth Daily., Disp: , Rfl:   •  sildenafil (VIAGRA) 25 MG tablet, Take 1 tablet by mouth Daily As Needed for erectile dysfunction., Disp: 30 tablet, Rfl: 1  •  sodium chloride (OCEAN NASAL SPRAY) 0.65 %  nasal spray, 1 spray into the nostril(s) as directed by provider As Needed for Congestion., Disp: 1 each, Rfl: 12  •  tiotropium (SPIRIVA) 18 MCG per inhalation capsule, Place 1 capsule into inhaler and inhale Daily., Disp: 30 capsule, Rfl: 11  •  triamcinolone (KENALOG) 0.1 % ointment, Apply  topically to the appropriate area as directed 2 (Two) Times a Day., Disp: 1 tube, Rfl: 0    Allergies:  Ace inhibitors; Lisinopril; and Vistaril [hydroxyzine hcl]    Family Hx:  Family History   Problem Relation Age of Onset   • Cancer Mother    • Cirrhosis Father    • Diabetes Other    • Hypertension Other         Social History:  Social History     Socioeconomic History   • Marital status: Single     Spouse name: Not on file   • Number of children: 0   • Years of education: 12   • Highest education level: Not on file   Occupational History   • Occupation: retired   Tobacco Use   • Smoking status: Current Every Day Smoker     Packs/day: 1.00     Years: 40.00     Pack years: 40.00     Types: Cigarettes   • Smokeless tobacco: Never Used   Substance and Sexual Activity   • Alcohol use: Yes     Alcohol/week: 3.6 oz     Types: 6 Cans of beer per week     Comment: when can get   • Drug use: No   • Sexual activity: Defer     Partners: Female       Review of Systems  Review of Systems   Constitutional: Negative for appetite change, chills, diaphoresis, fatigue and fever.   HENT: Negative for ear discharge, ear pain, facial swelling, hearing loss, rhinorrhea, sinus pressure, sinus pain, sneezing, sore throat and voice change.    Eyes: Negative for pain, discharge and visual disturbance.   Respiratory: Negative for apnea, cough, chest tightness, shortness of breath, wheezing and stridor.    Cardiovascular: Negative for chest pain, palpitations and leg swelling.   Gastrointestinal: Negative for abdominal distention, abdominal pain, constipation, diarrhea, rectal pain and vomiting.   Genitourinary: Negative for dysuria, frequency and  "urgency.   Musculoskeletal: Negative for arthralgias, back pain, myalgias and neck pain.   Skin: Negative for color change, rash and wound.        Pruritic right inner elbow   Neurological: Negative for facial asymmetry, speech difficulty, light-headedness and headaches.   Psychiatric/Behavioral: Negative for agitation and decreased concentration. The patient is not nervous/anxious.             Objective:     /80   Pulse 78   Ht 185.4 cm (73\")   Wt 88.3 kg (194 lb 9.6 oz)   SpO2 97%   BMI 25.67 kg/m²       Physical Exam   Constitutional: He is oriented to person, place, and time. He appears well-developed and well-nourished. No distress.   HENT:   Head: Normocephalic and atraumatic.   Right Ear: External ear normal.   Left Ear: External ear normal.   Mouth/Throat: Oropharynx is clear and moist.   Eyes: Conjunctivae and EOM are normal. Pupils are equal, round, and reactive to light. Right eye exhibits no discharge. Left eye exhibits no discharge. No scleral icterus.   Neck: Normal range of motion. No tracheal deviation present.   Cardiovascular: Normal rate, regular rhythm and normal heart sounds. Exam reveals no gallop and no friction rub.   No murmur heard.  Pulmonary/Chest: Effort normal and breath sounds normal. No stridor. No respiratory distress. He has no wheezes. He has no rales. He exhibits no tenderness.   Abdominal: Soft. Bowel sounds are normal. He exhibits no distension and no mass. There is no tenderness. There is no rebound and no guarding.   Musculoskeletal: Normal range of motion. He exhibits no edema or tenderness.   Neurological: He is alert and oriented to person, place, and time.   Skin: Skin is warm and dry. Capillary refill takes less than 2 seconds. Rash noted. He is not diaphoretic. No erythema. No pallor.   Excoriated area in inner elbow of right arm     Psychiatric: He has a normal mood and affect. His behavior is normal.       Assessment/Plan:     Jameel was seen today for " difficulty swallowing.    Diagnoses and all orders for this visit:    Type 2 diabetes mellitus with complication, without long-term current use of insulin (CMS/[facility])  -     Hemoglobin A1c; Future    GERD with esophagitis  -     cetirizine (zyrTEC) 10 MG tablet; Take 1 tablet by mouth Daily.  -     omeprazole (priLOSEC) 20 MG capsule; Take 1 capsule by mouth Every Morning.    Essential hypertension  -     Blood Pressure Monitoring (ADULT BLOOD PRESSURE CUFF LG) kit; Check BP daily  -     aspirin (ASPIRIN LOW DOSE) 81 MG EC tablet; Take 1 tablet by mouth Daily.  -     losartan (COZAAR) 100 MG tablet; Take 1 tablet by mouth Daily.  -     metoprolol tartrate (LOPRESSOR) 25 MG tablet; Take 1 tablet by mouth Every 12 (Twelve) Hours.    Central obesity  -     aspirin (ASPIRIN LOW DOSE) 81 MG EC tablet; Take 1 tablet by mouth Daily.    Hypercholesterolemia  -     aspirin (ASPIRIN LOW DOSE) 81 MG EC tablet; Take 1 tablet by mouth Daily.    Pre-diabetes  -     metFORMIN (GLUCOPHAGE) 500 MG tablet; Take 1 tablet by mouth Daily With Breakfast.    Obstructive sleep apnea syndrome  -     albuterol sulfate HFA (VENTOLIN HFA) 108 (90 Base) MCG/ACT inhaler; Inhale 2 puffs Every 4 (Four) Hours As Needed for Wheezing.    Skin irritation  -     triamcinolone (KENALOG) 0.1 % ointment; Apply  topically to the appropriate area as directed 2 (Two) Times a Day.    Other orders  -     magnesium oxide (MAGOX) 400 (241.3 Mg) MG tablet tablet; Take 1 tablet by mouth Daily.  -     tiotropium (SPIRIVA) 18 MCG per inhalation capsule; Place 1 capsule into inhaler and inhale Daily.         Follow-up:     Return in about 3 months (around 11/9/2019).    Goals     • Quit smoking / using tobacco (pt-stated)           Preventative:    Vaccines Recommended at this visit:   No Vaccines recommended today. Patient is up to date on all vaccines.     Vaccines Received at this visit:  No Vaccines recommended today. Patient is up to date on all vaccines.      Screenings Recommended at this visit:  Diabetic Eye Exam    Screenings Ordered at this visit:  Diabetic Eye Exam    Smoking Status:  Patient is current smoker. Patient is not interested in smoking cessation.    Alcohol Intake:  Regular (moderate)    Patient's Body mass index is 25.67 kg/m². BMI is within normal parameters. No follow-up required..         RISK SCORE: 3          This document has been electronically signed by Jeri Danielson MD on August 9, 2019 4:47 PM

## 2019-08-10 RX ORDER — TIOTROPIUM BROMIDE 18 UG/1
CAPSULE ORAL; RESPIRATORY (INHALATION)
Qty: 30 CAPSULE | Refills: 10 | OUTPATIENT
Start: 2019-08-10

## 2019-08-12 ENCOUNTER — HOSPITAL ENCOUNTER (OUTPATIENT)
Dept: ULTRASOUND IMAGING | Facility: HOSPITAL | Age: 59
Discharge: HOME | End: 2019-08-12
Admitting: PHYSICIAN ASSISTANT

## 2019-08-12 DIAGNOSIS — I10 ESSENTIAL HYPERTENSION: ICD-10-CM

## 2019-08-12 PROCEDURE — 76700 US EXAM ABDOM COMPLETE: CPT

## 2019-08-12 RX ORDER — LOSARTAN POTASSIUM 100 MG/1
100 TABLET ORAL DAILY
Qty: 30 TABLET | Refills: 5 | Status: SHIPPED | OUTPATIENT
Start: 2019-08-12 | End: 2019-08-14 | Stop reason: SDUPTHER

## 2019-08-12 NOTE — TELEPHONE ENCOUNTER
Pharmacy called asking for a refill on patients  tiotropium (SPIRIVA) 18 MCG per inhalation capsule and his losartan (COZAAR) 100 MG tablet. Patient is completely out of his blood pressure meds.    Patient uses [facility].    Thanks,  Essie

## 2019-08-13 LAB
A2 MACROGLOB SERPL-MCNC: 256 MG/DL (ref 110–276)
ALT SERPL W P-5'-P-CCNC: 17 IU/L (ref 0–55)
APO A-I SERPL-MCNC: 187 MG/DL (ref 101–178)
AST SERPL W P-5'-P-CCNC: 20 IU/L (ref 0–40)
BILIRUB SERPL-MCNC: 0.5 MG/DL (ref 0–1.2)
CHOLEST SERPL-MCNC: 233 MG/DL (ref 100–199)
FIBROSIS SCORING:: ABNORMAL
FIBROSIS STAGE SERPL QL: ABNORMAL
GGT SERPL-CCNC: 54 IU/L (ref 0–65)
GLUCOSE SERPL-MCNC: 90 MG/DL (ref 65–99)
HAPTOGLOB SERPL-MCNC: 147 MG/DL (ref 34–200)
INTERPRETATIONS: (REFERENCE): ABNORMAL
LABORATORY COMMENT REPORT: ABNORMAL
LIMITATIONS: (REFERENCE): ABNORMAL
LIVER FIBR SCORE SERPL CALC.FIBROSURE: 0.32 (ref 0–0.21)
NASH GRADE (REFERENCE): ABNORMAL
NASH SCORE (REFERENCE): 0.25
NASH SCORING (REFERENCE): ABNORMAL
STEATOSIS GRADE (REFERENCE): ABNORMAL
STEATOSIS GRADING (REFERENCE): ABNORMAL
STEATOSIS SCORE (REFERENCE): 0.29 (ref 0–0.3)
TRIGL SERPL-MCNC: 153 MG/DL (ref 0–149)
WEIGHT: (REFERENCE): 194 LBS

## 2019-08-14 DIAGNOSIS — I10 ESSENTIAL HYPERTENSION: ICD-10-CM

## 2019-08-14 RX ORDER — LOSARTAN POTASSIUM 100 MG/1
100 TABLET ORAL DAILY
Qty: 30 TABLET | Refills: 5 | Status: SHIPPED | OUTPATIENT
Start: 2019-08-14 | End: 2019-11-08 | Stop reason: SDUPTHER

## 2019-08-14 RX ORDER — LOSARTAN POTASSIUM 100 MG/1
100 TABLET ORAL DAILY
Qty: 30 TABLET | Refills: 5 | Status: SHIPPED | OUTPATIENT
Start: 2019-08-14 | End: 2019-08-14 | Stop reason: SDUPTHER

## 2019-08-15 NOTE — PROGRESS NOTES
Subjective:     Jameel Dozier is a 59 y.o. male who presents for medication refill of his HTN and DMT2 medications. Pt states his HTN is currently well controlled. He checks his BP at home and is usually around 130/80 range. He states his DM2 is also well controlled. Last A1c was 5.7 6 months ago.  Pt also complains of 2 day hx of itchy skin on his right elbow. He does mow outside. He only noticed it yesterday; no pain. No other concerns at this time.               Past Medical Hx:  Past Medical History:   Diagnosis Date   • Abdominal pain    • Acquired achalasia of esophagus    • Adenomatous polyp of colon    • Adverse drug effect    • Alcohol dependence with alcohol-induced disorder (CMS/HCC)    • Allergic rhinitis    • Anal fissure    • Anemia due to blood loss    • Benign essential hypertension    • Central obesity    • Cerebrovascular disease    • Chronic obstructive lung disease (CMS/HCC)    • Claudication (CMS/HCC)    • Disorder of peripheral nervous system    • Diverticular disease of colon     completed antibx, ? neoplasm on CT   • Dysphagia    • Dyspnea    • ED (erectile dysfunction)    • Epigastric pain    • Esophageal dysmotility    • Esophageal reflux    • Esophagitis    • Essential hypertension    • External hemorrhoids    • Gastritis    • GERD with esophagitis    • Heavy tobacco smoker    • Hemorrhoids    • Hiatal hernia    • Hiccough    • History of colon polyps    • History of echocardiogram     Normal LV funciton with Ef of 65% to 70% without regional wall motion abnormalities.Mild CLVH. Normal RV size and function. No significant valvular regurgitation or stenosis. 09/19/2014       • History of EKG    • History of TIA (transient ischemic attack)    • Hypercholesterolemia    • Hypertension    • Left ventricular hypertrophy    • Male erectile disorder    • Obstructive sleep apnea syndrome    • Primary osteoarthritis of knees, bilateral    • Rectal bleed     painful   • Rectal hemorrhage    •  Sleep disorder    • Transient cerebral ischemia    • Upper respiratory infection    • Weakness     Attacks of weakness       Past Surgical Hx:  Past Surgical History:   Procedure Laterality Date   • COLONOSCOPY  04/13/2015    Internal and external hemorrhoids found. 04/13/2015    • ENDOSCOPY      Internal & external hemorrhoids found. 1 polyp in sigmoid colon; removed by snare cautery polypectomy. 09/26/2012    • ENDOSCOPY      Gastritis found in the stomach. Biopsy taken.Enlarged folds were found in the body of the stomach.Biopsy taken.Normal duodenum.A hiatus hernia was found in the esophagus. 04/13/2015    • ENDOSCOPY      Hiatus hernia in esophagus. Gastritis in stomach. Biopsy taken. Normal duodenum. Biopsy taken. 09/26/2012       • ENDOSCOPY N/A 12/18/2017    Procedure: ESOPHAGOGASTRODUODENOSCOPY--attn mid esophagus, abnl on CT;  Surgeon: Alli Dockery MD;  Location: [facility] ENDOSCOPY;  Service:    • ENDOSCOPY N/A 3/6/2019    Procedure: ESOPHAGOGASTRODUODENOSCOPY WITH DILATATION;  Surgeon: Alli Dockery MD;  Location: [facility] ENDOSCOPY;  Service: Gastroenterology   • UPPER GASTROINTESTINAL ENDOSCOPY  08/24/2016   • UPPER GASTROINTESTINAL ENDOSCOPY  04/13/2015   • UPPER GASTROINTESTINAL ENDOSCOPY  12/18/2017   • UPPER GASTROINTESTINAL ENDOSCOPY  03/06/2019       Health Maintenance:  Health Maintenance   Topic Date Due   • ZOSTER VACCINE (1 of 2) 07/17/2008   • MEDICARE ANNUAL WELLNESS  12/08/2016   • DIABETIC EYE EXAM  02/10/2018   • INFLUENZA VACCINE  08/01/2019   • LUNG CANCER SCREENING  11/12/2019   • DIABETIC FOOT EXAM  02/07/2020   • LIPID PANEL  02/07/2020   • URINE MICROALBUMIN  02/07/2020   • HEMOGLOBIN A1C  02/09/2020   • COLONOSCOPY  04/13/2020   • TDAP/TD VACCINES (2 - Td) 08/15/2024   • PNEUMOCOCCAL VACCINE (19-64 MEDIUM RISK)  Completed   • HEPATITIS C SCREENING  Completed       Current Meds:    Current Outpatient Medications:   •  albuterol sulfate HFA (VENTOLIN HFA) 108 (90 Base) MCG/ACT  inhaler, Inhale 2 puffs Every 4 (Four) Hours As Needed for Wheezing., Disp: 18 g, Rfl: 10  •  amLODIPine (NORVASC) 10 MG tablet, Take 1 tablet by mouth Daily., Disp: 30 tablet, Rfl: 6  •  aspirin (ASPIRIN LOW DOSE) 81 MG EC tablet, Take 1 tablet by mouth Daily., Disp: 30 tablet, Rfl: 4  •  Blood Pressure Monitoring (ADULT BLOOD PRESSURE CUFF LG) kit, Check BP daily, Disp: 1 each, Rfl: 0  •  cetirizine (zyrTEC) 10 MG tablet, Take 1 tablet by mouth Daily., Disp: 30 tablet, Rfl: 1  •  ferrous sulfate (FEOSOL) 325 (65 FE) MG tablet, Take 1 tablet by mouth 3 (Three) Times a Day With Meals., Disp: 90 tablet, Rfl: 5  •  fluticasone (FLONASE) 50 MCG/ACT nasal spray, 2 sprays into each nostril Daily., Disp: 15.8 mL, Rfl: 6  •  hydrALAZINE (APRESOLINE) 10 MG tablet, TAKE 1 TABLET THREE  TIMES DAILY, Disp: 90 tablet, Rfl: 1  •  lovastatin (MEVACOR) 40 MG tablet, Take 1 tablet by mouth Daily., Disp: 30 tablet, Rfl: 5  •  magnesium oxide (MAGOX) 400 (241.3 Mg) MG tablet tablet, Take 1 tablet by mouth Daily., Disp: 30 tablet, Rfl: 5  •  metFORMIN (GLUCOPHAGE) 500 MG tablet, Take 1 tablet by mouth Daily With Breakfast., Disp: 30 tablet, Rfl: 4  •  metoprolol tartrate (LOPRESSOR) 25 MG tablet, Take 1 tablet by mouth Every 12 (Twelve) Hours., Disp: 60 tablet, Rfl: 5  •  omeprazole (priLOSEC) 20 MG capsule, Take 1 capsule by mouth Every Morning., Disp: 30 capsule, Rfl: 5  •  ondansetron (ZOFRAN) 4 MG tablet, Take 1 tablet by mouth Every 8 (Eight) Hours As Needed for Nausea or Vomiting., Disp: 30 tablet, Rfl: 2  •  potassium citrate (UROCIT-K) 5 MEQ (540 MG) CR tablet, Take 20 mEq by mouth Daily., Disp: , Rfl:   •  sildenafil (VIAGRA) 25 MG tablet, Take 1 tablet by mouth Daily As Needed for erectile dysfunction., Disp: 30 tablet, Rfl: 1  •  sodium chloride (OCEAN NASAL SPRAY) 0.65 % nasal spray, 1 spray into the nostril(s) as directed by provider As Needed for Congestion., Disp: 1 each, Rfl: 12  •  losartan (COZAAR) 100 MG tablet,  "Take 1 tablet by mouth Daily., Disp: 30 tablet, Rfl: 5  •  tiotropium (SPIRIVA) 18 MCG per inhalation capsule, Place 1 capsule into inhaler and inhale Daily., Disp: 30 capsule, Rfl: 11  •  triamcinolone (KENALOG) 0.1 % ointment, Apply  topically to the appropriate area as directed 2 (Two) Times a Day., Disp: 1 tube, Rfl: 0    Allergies:  Ace inhibitors; Lisinopril; and Vistaril [hydroxyzine hcl]    Family Hx:  Family History   Problem Relation Age of Onset   • Cancer Mother    • Cirrhosis Father    • Diabetes Other    • Hypertension Other         Social History:  Social History     Socioeconomic History   • Marital status: Single     Spouse name: Not on file   • Number of children: 0   • Years of education: 12   • Highest education level: Not on file   Occupational History   • Occupation: retired   Tobacco Use   • Smoking status: Current Every Day Smoker     Packs/day: 1.00     Years: 40.00     Pack years: 40.00     Types: Cigarettes   • Smokeless tobacco: Never Used   Substance and Sexual Activity   • Alcohol use: Yes     Alcohol/week: 3.6 oz     Types: 6 Cans of beer per week     Comment: when can get   • Drug use: No   • Sexual activity: Defer     Partners: Female       Review of Systems  Review of Systems  Negative other than listed in HPI. See resident note for full HPI  Objective:     /80   Pulse 78   Ht 185.4 cm (73\")   Wt 88.3 kg (194 lb 9.6 oz)   SpO2 97%   BMI 25.67 kg/m²   Physical Exam  NAD  ncat  Missing teeth  Alert and oriented  nondysneic  Excoriated/raised rash on right antecub/ no plaques noted    Lab Review  Results for orders placed or performed in visit on 08/01/19   Comprehensive Metabolic Panel   Result Value Ref Range    Glucose 93 65 - 99 mg/dL    BUN 7 (L) 8 - 23 mg/dL    Creatinine 0.79 0.76 - 1.27 mg/dL    Sodium 139 136 - 145 mmol/L    Potassium 3.4 (L) 3.5 - 5.2 mmol/L    Chloride 98 98 - 107 mmol/L    CO2 27.0 22.0 - 29.0 mmol/L    Calcium 9.3 8.6 - 10.5 mg/dL    Total " Protein 7.5 6.0 - 8.5 g/dL    Albumin 4.40 3.50 - 5.20 g/dL    ALT (SGPT) 14 1 - 41 U/L    AST (SGOT) 17 1 - 40 U/L    Alkaline Phosphatase 54 39 - 117 U/L    Total Bilirubin 0.6 0.2 - 1.2 mg/dL    eGFR  African Amer 121 >60 mL/min/1.73    Globulin 3.1 gm/dL    A/G Ratio 1.4 g/dL    BUN/Creatinine Ratio 8.9 7.0 - 25.0    Anion Gap 14.0 5.0 - 15.0 mmol/L   Protime-INR   Result Value Ref Range    Protime 13.2 11.1 - 15.3 Seconds    INR 1.02 0.80 - 1.20   REYNOSO Fibrosure   Result Value Ref Range    Fibrosis Score (References) 0.32 (H) 0.00 - 0.21    Fibrosis Stage (Reference) F1-F2     Steatosis Score (Reference) 0.29 0.00 - 0.30    Steatosis Grade (Reference) Comment     REYNOSO Score (Reference) 0.25 0.25    Reynoso Grade (Reference) Comment     Height: (Reference) 73 in    Weight: (Reference) 194 LBS    Alpha 2-Macroglobulins, Qn 256 110 - 276 mg/dL    Haptoglobin 147 34 - 200 mg/dL    Apolipoprotein A-1 187 (H) 101 - 178 mg/dL    Total Bilirubin 0.5 0.0 - 1.2 mg/dL    GGT 54 0 - 65 IU/L    ALT (SGPT) 17 0 - 55 IU/L    AST (SGOT) P5P (Reference) 20 0 - 40 IU/L    Cholesterol, Total (Reference) 233 (H) 100 - 199 mg/dL    Glucose, Serum (Reference) 90 65 - 99 mg/dL    Triglycerides 153 (H) 0 - 149 mg/dL    Interpretations: (Reference) Comment     Fibrosis Scoring: Comment     Steatosis Grading (Reference) Comment     Reynoso Scoring (Reference) Comment     Limitations: (Reference) Comment     Comment (Reference) Comment    AFP Tumor Marker   Result Value Ref Range    ALPHA-FETOPROTEIN 1.43 0 - 8.3 ng/mL   Hepatitis Panel, Acute   Result Value Ref Range    Hepatitis B Surface Ag Non-Reactive Non-Reactive    Hep A IgM Non-Reactive Non-Reactive    Hep B C IgM Non-Reactive Non-Reactive    Hepatitis C Ab Non-Reactive Non-Reactive   CBC Auto Differential   Result Value Ref Range    WBC 5.06 3.40 - 10.80 10*3/mm3    RBC 6.18 (H) 4.14 - 5.80 10*6/mm3    Hemoglobin 15.6 13.0 - 17.7 g/dL    Hematocrit 49.0 37.5 - 51.0 %    MCV 79.3 79.0  - 97.0 fL    MCH 25.2 (L) 26.6 - 33.0 pg    MCHC 31.8 31.5 - 35.7 g/dL    RDW 15.2 12.3 - 15.4 %    RDW-SD 41.5 37.0 - 54.0 fl    MPV 13.1 (H) 6.0 - 12.0 fL    Platelets 238 140 - 450 10*3/mm3    Neutrophil % 52.2 42.7 - 76.0 %    Lymphocyte % 32.0 19.6 - 45.3 %    Monocyte % 8.9 5.0 - 12.0 %    Eosinophil % 4.7 0.3 - 6.2 %    Basophil % 1.8 (H) 0.0 - 1.5 %    Immature Grans % 0.4 0.0 - 0.5 %    Neutrophils, Absolute 2.64 1.70 - 7.00 10*3/mm3    Lymphocytes, Absolute 1.62 0.70 - 3.10 10*3/mm3    Monocytes, Absolute 0.45 0.10 - 0.90 10*3/mm3    Eosinophils, Absolute 0.24 0.00 - 0.40 10*3/mm3    Basophils, Absolute 0.09 0.00 - 0.20 10*3/mm3    Immature Grans, Absolute 0.02 0.00 - 0.05 10*3/mm3    nRBC 0.2 0.0 - 0.2 /100 WBC            Assessment:     Jameel was seen today for difficulty swallowing.    Diagnoses and all orders for this visit:    Type 2 diabetes mellitus with complication, without long-term current use of insulin (CMS/[facility])  -     Hemoglobin A1c; Future    GERD with esophagitis  -     cetirizine (zyrTEC) 10 MG tablet; Take 1 tablet by mouth Daily.  -     omeprazole (priLOSEC) 20 MG capsule; Take 1 capsule by mouth Every Morning.    Essential hypertension  -     Blood Pressure Monitoring (ADULT BLOOD PRESSURE CUFF LG) kit; Check BP daily  -     aspirin (ASPIRIN LOW DOSE) 81 MG EC tablet; Take 1 tablet by mouth Daily.  -     Discontinue: losartan (COZAAR) 100 MG tablet; Take 1 tablet by mouth Daily.  -     metoprolol tartrate (LOPRESSOR) 25 MG tablet; Take 1 tablet by mouth Every 12 (Twelve) Hours.    Central obesity  -     aspirin (ASPIRIN LOW DOSE) 81 MG EC tablet; Take 1 tablet by mouth Daily.    Hypercholesterolemia  -     aspirin (ASPIRIN LOW DOSE) 81 MG EC tablet; Take 1 tablet by mouth Daily.    Pre-diabetes  -     metFORMIN (GLUCOPHAGE) 500 MG tablet; Take 1 tablet by mouth Daily With Breakfast.    Obstructive sleep apnea syndrome  -     albuterol sulfate HFA (VENTOLIN HFA) 108 (90 Base) MCG/ACT  inhaler; Inhale 2 puffs Every 4 (Four) Hours As Needed for Wheezing.    Skin irritation  -     triamcinolone (KENALOG) 0.1 % ointment; Apply  topically to the appropriate area as directed 2 (Two) Times a Day.    Other orders  -     magnesium oxide (MAGOX) 400 (241.3 Mg) MG tablet tablet; Take 1 tablet by mouth Daily.  -     Discontinue: tiotropium (SPIRIVA) 18 MCG per inhalation capsule; Place 1 capsule into inhaler and inhale Daily.        Plan:     I have seen and examined the patient.  I have reviewed the notes, assessments, and/or procedures performed by **Jeri Danielson MD  *, I concur with her/his documentation and assessment and plan for Jameel Dozier. Continue regimen for DM2 and HTN.  Pt ok to use allergy medication along with trial of triamcinolone ointment for rash on elbow. To monitor for progression/improvement                    This document has been electronically signed by Sincere Narayanan MD on August 15, 2019 8:58 AM

## 2019-08-29 NOTE — PROGRESS NOTES
Pulmonary Office Follow-up    Subjective     Jameel Dozier is seen today at the office for   Chief Complaint   Patient presents with   • COPD         HPI  Jameel Dozier is a 59 y.o. male with a PMH significant for COPD, tobacco use, [place], obesity, HTN, and GERD who presents for follow-up of COPD.      He was last seen on 2/25/19, at which time I recommended continuing on Spiriva daily and again stressed importance of complete tobacco cessation.  He states that he does have some issues with his breathing but he has not felt the need to use his albuterol.  Patient continues on Spiriva daily.  He admits to occasional cough, but no sputum, wheeze, or edema.  Patient states that he has had an episode of chest discomfort, but it was fleeting and has not recurred.  His blood pressure is elevated today, but he states he does not monitor his blood pressure at home.  He denies any headache, vision changes, or strokelike symptoms.  Unfortunately, the patient does continue to smoke on a pack a day and is not currently interested in quitting.  He does admit to some occasional nasal congestion for which he uses Flonase but he does not use Flonase on a regular basis.      Tobacco use history:  Type: cigarettes  Amount: 1 ppd  Duration: 45 years  Cessation: N/a   Willing to quit: No      Patient Active Problem List   Diagnosis   • Central obesity   • Hypercholesterolemia   • Essential hypertension   • Astigmatism   • Type 2 diabetes mellitus with complication, without long-term current use of insulin (CMS/[facility])   • Cigarette nicotine dependence without complication   • Tobacco use disorder   • Chronic obstructive pulmonary disease (CMS/[facility])   • Overweight (BMI 25.0-29.9)   • Physical deconditioning   • Chronic non-seasonal allergic rhinitis   • Imaging of gastrointestinal tract abnormal   • Pain of upper abdomen   • Gastroesophageal reflux disease with esophagitis   • Screening for hyperlipidemia   • Weakness   • Upper  respiratory infection   • Transient cerebral ischemia   • Sleep disorder   • Rectal hemorrhage   • Rectal bleed   • Primary osteoarthritis of knees, bilateral   • Obstructive sleep apnea syndrome   • Male erectile disorder   • Left ventricular hypertrophy   • Hypertension   • History of TIA (transient ischemic attack)   • History of EKG   • History of echocardiogram   • History of colon polyps   • Hiccough   • Hiatal hernia   • Hemorrhoids   • Heavy tobacco smoker   • Gastritis   • External hemorrhoids   • Esophagitis   • Esophageal reflux   • Esophageal dysmotility   • Epigastric pain   • ED (erectile dysfunction)   • Dyspnea   • Dysphagia   • Diverticular disease of colon   • Disorder of peripheral nervous system   • Claudication (CMS/HCC)   • Chronic obstructive lung disease (CMS/HCC)   • Cerebrovascular disease   • Benign essential hypertension   • Anemia due to blood loss   • Anal fissure   • Allergic rhinitis   • Alcohol dependence with alcohol-induced disorder (CMS/HCC)   • Adenomatous polyp of colon   • Acquired achalasia of esophagus   • Abdominal pain   • Adverse drug effect   • Intractable hiccups   • Non-intractable vomiting with nausea       Review of Systems  Review of Systems   Constitutional: Negative for fever and unexpected weight change.   HENT: Negative for congestion and postnasal drip.    Respiratory: Positive for cough and shortness of breath. Negative for chest tightness and wheezing.    Cardiovascular: Positive for chest pain. Negative for leg swelling.     As described in the HPI. Otherwise, remainder of ROS (14 systems) were negative.    Medications, Allergies, Social, and Family Histories reviewed as per EMR.    Objective     Vitals:    08/30/19 1009   BP: (!) 164/106   Pulse: 84   SpO2: 98%         08/30/19  1009   Weight: 89.8 kg (198 lb)     Physical Exam   Constitutional: He is oriented to person, place, and time. Vital signs are normal. He appears well-developed and well-nourished.    obese   HENT:   Head: Normocephalic and atraumatic.   Nose: Nose normal. No mucosal edema or rhinorrhea.   Mouth/Throat: Uvula is midline, oropharynx is clear and moist and mucous membranes are normal. Abnormal dentition.   Mallampati 4, large tongue   Eyes: Conjunctivae, EOM and lids are normal. Pupils are equal, round, and reactive to light.   Neck: Trachea normal and normal range of motion. No tracheal tenderness present. No thyroid mass present.   Cardiovascular: Normal rate, regular rhythm and normal heart sounds. PMI is not displaced. Exam reveals no gallop.   No murmur heard.  Pulmonary/Chest: Effort normal and breath sounds normal. No respiratory distress. He has no decreased breath sounds. He has no wheezes. He has no rhonchi. He exhibits no tenderness.   Abdominal: Soft. Normal appearance and bowel sounds are normal. There is no hepatomegaly. There is no tenderness.   obese   Musculoskeletal:   Normal gait, no extremity edema     Vascular Status -  His right foot exhibits no edema. His left foot exhibits no edema.  Lymphadenopathy:        Head (right side): No submandibular adenopathy present.        Head (left side): No submandibular adenopathy present.     He has no cervical adenopathy.        Right: No supraclavicular adenopathy present.        Left: No supraclavicular adenopathy present.   Neurological: He is alert and oriented to person, place, and time.   Skin: Skin is warm and dry. No cyanosis. Nails show no clubbing.   Psychiatric: He has a normal mood and affect. His speech is normal and behavior is normal. Judgment normal.   Nursing note and vitals reviewed.    PFTs: 11/27/17  Ratio 89  FVC 2.4/ 55%  FEV1 2.13/ 63%  Poor effort.  Reduced FVC and FEV1 suggesting restriction.  No bronchodilator response.  No comparative data available.     IMAGING: LDCT 11/12/18 (independently reviewed and interpreted by me) showed no nodules      Assessment/Plan     Jameel was seen today for copd.    Diagnoses  and all orders for this visit:    Chronic obstructive pulmonary disease, unspecified COPD type (CMS/HCC)    Chronic non-seasonal allergic rhinitis    Obstructive sleep apnea syndrome    Cigarette nicotine dependence without complication  -     CT Chest Low Dose Wo; Future    Physical deconditioning    Hypertension, unspecified type         Discussion/ Recommendations:   While he has complained of some dyspnea, he has not felt the need his albuterol so I do not feel the escalation of his Spiriva to a LAMA/LABA as necessary.  I have encouraged him to use his albuterol more with these episodes.  Otherwise, he is not having frequent nasal  congestion.  His blood pressure is elevated today but he is not having any concerning symptoms I have encouraged him to follow this and check with his PCP.  He does continue to smoke and is not interested in quitting.    -Continue Spiriva handihaler daily   -Use Albuterol as needed for dyspnea or wheeze.    -Continue Zyrtec daily.  -Encouraged more frequent use of Flonase for nasal congestion.  -Jameel Dozier is a current cigarettes user.  He currently smokes 1 pack of cigarettes per day for a duration of 45 years. I have educated him on the risk of diseases from using tobacco products such as cancer, COPD and heart diease.  I advised him to quit and he is not willing to quit. I spent 1 minutes counseling the patient.  -The patient meets criteria for lung cancer screening CT (LDCT); 1ppd x 45yrs still smoking with no symptoms of lung cancer.  Reviewed benefits of such screening including, early detection of potentially curable lung cancer.  Also, discussed risks of screening including, radiation exposure, test anxiety, false positives, risk associated with future procedures, and possibility of clinically significant incidental findings.  The patient does agree to undergo lung cancer screening.  -Up-to-date with pneumococcal vaccine.  Refuses flu vaccination despite  counseling.    Patient's Body mass index is 26.12 kg/m². BMI is above normal parameters. Recommendations include: exercise counseling.      Return in about 3 months (around 11/30/2019) for Recheck COPD, f/u LDCT.          This document has been electronically signed by Yolanda Phan MD on August 30, 2019 11:31 AM      Dictated using Dragon

## 2019-08-30 ENCOUNTER — OFFICE VISIT (OUTPATIENT)
Dept: PULMONOLOGY | Facility: CLINIC | Age: 59
End: 2019-08-30

## 2019-08-30 VITALS
SYSTOLIC BLOOD PRESSURE: 164 MMHG | DIASTOLIC BLOOD PRESSURE: 106 MMHG | BODY MASS INDEX: 26.24 KG/M2 | HEART RATE: 84 BPM | OXYGEN SATURATION: 98 % | HEIGHT: 73 IN | WEIGHT: 198 LBS

## 2019-08-30 DIAGNOSIS — R53.81 PHYSICAL DECONDITIONING: ICD-10-CM

## 2019-08-30 DIAGNOSIS — J30.89 CHRONIC NON-SEASONAL ALLERGIC RHINITIS: ICD-10-CM

## 2019-08-30 DIAGNOSIS — J44.9 CHRONIC OBSTRUCTIVE PULMONARY DISEASE, UNSPECIFIED COPD TYPE (HCC): Primary | ICD-10-CM

## 2019-08-30 DIAGNOSIS — F17.210 CIGARETTE NICOTINE DEPENDENCE WITHOUT COMPLICATION: ICD-10-CM

## 2019-08-30 DIAGNOSIS — G47.33 OBSTRUCTIVE SLEEP APNEA SYNDROME: ICD-10-CM

## 2019-08-30 DIAGNOSIS — I10 HYPERTENSION, UNSPECIFIED TYPE: ICD-10-CM

## 2019-08-30 PROCEDURE — G0296 VISIT TO DETERM LDCT ELIG: HCPCS | Performed by: INTERNAL MEDICINE

## 2019-08-30 PROCEDURE — 99214 OFFICE O/P EST MOD 30 MIN: CPT | Performed by: INTERNAL MEDICINE

## 2019-09-10 DIAGNOSIS — I10 ESSENTIAL HYPERTENSION: ICD-10-CM

## 2019-09-10 RX ORDER — HYDRALAZINE HYDROCHLORIDE 10 MG/1
TABLET, FILM COATED ORAL
Qty: 90 TABLET | Refills: 0 | Status: SHIPPED | OUTPATIENT
Start: 2019-09-10 | End: 2019-11-08 | Stop reason: SDUPTHER

## 2019-09-16 ENCOUNTER — OFFICE VISIT (OUTPATIENT)
Dept: GASTROENTEROLOGY | Facility: CLINIC | Age: 59
End: 2019-09-16

## 2019-09-16 VITALS
HEART RATE: 82 BPM | SYSTOLIC BLOOD PRESSURE: 158 MMHG | WEIGHT: 201.2 LBS | HEIGHT: 73 IN | DIASTOLIC BLOOD PRESSURE: 92 MMHG | BODY MASS INDEX: 26.66 KG/M2

## 2019-09-16 DIAGNOSIS — K21.00 GASTROESOPHAGEAL REFLUX DISEASE WITH ESOPHAGITIS: ICD-10-CM

## 2019-09-16 DIAGNOSIS — K70.0 ALCOHOLIC FATTY LIVER: ICD-10-CM

## 2019-09-16 DIAGNOSIS — K21.00 GERD WITH ESOPHAGITIS: ICD-10-CM

## 2019-09-16 DIAGNOSIS — I85.00 ESOPHAGEAL VARICES WITHOUT BLEEDING, UNSPECIFIED ESOPHAGEAL VARICES TYPE (HCC): Primary | ICD-10-CM

## 2019-09-16 PROCEDURE — 99214 OFFICE O/P EST MOD 30 MIN: CPT | Performed by: PHYSICIAN ASSISTANT

## 2019-09-16 NOTE — PATIENT INSTRUCTIONS

## 2019-09-16 NOTE — PROGRESS NOTES
Chief Complaint   Patient presents with   • Esophageal Varices     patient brought home medications in for verification. medications that were d/c were because the patient stated he was not taking anything other than what her brought in for verification   • Heartburn   • Fatty Liver       ENDO PROCEDURE ORDERED:    Subjective    Jameel Dozier is a 59 y.o. male. he is here today for follow-up.    History of Present Illness    Patient was seen on a recheck of his grade 1 esophageal varices, GERD, fatty liver.  Last seen 08/01/2019.  Patient states he does take aspirin regularly.  He states it seems to help with his chest pain which he gets occasionally.  He still has chronic hiccups.  He is on Prilosec 20 mg daily.  Denied nausea or vomiting.  Bowels are moving without blood or mucus.  Weight is up 10 pounds since last visit, but he did not seem to have any significant edema.  He thought his clothing was different.  Last EGD 03/06/2019 showed esophageal stricture with grade 1 varices.  Last colonoscopy showed hemorrhoids on 04/13/2015.    Laboratories on 08/09/2019:  A1c was normal with hepatitis diagnostic panel negative, AFP was normal.  CBC fairly normal.  CMP showed potassium 3.4, otherwise normal, INR 1.02, REYNOSO Fibrosure 0.32/F1-F2, steatosis 0.29/S0, 0.25/N0.  Cholesterol 233, triglycerides 153.    Abdominal ultrasound 08/12/2019 showed normal appearing liver and gallbladder with 4.8 mm common bile duct, 2.2 cm left renal cyst.    ASSESSMENT/PLAN:  Patient with chronic GERD, chronic hiccups, grade 1 esophageal varices of questionable etiology or significance.  He is scheduled for repeat CT scan of the chest next month.  We will look to see if there are any other causes of this in his mediastinum, etc.  He will be due for colonoscopy next year.  He feels well currently.  We will continue current regimen.  We will plan followup in 3 months with LFTs, INR prior.  Further pending clinical course and the  results of the above.       The following portions of the patient's history were reviewed and updated as appropriate:   Past Medical History:   Diagnosis Date   • Abdominal pain    • Acquired achalasia of esophagus    • Adenomatous polyp of colon    • Adverse drug effect    • Alcohol dependence with alcohol-induced disorder (CMS/HCC)    • Allergic rhinitis    • Anal fissure    • Anemia due to blood loss    • Benign essential hypertension    • Central obesity    • Cerebrovascular disease    • Chronic obstructive lung disease (CMS/HCC)    • Claudication (CMS/HCC)    • Disorder of peripheral nervous system    • Diverticular disease of colon     completed antibx, ? neoplasm on CT   • Dysphagia    • Dyspnea    • ED (erectile dysfunction)    • Epigastric pain    • Esophageal dysmotility    • Esophageal reflux    • Esophagitis    • Essential hypertension    • External hemorrhoids    • Gastritis    • GERD with esophagitis    • Heavy tobacco smoker    • Hemorrhoids    • Hiatal hernia    • Hiccough    • History of colon polyps    • History of echocardiogram     Normal LV funciton with Ef of 65% to 70% without regional wall motion abnormalities.Mild CLVH. Normal RV size and function. No significant valvular regurgitation or stenosis. 09/19/2014       • History of EKG    • History of TIA (transient ischemic attack)    • Hypercholesterolemia    • Hypertension    • Left ventricular hypertrophy    • Male erectile disorder    • Obstructive sleep apnea syndrome    • Primary osteoarthritis of knees, bilateral    • Rectal bleed     painful   • Rectal hemorrhage    • Sleep disorder    • Transient cerebral ischemia    • Upper respiratory infection    • Weakness     Attacks of weakness     Past Surgical History:   Procedure Laterality Date   • COLONOSCOPY  04/13/2015    Internal and external hemorrhoids found. 04/13/2015    • ENDOSCOPY      Internal & external hemorrhoids found. 1 polyp in sigmoid colon; removed by snare cautery  polypectomy. 09/26/2012    • ENDOSCOPY      Gastritis found in the stomach. Biopsy taken.Enlarged folds were found in the body of the stomach.Biopsy taken.Normal duodenum.A hiatus hernia was found in the esophagus. 04/13/2015    • ENDOSCOPY      Hiatus hernia in esophagus. Gastritis in stomach. Biopsy taken. Normal duodenum. Biopsy taken. 09/26/2012       • ENDOSCOPY N/A 12/18/2017    Procedure: ESOPHAGOGASTRODUODENOSCOPY--attn mid esophagus, abnl on CT;  Surgeon: Alli Dockery MD;  Location: [facility] ENDOSCOPY;  Service:    • ENDOSCOPY N/A 3/6/2019    Procedure: ESOPHAGOGASTRODUODENOSCOPY WITH DILATATION;  Surgeon: Alli Dockery MD;  Location: [facility] ENDOSCOPY;  Service: Gastroenterology   • UPPER GASTROINTESTINAL ENDOSCOPY  08/24/2016   • UPPER GASTROINTESTINAL ENDOSCOPY  04/13/2015   • UPPER GASTROINTESTINAL ENDOSCOPY  12/18/2017   • UPPER GASTROINTESTINAL ENDOSCOPY  03/06/2019     Family History   Problem Relation Age of Onset   • Cancer Mother    • Cirrhosis Father    • Diabetes Other    • Hypertension Other        Allergies   Allergen Reactions   • Ace Inhibitors Angioedema     hypotension   • Lisinopril Other (See Comments)     cough   • Vistaril [Hydroxyzine Hcl] Other (See Comments)     Patient is unsure      Social History     Socioeconomic History   • Marital status: Single     Spouse name: Not on file   • Number of children: 0   • Years of education: 12   • Highest education level: Not on file   Occupational History   • Occupation: retired   Tobacco Use   • Smoking status: Current Every Day Smoker     Packs/day: 1.00     Years: 40.00     Pack years: 40.00     Types: Cigarettes   • Smokeless tobacco: Never Used   Substance and Sexual Activity   • Alcohol use: Yes     Alcohol/week: 3.6 oz     Types: 6 Cans of beer per week     Comment: when can get   • Drug use: No   • Sexual activity: Defer     Partners: Female     Current Medications:  Prior to Admission medications    Medication Sig Start Date End  Date Taking? Authorizing Provider   aspirin (ASPIRIN LOW DOSE) 81 MG EC tablet Take 1 tablet by mouth Daily. 8/9/19  Yes Jeri Danielson MD   Blood Pressure Monitoring (ADULT BLOOD PRESSURE CUFF LG) kit Check BP daily 8/9/19  Yes Jeri Danielson MD   cetirizine (zyrTEC) 10 MG tablet Take 1 tablet by mouth Daily. 8/9/19  Yes Jeri Danielson MD   ferrous sulfate (FEOSOL) 325 (65 FE) MG tablet Take 1 tablet by mouth 3 (Three) Times a Day With Meals. 7/17/18  Yes Madonna Flores MD   fluticasone (FLONASE) 50 MCG/ACT nasal spray 2 sprays into each nostril Daily. 7/17/18  Yes Madonna Flores MD   hydrALAZINE (APRESOLINE) 10 MG tablet TAKE 1 TABLET THREE  TIMES DAILY 9/10/19  Yes Jeri Danielson MD   losartan (COZAAR) 100 MG tablet Take 1 tablet by mouth Daily. 8/14/19  Yes Jeri Danielson MD   magnesium oxide (MAGOX) 400 (241.3 Mg) MG tablet tablet Take 1 tablet by mouth Daily. 8/9/19  Yes Jeri Danielson MD   metFORMIN (GLUCOPHAGE) 500 MG tablet Take 1 tablet by mouth Daily With Breakfast. 8/9/19  Yes Jeri Danielson MD   metoprolol tartrate (LOPRESSOR) 25 MG tablet Take 1 tablet by mouth Every 12 (Twelve) Hours. 8/9/19  Yes Jeri Danielson MD   Multiple Vitamins-Minerals (MULTIVITAL PO) Take  by mouth Daily.   Yes Radha Burrell MD   omeprazole (priLOSEC) 20 MG capsule Take 1 capsule by mouth Every Morning. 8/9/19  Yes Jeri Danielson MD   tiotropium (SPIRIVA) 18 MCG per inhalation capsule Place 1 capsule into inhaler and inhale Daily. 8/14/19  Yes Jeri Danielson MD   albuterol sulfate HFA (VENTOLIN HFA) 108 (90 Base) MCG/ACT inhaler Inhale 2 puffs Every 4 (Four) Hours As Needed for Wheezing. 8/9/19 9/16/19  Jeri Danielson MD   amLODIPine (NORVASC) 10 MG tablet Take 1 tablet by mouth Daily. 7/17/18 9/16/19  Madonna Flores MD   lovastatin (MEVACOR) 40 MG tablet Take 1 tablet by mouth Daily. 7/17/18 9/16/19  Madonna Flores MD   ondansetron  "(ZOFRAN) 4 MG tablet Take 1 tablet by mouth Every 8 (Eight) Hours As Needed for Nausea or Vomiting. 12/27/18 9/16/19  Remy Abarca MD   potassium citrate (UROCIT-K) 5 MEQ (540 MG) CR tablet Take 20 mEq by mouth Daily.  9/16/19  Provider, MD Radha   sildenafil (VIAGRA) 25 MG tablet Take 1 tablet by mouth Daily As Needed for erectile dysfunction. 12/10/18 9/16/19  Madonna Flores MD   sodium chloride (OCEAN NASAL SPRAY) 0.65 % nasal spray 1 spray into the nostril(s) as directed by provider As Needed for Congestion. 11/16/18 9/16/19  Yolanda Phan MD   triamcinolone (KENALOG) 0.1 % ointment Apply  topically to the appropriate area as directed 2 (Two) Times a Day. 8/9/19 9/16/19  Jeri Danielson MD     Review of Systems  Review of Systems       Objective    /92 (BP Location: Left arm)   Pulse 82   Ht 185.4 cm (73\")   Wt 91.3 kg (201 lb 3.2 oz)   BMI 26.55 kg/m²   Physical Exam   Constitutional: He is oriented to person, place, and time. He appears well-developed and well-nourished. No distress.   AA   HENT:   Head: Normocephalic and atraumatic.   Eyes: EOM are normal. Pupils are equal, round, and reactive to light.   Neck: Normal range of motion.   Cardiovascular: Normal rate, regular rhythm and normal heart sounds.   Pulmonary/Chest: Effort normal and breath sounds normal.   Abdominal: Soft. Bowel sounds are normal. He exhibits no shifting dullness, no distension, no abdominal bruit, no ascites and no mass. There is no hepatosplenomegaly. There is tenderness. There is no rigidity, no rebound, no guarding and no CVA tenderness. No hernia. Hernia confirmed negative in the ventral area.   mild   Musculoskeletal: Normal range of motion.   Neurological: He is alert and oriented to person, place, and time.   Skin: Skin is warm and dry.   Psychiatric: He has a normal mood and affect. His behavior is normal. Judgment and thought content normal.   Nursing note and vitals " reviewed.    Assessment/Plan      1. Esophageal varices without bleeding, unspecified esophageal varices type (CMS/HCC)    2. Gastroesophageal reflux disease with esophagitis    3. Alcoholic fatty liver    4. GERD with esophagitis    .   Jameel was seen today for esophageal varices, heartburn and fatty liver.    Diagnoses and all orders for this visit:    Esophageal varices without bleeding, unspecified esophageal varices type (CMS/HCC)  -     Hepatic Function Panel; Future  -     Protime-INR; Future    Gastroesophageal reflux disease with esophagitis  -     Hepatic Function Panel; Future  -     Protime-INR; Future    Alcoholic fatty liver  -     Hepatic Function Panel; Future  -     Protime-INR; Future    GERD with esophagitis  -     Hepatic Function Panel; Future  -     Protime-INR; Future        Orders placed during this encounter include:  Orders Placed This Encounter   Procedures   • Hepatic Function Panel     Standing Status:   Future     Standing Expiration Date:   3/14/2020   • Protime-INR     Standing Status:   Future     Standing Expiration Date:   3/14/2020       Medications prescribed:  No orders of the defined types were placed in this encounter.    Discontinued Medications       Reason for Discontinue    amLODIPine (NORVASC) 10 MG tablet Non-compliance    albuterol sulfate HFA (VENTOLIN HFA) 108 (90 Base) MCG/ACT inhaler Non-compliance    lovastatin (MEVACOR) 40 MG tablet Non-compliance    ondansetron (ZOFRAN) 4 MG tablet Non-compliance    potassium citrate (UROCIT-K) 5 MEQ (540 MG) CR tablet Non-compliance    sildenafil (VIAGRA) 25 MG tablet Non-compliance    sodium chloride (OCEAN NASAL SPRAY) 0.65 % nasal spray Non-compliance    triamcinolone (KENALOG) 0.1 % ointment Non-compliance        Requested Prescriptions      No prescriptions requested or ordered in this encounter       Review and/or summary of lab tests, radiology, procedures, medications. Review and summary of old records and obtaining of  history. The risks and benefits of my recommendations, as well as other treatment options were discussed with the patient today. Questions were answered.    Follow-up: Return in about 3 months (around 12/16/2019), or if symptoms worsen or fail to improve, for lab prior.     * Surgery not found *      This document has been electronically signed by Tim Chen PA-C on September 17, 2019 6:08 PM      Results for orders placed or performed in visit on 08/09/19   Hemoglobin A1c   Result Value Ref Range    Hemoglobin A1C 5.00 4.80 - 5.60 %   Results for orders placed or performed in visit on 08/01/19   REYNOSO Fibrosure   Result Value Ref Range    Fibrosis Score (References) 0.32 (H) 0.00 - 0.21    Fibrosis Stage (Reference) F1-F2     Steatosis Score (Reference) 0.29 0.00 - 0.30    Steatosis Grade (Reference) Comment     REYNOSO Score (Reference) 0.25 0.25    Reynoso Grade (Reference) Comment     Height: (Reference) 73 in    Weight: (Reference) 194 LBS    Alpha 2-Macroglobulins, Qn 256 110 - 276 mg/dL    Haptoglobin 147 34 - 200 mg/dL    Apolipoprotein A-1 187 (H) 101 - 178 mg/dL    Total Bilirubin 0.5 0.0 - 1.2 mg/dL    GGT 54 0 - 65 IU/L    ALT (SGPT) 17 0 - 55 IU/L    AST (SGOT) P5P (Reference) 20 0 - 40 IU/L    Cholesterol, Total (Reference) 233 (H) 100 - 199 mg/dL    Glucose, Serum (Reference) 90 65 - 99 mg/dL    Triglycerides 153 (H) 0 - 149 mg/dL    Interpretations: (Reference) Comment     Fibrosis Scoring: Comment     Steatosis Grading (Reference) Comment     Reynoso Scoring (Reference) Comment     Limitations: (Reference) Comment     Comment (Reference) Comment    CBC Auto Differential   Result Value Ref Range    WBC 5.06 3.40 - 10.80 10*3/mm3    RBC 6.18 (H) 4.14 - 5.80 10*6/mm3    Hemoglobin 15.6 13.0 - 17.7 g/dL    Hematocrit 49.0 37.5 - 51.0 %    MCV 79.3 79.0 - 97.0 fL    MCH 25.2 (L) 26.6 - 33.0 pg    MCHC 31.8 31.5 - 35.7 g/dL    RDW 15.2 12.3 - 15.4 %    RDW-SD 41.5 37.0 - 54.0 fl    MPV 13.1 (H) 6.0 - 12.0 fL     Platelets 238 140 - 450 10*3/mm3    Neutrophil % 52.2 42.7 - 76.0 %    Lymphocyte % 32.0 19.6 - 45.3 %    Monocyte % 8.9 5.0 - 12.0 %    Eosinophil % 4.7 0.3 - 6.2 %    Basophil % 1.8 (H) 0.0 - 1.5 %    Immature Grans % 0.4 0.0 - 0.5 %    Neutrophils, Absolute 2.64 1.70 - 7.00 10*3/mm3    Lymphocytes, Absolute 1.62 0.70 - 3.10 10*3/mm3    Monocytes, Absolute 0.45 0.10 - 0.90 10*3/mm3    Eosinophils, Absolute 0.24 0.00 - 0.40 10*3/mm3    Basophils, Absolute 0.09 0.00 - 0.20 10*3/mm3    Immature Grans, Absolute 0.02 0.00 - 0.05 10*3/mm3    nRBC 0.2 0.0 - 0.2 /100 WBC   AFP Tumor Marker   Result Value Ref Range    ALPHA-FETOPROTEIN 1.43 0 - 8.3 ng/mL   Hepatitis Panel, Acute   Result Value Ref Range    Hepatitis B Surface Ag Non-Reactive Non-Reactive    Hep A IgM Non-Reactive Non-Reactive    Hep B C IgM Non-Reactive Non-Reactive    Hepatitis C Ab Non-Reactive Non-Reactive   Protime-INR   Result Value Ref Range    Protime 13.2 11.1 - 15.3 Seconds    INR 1.02 0.80 - 1.20   Comprehensive Metabolic Panel   Result Value Ref Range    Glucose 93 65 - 99 mg/dL    BUN 7 (L) 8 - 23 mg/dL    Creatinine 0.79 0.76 - 1.27 mg/dL    Sodium 139 136 - 145 mmol/L    Potassium 3.4 (L) 3.5 - 5.2 mmol/L    Chloride 98 98 - 107 mmol/L    CO2 27.0 22.0 - 29.0 mmol/L    Calcium 9.3 8.6 - 10.5 mg/dL    Total Protein 7.5 6.0 - 8.5 g/dL    Albumin 4.40 3.50 - 5.20 g/dL    ALT (SGPT) 14 1 - 41 U/L    AST (SGOT) 17 1 - 40 U/L    Alkaline Phosphatase 54 39 - 117 U/L    Total Bilirubin 0.6 0.2 - 1.2 mg/dL    eGFR  African Amer 121 >60 mL/min/1.73    Globulin 3.1 gm/dL    A/G Ratio 1.4 g/dL    BUN/Creatinine Ratio 8.9 7.0 - 25.0    Anion Gap 14.0 5.0 - 15.0 mmol/L   Results for orders placed or performed during the hospital encounter of 03/06/19   Tissue Pathology Exam   Result Value Ref Range    Case Report       Surgical Pathology Report                         Case: [id number]                                  Authorizing Provider:  Alli Dockery  MD BENOIT        Collected:           03/06/2019 01:58 PM          Ordering Location:     [facility]             Received:            03/06/2019 02:45 PM                                 [facility]                                                     Pathologist:           Roberto Clarke MD                                                        Specimen:    Gastric, Antrum                                                                            Final Diagnosis       GASTRIC ANTRUM, MUCOSAL BIOPSY:  MILD CHRONIC GASTRITIS.  NO EVIDENCE OF HELICOBACTER PYLORI.      Gross Description       The specimen consists of a mucosal biopsy measuring up to 0.3 cm in greatest dimension.  All embedded as 1A.     Results for orders placed or performed in visit on 02/07/19   Microalbumin / Creatinine Urine Ratio - Urine, Clean Catch   Result Value Ref Range    Microalbumin/Creatinine Ratio  0.0 - 30.0 mg/g    Creatinine, Urine 220.0 mg/dL    Microalbumin, Urine >114.0 mg/L   Protein, Urine, Random - Urine, Clean Catch   Result Value Ref Range    Total Protein, Urine 396.0 mg/dL   Hemoglobin A1c   Result Value Ref Range    Hemoglobin A1C 5.7 (H) 4 - 5.6 %   Results for orders placed or performed in visit on 12/27/18   POCT Influenza A/B   Result Value Ref Range    Rapid Influenza A Ag Negative Negative    Rapid Influenza B Ag Negative Negative    Internal Control Passed Passed    Lot Number [id number]     Expiration Date 05/14/2020    Results for orders placed or performed in visit on 07/17/18   TSH   Result Value Ref Range    TSH 0.470 0.460 - 4.680 mIU/mL   T4, free   Result Value Ref Range    Free T4 0.99 0.78 - 2.19 ng/dL   Potassium   Result Value Ref Range    Potassium 3.3 (L) 3.5 - 5.1 mmol/L   Magnesium   Result Value Ref Range    Magnesium 1.4 (L) 1.6 - 2.3 mg/dL   Lipid Panel   Result Value Ref Range    Total Cholesterol 198 0 - 199 mg/dL    Triglycerides 61 20 - 199 mg/dL    HDL Cholesterol 102 60 - 200  mg/dL    LDL Cholesterol  82 1 - 129 mg/dL    LDL/HDL Ratio 0.82 0.00 - 3.55   Results for orders placed or performed in visit on 05/31/18   Magnesium   Result Value Ref Range    Magnesium 1.2 (L) 1.6 - 2.3 mg/dL   Results for orders placed or performed in visit on 05/31/18   PSA Screen   Result Value Ref Range    PSA 0.470 0.000 - 4.000 ng/mL   CBC Auto Differential   Result Value Ref Range    WBC 4.96 3.20 - 9.80 10*3/mm3    RBC 5.69 4.37 - 5.74 10*6/mm3    Hemoglobin 14.9 13.7 - 17.3 g/dL    Hematocrit 44.2 39.0 - 49.0 %    MCV 77.7 (L) 80.0 - 98.0 fL    MCH 26.2 (L) 26.5 - 34.0 pg    MCHC 33.7 31.5 - 36.3 g/dL    RDW 17.2 (H) 11.5 - 14.5 %    RDW-SD 48.3 (H) 35.1 - 43.9 fl    MPV 10.6 8.0 - 12.0 fL    Platelets 254 150 - 450 10*3/mm3    Neutrophil % 53.5 37.0 - 80.0 %    Lymphocyte % 30.6 10.0 - 50.0 %    Monocyte % 8.7 0.0 - 12.0 %    Eosinophil % 5.6 0.0 - 7.0 %    Basophil % 1.4 0.0 - 2.0 %    Immature Grans % 0.2 0.0 - 0.5 %    Neutrophils, Absolute 2.65 2.00 - 8.60 10*3/mm3    Lymphocytes, Absolute 1.52 0.60 - 4.20 10*3/mm3    Monocytes, Absolute 0.43 0.00 - 0.90 10*3/mm3    Eosinophils, Absolute 0.28 0.00 - 0.70 10*3/mm3    Basophils, Absolute 0.07 0.00 - 0.20 10*3/mm3    Immature Grans, Absolute 0.01 0.00 - 0.02 10*3/mm3     *Note: Due to a large number of results and/or encounters for the requested time period, some results have not been displayed. A complete set of results can be found in Results Review.       Some portions of this note have been dictated using voice recognition software and may contain errors and/or omissions.

## 2019-11-08 ENCOUNTER — OFFICE VISIT (OUTPATIENT)
Dept: FAMILY MEDICINE CLINIC | Facility: CLINIC | Age: 59
End: 2019-11-08

## 2019-11-08 ENCOUNTER — LAB (OUTPATIENT)
Dept: LAB | Facility: HOSPITAL | Age: 59
End: 2019-11-08

## 2019-11-08 VITALS
OXYGEN SATURATION: 100 % | TEMPERATURE: 98.5 F | BODY MASS INDEX: 28.14 KG/M2 | SYSTOLIC BLOOD PRESSURE: 130 MMHG | HEART RATE: 86 BPM | DIASTOLIC BLOOD PRESSURE: 76 MMHG | WEIGHT: 212.31 LBS | RESPIRATION RATE: 19 BRPM | HEIGHT: 73 IN

## 2019-11-08 DIAGNOSIS — Z00.00 PREVENTATIVE HEALTH CARE: ICD-10-CM

## 2019-11-08 DIAGNOSIS — K70.0 ALCOHOLIC FATTY LIVER: ICD-10-CM

## 2019-11-08 DIAGNOSIS — E87.6 HYPOKALEMIA: ICD-10-CM

## 2019-11-08 DIAGNOSIS — K21.00 GERD WITH ESOPHAGITIS: ICD-10-CM

## 2019-11-08 DIAGNOSIS — K21.00 GASTROESOPHAGEAL REFLUX DISEASE WITH ESOPHAGITIS: ICD-10-CM

## 2019-11-08 DIAGNOSIS — E11.8 TYPE 2 DIABETES MELLITUS WITH COMPLICATION, WITHOUT LONG-TERM CURRENT USE OF INSULIN (HCC): Primary | ICD-10-CM

## 2019-11-08 DIAGNOSIS — I85.00 ESOPHAGEAL VARICES WITHOUT BLEEDING, UNSPECIFIED ESOPHAGEAL VARICES TYPE (HCC): ICD-10-CM

## 2019-11-08 DIAGNOSIS — E11.8 TYPE 2 DIABETES MELLITUS WITH COMPLICATION, WITHOUT LONG-TERM CURRENT USE OF INSULIN (HCC): ICD-10-CM

## 2019-11-08 DIAGNOSIS — I10 ESSENTIAL HYPERTENSION: ICD-10-CM

## 2019-11-08 LAB
ALBUMIN SERPL-MCNC: 4.5 G/DL (ref 3.5–5.2)
ALBUMIN/GLOB SERPL: 1.2 G/DL
ALP SERPL-CCNC: 66 U/L (ref 39–117)
ALT SERPL W P-5'-P-CCNC: 13 U/L (ref 1–41)
ANION GAP SERPL CALCULATED.3IONS-SCNC: 15.6 MMOL/L (ref 5–15)
AST SERPL-CCNC: 17 U/L (ref 1–40)
BILIRUB CONJ SERPL-MCNC: 0.2 MG/DL (ref 0.2–0.3)
BILIRUB SERPL-MCNC: 0.8 MG/DL (ref 0.2–1.2)
BUN BLD-MCNC: 7 MG/DL (ref 8–23)
BUN/CREAT SERPL: 8.2 (ref 7–25)
CALCIUM SPEC-SCNC: 9.6 MG/DL (ref 8.6–10.5)
CHLORIDE SERPL-SCNC: 105 MMOL/L (ref 98–107)
CO2 SERPL-SCNC: 26.4 MMOL/L (ref 22–29)
CREAT BLD-MCNC: 0.85 MG/DL (ref 0.76–1.27)
GFR SERPL CREATININE-BSD FRML MDRD: 111 ML/MIN/1.73
GLOBULIN UR ELPH-MCNC: 3.7 GM/DL
GLUCOSE BLD-MCNC: 92 MG/DL (ref 65–99)
HBA1C MFR BLD: 6.01 % (ref 4.8–5.6)
INR PPP: 0.95 (ref 0.8–1.2)
POTASSIUM BLD-SCNC: 4.1 MMOL/L (ref 3.5–5.2)
PROT SERPL-MCNC: 8.2 G/DL (ref 6–8.5)
PROTHROMBIN TIME: 12.5 SECONDS (ref 11.1–15.3)
SODIUM BLD-SCNC: 147 MMOL/L (ref 136–145)

## 2019-11-08 PROCEDURE — 36415 COLL VENOUS BLD VENIPUNCTURE: CPT

## 2019-11-08 PROCEDURE — 83036 HEMOGLOBIN GLYCOSYLATED A1C: CPT

## 2019-11-08 PROCEDURE — 80053 COMPREHEN METABOLIC PANEL: CPT

## 2019-11-08 PROCEDURE — 99213 OFFICE O/P EST LOW 20 MIN: CPT | Performed by: STUDENT IN AN ORGANIZED HEALTH CARE EDUCATION/TRAINING PROGRAM

## 2019-11-08 PROCEDURE — 82248 BILIRUBIN DIRECT: CPT

## 2019-11-08 PROCEDURE — 85610 PROTHROMBIN TIME: CPT

## 2019-11-08 RX ORDER — LOSARTAN POTASSIUM 100 MG/1
100 TABLET ORAL DAILY
Qty: 30 TABLET | Refills: 5 | Status: SHIPPED | OUTPATIENT
Start: 2019-11-08 | End: 2020-09-17 | Stop reason: SDUPTHER

## 2019-11-08 RX ORDER — OMEPRAZOLE 20 MG/1
20 CAPSULE, DELAYED RELEASE ORAL EVERY MORNING
Qty: 30 CAPSULE | Refills: 5 | Status: SHIPPED | OUTPATIENT
Start: 2019-11-08 | End: 2020-06-12 | Stop reason: SDUPTHER

## 2019-11-08 RX ORDER — HYDRALAZINE HYDROCHLORIDE 10 MG/1
10 TABLET, FILM COATED ORAL 3 TIMES DAILY
Qty: 90 TABLET | Refills: 5 | Status: SHIPPED | OUTPATIENT
Start: 2019-11-08 | End: 2020-12-18 | Stop reason: SDUPTHER

## 2019-11-08 RX ORDER — CETIRIZINE HYDROCHLORIDE 10 MG/1
10 TABLET ORAL DAILY
Qty: 30 TABLET | Refills: 1 | Status: SHIPPED | OUTPATIENT
Start: 2019-11-08 | End: 2020-06-10 | Stop reason: SDUPTHER

## 2019-11-08 RX ORDER — FLUTICASONE PROPIONATE 50 MCG
2 SPRAY, SUSPENSION (ML) NASAL DAILY
Qty: 15.8 ML | Refills: 6 | Status: SHIPPED | OUTPATIENT
Start: 2019-11-08 | End: 2020-06-10 | Stop reason: SDUPTHER

## 2019-11-08 RX ORDER — FERROUS SULFATE 325(65) MG
325 TABLET ORAL
Qty: 90 TABLET | Refills: 5 | Status: SHIPPED | OUTPATIENT
Start: 2019-11-08 | End: 2020-08-06 | Stop reason: SDUPTHER

## 2019-11-08 NOTE — PROGRESS NOTES
Subjective   Jameel Dozier is a 59 y.o. male who presents for follow up for medication refill. Pt has a CMHx of DMT2, Htn, and COPD. States that he is currently doing well on this medication without difficulty. Last A1C was 5.0 3 months ago, BP well controlled today. States his breathing is currently doing well, able to ambulate without difficulty. No other concerns for this time.       Past Medical Hx:  Past Medical History:   Diagnosis Date   • Abdominal pain    • Acquired achalasia of esophagus    • Adenomatous polyp of colon    • Adverse drug effect    • Alcohol dependence with alcohol-induced disorder (CMS/HCC)    • Allergic rhinitis    • Anal fissure    • Anemia due to blood loss    • Benign essential hypertension    • Central obesity    • Cerebrovascular disease    • Chronic obstructive lung disease (CMS/HCC)    • Claudication (CMS/HCC)    • Disorder of peripheral nervous system    • Diverticular disease of colon     completed antibx, ? neoplasm on CT   • Dysphagia    • Dyspnea    • ED (erectile dysfunction)    • Epigastric pain    • Esophageal dysmotility    • Esophageal reflux    • Esophagitis    • Essential hypertension    • External hemorrhoids    • Gastritis    • GERD with esophagitis    • Heavy tobacco smoker    • Hemorrhoids    • Hiatal hernia    • Hiccough    • History of colon polyps    • History of echocardiogram     Normal LV funciton with Ef of 65% to 70% without regional wall motion abnormalities.Mild CLVH. Normal RV size and function. No significant valvular regurgitation or stenosis. 09/19/2014       • History of EKG    • History of TIA (transient ischemic attack)    • Hypercholesterolemia    • Hypertension    • Left ventricular hypertrophy    • Male erectile disorder    • Obstructive sleep apnea syndrome    • Primary osteoarthritis of knees, bilateral    • Rectal bleed     painful   • Rectal hemorrhage    • Sleep disorder    • Transient cerebral ischemia    • Upper  respiratory infection    • Weakness     Attacks of weakness       Past Surgical Hx:  Past Surgical History:   Procedure Laterality Date   • COLONOSCOPY  04/13/2015    Internal and external hemorrhoids found. 04/13/2015    • ENDOSCOPY      Internal & external hemorrhoids found. 1 polyp in sigmoid colon; removed by snare cautery polypectomy. 09/26/2012    • ENDOSCOPY      Gastritis found in the stomach. Biopsy taken.Enlarged folds were found in the body of the stomach.Biopsy taken.Normal duodenum.A hiatus hernia was found in the esophagus. 04/13/2015    • ENDOSCOPY      Hiatus hernia in esophagus. Gastritis in stomach. Biopsy taken. Normal duodenum. Biopsy taken. 09/26/2012       • ENDOSCOPY N/A 12/18/2017    Procedure: ESOPHAGOGASTRODUODENOSCOPY--attn mid esophagus, abnl on CT;  Surgeon: Alli Dockery MD;  Location: [facility] ENDOSCOPY;  Service:    • ENDOSCOPY N/A 3/6/2019    Procedure: ESOPHAGOGASTRODUODENOSCOPY WITH DILATATION;  Surgeon: Alli Dockery MD;  Location: [facility] ENDOSCOPY;  Service: Gastroenterology   • UPPER GASTROINTESTINAL ENDOSCOPY  08/24/2016   • UPPER GASTROINTESTINAL ENDOSCOPY  04/13/2015   • UPPER GASTROINTESTINAL ENDOSCOPY  12/18/2017   • UPPER GASTROINTESTINAL ENDOSCOPY  03/06/2019       Health Maintenance:  Health Maintenance   Topic Date Due   • ZOSTER VACCINE (1 of 2) 07/17/2008   • MEDICARE ANNUAL WELLNESS  12/08/2016   • DIABETIC EYE EXAM  02/10/2018   • INFLUENZA VACCINE  08/01/2019   • LUNG CANCER SCREENING  11/12/2019   • DIABETIC FOOT EXAM  02/07/2020   • LIPID PANEL  02/07/2020   • URINE MICROALBUMIN  02/07/2020   • HEMOGLOBIN A1C  02/09/2020   • COLONOSCOPY  04/13/2020   • TDAP/TD VACCINES (2 - Td) 08/15/2024   • PNEUMOCOCCAL VACCINE (19-64 MEDIUM RISK)  Completed   • HEPATITIS C SCREENING  Completed       Current Meds:    Current Outpatient Medications:   •  aspirin (ASPIRIN LOW DOSE) 81 MG EC tablet, Take 1 tablet by mouth Daily., Disp: 30 tablet, Rfl: 4  •  Blood Pressure  Monitoring (ADULT BLOOD PRESSURE CUFF LG) kit, Check BP daily, Disp: 1 each, Rfl: 0  •  cetirizine (zyrTEC) 10 MG tablet, Take 1 tablet by mouth Daily., Disp: 30 tablet, Rfl: 1  •  ferrous sulfate (FEOSOL) 325 (65 FE) MG tablet, Take 1 tablet by mouth 3 (Three) Times a Day With Meals., Disp: 90 tablet, Rfl: 5  •  fluticasone (FLONASE) 50 MCG/ACT nasal spray, 2 sprays into the nostril(s) as directed by provider Daily., Disp: 15.8 mL, Rfl: 6  •  hydrALAZINE (APRESOLINE) 10 MG tablet, Take 1 tablet by mouth 3 (Three) Times a Day., Disp: 90 tablet, Rfl: 5  •  losartan (COZAAR) 100 MG tablet, Take 1 tablet by mouth Daily., Disp: 30 tablet, Rfl: 5  •  magnesium oxide (MAGOX) 400 (241.3 Mg) MG tablet tablet, Take 1 tablet by mouth Daily., Disp: 30 tablet, Rfl: 4  •  metFORMIN (GLUCOPHAGE) 500 MG tablet, Take 1 tablet by mouth Daily With Breakfast., Disp: 30 tablet, Rfl: 4  •  metoprolol tartrate (LOPRESSOR) 25 MG tablet, Take 1 tablet by mouth Every 12 (Twelve) Hours., Disp: 60 tablet, Rfl: 5  •  omeprazole (priLOSEC) 20 MG capsule, Take 1 capsule by mouth Every Morning., Disp: 30 capsule, Rfl: 5  •  tiotropium (SPIRIVA) 18 MCG per inhalation capsule, Place 1 capsule into inhaler and inhale Daily., Disp: 30 capsule, Rfl: 11  •  Multiple Vitamins-Minerals (MULTIVITAL) tablet, Take 1 tablet by mouth Daily., Disp: 30 tablet, Rfl: 11    Allergies:  Ace inhibitors; Lisinopril; and Vistaril [hydroxyzine hcl]    Family Hx:  Family History   Problem Relation Age of Onset   • Cancer Mother    • Cirrhosis Father    • Diabetes Other    • Hypertension Other         Social History:  Social History     Socioeconomic History   • Marital status: Single     Spouse name: Not on file   • Number of children: 0   • Years of education: 12   • Highest education level: Not on file   Occupational History   • Occupation: retired   Tobacco Use   • Smoking status: Current Every Day Smoker     Packs/day: 1.00     Years: 40.00     Pack years: 40.00      "Types: Cigarettes   • Smokeless tobacco: Never Used   Substance and Sexual Activity   • Alcohol use: Yes     Alcohol/week: 3.6 oz     Types: 6 Cans of beer per week     Comment: when can get   • Drug use: No   • Sexual activity: Defer     Partners: Female       Review of Systems  Review of Systems   Constitutional: Negative for appetite change, chills, diaphoresis, fatigue and fever.   HENT: Negative for ear discharge, ear pain, facial swelling, hearing loss, rhinorrhea, sinus pressure, sinus pain, sneezing, sore throat and voice change.    Eyes: Negative for pain, discharge and visual disturbance.   Respiratory: Negative for apnea, cough, chest tightness, shortness of breath, wheezing and stridor.    Cardiovascular: Negative for chest pain, palpitations and leg swelling.   Gastrointestinal: Negative for abdominal distention, abdominal pain, constipation, diarrhea, nausea and vomiting.   Genitourinary: Negative for dysuria, frequency and urgency.   Musculoskeletal: Negative for arthralgias, back pain, myalgias and neck pain.   Skin: Negative for color change, pallor, rash and wound.   Neurological: Negative for facial asymmetry, speech difficulty, light-headedness and headaches.   Psychiatric/Behavioral: Negative for agitation and decreased concentration. The patient is not nervous/anxious.             Objective:     /76 (BP Location: Left arm, Patient Position: Sitting, Cuff Size: Large Adult)   Pulse 86   Temp 98.5 °F (36.9 °C) (Tympanic)   Resp 19   Ht 185.4 cm (73\")   Wt 96.3 kg (212 lb 5 oz)   SpO2 100%   BMI 28.01 kg/m²       Physical Exam   Constitutional: He is oriented to person, place, and time. He appears well-developed and well-nourished. No distress.   HENT:   Head: Normocephalic and atraumatic.   Right Ear: External ear normal.   Left Ear: External ear normal.   Mouth/Throat: Oropharynx is clear and moist.   Eyes: Conjunctivae and EOM are normal. Pupils are equal, round, and reactive to " light. Right eye exhibits no discharge. Left eye exhibits no discharge. No scleral icterus.   Neck: Normal range of motion. No tracheal deviation present.   Cardiovascular: Normal rate, regular rhythm and normal heart sounds. Exam reveals no gallop and no friction rub.   No murmur heard.  Pulmonary/Chest: Effort normal and breath sounds normal. No stridor. No respiratory distress. He has no wheezes. He has no rales. He exhibits no tenderness.   Abdominal: Soft. Bowel sounds are normal. He exhibits no distension and no mass. There is no tenderness. There is no rebound and no guarding.   Musculoskeletal: Normal range of motion. He exhibits no edema, tenderness or deformity.   Neurological: He is alert and oriented to person, place, and time.   Skin: Skin is warm and dry. Capillary refill takes less than 2 seconds. No rash noted. He is not diaphoretic. No erythema. No pallor.   Psychiatric: He has a normal mood and affect. His behavior is normal.       Assessment/Plan:     Jameel was seen today for hypertension and diabetes.    Diagnoses and all orders for this visit:    Type 2 diabetes mellitus with complication, without long-term current use of insulin (CMS/[facility])  -     Hemoglobin A1c; Future    GERD with esophagitis  -     omeprazole (priLOSEC) 20 MG capsule; Take 1 capsule by mouth Every Morning.  -     cetirizine (zyrTEC) 10 MG tablet; Take 1 tablet by mouth Daily.    Essential hypertension  -     metoprolol tartrate (LOPRESSOR) 25 MG tablet; Take 1 tablet by mouth Every 12 (Twelve) Hours.  -     losartan (COZAAR) 100 MG tablet; Take 1 tablet by mouth Daily.  -     hydrALAZINE (APRESOLINE) 10 MG tablet; Take 1 tablet by mouth 3 (Three) Times a Day.    Preventative health care  -     metFORMIN (GLUCOPHAGE) 500 MG tablet; Take 1 tablet by mouth Daily With Breakfast.  -     ferrous sulfate (FEOSOL) 325 (65 FE) MG tablet; Take 1 tablet by mouth 3 (Three) Times a Day With Meals.    Hypokalemia  -     Comprehensive  metabolic panel; Future    Other orders  -     tiotropium (SPIRIVA) 18 MCG per inhalation capsule; Place 1 capsule into inhaler and inhale Daily.  -     Multiple Vitamins-Minerals (MULTIVITAL) tablet; Take 1 tablet by mouth Daily.  -     magnesium oxide (MAGOX) 400 (241.3 Mg) MG tablet tablet; Take 1 tablet by mouth Daily.  -     fluticasone (FLONASE) 50 MCG/ACT nasal spray; 2 sprays into the nostril(s) as directed by provider Daily.       Will order A1C today for follow up of DMT2. Also order CMP due to hypokalemia at previous visit.   Follow-up:     Return in about 3 months (around 2/8/2020).    Goals     • Quit smoking / using tobacco (pt-stated)           Preventative:    Vaccines Recommended at this visit:   Influenza    Vaccines Received at this visit:  Patient refused all vaccinations at this visit.    Screenings Recommended at this visit:  A1C and CMP    Screenings Ordered at this visit:  A1C and CMP    Smoking Status:  Patient is a former smoker.    Alcohol Intake:  Occasional/rare    Patient's Body mass index is 28.01 kg/m². BMI is above normal parameters. Recommendations include: exercise counseling and nutrition counseling.         RISK SCORE: 3          This document has been electronically signed by Jeri Danielson MD on November 8, 2019 9:36 AM

## 2019-11-13 ENCOUNTER — HOSPITAL ENCOUNTER (OUTPATIENT)
Dept: CT IMAGING | Facility: HOSPITAL | Age: 59
Discharge: HOME | End: 2019-11-13
Admitting: INTERNAL MEDICINE

## 2019-11-13 DIAGNOSIS — F17.210 CIGARETTE NICOTINE DEPENDENCE WITHOUT COMPLICATION: ICD-10-CM

## 2019-11-13 PROCEDURE — G0297 LDCT FOR LUNG CA SCREEN: HCPCS

## 2019-11-15 NOTE — PROGRESS NOTES
I have reviewed the notes, assessments, and/or procedures performed by **Jeri Danielson MD  *during office visit. I concur with her/his documentation and assessment and plan for Jameel Woo Jacoby.          This document has been electronically signed by Sincere Narayanan MD on November 15, 2019 10:53 AM

## 2019-11-20 NOTE — PROGRESS NOTES
Pulmonary Office Follow-up    Subjective     Jameel Dozier is seen today at the office for   Chief Complaint   Patient presents with   • COPD         HPI  Jameel Dozier is a 59 y.o. male with a PMH significant for COPD, tobacco use, [place], obesity, HTN, and GERD who presents for follow-up of COPD.      8/30/19: Recommended continuing on Spiriva daily and using albuterol only as needed.  I also encouraged him to use his Flonase more frequently for his chronic rhinitis and counseled him on the importance of complete tobacco cessation.  Patient was agreeable to undergoing lung cancer screening as scheduled.    11/12/19: Pt states his breathing is stable on his Spiriva but he has not needed his albuterol.  He admits to occasional cough but is not bothersome.  Patient denies any sputum, fever, or chills.  He does report he had some chest discomfort this morning but it was brief and has resolved.  He denies any recent illnesses or steroid use.  Patient does sometimes get some nasal congestion but he is using Flonase.  He also admits to some reflux which is mostly controlled with his omeprazole daily.  He has not gotten the flu vaccine yet and is uncertain if he will get one.  Patient continues to smoke and is not interested in quitting at this time.    Tobacco use history:  Type: cigarettes  Amount: 1 ppd  Duration: 45 years  Cessation: N/a   Willing to quit: No      Patient Active Problem List   Diagnosis   • Central obesity   • Hypercholesterolemia   • Essential hypertension   • Astigmatism   • Type 2 diabetes mellitus with complication, without long-term current use of insulin (CMS/[facility])   • Cigarette nicotine dependence without complication   • Tobacco use disorder   • Chronic obstructive pulmonary disease (CMS/HCC)   • Overweight (BMI 25.0-29.9)   • Physical deconditioning   • Chronic non-seasonal allergic rhinitis   • Imaging of gastrointestinal tract abnormal   • Pain of upper abdomen   •  Gastroesophageal reflux disease with esophagitis   • Screening for hyperlipidemia   • Weakness   • Upper respiratory infection   • Transient cerebral ischemia   • Sleep disorder   • Rectal hemorrhage   • Rectal bleed   • Primary osteoarthritis of knees, bilateral   • Obstructive sleep apnea syndrome   • Male erectile disorder   • Left ventricular hypertrophy   • Hypertension   • History of TIA (transient ischemic attack)   • History of EKG   • History of echocardiogram   • History of colon polyps   • Hiccough   • Hiatal hernia   • Hemorrhoids   • Heavy tobacco smoker   • Gastritis   • External hemorrhoids   • Esophagitis   • Esophageal reflux   • Esophageal dysmotility   • Epigastric pain   • ED (erectile dysfunction)   • Dyspnea   • Dysphagia   • Diverticular disease of colon   • Disorder of peripheral nervous system   • Claudication (CMS/HCC)   • Chronic obstructive lung disease (CMS/HCC)   • Cerebrovascular disease   • Benign essential hypertension   • Anemia due to blood loss   • Anal fissure   • Allergic rhinitis   • Alcohol dependence with alcohol-induced disorder (CMS/HCC)   • Adenomatous polyp of colon   • Acquired achalasia of esophagus   • Abdominal pain   • Adverse drug effect   • Intractable hiccups   • Non-intractable vomiting with nausea       Review of Systems  Review of Systems   Constitutional: Negative for fever and unexpected weight change.   HENT: Positive for congestion. Negative for postnasal drip.    Respiratory: Positive for cough and shortness of breath. Negative for chest tightness and wheezing.    Cardiovascular: Positive for chest pain. Negative for leg swelling.     As described in the HPI. Otherwise, remainder of ROS (14 systems) were negative.    Medications, Allergies, Social, and Family Histories reviewed as per EMR.    Objective     Vitals:    11/21/19 1001   BP: (!) 169/105   Pulse: 90   SpO2: 98%         11/21/19  1001   Weight: 92.5 kg (204 lb)     Physical Exam   Constitutional:  He is oriented to person, place, and time. Vital signs are normal. He appears well-developed and well-nourished.   obese   HENT:   Head: Normocephalic and atraumatic.   Nose: Nose normal. No mucosal edema or rhinorrhea.   Mouth/Throat: Uvula is midline, oropharynx is clear and moist and mucous membranes are normal. Abnormal dentition.   Mallampati 4, large tongue   Eyes: Conjunctivae, EOM and lids are normal. Pupils are equal, round, and reactive to light.   Neck: Trachea normal and normal range of motion. No tracheal tenderness present. No thyroid mass present.   Cardiovascular: Normal rate, regular rhythm and normal heart sounds. PMI is not displaced. Exam reveals no gallop.   No murmur heard.  Pulmonary/Chest: Effort normal and breath sounds normal. No respiratory distress. He has no decreased breath sounds. He has no wheezes. He has no rhonchi. He exhibits no tenderness.   Abdominal: Soft. Normal appearance and bowel sounds are normal. There is no hepatomegaly. There is no tenderness.   obese   Musculoskeletal:   Normal gait, no extremity edema     Vascular Status -  His right foot exhibits no edema. His left foot exhibits no edema.  Lymphadenopathy:        Head (right side): No submandibular adenopathy present.        Head (left side): No submandibular adenopathy present.     He has no cervical adenopathy.        Right: No supraclavicular adenopathy present.        Left: No supraclavicular adenopathy present.   Neurological: He is alert and oriented to person, place, and time.   Skin: Skin is warm and dry. No cyanosis. Nails show no clubbing.   Psychiatric: He has a normal mood and affect. His speech is normal and behavior is normal. Judgment normal.   Nursing note and vitals reviewed.    PFTs: 11/27/17  Ratio 89  FVC 2.4/ 55%  FEV1 2.13/ 63%  Poor effort.  Reduced FVC and FEV1 suggesting restriction.  No bronchodilator response.  No comparative data available.     IMAGING: LDCT 11/13/19 (independently reviewed  and interpreted by me) showed no nodules, NACPD, distended esophagus suspicious for reflux      Assessment/Plan     Jameel was seen today for copd.    Diagnoses and all orders for this visit:    Chronic obstructive pulmonary disease, unspecified COPD type (CMS/HCC)    Chronic non-seasonal allergic rhinitis    Overweight (BMI 25.0-29.9)    Cigarette nicotine dependence without complication    Essential hypertension         Discussion/ Recommendations:   He remained stable from a COPD standpoint and does not warrant escalation to dual therapy with the addition of a LABA.  I did personally review his lung cancer screening CT which did not show any concerning nodules.  Unfortunately, he does continue to smoke and is not urged in quitting at this time.  Otherwise, his blood pressure is elevated today and have encouraged him to continue monitoring this at home.    -Continue Spiriva handihaler daily   -Use Albuterol as needed for dyspnea or wheeze.    -Continue Zyrtec and Flonase daily.  Recommend he use nasal saline to prevent excessive dryness.  -Continue antihypertensives and follow blood pressure.  Follow-up with PCP.  -Jameel Dozier is a current cigarettes user.  He currently smokes 1 pack of cigarettes per day for a duration of 45 years. I have educated him on the risk of diseases from using tobacco products such as cancer, COPD and heart diease.  I advised him to quit and he is not willing to quit. I spent 1 minutes counseling the patient.  -Annual lung cancer screening CT on or after 11/13/2020 (LDCT); 1ppd x 45yrs still smoking with no symptoms of lung cancer.   -Up-to-date with pneumococcal vaccine.  Refuses flu vaccination despite counseling.    Patient's Body mass index is 26.91 kg/m². BMI is above normal parameters. Recommendations include: exercise counseling.      Return in about 3 months (around 2/21/2020) for Recheck COPD.          This document has been electronically signed by Yolanda Phan MD  on November 21, 2019 10:47 AM      Dictated using Dragon

## 2019-11-21 ENCOUNTER — OFFICE VISIT (OUTPATIENT)
Dept: PULMONOLOGY | Facility: CLINIC | Age: 59
End: 2019-11-21

## 2019-11-21 VITALS
HEART RATE: 90 BPM | BODY MASS INDEX: 27.04 KG/M2 | OXYGEN SATURATION: 98 % | WEIGHT: 204 LBS | SYSTOLIC BLOOD PRESSURE: 169 MMHG | HEIGHT: 73 IN | DIASTOLIC BLOOD PRESSURE: 105 MMHG

## 2019-11-21 DIAGNOSIS — E66.3 OVERWEIGHT (BMI 25.0-29.9): ICD-10-CM

## 2019-11-21 DIAGNOSIS — F17.210 CIGARETTE NICOTINE DEPENDENCE WITHOUT COMPLICATION: ICD-10-CM

## 2019-11-21 DIAGNOSIS — J30.89 CHRONIC NON-SEASONAL ALLERGIC RHINITIS: ICD-10-CM

## 2019-11-21 DIAGNOSIS — J44.9 CHRONIC OBSTRUCTIVE PULMONARY DISEASE, UNSPECIFIED COPD TYPE (HCC): Primary | ICD-10-CM

## 2019-11-21 DIAGNOSIS — I10 ESSENTIAL HYPERTENSION: ICD-10-CM

## 2019-11-21 PROCEDURE — 99214 OFFICE O/P EST MOD 30 MIN: CPT | Performed by: INTERNAL MEDICINE

## 2019-12-16 ENCOUNTER — OFFICE VISIT (OUTPATIENT)
Dept: GASTROENTEROLOGY | Facility: CLINIC | Age: 59
End: 2019-12-16

## 2019-12-16 VITALS
DIASTOLIC BLOOD PRESSURE: 98 MMHG | HEART RATE: 94 BPM | SYSTOLIC BLOOD PRESSURE: 152 MMHG | BODY MASS INDEX: 26.48 KG/M2 | WEIGHT: 199.8 LBS | HEIGHT: 73 IN

## 2019-12-16 DIAGNOSIS — K21.00 GASTROESOPHAGEAL REFLUX DISEASE WITH ESOPHAGITIS: ICD-10-CM

## 2019-12-16 DIAGNOSIS — K70.0 ALCOHOLIC FATTY LIVER: ICD-10-CM

## 2019-12-16 DIAGNOSIS — K21.00 GERD WITH ESOPHAGITIS: ICD-10-CM

## 2019-12-16 DIAGNOSIS — I85.00 ESOPHAGEAL VARICES WITHOUT BLEEDING, UNSPECIFIED ESOPHAGEAL VARICES TYPE (HCC): Primary | ICD-10-CM

## 2019-12-16 DIAGNOSIS — K74.00 HEPATIC FIBROSIS: ICD-10-CM

## 2019-12-16 DIAGNOSIS — E71.30 DISORDER OF FATTY-ACID METABOLISM, UNSPECIFIED: ICD-10-CM

## 2019-12-16 DIAGNOSIS — K76.0 FATTY (CHANGE OF) LIVER, NOT ELSEWHERE CLASSIFIED: ICD-10-CM

## 2019-12-16 PROCEDURE — 99213 OFFICE O/P EST LOW 20 MIN: CPT | Performed by: PHYSICIAN ASSISTANT

## 2019-12-16 NOTE — PATIENT INSTRUCTIONS

## 2020-01-22 NOTE — PROGRESS NOTES
----- Message from Juli Espinal sent at 1/22/2020  2:15 PM CST -----  Contact: Yvonne - [facility]  Yvonne called from [facility] in regards to getting a rems # for an rx for Revlimid 2.5mg for the patient.    Yvonne can be reached at [phone] and would like a  Call back asap please.    Thank you.   Chief Complaint   Patient presents with   • Esophageal Varices   • Heartburn   • Fatty Liver       ENDO PROCEDURE ORDERED:    Subjective    Jameel Dozier is a 59 y.o. male. he is here today for follow-up.    History of Present Illness    Patient is seen on a recheck of his esophageal varices, GERD, fatty liver, F1-F2/S0/N0.  Last seen 09/16/2019.  He had had an EGD on 03/06/2019 showing an esophageal stricture dilated with grade 1 esophageal varices.  An ultrasound on 08/12/2019 showed normal appearing liver, gallbladder, and common bile duct.  Low CT scan of the chest was done on 11/13/2019 that showed he had a distended esophagus with fluid suggestive of reflux, the rest of the study was unremarkable.  I did review some of the images.  He does appear to have a somewhat enlarged liver.  He did not appear to have evidence for portal hypertension, although this was limited contrast.        Laboratory on 11/08/2019:  INR normal.  A1c 6.01%.  CMP showed a sodium of 147, otherwise normal.  Patient currently denies abdominal pain.  Heartburn is reasonably well-controlled on the Prilosec.  He denied nausea or vomiting.  He still has occasional difficulty with swallowing.  Bowels are moving without blood or mucus.  Weight is down 1.5 pounds since last visit.  Last colonoscopy 04/13/2015 showed hemorrhoids.      A/P:  Patient with chronic hiccups, grade 1 esophageal varices with limited findings to suggest advanced liver disease, GERD well-controlled on Prilosec.        Would consider repeat dilatation as needed.  His current LFTs are normal.  Will plan follow-up in 4 months with repeat REYNOSO FibroSure, AFP, and INR prior.  Further pending clinical course and the results of the above.           The following portions of the patient's history were reviewed and updated as appropriate:   Past Medical History:   Diagnosis Date   • Abdominal pain    • Acquired achalasia of esophagus    • Adenomatous polyp of colon    •  Adverse drug effect    • Alcohol dependence with alcohol-induced disorder (CMS/HCC)    • Allergic rhinitis    • Anal fissure    • Anemia due to blood loss    • Benign essential hypertension    • Central obesity    • Cerebrovascular disease    • Chronic obstructive lung disease (CMS/HCC)    • Claudication (CMS/HCC)    • Disorder of peripheral nervous system    • Diverticular disease of colon     completed antibx, ? neoplasm on CT   • Dysphagia    • Dyspnea    • ED (erectile dysfunction)    • Epigastric pain    • Esophageal dysmotility    • Esophageal reflux    • Esophagitis    • Essential hypertension    • External hemorrhoids    • Gastritis    • GERD with esophagitis    • Heavy tobacco smoker    • Hemorrhoids    • Hiatal hernia    • Hiccough    • History of colon polyps    • History of echocardiogram     Normal LV funciton with Ef of 65% to 70% without regional wall motion abnormalities.Mild CLVH. Normal RV size and function. No significant valvular regurgitation or stenosis. 09/19/2014       • History of EKG    • History of TIA (transient ischemic attack)    • Hypercholesterolemia    • Hypertension    • Left ventricular hypertrophy    • Male erectile disorder    • Obstructive sleep apnea syndrome    • Primary osteoarthritis of knees, bilateral    • Rectal bleed     painful   • Rectal hemorrhage    • Sleep disorder    • Transient cerebral ischemia    • Upper respiratory infection    • Weakness     Attacks of weakness     Past Surgical History:   Procedure Laterality Date   • COLONOSCOPY  04/13/2015    Internal and external hemorrhoids found. 04/13/2015    • ENDOSCOPY      Internal & external hemorrhoids found. 1 polyp in sigmoid colon; removed by snare cautery polypectomy. 09/26/2012    • ENDOSCOPY      Gastritis found in the stomach. Biopsy taken.Enlarged folds were found in the body of the stomach.Biopsy taken.Normal duodenum.A hiatus hernia was found in the esophagus. 04/13/2015    • ENDOSCOPY      Hiatus hernia  in esophagus. Gastritis in stomach. Biopsy taken. Normal duodenum. Biopsy taken. 09/26/2012       • ENDOSCOPY N/A 12/18/2017    Procedure: ESOPHAGOGASTRODUODENOSCOPY--attn mid esophagus, abnl on CT;  Surgeon: Alli Dockery MD;  Location: [facility] ENDOSCOPY;  Service:    • ENDOSCOPY N/A 3/6/2019    Procedure: ESOPHAGOGASTRODUODENOSCOPY WITH DILATATION;  Surgeon: Alli Dockery MD;  Location: [facility] ENDOSCOPY;  Service: Gastroenterology   • UPPER GASTROINTESTINAL ENDOSCOPY  08/24/2016   • UPPER GASTROINTESTINAL ENDOSCOPY  04/13/2015   • UPPER GASTROINTESTINAL ENDOSCOPY  12/18/2017   • UPPER GASTROINTESTINAL ENDOSCOPY  03/06/2019     Family History   Problem Relation Age of Onset   • Cancer Mother    • Cirrhosis Father    • Diabetes Other    • Hypertension Other        Allergies   Allergen Reactions   • Ace Inhibitors Angioedema     hypotension   • Lisinopril Other (See Comments)     cough   • Vistaril [Hydroxyzine Hcl] Other (See Comments)     Patient is unsure      Social History     Socioeconomic History   • Marital status: Single     Spouse name: Not on file   • Number of children: 0   • Years of education: 12   • Highest education level: Not on file   Occupational History   • Occupation: retired   Tobacco Use   • Smoking status: Current Every Day Smoker     Packs/day: 1.00     Years: 40.00     Pack years: 40.00     Types: Cigarettes   • Smokeless tobacco: Never Used   Substance and Sexual Activity   • Alcohol use: Yes     Alcohol/week: 6.0 standard drinks     Types: 6 Cans of beer per week     Comment: when can get   • Drug use: No   • Sexual activity: Defer     Partners: Female     Current Medications:  Prior to Admission medications    Medication Sig Start Date End Date Taking? Authorizing Provider   aspirin (ASPIRIN LOW DOSE) 81 MG EC tablet Take 1 tablet by mouth Daily. 8/9/19  Yes Jeri Danielson MD   Blood Pressure Monitoring (ADULT BLOOD PRESSURE CUFF LG) kit Check BP daily 8/9/19  Yes Jeri Danielson  "MD   cetirizine (zyrTEC) 10 MG tablet Take 1 tablet by mouth Daily. 11/8/19  Yes Jeri Danielson MD   ferrous sulfate (FEOSOL) 325 (65 FE) MG tablet Take 1 tablet by mouth 3 (Three) Times a Day With Meals. 11/8/19  Yes Jeri Danielson MD   fluticasone (FLONASE) 50 MCG/ACT nasal spray 2 sprays into the nostril(s) as directed by provider Daily. 11/8/19  Yes Jeri Danielson MD   hydrALAZINE (APRESOLINE) 10 MG tablet Take 1 tablet by mouth 3 (Three) Times a Day. 11/8/19  Yes Jeri Danielson MD   losartan (COZAAR) 100 MG tablet Take 1 tablet by mouth Daily. 11/8/19  Yes Jeri Danielson MD   magnesium oxide (MAGOX) 400 (241.3 Mg) MG tablet tablet Take 1 tablet by mouth Daily. 11/8/19  Yes Jeri Danielson MD   metFORMIN (GLUCOPHAGE) 500 MG tablet Take 1 tablet by mouth Daily With Breakfast. 11/8/19  Yes Jeri Danielson MD   metoprolol tartrate (LOPRESSOR) 25 MG tablet Take 1 tablet by mouth Every 12 (Twelve) Hours. 11/8/19  Yes Jeri Danielson MD   Multiple Vitamins-Minerals (MULTIVITAL) tablet Take 1 tablet by mouth Daily. 11/8/19 11/7/20 Yes Jeri Danielson MD   omeprazole (priLOSEC) 20 MG capsule Take 1 capsule by mouth Every Morning. 11/8/19  Yes Jeri Danielson MD   tiotropium (SPIRIVA) 18 MCG per inhalation capsule Place 1 capsule into inhaler and inhale Daily. 11/8/19  Yes Jeri Danielson MD     Review of Systems  Review of Systems       Objective    /98 (BP Location: Left arm)   Pulse 94   Ht 185.4 cm (73\")   Wt 90.6 kg (199 lb 12.8 oz)   BMI 26.36 kg/m²   Physical Exam   Constitutional: He is oriented to person, place, and time. He appears well-developed and well-nourished. No distress.   AA   HENT:   Head: Normocephalic and atraumatic.   Eyes: Pupils are equal, round, and reactive to light. EOM are normal.   Neck: Normal range of motion.   Cardiovascular: Normal rate, regular rhythm and normal heart sounds.   Pulmonary/Chest: Effort normal and breath sounds normal.   Abdominal: " Soft. Bowel sounds are normal. He exhibits no shifting dullness, no distension, no abdominal bruit, no ascites and no mass. There is no hepatosplenomegaly. There is tenderness. There is no rigidity, no rebound, no guarding and no CVA tenderness. No hernia. Hernia confirmed negative in the ventral area.   mild   Musculoskeletal: Normal range of motion.   Neurological: He is alert and oriented to person, place, and time.   Skin: Skin is warm and dry.   Psychiatric: He has a normal mood and affect. His behavior is normal. Judgment and thought content normal.   Nursing note and vitals reviewed.    Assessment/Plan      1. Esophageal varices without bleeding, unspecified esophageal varices type (CMS/HCC)    2. Gastroesophageal reflux disease with esophagitis    3. Alcoholic fatty liver    4. GERD with esophagitis    5. Fatty (change of) liver, not elsewhere classified     6. Disorder of fatty-acid metabolism, unspecified     7. Hepatic fibrosis     .   Jameel was seen today for esophageal varices, heartburn and fatty liver.    Diagnoses and all orders for this visit:    Esophageal varices without bleeding, unspecified esophageal varices type (CMS/HCC)  -     REYNOSO Fibrosure; Future  -     Protime-INR; Future  -     AFP Tumor Marker; Future    Gastroesophageal reflux disease with esophagitis    Alcoholic fatty liver  -     REYNOSO Fibrosure; Future  -     Protime-INR; Future  -     AFP Tumor Marker; Future    GERD with esophagitis    Fatty (change of) liver, not elsewhere classified   -     REYNOSO Fibrosure; Future    Disorder of fatty-acid metabolism, unspecified   -     REYNOSO Fibrosure; Future    Hepatic fibrosis   -     AFP Tumor Marker; Future        Orders placed during this encounter include:  Orders Placed This Encounter   Procedures   • REYNOSO Fibrosure     Due before follow up in April     Standing Status:   Future     Standing Expiration Date:   4/30/2020   • Protime-INR     Due before follow up in April     Standing  Status:   Future     Standing Expiration Date:   4/30/2020   • AFP Tumor Marker     Due before follow up in April     Standing Status:   Future     Standing Expiration Date:   4/30/2020       Medications prescribed:  No orders of the defined types were placed in this encounter.      Requested Prescriptions      No prescriptions requested or ordered in this encounter       Review and/or summary of lab tests, radiology, procedures, medications. Review and summary of old records and obtaining of history. The risks and benefits of my recommendations, as well as other treatment options were discussed with the patient today. Questions were answered.    Follow-up: No follow-ups on file.     * Surgery not found *      This document has been electronically signed by Tim Chen PA-C on December 20, 2019 1:04 PM      Results for orders placed or performed in visit on 11/08/19   Protime-INR   Result Value Ref Range    Protime 12.5 11.1 - 15.3 Seconds    INR 0.95 0.80 - 1.20   Hemoglobin A1c   Result Value Ref Range    Hemoglobin A1C 6.01 (H) 4.80 - 5.60 %   Bilirubin, Direct   Result Value Ref Range    Bilirubin, Direct 0.2 0.2 - 0.3 mg/dL   Comprehensive metabolic panel   Result Value Ref Range    Glucose 92 65 - 99 mg/dL    BUN 7 (L) 8 - 23 mg/dL    Creatinine 0.85 0.76 - 1.27 mg/dL    Sodium 147 (H) 136 - 145 mmol/L    Potassium 4.1 3.5 - 5.2 mmol/L    Chloride 105 98 - 107 mmol/L    CO2 26.4 22.0 - 29.0 mmol/L    Calcium 9.6 8.6 - 10.5 mg/dL    Total Protein 8.2 6.0 - 8.5 g/dL    Albumin 4.50 3.50 - 5.20 g/dL    ALT (SGPT) 13 1 - 41 U/L    AST (SGOT) 17 1 - 40 U/L    Alkaline Phosphatase 66 39 - 117 U/L    Total Bilirubin 0.8 0.2 - 1.2 mg/dL    eGFR  African Amer 111 >60 mL/min/1.73    Globulin 3.7 gm/dL    A/G Ratio 1.2 g/dL    BUN/Creatinine Ratio 8.2 7.0 - 25.0    Anion Gap 15.6 (H) 5.0 - 15.0 mmol/L   Results for orders placed or performed in visit on 08/09/19   Hemoglobin A1c   Result Value Ref Range     Hemoglobin A1C 5.00 4.80 - 5.60 %   Results for orders placed or performed in visit on 08/01/19   REYNOSO Fibrosure   Result Value Ref Range    Fibrosis Score (References) 0.32 (H) 0.00 - 0.21    Fibrosis Stage (Reference) F1-F2     Steatosis Score (Reference) 0.29 0.00 - 0.30    Steatosis Grade (Reference) Comment     REYNOSO Score (Reference) 0.25 0.25    Reynoso Grade (Reference) Comment     Height: (Reference) 73 in    Weight: (Reference) 194 LBS    Alpha 2-Macroglobulins, Qn 256 110 - 276 mg/dL    Haptoglobin 147 34 - 200 mg/dL    Apolipoprotein A-1 187 (H) 101 - 178 mg/dL    Total Bilirubin 0.5 0.0 - 1.2 mg/dL    GGT 54 0 - 65 IU/L    ALT (SGPT) 17 0 - 55 IU/L    AST (SGOT) P5P (Reference) 20 0 - 40 IU/L    Cholesterol, Total (Reference) 233 (H) 100 - 199 mg/dL    Glucose, Serum (Reference) 90 65 - 99 mg/dL    Triglycerides 153 (H) 0 - 149 mg/dL    Interpretations: (Reference) Comment     Fibrosis Scoring: Comment     Steatosis Grading (Reference) Comment     Reynoso Scoring (Reference) Comment     Limitations: (Reference) Comment     Comment (Reference) Comment    CBC Auto Differential   Result Value Ref Range    WBC 5.06 3.40 - 10.80 10*3/mm3    RBC 6.18 (H) 4.14 - 5.80 10*6/mm3    Hemoglobin 15.6 13.0 - 17.7 g/dL    Hematocrit 49.0 37.5 - 51.0 %    MCV 79.3 79.0 - 97.0 fL    MCH 25.2 (L) 26.6 - 33.0 pg    MCHC 31.8 31.5 - 35.7 g/dL    RDW 15.2 12.3 - 15.4 %    RDW-SD 41.5 37.0 - 54.0 fl    MPV 13.1 (H) 6.0 - 12.0 fL    Platelets 238 140 - 450 10*3/mm3    Neutrophil % 52.2 42.7 - 76.0 %    Lymphocyte % 32.0 19.6 - 45.3 %    Monocyte % 8.9 5.0 - 12.0 %    Eosinophil % 4.7 0.3 - 6.2 %    Basophil % 1.8 (H) 0.0 - 1.5 %    Immature Grans % 0.4 0.0 - 0.5 %    Neutrophils, Absolute 2.64 1.70 - 7.00 10*3/mm3    Lymphocytes, Absolute 1.62 0.70 - 3.10 10*3/mm3    Monocytes, Absolute 0.45 0.10 - 0.90 10*3/mm3    Eosinophils, Absolute 0.24 0.00 - 0.40 10*3/mm3    Basophils, Absolute 0.09 0.00 - 0.20 10*3/mm3    Immature Grans,  Absolute 0.02 0.00 - 0.05 10*3/mm3    nRBC 0.2 0.0 - 0.2 /100 WBC   AFP Tumor Marker   Result Value Ref Range    ALPHA-FETOPROTEIN 1.43 0 - 8.3 ng/mL   Hepatitis Panel, Acute   Result Value Ref Range    Hepatitis B Surface Ag Non-Reactive Non-Reactive    Hep A IgM Non-Reactive Non-Reactive    Hep B C IgM Non-Reactive Non-Reactive    Hepatitis C Ab Non-Reactive Non-Reactive   Protime-INR   Result Value Ref Range    Protime 13.2 11.1 - 15.3 Seconds    INR 1.02 0.80 - 1.20   Comprehensive Metabolic Panel   Result Value Ref Range    Glucose 93 65 - 99 mg/dL    BUN 7 (L) 8 - 23 mg/dL    Creatinine 0.79 0.76 - 1.27 mg/dL    Sodium 139 136 - 145 mmol/L    Potassium 3.4 (L) 3.5 - 5.2 mmol/L    Chloride 98 98 - 107 mmol/L    CO2 27.0 22.0 - 29.0 mmol/L    Calcium 9.3 8.6 - 10.5 mg/dL    Total Protein 7.5 6.0 - 8.5 g/dL    Albumin 4.40 3.50 - 5.20 g/dL    ALT (SGPT) 14 1 - 41 U/L    AST (SGOT) 17 1 - 40 U/L    Alkaline Phosphatase 54 39 - 117 U/L    Total Bilirubin 0.6 0.2 - 1.2 mg/dL    eGFR  African Amer 121 >60 mL/min/1.73    Globulin 3.1 gm/dL    A/G Ratio 1.4 g/dL    BUN/Creatinine Ratio 8.9 7.0 - 25.0    Anion Gap 14.0 5.0 - 15.0 mmol/L   Results for orders placed or performed during the hospital encounter of 03/06/19   Tissue Pathology Exam   Result Value Ref Range    Case Report       Surgical Pathology Report                         Case: [id number]                                  Authorizing Provider:  Alli Dockery MD        Collected:           03/06/2019 01:58 PM          Ordering Location:     [facility]             Received:            03/06/2019 02:45 PM                                 [facility]                                                     Pathologist:           Roberto Clarke MD                                                        Specimen:    Gastric, Antrum                                                                            Final Diagnosis       GASTRIC ANTRUM,  MUCOSAL BIOPSY:  MILD CHRONIC GASTRITIS.  NO EVIDENCE OF HELICOBACTER PYLORI.      Gross Description       The specimen consists of a mucosal biopsy measuring up to 0.3 cm in greatest dimension.  All embedded as 1A.       *Note: Due to a large number of results and/or encounters for the requested time period, some results have not been displayed. A complete set of results can be found in Results Review.       Some portions of this note have been dictated using voice recognition software and may contain errors and/or omissions.

## 2020-01-31 DIAGNOSIS — K21.00 GERD WITH ESOPHAGITIS: ICD-10-CM

## 2020-01-31 DIAGNOSIS — Z00.00 PREVENTATIVE HEALTH CARE: ICD-10-CM

## 2020-01-31 RX ORDER — CETIRIZINE HYDROCHLORIDE 10 MG/1
TABLET ORAL
Qty: 30 TABLET | Refills: 0 | OUTPATIENT
Start: 2020-01-31

## 2020-02-20 NOTE — PROGRESS NOTES
Pulmonary Office Follow-up    Subjective     Jameel Dozier is seen today at the office for   Chief Complaint   Patient presents with   • COPD         HPI  Jameel Dozier is a 59 y.o. male with a PMH significant for COPD, tobacco use, [place], obesity, HTN, and GERD who presents for follow-up of COPD.      8/30/19: Recommended continuing on Spiriva daily and using albuterol only as needed.  I also encouraged him to use his Flonase more frequently for his chronic rhinitis and counseled him on the importance of complete tobacco cessation.  Patient was agreeable to undergoing lung cancer screening as scheduled.    11/12/19: He remained stable on Spiriva so I again spent time counseling on importance of complete tobacco cessation but he is not willing to quit.  I also reviewed his lung cancer screening CT which did not show any concerning nodules.    2/21/20: He states that his breathing is doing well on Spiriva and he has not needed to use his albuterol.  Patient does admit to onset of sore throat about 2 days ago as well as some nasal congestion/drainage and occasional cough.  He denies any fever, chills, or sputum production.  He thinks his blood pressure is up today as he took some over-the-counter cold medicine.  He does not know exactly what medication he took.  Patient does admit that he does not monitor his blood pressure at home.  Unfortunately, he does continue to smoke and he is still not interested in quitting.  He denies any recent antibiotics or steroid use.    Tobacco use history:  Type: cigarettes  Amount: 1 ppd  Duration: 45 years  Cessation: N/a   Willing to quit: No      Patient Active Problem List   Diagnosis   • Central obesity   • Hypercholesterolemia   • Essential hypertension   • Astigmatism   • Type 2 diabetes mellitus with complication, without long-term current use of insulin (CMS/[facility])   • Cigarette nicotine dependence without complication   • Tobacco use disorder   • Chronic  obstructive pulmonary disease (CMS/HCC)   • Overweight (BMI 25.0-29.9)   • Physical deconditioning   • Chronic non-seasonal allergic rhinitis   • Imaging of gastrointestinal tract abnormal   • Pain of upper abdomen   • Gastroesophageal reflux disease with esophagitis   • Screening for hyperlipidemia   • Weakness   • Upper respiratory infection   • Transient cerebral ischemia   • Sleep disorder   • Rectal hemorrhage   • Rectal bleed   • Primary osteoarthritis of knees, bilateral   • Obstructive sleep apnea syndrome   • Male erectile disorder   • Left ventricular hypertrophy   • Hypertension   • History of TIA (transient ischemic attack)   • History of EKG   • History of echocardiogram   • History of colon polyps   • Hiccough   • Hiatal hernia   • Hemorrhoids   • Heavy tobacco smoker   • Gastritis   • External hemorrhoids   • Esophagitis   • Esophageal reflux   • Esophageal dysmotility   • Epigastric pain   • ED (erectile dysfunction)   • Dyspnea   • Dysphagia   • Diverticular disease of colon   • Disorder of peripheral nervous system   • Claudication (CMS/HCC)   • Chronic obstructive lung disease (CMS/HCC)   • Cerebrovascular disease   • Benign essential hypertension   • Anemia due to blood loss   • Anal fissure   • Allergic rhinitis   • Alcohol dependence with alcohol-induced disorder (CMS/HCC)   • Adenomatous polyp of colon   • Acquired achalasia of esophagus   • Abdominal pain   • Adverse drug effect   • Intractable hiccups   • Non-intractable vomiting with nausea       Review of Systems  Review of Systems   Constitutional: Negative for fever and unexpected weight change.   HENT: Positive for congestion, rhinorrhea and sore throat. Negative for postnasal drip.    Respiratory: Positive for cough and shortness of breath. Negative for chest tightness and wheezing.    Cardiovascular: Negative for chest pain and leg swelling.     As described in the HPI. Otherwise, remainder of ROS (14 systems) were  negative.    Medications, Allergies, Social, and Family Histories reviewed as per EMR.    Objective     Vitals:    02/21/20 0943   BP: 168/98   Pulse: 104   SpO2: 98%         02/21/20  0943   Weight: 89.8 kg (198 lb)     Physical Exam   Constitutional: He is oriented to person, place, and time. Vital signs are normal. He appears well-developed and well-nourished.   obese   HENT:   Head: Normocephalic and atraumatic.   Nose: Rhinorrhea present. No mucosal edema.   Mouth/Throat: Uvula is midline, oropharynx is clear and moist and mucous membranes are normal. Abnormal dentition.   Mallampati 4, large tongue   Eyes: Pupils are equal, round, and reactive to light. Conjunctivae, EOM and lids are normal.   Neck: Trachea normal and normal range of motion. No tracheal tenderness present. No thyroid mass present.   Cardiovascular: Normal rate, regular rhythm and normal heart sounds. PMI is not displaced. Exam reveals no gallop.   No murmur heard.  Pulmonary/Chest: Effort normal and breath sounds normal. No respiratory distress. He has no decreased breath sounds. He has no wheezes. He has no rhonchi. He exhibits no tenderness.   Abdominal: Soft. Normal appearance and bowel sounds are normal. There is no hepatomegaly. There is no tenderness.   obese   Musculoskeletal:   Normal gait, no extremity edema     Vascular Status -  His right foot exhibits no edema. His left foot exhibits no edema.  Lymphadenopathy:        Head (right side): No submandibular adenopathy present.        Head (left side): No submandibular adenopathy present.     He has no cervical adenopathy.        Right: No supraclavicular adenopathy present.        Left: No supraclavicular adenopathy present.   Neurological: He is alert and oriented to person, place, and time.   Skin: Skin is warm and dry. No cyanosis. Nails show no clubbing.   Psychiatric: He has a normal mood and affect. His speech is normal and behavior is normal. Judgment normal.   Nursing note and  vitals reviewed.    PFTs: 11/27/17  Ratio 89  FVC 2.4/ 55%  FEV1 2.13/ 63%  Poor effort.  Reduced FVC and FEV1 suggesting restriction.  No bronchodilator response.  No comparative data available.     IMAGING: LDCT 11/13/19 (independently reviewed and interpreted by me) showed no nodules, NACPD, distended esophagus suspicious for reflux      Assessment/Plan     Jameel was seen today for copd.    Diagnoses and all orders for this visit:    Viral upper respiratory tract infection    Chronic obstructive pulmonary disease, unspecified COPD type (CMS/[facility])  -     albuterol sulfate  (90 Base) MCG/ACT inhaler; Inhale 2 puffs Every 4 (Four) Hours As Needed for Wheezing or Shortness of Air.    Chronic non-seasonal allergic rhinitis    Obstructive sleep apnea syndrome    Cigarette nicotine dependence without complication    Essential hypertension         Discussion/ Recommendations:   I think he is likely experiencing a mild viral URI at this time.  He is not having any worsening of his COPD so do not feel that steroids are necessary.  I have encouraged over-the-counter symptomatic management but cautioned on using medications that are safe to use with high blood pressure.  Otherwise, he continues to smoke and is not interested in quitting.    -Continue Spiriva handihaler daily   -Use Albuterol as needed for dyspnea or wheeze.    -Continue Zyrtec and Flonase daily.  Use frequent nasal saline.  -Continue antihypertensives.  Encouraged him to start monitoring his blood pressure at home.  Follow-up with PCP if blood pressure remains elevated.  -Jameel Dozier is a current cigarettes user.  He currently smokes 1 pack of cigarettes per day for a duration of 45 years. I have educated him on the risk of diseases from using tobacco products such as cancer, COPD and heart diease.  I advised him to quit and he is not willing to quit. I spent 1 minutes counseling the patient.  -Annual lung cancer screening CT on or after  11/13/2020 (LDCT); 1ppd x 45yrs still smoking with no symptoms of lung cancer.   -Up-to-date with pneumococcal vaccine.  Refuses flu vaccination despite counseling.    Patient's Body mass index is 26.12 kg/m². BMI is above normal parameters. Recommendations include: exercise counseling.      Return in about 6 months (around 8/21/2020) for Recheck COPD.          This document has been electronically signed by Yolanda Phan MD on February 21, 2020 9:56 AM      Dictated using Dragon

## 2020-02-21 ENCOUNTER — OFFICE VISIT (OUTPATIENT)
Dept: PULMONOLOGY | Facility: CLINIC | Age: 60
End: 2020-02-21

## 2020-02-21 VITALS
OXYGEN SATURATION: 98 % | DIASTOLIC BLOOD PRESSURE: 98 MMHG | HEIGHT: 73 IN | SYSTOLIC BLOOD PRESSURE: 168 MMHG | WEIGHT: 198 LBS | HEART RATE: 104 BPM | BODY MASS INDEX: 26.24 KG/M2

## 2020-02-21 DIAGNOSIS — J44.9 CHRONIC OBSTRUCTIVE PULMONARY DISEASE, UNSPECIFIED COPD TYPE (HCC): ICD-10-CM

## 2020-02-21 DIAGNOSIS — I10 ESSENTIAL HYPERTENSION: ICD-10-CM

## 2020-02-21 DIAGNOSIS — F17.210 CIGARETTE NICOTINE DEPENDENCE WITHOUT COMPLICATION: ICD-10-CM

## 2020-02-21 DIAGNOSIS — J30.89 CHRONIC NON-SEASONAL ALLERGIC RHINITIS: ICD-10-CM

## 2020-02-21 DIAGNOSIS — J06.9 VIRAL UPPER RESPIRATORY TRACT INFECTION: Primary | ICD-10-CM

## 2020-02-21 DIAGNOSIS — G47.33 OBSTRUCTIVE SLEEP APNEA SYNDROME: ICD-10-CM

## 2020-02-21 PROCEDURE — 99214 OFFICE O/P EST MOD 30 MIN: CPT | Performed by: INTERNAL MEDICINE

## 2020-02-21 RX ORDER — ALBUTEROL SULFATE 90 UG/1
2 AEROSOL, METERED RESPIRATORY (INHALATION) EVERY 4 HOURS PRN
Qty: 18 G | Refills: 11 | Status: SHIPPED | OUTPATIENT
Start: 2020-02-21 | End: 2020-06-10 | Stop reason: SDUPTHER

## 2020-04-10 ENCOUNTER — TRANSCRIBE ORDERS (OUTPATIENT)
Dept: ORTHOPEDIC SURGERY | Facility: CLINIC | Age: 60
End: 2020-04-10

## 2020-04-10 DIAGNOSIS — S62.636G CLOSED DISPLACED FRACTURE OF DISTAL PHALANX OF RIGHT LITTLE FINGER WITH DELAYED HEALING, SUBSEQUENT ENCOUNTER: Primary | ICD-10-CM

## 2020-04-16 ENCOUNTER — OFFICE VISIT (OUTPATIENT)
Dept: ORTHOPEDIC SURGERY | Facility: CLINIC | Age: 60
End: 2020-04-16

## 2020-04-16 ENCOUNTER — OFFICE VISIT (OUTPATIENT)
Dept: GASTROENTEROLOGY | Facility: CLINIC | Age: 60
End: 2020-04-16

## 2020-04-16 VITALS
WEIGHT: 196.2 LBS | HEART RATE: 94 BPM | SYSTOLIC BLOOD PRESSURE: 160 MMHG | BODY MASS INDEX: 26 KG/M2 | DIASTOLIC BLOOD PRESSURE: 90 MMHG | HEIGHT: 73 IN | OXYGEN SATURATION: 98 %

## 2020-04-16 VITALS — BODY MASS INDEX: 25.98 KG/M2 | HEIGHT: 73 IN | WEIGHT: 196 LBS

## 2020-04-16 DIAGNOSIS — M79.641 PAIN OF RIGHT HAND: ICD-10-CM

## 2020-04-16 DIAGNOSIS — K21.00 GASTROESOPHAGEAL REFLUX DISEASE WITH ESOPHAGITIS: ICD-10-CM

## 2020-04-16 DIAGNOSIS — K21.00 GERD WITH ESOPHAGITIS: ICD-10-CM

## 2020-04-16 DIAGNOSIS — I85.00 ESOPHAGEAL VARICES WITHOUT BLEEDING, UNSPECIFIED ESOPHAGEAL VARICES TYPE (HCC): Primary | ICD-10-CM

## 2020-04-16 DIAGNOSIS — S62.326A CLOSED DISPLACED FRACTURE OF SHAFT OF FIFTH METACARPAL BONE OF RIGHT HAND, INITIAL ENCOUNTER: Primary | ICD-10-CM

## 2020-04-16 DIAGNOSIS — K76.0 FATTY (CHANGE OF) LIVER, NOT ELSEWHERE CLASSIFIED: ICD-10-CM

## 2020-04-16 DIAGNOSIS — R52 PAIN: Primary | ICD-10-CM

## 2020-04-16 DIAGNOSIS — K74.00 HEPATIC FIBROSIS: ICD-10-CM

## 2020-04-16 DIAGNOSIS — K70.0 ALCOHOLIC FATTY LIVER: ICD-10-CM

## 2020-04-16 DIAGNOSIS — E71.30 DISORDER OF FATTY-ACID METABOLISM, UNSPECIFIED: ICD-10-CM

## 2020-04-16 PROCEDURE — 26605 TREAT METACARPAL FRACTURE: CPT | Performed by: NURSE PRACTITIONER

## 2020-04-16 PROCEDURE — 99213 OFFICE O/P EST LOW 20 MIN: CPT | Performed by: PHYSICIAN ASSISTANT

## 2020-04-16 PROCEDURE — 99214 OFFICE O/P EST MOD 30 MIN: CPT | Performed by: NURSE PRACTITIONER

## 2020-04-16 RX ORDER — HYDROCODONE BITARTRATE AND ACETAMINOPHEN 5; 325 MG/1; MG/1
1 TABLET ORAL EVERY 8 HOURS PRN
Qty: 30 TABLET | Refills: 0 | Status: SHIPPED | OUTPATIENT
Start: 2020-04-16 | End: 2020-04-26

## 2020-04-16 NOTE — PROGRESS NOTES
Chief Complaint   Patient presents with   • Fatty Liver   • Heartburn   • Esophageal Varices       ENDO PROCEDURE ORDERED:    Subjective    Jameel Dozier is a 59 y.o. male. he is here today for follow-up.    History of Present Illness    Patient seen on a recheck of his REYNOSO, esophageal varices, fatty liver, GERD, F1-F2/S0/N0. Last seen 12/16/2019. He did not get his laboratories drawn. He has been having problems with his right hand. He had a fracture, went to [facility] on 04/09/2020. His hand is in a wrap. He did see Valarie hernandez in Orthopedics today. He states he is doing okay with his swallowing. He still has chronic hiccups. He has occasional difficulty swallowing. He is taking his Prilosec. He denied nausea or vomiting. Bowels are moving without blood or mucus. Weight is down 3.5 pounds since last visit. I did review his imaging. Last EGD 03/06/2019 showed esophageal stricture, dilated, grade 1 varices. Last colonoscopy showed hemorrhoids, 04/13/2015. I did mention to him repeating the colonoscopy at this time but he declined. Currently because of the COVID-19 we are deferring routine screenings.     ASSESSMENT/PLAN: Patient with esophageal varices, fatty liver, GERD, abdominal pain, chronic hiccups. He was encouraged to get his laboratories drawn as previously ordered. Will plan followup in 3-6 months, I did discuss repeating colonoscopy with him when that is able. Will plan further pending clinical course and the results of the above.        The following portions of the patient's history were reviewed and updated as appropriate:   Past Medical History:   Diagnosis Date   • Abdominal pain    • Acquired achalasia of esophagus    • Adenomatous polyp of colon    • Adverse drug effect    • Alcohol dependence with alcohol-induced disorder (CMS/HCC)    • Allergic rhinitis    • Anal fissure    • Anemia due to blood loss    • Benign essential hypertension    • Central obesity    • Cerebrovascular disease    •  Chronic obstructive lung disease (CMS/HCC)    • Claudication (CMS/HCC)    • Disorder of peripheral nervous system    • Diverticular disease of colon     completed antibx, ? neoplasm on CT   • Dysphagia    • Dyspnea    • ED (erectile dysfunction)    • Epigastric pain    • Esophageal dysmotility    • Esophageal reflux    • Esophagitis    • Essential hypertension    • External hemorrhoids    • Gastritis    • GERD with esophagitis    • Heavy tobacco smoker    • Hemorrhoids    • Hiatal hernia    • Hiccough    • History of colon polyps    • History of echocardiogram     Normal LV funciton with Ef of 65% to 70% without regional wall motion abnormalities.Mild CLVH. Normal RV size and function. No significant valvular regurgitation or stenosis. 09/19/2014       • History of EKG    • History of TIA (transient ischemic attack)    • Hypercholesterolemia    • Hypertension    • Left ventricular hypertrophy    • Male erectile disorder    • Obstructive sleep apnea syndrome    • Primary osteoarthritis of knees, bilateral    • Rectal bleed     painful   • Rectal hemorrhage    • Sleep disorder    • Transient cerebral ischemia    • Upper respiratory infection    • Weakness     Attacks of weakness     Past Surgical History:   Procedure Laterality Date   • COLONOSCOPY  04/13/2015    Internal and external hemorrhoids found. 04/13/2015    • ENDOSCOPY      Internal & external hemorrhoids found. 1 polyp in sigmoid colon; removed by snare cautery polypectomy. 09/26/2012    • ENDOSCOPY      Gastritis found in the stomach. Biopsy taken.Enlarged folds were found in the body of the stomach.Biopsy taken.Normal duodenum.A hiatus hernia was found in the esophagus. 04/13/2015    • ENDOSCOPY      Hiatus hernia in esophagus. Gastritis in stomach. Biopsy taken. Normal duodenum. Biopsy taken. 09/26/2012       • ENDOSCOPY N/A 12/18/2017    Procedure: ESOPHAGOGASTRODUODENOSCOPY--attn mid esophagus, abnl on CT;  Surgeon: Alli Dockery MD;  Location:   [facility] ENDOSCOPY;  Service:    • ENDOSCOPY N/A 3/6/2019    Procedure: ESOPHAGOGASTRODUODENOSCOPY WITH DILATATION;  Surgeon: Alli Dockery MD;  Location: [facility] ENDOSCOPY;  Service: Gastroenterology   • UPPER GASTROINTESTINAL ENDOSCOPY  08/24/2016   • UPPER GASTROINTESTINAL ENDOSCOPY  04/13/2015   • UPPER GASTROINTESTINAL ENDOSCOPY  12/18/2017   • UPPER GASTROINTESTINAL ENDOSCOPY  03/06/2019     Family History   Problem Relation Age of Onset   • Cancer Mother    • Cirrhosis Father    • Diabetes Other    • Hypertension Other        Allergies   Allergen Reactions   • Ace Inhibitors Angioedema     hypotension   • Lisinopril Other (See Comments)     cough   • Vistaril [Hydroxyzine Hcl] Other (See Comments)     Patient is unsure      Social History     Socioeconomic History   • Marital status: Single     Spouse name: Not on file   • Number of children: 0   • Years of education: 12   • Highest education level: Not on file   Occupational History   • Occupation: retired   Tobacco Use   • Smoking status: Current Every Day Smoker     Packs/day: 1.50     Years: 40.00     Pack years: 60.00     Types: Cigarettes   • Smokeless tobacco: Never Used   Substance and Sexual Activity   • Alcohol use: Yes     Alcohol/week: 6.0 standard drinks     Types: 6 Cans of beer per week     Comment: when can get   • Drug use: No   • Sexual activity: Defer     Partners: Female     Current Medications:  Prior to Admission medications    Medication Sig Start Date End Date Taking? Authorizing Provider   albuterol sulfate  (90 Base) MCG/ACT inhaler Inhale 2 puffs Every 4 (Four) Hours As Needed for Wheezing or Shortness of Air. 2/21/20  Yes Yolanda Phan MD   aspirin (ASPIRIN LOW DOSE) 81 MG EC tablet Take 1 tablet by mouth Daily. 8/9/19  Yes Jeri Danielson MD   Blood Pressure Monitoring (ADULT BLOOD PRESSURE CUFF LG) kit Check BP daily 8/9/19  Yes Jeri Danielson MD   cetirizine (zyrTEC) 10 MG tablet Take 1 tablet by mouth Daily.  "11/8/19  Yes Jeri Danielson MD   ferrous sulfate (FEOSOL) 325 (65 FE) MG tablet Take 1 tablet by mouth 3 (Three) Times a Day With Meals. 11/8/19  Yes Jeri Danielson MD   fluticasone (FLONASE) 50 MCG/ACT nasal spray 2 sprays into the nostril(s) as directed by provider Daily. 11/8/19  Yes Jeri Danielson MD   hydrALAZINE (APRESOLINE) 10 MG tablet Take 1 tablet by mouth 3 (Three) Times a Day. 11/8/19  Yes Jeri Danielson MD   HYDROcodone-acetaminophen (NORCO) 5-325 MG per tablet Take 1 tablet by mouth Every 8 (Eight) Hours As Needed for Severe Pain  for up to 10 days. 4/16/20 4/26/20 Yes Valarie Gamez APRN   losartan (COZAAR) 100 MG tablet Take 1 tablet by mouth Daily. 11/8/19  Yes Jeri Danielson MD   magnesium oxide (MAGOX) 400 (241.3 Mg) MG tablet tablet Take 1 tablet by mouth Daily. 11/8/19  Yes Jeri Danielson MD   metFORMIN (GLUCOPHAGE) 500 MG tablet Take 1 tablet by mouth Daily With Breakfast. 1/31/20  Yes Jeri Danielson MD   metoprolol tartrate (LOPRESSOR) 25 MG tablet Take 1 tablet by mouth Every 12 (Twelve) Hours. 11/8/19  Yes Jeri Danielson MD   Multiple Vitamins-Minerals (MULTIVITAL) tablet Take 1 tablet by mouth Daily. 11/8/19 11/7/20 Yes Jeri Danielson MD   omeprazole (priLOSEC) 20 MG capsule Take 1 capsule by mouth Every Morning. 11/8/19  Yes Jeri Danielson MD   tiotropium (SPIRIVA) 18 MCG per inhalation capsule Place 1 capsule into inhaler and inhale Daily. 11/8/19  Yes Jeri Danielson MD     Review of Systems  Review of Systems       Objective    /90   Pulse 94   Ht 185.4 cm (73\")   Wt 89 kg (196 lb 3.2 oz)   SpO2 98%   BMI 25.89 kg/m²   Physical Exam   Constitutional: He is oriented to person, place, and time. He appears well-developed and well-nourished. No distress.   AA   HENT:   Head: Normocephalic and atraumatic.   Eyes: Pupils are equal, round, and reactive to light. EOM are normal.   Neck: Normal range of motion.   Cardiovascular: Normal rate, regular " rhythm and normal heart sounds.   Pulmonary/Chest: Effort normal and breath sounds normal.   Abdominal: Soft. Bowel sounds are normal. He exhibits no shifting dullness, no distension, no abdominal bruit, no ascites and no mass. There is no hepatosplenomegaly. There is tenderness. There is no rigidity, no rebound, no guarding and no CVA tenderness. No hernia. Hernia confirmed negative in the ventral area.   mild   Musculoskeletal: Normal range of motion.   Neurological: He is alert and oriented to person, place, and time.   Skin: Skin is warm and dry.   Psychiatric: He has a normal mood and affect. His behavior is normal. Judgment and thought content normal.   Nursing note and vitals reviewed.    Assessment/Plan      1. Esophageal varices without bleeding, unspecified esophageal varices type (CMS/HCC)    2. Gastroesophageal reflux disease with esophagitis    3. Alcoholic fatty liver    4. GERD with esophagitis    5. Hepatic fibrosis     .   Jameel was seen today for fatty liver, heartburn and esophageal varices.    Diagnoses and all orders for this visit:    Esophageal varices without bleeding, unspecified esophageal varices type (CMS/HCC)    Gastroesophageal reflux disease with esophagitis    Alcoholic fatty liver    GERD with esophagitis    Hepatic fibrosis         Orders placed during this encounter include:  No orders of the defined types were placed in this encounter.      Medications prescribed:  No orders of the defined types were placed in this encounter.      Requested Prescriptions      No prescriptions requested or ordered in this encounter       Review and/or summary of lab tests, radiology, procedures, medications. Review and summary of old records and obtaining of history. The risks and benefits of my recommendations, as well as other treatment options were discussed with the patient today. Questions were answered.    Follow-up: Return in about 4 months (around 8/16/2020), or if symptoms worsen or fail  to improve, for lab today.     * Surgery not found *      This document has been electronically signed by Tim Chen PA-C on April 24, 2020 15:24      Results for orders placed or performed in visit on 11/08/19   Protime-INR   Result Value Ref Range    Protime 12.5 11.1 - 15.3 Seconds    INR 0.95 0.80 - 1.20   Hemoglobin A1c   Result Value Ref Range    Hemoglobin A1C 6.01 (H) 4.80 - 5.60 %   Bilirubin, Direct   Result Value Ref Range    Bilirubin, Direct 0.2 0.2 - 0.3 mg/dL   Comprehensive metabolic panel   Result Value Ref Range    Glucose 92 65 - 99 mg/dL    BUN 7 (L) 8 - 23 mg/dL    Creatinine 0.85 0.76 - 1.27 mg/dL    Sodium 147 (H) 136 - 145 mmol/L    Potassium 4.1 3.5 - 5.2 mmol/L    Chloride 105 98 - 107 mmol/L    CO2 26.4 22.0 - 29.0 mmol/L    Calcium 9.6 8.6 - 10.5 mg/dL    Total Protein 8.2 6.0 - 8.5 g/dL    Albumin 4.50 3.50 - 5.20 g/dL    ALT (SGPT) 13 1 - 41 U/L    AST (SGOT) 17 1 - 40 U/L    Alkaline Phosphatase 66 39 - 117 U/L    Total Bilirubin 0.8 0.2 - 1.2 mg/dL    eGFR  African Amer 111 >60 mL/min/1.73    Globulin 3.7 gm/dL    A/G Ratio 1.2 g/dL    BUN/Creatinine Ratio 8.2 7.0 - 25.0    Anion Gap 15.6 (H) 5.0 - 15.0 mmol/L   Results for orders placed or performed in visit on 08/09/19   Hemoglobin A1c   Result Value Ref Range    Hemoglobin A1C 5.00 4.80 - 5.60 %   Results for orders placed or performed in visit on 08/01/19   REYNOSO Fibrosure   Result Value Ref Range    Fibrosis Score (References) 0.32 (H) 0.00 - 0.21    Fibrosis Stage (Reference) F1-F2     Steatosis Score (Reference) 0.29 0.00 - 0.30    Steatosis Grade (Reference) Comment     REYNOSO Score (Reference) 0.25 0.25    Reynoso Grade (Reference) Comment     Height: (Reference) 73 in    Weight: (Reference) 194 LBS    Alpha 2-Macroglobulins, Qn 256 110 - 276 mg/dL    Haptoglobin 147 34 - 200 mg/dL    Apolipoprotein A-1 187 (H) 101 - 178 mg/dL    Total Bilirubin 0.5 0.0 - 1.2 mg/dL    GGT 54 0 - 65 IU/L    ALT (SGPT) 17 0 - 55 IU/L    AST  (SGOT) P5P (Reference) 20 0 - 40 IU/L    Cholesterol, Total (Reference) 233 (H) 100 - 199 mg/dL    Glucose, Serum (Reference) 90 65 - 99 mg/dL    Triglycerides 153 (H) 0 - 149 mg/dL    Interpretations: (Reference) Comment     Fibrosis Scoring: Comment     Steatosis Grading (Reference) Comment     Jorgensen Scoring (Reference) Comment     Limitations: (Reference) Comment     Comment (Reference) Comment    CBC Auto Differential   Result Value Ref Range    WBC 5.06 3.40 - 10.80 10*3/mm3    RBC 6.18 (H) 4.14 - 5.80 10*6/mm3    Hemoglobin 15.6 13.0 - 17.7 g/dL    Hematocrit 49.0 37.5 - 51.0 %    MCV 79.3 79.0 - 97.0 fL    MCH 25.2 (L) 26.6 - 33.0 pg    MCHC 31.8 31.5 - 35.7 g/dL    RDW 15.2 12.3 - 15.4 %    RDW-SD 41.5 37.0 - 54.0 fl    MPV 13.1 (H) 6.0 - 12.0 fL    Platelets 238 140 - 450 10*3/mm3    Neutrophil % 52.2 42.7 - 76.0 %    Lymphocyte % 32.0 19.6 - 45.3 %    Monocyte % 8.9 5.0 - 12.0 %    Eosinophil % 4.7 0.3 - 6.2 %    Basophil % 1.8 (H) 0.0 - 1.5 %    Immature Grans % 0.4 0.0 - 0.5 %    Neutrophils, Absolute 2.64 1.70 - 7.00 10*3/mm3    Lymphocytes, Absolute 1.62 0.70 - 3.10 10*3/mm3    Monocytes, Absolute 0.45 0.10 - 0.90 10*3/mm3    Eosinophils, Absolute 0.24 0.00 - 0.40 10*3/mm3    Basophils, Absolute 0.09 0.00 - 0.20 10*3/mm3    Immature Grans, Absolute 0.02 0.00 - 0.05 10*3/mm3    nRBC 0.2 0.0 - 0.2 /100 WBC   AFP Tumor Marker   Result Value Ref Range    ALPHA-FETOPROTEIN 1.43 0 - 8.3 ng/mL   Hepatitis Panel, Acute   Result Value Ref Range    Hepatitis B Surface Ag Non-Reactive Non-Reactive    Hep A IgM Non-Reactive Non-Reactive    Hep B C IgM Non-Reactive Non-Reactive    Hepatitis C Ab Non-Reactive Non-Reactive   Protime-INR   Result Value Ref Range    Protime 13.2 11.1 - 15.3 Seconds    INR 1.02 0.80 - 1.20   Comprehensive Metabolic Panel   Result Value Ref Range    Glucose 93 65 - 99 mg/dL    BUN 7 (L) 8 - 23 mg/dL    Creatinine 0.79 0.76 - 1.27 mg/dL    Sodium 139 136 - 145 mmol/L    Potassium 3.4 (L)  3.5 - 5.2 mmol/L    Chloride 98 98 - 107 mmol/L    CO2 27.0 22.0 - 29.0 mmol/L    Calcium 9.3 8.6 - 10.5 mg/dL    Total Protein 7.5 6.0 - 8.5 g/dL    Albumin 4.40 3.50 - 5.20 g/dL    ALT (SGPT) 14 1 - 41 U/L    AST (SGOT) 17 1 - 40 U/L    Alkaline Phosphatase 54 39 - 117 U/L    Total Bilirubin 0.6 0.2 - 1.2 mg/dL    eGFR  African Amer 121 >60 mL/min/1.73    Globulin 3.1 gm/dL    A/G Ratio 1.4 g/dL    BUN/Creatinine Ratio 8.9 7.0 - 25.0    Anion Gap 14.0 5.0 - 15.0 mmol/L   Results for orders placed or performed during the hospital encounter of 03/06/19   Tissue Pathology Exam   Result Value Ref Range    Case Report       Surgical Pathology Report                         Case: [id number]                                  Authorizing Provider:  Alli Dockery MD        Collected:           03/06/2019 01:58 PM          Ordering Location:     [facility]             Received:            03/06/2019 02:45 PM                                 [facility]                                                     Pathologist:           Roberot Clarke MD                                                        Specimen:    Gastric, Antrum                                                                            Final Diagnosis       GASTRIC ANTRUM, MUCOSAL BIOPSY:  MILD CHRONIC GASTRITIS.  NO EVIDENCE OF HELICOBACTER PYLORI.      Gross Description       The specimen consists of a mucosal biopsy measuring up to 0.3 cm in greatest dimension.  All embedded as 1A.       *Note: Due to a large number of results and/or encounters for the requested time period, some results have not been displayed. A complete set of results can be found in Results Review.       Some portions of this note have been dictated using voice recognition software and may contain errors and/or omissions.
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
## 2020-04-16 NOTE — PATIENT INSTRUCTIONS
"  BMI for Adults    Body mass index (BMI) is a number that is calculated from a person's weight and height. BMI may help to estimate how much of a person's weight is composed of fat. BMI can help identify those who may be at higher risk for certain medical problems.  How is BMI used with adults?  BMI is used as a screening tool to identify possible weight problems. It is used to check whether a person is obese, overweight, healthy weight, or underweight.  How is BMI calculated?  BMI measures your weight and compares it to your height. This can be done either in English ([place] or metric measurements. Note that charts are available to help you find your BMI quickly and easily without having to do these calculations yourself.  To calculate your BMI in English ([place] measurements, your health care provider will:  1. Measure your weight in pounds (lb).  2. Multiply the number of pounds by 703.  ? For example, for a person who weighs 180 lb, multiply that number by 703, which equals 126,540.  3. Measure your height in inches (in). Then multiply that number by itself to get a measurement called \"inches squared.\"  ? For example, for a person who is 70 in tall, the \"inches squared\" measurement is 70 in x 70 in, which equals 4900 inches squared.  4. Divide the total from Step 2 (number of lb x 703) by the total from Step 3 (inches squared): 126,540 ÷ 4900 = 25.8. This is your BMI.  To calculate your BMI in metric measurements, your health care provider will:  1. Measure your weight in kilograms (kg).  2. Measure your height in meters (m). Then multiply that number by itself to get a measurement called \"meters squared.\"  ? For example, for a person who is 1.75 m tall, the \"meters squared\" measurement is 1.75 m x 1.75 m, which is equal to 3.1 meters squared.  3. Divide the number of kilograms (your weight) by the meters squared number. In this example: 70 ÷ 3.1 = 22.6. This is your BMI.  How is BMI interpreted?  To interpret " your results, your health care provider will use BMI charts to identify whether you are underweight, normal weight, overweight, or obese. The following guidelines will be used:  · Underweight: BMI less than 18.5.  · Normal weight: BMI between 18.5 and 24.9.  · Overweight: BMI between 25 and 29.9.  · Obese: BMI of 30 and above.  Please note:  · Weight includes both fat and muscle, so someone with a muscular build, such as an athlete, may have a BMI that is higher than 24.9. In cases like these, BMI is not an accurate measure of body fat.  · To determine if excess body fat is the cause of a BMI of 25 or higher, further assessments may need to be done by a health care provider.  · BMI is usually interpreted in the same way for men and women.  Why is BMI a useful tool?  BMI is useful in two ways:  · Identifying a weight problem that may be related to a medical condition, or that may increase the risk for medical problems.  · Promoting lifestyle and diet changes in order to reach a healthy weight.  Summary  · Body mass index (BMI) is a number that is calculated from a person's weight and height.  · BMI may help to estimate how much of a person's weight is composed of fat. BMI can help identify those who may be at higher risk for certain medical problems.  · BMI can be measured using English measurements or metric measurements.  · To interpret your results, your health care provider will use BMI charts to identify whether you are underweight, normal weight, overweight, or obese.  This information is not intended to replace advice given to you by your health care provider. Make sure you discuss any questions you have with your health care provider.  Document Released: 08/29/2005 Document Revised: 10/31/2018 Document Reviewed: 10/31/2018  [employer] Patient Education © 2020 [employer]

## 2020-04-17 DIAGNOSIS — S62.326A CLOSED DISPLACED FRACTURE OF SHAFT OF FIFTH METACARPAL BONE OF RIGHT HAND, INITIAL ENCOUNTER: Primary | ICD-10-CM

## 2020-04-17 DIAGNOSIS — S62.328D: ICD-10-CM

## 2020-04-23 ENCOUNTER — OFFICE VISIT (OUTPATIENT)
Dept: ORTHOPEDIC SURGERY | Facility: CLINIC | Age: 60
End: 2020-04-23

## 2020-04-23 VITALS — BODY MASS INDEX: 25.71 KG/M2 | WEIGHT: 194 LBS | HEIGHT: 73 IN

## 2020-04-23 DIAGNOSIS — S62.328D: Primary | ICD-10-CM

## 2020-04-23 DIAGNOSIS — M79.641 PAIN OF RIGHT HAND: ICD-10-CM

## 2020-04-23 PROCEDURE — 99024 POSTOP FOLLOW-UP VISIT: CPT | Performed by: NURSE PRACTITIONER
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
## 2020-04-27 DIAGNOSIS — E65 CENTRAL OBESITY: ICD-10-CM

## 2020-04-27 DIAGNOSIS — I10 ESSENTIAL HYPERTENSION: ICD-10-CM

## 2020-04-27 DIAGNOSIS — E78.00 HYPERCHOLESTEROLEMIA: ICD-10-CM

## 2020-04-29 RX ORDER — ASPIRIN 81 MG/1
TABLET ORAL
Qty: 30 TABLET | Refills: 3 | Status: SHIPPED | OUTPATIENT
Start: 2020-04-29 | End: 2020-08-20

## 2020-05-14 DIAGNOSIS — S62.328D: Primary | ICD-10-CM

## 2020-05-14 DIAGNOSIS — M79.641 PAIN OF RIGHT HAND: ICD-10-CM

## 2020-05-14 RX ORDER — HYDROCODONE BITARTRATE AND ACETAMINOPHEN 5; 325 MG/1; MG/1
TABLET ORAL
Qty: 30 TABLET | Refills: 0 | Status: SHIPPED | OUTPATIENT
Start: 2020-05-14 | End: 2020-05-22

## 2020-05-21 DIAGNOSIS — S62.328D: Primary | ICD-10-CM

## 2020-05-22 ENCOUNTER — OFFICE VISIT (OUTPATIENT)
Dept: ORTHOPEDIC SURGERY | Facility: CLINIC | Age: 60
End: 2020-05-22

## 2020-05-22 VITALS — BODY MASS INDEX: 25.17 KG/M2 | WEIGHT: 189.9 LBS | HEIGHT: 73 IN

## 2020-05-22 DIAGNOSIS — M79.641 PAIN OF RIGHT HAND: ICD-10-CM

## 2020-05-22 DIAGNOSIS — S62.328D: Primary | ICD-10-CM

## 2020-05-22 PROCEDURE — 29280 STRAPPING OF HAND OR FINGER: CPT | Performed by: NURSE PRACTITIONER

## 2020-05-22 PROCEDURE — 99024 POSTOP FOLLOW-UP VISIT: CPT | Performed by: NURSE PRACTITIONER
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
## 2020-05-28 NOTE — PROGRESS NOTES
Pulmonary Office Follow-up    Subjective     Jameel Dozier is seen today at the office for   Chief Complaint   Patient presents with   • COPD         HPI  Jameel Dozier is a 59 y.o. male with a PMH significant for COPD, tobacco use, [place], obesity, HTN, and GERD who presents for follow-up of COPD.      8/30/19: Recommended continuing on Spiriva daily and using albuterol only as needed.  I also encouraged him to use his Flonase more frequently for his chronic rhinitis and counseled him on the importance of complete tobacco cessation.  Patient was agreeable to undergoing lung cancer screening as scheduled.    11/12/19: He remained stable on Spiriva so I again spent time counseling on importance of complete tobacco cessation but he is not willing to quit.  I also reviewed his lung cancer screening CT which did not show any concerning nodules.    2/21/20: He is experiencing symptoms which I felt were secondary to a mild viral URI so I recommended supportive care.  He was otherwise continue on his Spiriva and allergy regimen.  I did spend time again counseled on the importance of complete tobacco cessation but he was still not willing to quit.    5/29/20: He states that his breathing is stable.  He continues on Spiriva daily but has not needed to use his albuterol.  He denies cough, sputum, wheeze, chest pain, or leg swelling.  Patient has not had any recent illnesses and denies any fevers.  He has had some allergy symptoms despite taking cetirizine and Flonase, but it is manageable.  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  Patient does not have to stop and uses albuterol when he mows.  Unfortunately, he does continue to smoke and is still interested in quitting.  His blood pressure is well controlled currently.  Patient does report that he broke his right hand a while back but it is healed and he denies any pain or loss of function.    Tobacco use  history:  Type: cigarettes  Amount: 1 ppd  Duration: 45 years  Cessation: N/a   Willing to quit: No      Patient Active Problem List   Diagnosis   • Central obesity   • Hypercholesterolemia   • Essential hypertension   • Astigmatism   • Type 2 diabetes mellitus with complication, without long-term current use of insulin (CMS/HCC)   • Cigarette nicotine dependence without complication   • Tobacco use disorder   • Chronic obstructive pulmonary disease (CMS/HCC)   • Overweight (BMI 25.0-29.9)   • Physical deconditioning   • Chronic non-seasonal allergic rhinitis   • Imaging of gastrointestinal tract abnormal   • Pain of upper abdomen   • Gastroesophageal reflux disease with esophagitis   • Screening for hyperlipidemia   • Weakness   • Transient cerebral ischemia   • Sleep disorder   • Rectal hemorrhage   • Rectal bleed   • Primary osteoarthritis of knees, bilateral   • Obstructive sleep apnea syndrome   • Male erectile disorder   • Left ventricular hypertrophy   • Hypertension   • History of TIA (transient ischemic attack)   • History of EKG   • History of echocardiogram   • History of colon polyps   • Hiccough   • Hiatal hernia   • Hemorrhoids   • Heavy tobacco smoker   • Gastritis   • External hemorrhoids   • Esophagitis   • Esophageal reflux   • Esophageal dysmotility   • Epigastric pain   • ED (erectile dysfunction)   • Dyspnea   • Dysphagia   • Diverticular disease of colon   • Disorder of peripheral nervous system   • Claudication (CMS/HCC)   • Chronic obstructive lung disease (CMS/HCC)   • Cerebrovascular disease   • Benign essential hypertension   • Anemia due to blood loss   • Anal fissure   • Allergic rhinitis   • Alcohol dependence with alcohol-induced disorder (CMS/HCC)   • Adenomatous polyp of colon   • Acquired achalasia of esophagus   • Abdominal pain   • Adverse drug effect   • Intractable hiccups   • Non-intractable vomiting with nausea   • Closed displaced fracture of shaft of fifth metacarpal bone  with routine healing, subsequent encounter   • Pain of right hand       Review of Systems  Review of Systems   Constitutional: Negative for fever and unexpected weight change.   HENT: Positive for congestion and rhinorrhea. Negative for postnasal drip.    Respiratory: Positive for shortness of breath. Negative for cough, chest tightness and wheezing.    Cardiovascular: Negative for chest pain and leg swelling.     As described in the HPI. Otherwise, remainder of ROS (14 systems) were negative.    Medications, Allergies, Social, and Family Histories reviewed as per EMR.    Objective     Vitals:    05/29/20 0944   BP: 114/80   Pulse: 95   SpO2: 98%         05/29/20  0944   Weight: 84.4 kg (186 lb)     Physical Exam   Constitutional: He is oriented to person, place, and time. Vital signs are normal. He appears well-developed and well-nourished.   obese   HENT:   Head: Normocephalic and atraumatic.   Eyes: Pupils are equal, round, and reactive to light. Conjunctivae, EOM and lids are normal.   Neck: Trachea normal and normal range of motion. No tracheal tenderness present. No thyroid mass present.   Cardiovascular: Normal rate, regular rhythm and normal heart sounds. PMI is not displaced. Exam reveals no gallop.   No murmur heard.  Pulmonary/Chest: Effort normal and breath sounds normal. No respiratory distress. He has no decreased breath sounds. He has no wheezes. He has no rhonchi. He exhibits no tenderness.   Abdominal: Soft. Normal appearance and bowel sounds are normal. There is no hepatomegaly. There is no tenderness.   obese   Musculoskeletal:   Normal gait, no extremity edema, swelling medial dorsal right hand     Vascular Status -  His right foot exhibits no edema. His left foot exhibits no edema.  Lymphadenopathy:        Head (right side): No submandibular adenopathy present.        Head (left side): No submandibular adenopathy present.     He has no cervical adenopathy.        Right: No supraclavicular  adenopathy present.        Left: No supraclavicular adenopathy present.   Neurological: He is alert and oriented to person, place, and time.   Skin: Skin is warm and dry. No cyanosis. Nails show no clubbing.   Psychiatric: He has a normal mood and affect. His speech is normal and behavior is normal. Judgment normal.   Nursing note and vitals reviewed.    PFTs: 11/27/17  Ratio 89  FVC 2.4/ 55%  FEV1 2.13/ 63%  Poor effort.  Reduced FVC and FEV1 suggesting restriction.  No bronchodilator response.  No comparative data available.     IMAGING: LDCT 11/13/19 (independently reviewed and interpreted by me) showed no nodules, NACPD, distended esophagus suspicious for reflux      Assessment/Plan     Jameel was seen today for copd.    Diagnoses and all orders for this visit:    Chronic obstructive pulmonary disease, unspecified COPD type (CMS/HCC)    Chronic non-seasonal allergic rhinitis    Obstructive sleep apnea syndrome    Cigarette nicotine dependence without complication  -     CT Chest Low Dose Wo; Future    Essential hypertension         Discussion/ Recommendations:   His COPD is stable currently and his allergies are relatively well controlled.  He does continue to smoke and is still interested in quitting.  Patient's blood pressure is currently well controlled.    -Continue Spiriva handihaler daily   -Use Albuterol as needed for dyspnea or wheeze.    -Continue Zyrtec and Flonase daily.  Use frequent nasal saline.  -Continue antihypertensives.   -Jameel Dozier is a current cigarettes user.  He currently smokes 1 pack of cigarettes per day for a duration of 45 years. I have educated him on the risk of diseases from using tobacco products such as cancer, COPD and heart diease.  I advised him to quit and he is not willing to quit. I spent 1 minutes counseling the patient.  -Lung cancer screening shared decision making counseling: The patient meets criteria for lung cancer screening CT (LDCT); 1 PPD x 45 years  with no symptoms of lung cancer.  Reviewed benefits of such screening including, early detection of potentially curable lung cancer.  Also, discussed risks of screening including, radiation exposure, test anxiety, false positives, risk associated with future procedures, and possibility of clinically significant incidental findings.  The patient does agree to undergo lung cancer screening.  Due on or after 11/13/2020.  -Up-to-date with pneumococcal vaccine.  Refuses flu vaccination despite counseling.    Patient's Body mass index is 24.54 kg/m². BMI is above normal parameters. Recommendations include: exercise counseling.      Return in about 6 months (around 11/29/2020) for Recheck COPD, Review results.          This document has been electronically signed by Yolanda Phan MD on May 29, 2020 10:00      Dictated using Dragon

## 2020-05-29 ENCOUNTER — OFFICE VISIT (OUTPATIENT)
Dept: PULMONOLOGY | Facility: CLINIC | Age: 60
End: 2020-05-29

## 2020-05-29 VITALS
HEIGHT: 73 IN | OXYGEN SATURATION: 98 % | WEIGHT: 186 LBS | SYSTOLIC BLOOD PRESSURE: 114 MMHG | BODY MASS INDEX: 24.65 KG/M2 | DIASTOLIC BLOOD PRESSURE: 80 MMHG | HEART RATE: 95 BPM

## 2020-05-29 DIAGNOSIS — I10 ESSENTIAL HYPERTENSION: ICD-10-CM

## 2020-05-29 DIAGNOSIS — F17.210 CIGARETTE NICOTINE DEPENDENCE WITHOUT COMPLICATION: ICD-10-CM

## 2020-05-29 DIAGNOSIS — J30.89 CHRONIC NON-SEASONAL ALLERGIC RHINITIS: ICD-10-CM

## 2020-05-29 DIAGNOSIS — J44.9 CHRONIC OBSTRUCTIVE PULMONARY DISEASE, UNSPECIFIED COPD TYPE (HCC): Primary | ICD-10-CM

## 2020-05-29 DIAGNOSIS — G47.33 OBSTRUCTIVE SLEEP APNEA SYNDROME: ICD-10-CM

## 2020-05-29 PROCEDURE — G0296 VISIT TO DETERM LDCT ELIG: HCPCS | Performed by: INTERNAL MEDICINE

## 2020-05-29 PROCEDURE — 99214 OFFICE O/P EST MOD 30 MIN: CPT | Performed by: INTERNAL MEDICINE

## 2020-06-09 ENCOUNTER — TELEPHONE (OUTPATIENT)
Dept: FAMILY MEDICINE CLINIC | Facility: CLINIC | Age: 60
End: 2020-06-09

## 2020-06-09 NOTE — TELEPHONE ENCOUNTER
PATIENTS PHARMACY CALLED ASKING FOR A MEDICATION REFILL ON PATIENTS omeprazole (priLOSEC) 20 MG capsule. PATIENT HAS NOT BEEN SEEN SINCE November 2019, WAS DUE BACK IN FEB.    THANKS,  QUINN

## 2020-06-10 DIAGNOSIS — J44.9 CHRONIC OBSTRUCTIVE PULMONARY DISEASE, UNSPECIFIED COPD TYPE (HCC): ICD-10-CM

## 2020-06-10 DIAGNOSIS — K21.00 GERD WITH ESOPHAGITIS: ICD-10-CM

## 2020-06-10 RX ORDER — ALBUTEROL SULFATE 90 UG/1
2 AEROSOL, METERED RESPIRATORY (INHALATION) EVERY 4 HOURS PRN
Qty: 18 G | Refills: 5 | Status: SHIPPED | OUTPATIENT
Start: 2020-06-10 | End: 2021-07-14 | Stop reason: SDUPTHER

## 2020-06-10 RX ORDER — CETIRIZINE HYDROCHLORIDE 10 MG/1
10 TABLET ORAL DAILY
Qty: 30 TABLET | Refills: 5 | Status: SHIPPED | OUTPATIENT
Start: 2020-06-10 | End: 2021-07-14 | Stop reason: SDUPTHER

## 2020-06-10 RX ORDER — FLUTICASONE PROPIONATE 50 MCG
2 SPRAY, SUSPENSION (ML) NASAL DAILY
Qty: 15.8 ML | Refills: 5 | Status: SHIPPED | OUTPATIENT
Start: 2020-06-10 | End: 2021-07-14 | Stop reason: SDUPTHER

## 2020-06-12 DIAGNOSIS — K21.00 GERD WITH ESOPHAGITIS: ICD-10-CM

## 2020-06-12 NOTE — TELEPHONE ENCOUNTER
Patient needs refills for the following       omeprazole (priLOSEC) 20 MG capsule       Please send to     [facility] - [place], [place] - [address] [place] [address] - [phone]  - [phone] FX Phone:  [phone] Fax:  [phone]    Address:  [address] [place] [id number]        Thank you     [phone]

## 2020-06-16 RX ORDER — OMEPRAZOLE 20 MG/1
20 CAPSULE, DELAYED RELEASE ORAL EVERY MORNING
Qty: 30 CAPSULE | Refills: 5 | Status: SHIPPED | OUTPATIENT
Start: 2020-06-16 | End: 2021-01-11 | Stop reason: SDUPTHER

## 2020-06-17 DIAGNOSIS — S62.328D: Primary | ICD-10-CM

## 2020-06-18 ENCOUNTER — LAB (OUTPATIENT)
Dept: LAB | Facility: HOSPITAL | Age: 60
End: 2020-06-18

## 2020-06-18 ENCOUNTER — OFFICE VISIT (OUTPATIENT)
Dept: FAMILY MEDICINE CLINIC | Facility: CLINIC | Age: 60
End: 2020-06-18

## 2020-06-18 VITALS
HEART RATE: 99 BPM | WEIGHT: 174.3 LBS | BODY MASS INDEX: 23.1 KG/M2 | DIASTOLIC BLOOD PRESSURE: 80 MMHG | OXYGEN SATURATION: 98 % | HEIGHT: 73 IN | SYSTOLIC BLOOD PRESSURE: 120 MMHG

## 2020-06-18 DIAGNOSIS — Z00.00 PREVENTATIVE HEALTH CARE: ICD-10-CM

## 2020-06-18 DIAGNOSIS — E11.8 TYPE 2 DIABETES MELLITUS WITH COMPLICATION, WITHOUT LONG-TERM CURRENT USE OF INSULIN (HCC): Primary | ICD-10-CM

## 2020-06-18 LAB
ALPHA-FETOPROTEIN: 2.02 NG/ML (ref 0–8.3)
INR PPP: 0.96 (ref 0.8–1.2)
PROTHROMBIN TIME: 12.6 SECONDS (ref 11.1–15.3)

## 2020-06-18 PROCEDURE — 83010 ASSAY OF HAPTOGLOBIN QUANT: CPT | Performed by: PHYSICIAN ASSISTANT

## 2020-06-18 PROCEDURE — 85610 PROTHROMBIN TIME: CPT | Performed by: PHYSICIAN ASSISTANT

## 2020-06-18 PROCEDURE — 84450 TRANSFERASE (AST) (SGOT): CPT | Performed by: PHYSICIAN ASSISTANT

## 2020-06-18 PROCEDURE — 84478 ASSAY OF TRIGLYCERIDES: CPT | Performed by: PHYSICIAN ASSISTANT

## 2020-06-18 PROCEDURE — 82247 BILIRUBIN TOTAL: CPT | Performed by: PHYSICIAN ASSISTANT

## 2020-06-18 PROCEDURE — 83883 ASSAY NEPHELOMETRY NOT SPEC: CPT | Performed by: PHYSICIAN ASSISTANT

## 2020-06-18 PROCEDURE — 84460 ALANINE AMINO (ALT) (SGPT): CPT | Performed by: PHYSICIAN ASSISTANT

## 2020-06-18 PROCEDURE — 36415 COLL VENOUS BLD VENIPUNCTURE: CPT | Performed by: PHYSICIAN ASSISTANT

## 2020-06-18 PROCEDURE — 99213 OFFICE O/P EST LOW 20 MIN: CPT | Performed by: STUDENT IN AN ORGANIZED HEALTH CARE EDUCATION/TRAINING PROGRAM

## 2020-06-18 PROCEDURE — 82105 ALPHA-FETOPROTEIN SERUM: CPT | Performed by: PHYSICIAN ASSISTANT

## 2020-06-18 PROCEDURE — 82947 ASSAY GLUCOSE BLOOD QUANT: CPT | Performed by: PHYSICIAN ASSISTANT

## 2020-06-18 PROCEDURE — 82465 ASSAY BLD/SERUM CHOLESTEROL: CPT | Performed by: PHYSICIAN ASSISTANT

## 2020-06-18 PROCEDURE — 82977 ASSAY OF GGT: CPT | Performed by: PHYSICIAN ASSISTANT

## 2020-06-18 PROCEDURE — 82172 ASSAY OF APOLIPOPROTEIN: CPT | Performed by: PHYSICIAN ASSISTANT

## 2020-06-18 NOTE — PROGRESS NOTES
Subjective   Jameel Dozier is a 59 y.o. male who presents for initial evaluation  for medication refill of his Metformin. Pt also receiving BP check at this visit. He states he is currently doing well on his medications without any difficulty. He is not checking his glucose at home. He denies any increased thirst or urination. Has not experienced palpitations, diaphoresis, headaches, or blurry vision. He has refills of all his medications except the Metformin today. Last A1C 7 months ago. No other concerns.      Past Medical Hx:  Past Medical History:   Diagnosis Date   • Abdominal pain    • Acquired achalasia of esophagus    • Adenomatous polyp of colon    • Adverse drug effect    • Alcohol dependence with alcohol-induced disorder (CMS/HCC)    • Allergic rhinitis    • Anal fissure    • Anemia due to blood loss    • Benign essential hypertension    • Central obesity    • Cerebrovascular disease    • Chronic obstructive lung disease (CMS/HCC)    • Claudication (CMS/HCC)    • Disorder of peripheral nervous system    • Diverticular disease of colon     completed antibx, ? neoplasm on CT   • Dysphagia    • Dyspnea    • ED (erectile dysfunction)    • Epigastric pain    • Esophageal dysmotility    • Esophageal reflux    • Esophagitis    • Essential hypertension    • External hemorrhoids    • Gastritis    • GERD with esophagitis    • Heavy tobacco smoker    • Hemorrhoids    • Hiatal hernia    • Hiccough    • History of colon polyps    • History of echocardiogram     Normal LV funciton with Ef of 65% to 70% without regional wall motion abnormalities.Mild CLVH. Normal RV size and function. No significant valvular regurgitation or stenosis. 09/19/2014       • History of EKG    • History of TIA (transient ischemic attack)    • Hypercholesterolemia    • Hypertension    • Left ventricular hypertrophy    • Male erectile disorder    • Obstructive sleep apnea syndrome    • Primary osteoarthritis of knees,  bilateral    • Rectal bleed     painful   • Rectal hemorrhage    • Sleep disorder    • Transient cerebral ischemia    • Upper respiratory infection    • Weakness     Attacks of weakness       Past Surgical Hx:  Past Surgical History:   Procedure Laterality Date   • COLONOSCOPY  04/13/2015    Internal and external hemorrhoids found. 04/13/2015    • ENDOSCOPY      Internal & external hemorrhoids found. 1 polyp in sigmoid colon; removed by snare cautery polypectomy. 09/26/2012    • ENDOSCOPY      Gastritis found in the stomach. Biopsy taken.Enlarged folds were found in the body of the stomach.Biopsy taken.Normal duodenum.A hiatus hernia was found in the esophagus. 04/13/2015    • ENDOSCOPY      Hiatus hernia in esophagus. Gastritis in stomach. Biopsy taken. Normal duodenum. Biopsy taken. 09/26/2012       • ENDOSCOPY N/A 12/18/2017    Procedure: ESOPHAGOGASTRODUODENOSCOPY--attn mid esophagus, abnl on CT;  Surgeon: Alli Dockery MD;  Location: [facility] ENDOSCOPY;  Service:    • ENDOSCOPY N/A 3/6/2019    Procedure: ESOPHAGOGASTRODUODENOSCOPY WITH DILATATION;  Surgeon: Alli Dockery MD;  Location: [facility] ENDOSCOPY;  Service: Gastroenterology   • UPPER GASTROINTESTINAL ENDOSCOPY  08/24/2016   • UPPER GASTROINTESTINAL ENDOSCOPY  04/13/2015   • UPPER GASTROINTESTINAL ENDOSCOPY  12/18/2017   • UPPER GASTROINTESTINAL ENDOSCOPY  03/06/2019       Health Maintenance:  Health Maintenance   Topic Date Due   • ZOSTER VACCINE (1 of 2) 07/17/2008   • MEDICARE ANNUAL WELLNESS  12/08/2016   • DIABETIC EYE EXAM  02/10/2018   • DIABETIC FOOT EXAM  02/07/2020   • LIPID PANEL  02/07/2020   • URINE MICROALBUMIN  02/07/2020   • COLONOSCOPY  04/13/2020   • HEMOGLOBIN A1C  05/08/2020   • INFLUENZA VACCINE  08/01/2020   • LUNG CANCER SCREENING  11/13/2020   • TDAP/TD VACCINES (2 - Td) 08/15/2024   • PNEUMOCOCCAL VACCINE (19-64 MEDIUM RISK)  Completed   • HEPATITIS C SCREENING  Completed       Current Meds:    Current Outpatient Medications:      •  albuterol sulfate  (90 Base) MCG/ACT inhaler, Inhale 2 puffs Every 4 (Four) Hours As Needed for Wheezing or Shortness of Air., Disp: 18 g, Rfl: 5  •  ASPIRIN ADULT LOW STRENGTH 81 MG EC tablet, TAKE 1 TABLET BY MOUTH DAILY., Disp: 30 tablet, Rfl: 3  •  Blood Pressure Monitoring (ADULT BLOOD PRESSURE CUFF LG) kit, Check BP daily, Disp: 1 each, Rfl: 0  •  cetirizine (zyrTEC) 10 MG tablet, Take 1 tablet by mouth Daily., Disp: 30 tablet, Rfl: 5  •  ferrous sulfate (FEOSOL) 325 (65 FE) MG tablet, Take 1 tablet by mouth 3 (Three) Times a Day With Meals., Disp: 90 tablet, Rfl: 5  •  fluticasone (FLONASE) 50 MCG/ACT nasal spray, 2 sprays into the nostril(s) as directed by provider Daily., Disp: 15.8 mL, Rfl: 5  •  hydrALAZINE (APRESOLINE) 10 MG tablet, Take 1 tablet by mouth 3 (Three) Times a Day., Disp: 90 tablet, Rfl: 5  •  losartan (COZAAR) 100 MG tablet, Take 1 tablet by mouth Daily., Disp: 30 tablet, Rfl: 5  •  magnesium oxide (MAGOX) 400 (241.3 Mg) MG tablet tablet, Take 1 tablet by mouth Daily., Disp: 30 tablet, Rfl: 4  •  metFORMIN (GLUCOPHAGE) 500 MG tablet, Take 1 tablet by mouth Daily With Breakfast., Disp: 30 tablet, Rfl: 4  •  metoprolol tartrate (LOPRESSOR) 25 MG tablet, Take 1 tablet by mouth Every 12 (Twelve) Hours., Disp: 60 tablet, Rfl: 5  •  Multiple Vitamins-Minerals (MULTIVITAL) tablet, Take 1 tablet by mouth Daily., Disp: 30 tablet, Rfl: 11  •  omeprazole (priLOSEC) 20 MG capsule, Take 1 capsule by mouth Every Morning., Disp: 30 capsule, Rfl: 5  •  tiotropium (SPIRIVA) 18 MCG per inhalation capsule, Place 1 capsule into inhaler and inhale Daily., Disp: 30 capsule, Rfl: 5    Allergies:  Ace inhibitors; Lisinopril; and Vistaril [hydroxyzine hcl]    Family Hx:  Family History   Problem Relation Age of Onset   • Cancer Mother    • Cirrhosis Father    • Diabetes Other    • Hypertension Other         Social History:  Social History     Socioeconomic History   • Marital status: Single     Spouse  "name: Not on file   • Number of children: 0   • Years of education: 12   • Highest education level: Not on file   Occupational History   • Occupation: retired   Tobacco Use   • Smoking status: Current Every Day Smoker     Packs/day: 1.50     Years: 40.00     Pack years: 60.00     Types: Cigarettes   • Smokeless tobacco: Never Used   Substance and Sexual Activity   • Alcohol use: Yes     Alcohol/week: 6.0 standard drinks     Types: 6 Cans of beer per week     Comment: when can get   • Drug use: No   • Sexual activity: Defer     Partners: Female       Review of Systems  Review of Systems   Constitutional: Negative for appetite change, chills, diaphoresis, fatigue and fever.   HENT: Negative for ear discharge, ear pain, facial swelling, hearing loss, rhinorrhea, sinus pressure, sinus pain, sneezing, sore throat and voice change.    Eyes: Negative for pain, discharge and visual disturbance.   Respiratory: Negative for apnea, cough, chest tightness, shortness of breath, wheezing and stridor.    Cardiovascular: Negative for chest pain, palpitations and leg swelling.   Gastrointestinal: Negative for abdominal distention, abdominal pain, constipation, diarrhea, nausea and vomiting.   Genitourinary: Negative for dysuria, frequency and urgency.   Musculoskeletal: Negative for arthralgias, back pain, myalgias and neck pain.   Skin: Negative for color change, rash and wound.   Neurological: Negative for facial asymmetry, speech difficulty, light-headedness and headaches.   Psychiatric/Behavioral: Negative for agitation and decreased concentration. The patient is not nervous/anxious.             Objective:     /80   Pulse 99   Ht 185.4 cm (73\")   Wt 79.1 kg (174 lb 4.8 oz)   SpO2 98%   BMI 23.00 kg/m²       Physical Exam   Constitutional: He is oriented to person, place, and time. He appears well-developed and well-nourished. No distress.   HENT:   Head: Normocephalic and atraumatic.   Right Ear: External ear normal.  "   Left Ear: External ear normal.   Mouth/Throat: Oropharynx is clear and moist.   Eyes: Pupils are equal, round, and reactive to light. Conjunctivae and EOM are normal. No scleral icterus.   Neck: Normal range of motion.   Cardiovascular: Normal rate, regular rhythm and normal heart sounds. Exam reveals no gallop and no friction rub.   No murmur heard.  Pulmonary/Chest: Effort normal and breath sounds normal. No stridor. No respiratory distress. He has no wheezes. He has no rales. He exhibits no tenderness.   Abdominal: Soft. Bowel sounds are normal. He exhibits no distension. There is no tenderness.   Musculoskeletal: Normal range of motion. He exhibits no edema or tenderness.   Neurological: He is alert and oriented to person, place, and time.   Skin: Skin is warm and dry. He is not diaphoretic. No erythema.   Psychiatric: He has a normal mood and affect. His behavior is normal.       Assessment/Plan:     Jameel was seen today for hypertension.    Diagnoses and all orders for this visit:    Type 2 diabetes mellitus with complication, without long-term current use of insulin (CMS/[facility])  -     metFORMIN (GLUCOPHAGE) 500 MG tablet; Take 1 tablet by mouth Daily With Breakfast.  -     Hemoglobin A1c; Future    Preventative health care       Will order A1C at this visit. Previous 6.0 on 11/20. May adjust Metformin after this information is available.   Follow-up:     Return in about 3 months (around 9/18/2020).    Goals     • Quit smoking / using tobacco (pt-stated)           Preventative:    Vaccines Recommended at this visit:   No Vaccines recommended today. Patient is up to date on all vaccines.     Vaccines Received at this visit:  No Vaccines recommended today. Patient is up to date on all vaccines.     Screenings Recommended at this visit:  No Screenings offered today. Patient is up to date on all screenings at this time.     Screenings Ordered at this visit:  No screenings were offered today. Patient is up to  date on all screenings.     Smoking Status:  Patient is current smoker. Patient is not interested in smoking cessation.    Alcohol Intake:  Regular (moderate)    Patient's Body mass index is 23 kg/m². BMI is below normal parameters. Recommendations include: increase caloric intake.         RISK SCORE: 3          This document has been electronically signed by Jeri Danielson MD on June 18, 2020 11:40

## 2020-06-19 ENCOUNTER — OFFICE VISIT (OUTPATIENT)
Dept: ORTHOPEDIC SURGERY | Facility: CLINIC | Age: 60
End: 2020-06-19

## 2020-06-19 VITALS — BODY MASS INDEX: 23.46 KG/M2 | WEIGHT: 177 LBS | HEIGHT: 73 IN

## 2020-06-19 DIAGNOSIS — S62.328D: Primary | ICD-10-CM

## 2020-06-19 PROCEDURE — 99024 POSTOP FOLLOW-UP VISIT: CPT | Performed by: NURSE PRACTITIONER
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
## 2020-06-23 LAB
A2 MACROGLOB SERPL-MCNC: 245 MG/DL (ref 110–276)
ALT SERPL W P-5'-P-CCNC: 37 IU/L (ref 0–55)
APO A-I SERPL-MCNC: 167 MG/DL (ref 101–178)
AST SERPL W P-5'-P-CCNC: 40 IU/L (ref 0–40)
BILIRUB SERPL-MCNC: 0.9 MG/DL (ref 0–1.2)
CHOLEST SERPL-MCNC: 141 MG/DL (ref 100–199)
FIBROSIS SCORING:: ABNORMAL
FIBROSIS STAGE SERPL QL: ABNORMAL
GGT SERPL-CCNC: 53 IU/L (ref 0–65)
GLUCOSE SERPL-MCNC: 104 MG/DL (ref 65–99)
HAPTOGLOB SERPL-MCNC: 159 MG/DL (ref 32–363)
INTERPRETATIONS: (REFERENCE): ABNORMAL
LABORATORY COMMENT REPORT: ABNORMAL
LIMITATIONS: (REFERENCE): ABNORMAL
LIVER FIBR SCORE SERPL CALC.FIBROSURE: 0.45 (ref 0–0.21)
NASH GRADE (REFERENCE): ABNORMAL
NASH SCORE (REFERENCE): 0.25
NASH SCORING (REFERENCE): ABNORMAL
STEATOSIS GRADE (REFERENCE): ABNORMAL
STEATOSIS GRADING (REFERENCE): ABNORMAL
STEATOSIS SCORE (REFERENCE): 0.28 (ref 0–0.3)
TRIGL SERPL-MCNC: 91 MG/DL (ref 0–149)
WEIGHT: (REFERENCE): 174 LBS

## 2020-06-25 NOTE — PROGRESS NOTES
I have reviewed the notes, assessments, and/or procedures performed by Dr. Danielson, I concur with her/his documentation and assessment and plan for Jameel Dozier.       This document has been electronically signed by Vin Helms MD on June 25, 2020 08:43

## 2020-08-03 RX ORDER — HYDROCODONE BITARTRATE AND ACETAMINOPHEN 5; 325 MG/1; MG/1
TABLET ORAL
Qty: 30 TABLET | OUTPATIENT
Start: 2020-08-03

## 2020-08-03 NOTE — TELEPHONE ENCOUNTER
His fracture is 4 months old and I haven't seen him in several weeks so I cannot refill pain medication for his hand. He was doing well at that time. If he is having issues, he needs to follow up in office so we can determine why he is having pain. Thanks.

## 2020-08-06 DIAGNOSIS — Z00.00 PREVENTATIVE HEALTH CARE: ICD-10-CM

## 2020-08-06 RX ORDER — FERROUS SULFATE 325(65) MG
325 TABLET ORAL
Qty: 90 TABLET | Refills: 5 | Status: SHIPPED | OUTPATIENT
Start: 2020-08-06 | End: 2021-04-22

## 2020-08-20 DIAGNOSIS — E65 CENTRAL OBESITY: ICD-10-CM

## 2020-08-20 DIAGNOSIS — E78.00 HYPERCHOLESTEROLEMIA: ICD-10-CM

## 2020-08-20 DIAGNOSIS — I10 ESSENTIAL HYPERTENSION: ICD-10-CM

## 2020-08-20 RX ORDER — ASPIRIN 81 MG/1
TABLET ORAL
Qty: 30 TABLET | Refills: 2 | Status: SHIPPED | OUTPATIENT
Start: 2020-08-20 | End: 2020-12-07

## 2020-09-03 ENCOUNTER — OFFICE VISIT (OUTPATIENT)
Dept: GASTROENTEROLOGY | Facility: CLINIC | Age: 60
End: 2020-09-03

## 2020-09-03 VITALS
HEART RATE: 94 BPM | HEIGHT: 73 IN | BODY MASS INDEX: 23.88 KG/M2 | SYSTOLIC BLOOD PRESSURE: 125 MMHG | WEIGHT: 180.2 LBS | DIASTOLIC BLOOD PRESSURE: 80 MMHG

## 2020-09-03 DIAGNOSIS — K74.00 HEPATIC FIBROSIS: ICD-10-CM

## 2020-09-03 DIAGNOSIS — Z12.11 ENCOUNTER FOR SCREENING FOR MALIGNANT NEOPLASM OF COLON: ICD-10-CM

## 2020-09-03 DIAGNOSIS — I85.00 ESOPHAGEAL VARICES WITHOUT BLEEDING, UNSPECIFIED ESOPHAGEAL VARICES TYPE (HCC): Primary | ICD-10-CM

## 2020-09-03 DIAGNOSIS — K21.00 GASTROESOPHAGEAL REFLUX DISEASE WITH ESOPHAGITIS: ICD-10-CM

## 2020-09-03 DIAGNOSIS — K70.0 ALCOHOLIC FATTY LIVER: ICD-10-CM

## 2020-09-03 PROCEDURE — 99214 OFFICE O/P EST MOD 30 MIN: CPT | Performed by: PHYSICIAN ASSISTANT

## 2020-09-03 RX ORDER — DEXTROSE AND SODIUM CHLORIDE 5; .45 G/100ML; G/100ML
30 INJECTION, SOLUTION INTRAVENOUS CONTINUOUS PRN
Status: CANCELLED | OUTPATIENT
Start: 2020-09-18

## 2020-09-03 NOTE — H&P (VIEW-ONLY)
Chief Complaint   Patient presents with   • Esophageal Varices   • Heartburn   • Fatty Liver       ENDO PROCEDURE ORDERED:EGDeval varices/COLON screen    Subjective    Jameel Dozier is a 60 y.o. male. he is here today for follow-up.    History of Present Illness    Patient seen on a recheck of his esophageal varices, GERD, alcoholic fatty liver, abdominal pain. Last seen 04/16/2020. Patient is on Prilosec 20 mg. He is not on a Beta-blocker. Last EGD on 03/06/2019 showed esophageal stricture with grade 1 varices. He is on iron for persistent anemia. Weight is down 16 pounds since last visit. He states he has not been eating.     Laboratory 06/18/2018 INR 0.96, normal AFP. REYNOSO FibroSure 0.45/F1-F2, steatosis 0.28/S0, 0.25/N0. Glucose 104.     A/P: Patient with previous esophageal varices. Question whether this was related to liver disease or other etiologies. He has had chronic hiccups. Recommend EGD, would consider dilation. Will reevaluate his varices. Consider banding. I am concerned about his weight loss. Previous iron deficiency anemia. He is due for screening colonoscopy as it has been 5 years since his last colonoscopy and he is . Recommend colonoscopy. Will plan follow-up after the above, further pending clinical course and the results of the above.       The following portions of the patient's history were reviewed and updated as appropriate:   Past Medical History:   Diagnosis Date   • Abdominal pain    • Acquired achalasia of esophagus    • Adenomatous polyp of colon    • Adverse drug effect    • Alcohol dependence with alcohol-induced disorder (CMS/HCC)    • Allergic rhinitis    • Anal fissure    • Anemia due to blood loss    • Benign essential hypertension    • Central obesity    • Cerebrovascular disease    • Chronic obstructive lung disease (CMS/HCC)    • Claudication (CMS/HCC)    • Disorder of peripheral nervous system    • Diverticular disease of colon     completed antibx, ?  neoplasm on CT   • Dysphagia    • Dyspnea    • ED (erectile dysfunction)    • Epigastric pain    • Esophageal dysmotility    • Esophageal reflux    • Esophagitis    • Essential hypertension    • External hemorrhoids    • Gastritis    • GERD with esophagitis    • Heavy tobacco smoker    • Hemorrhoids    • Hiatal hernia    • Hiccough    • History of colon polyps    • History of echocardiogram     Normal LV funciton with Ef of 65% to 70% without regional wall motion abnormalities.Mild CLVH. Normal RV size and function. No significant valvular regurgitation or stenosis. 09/19/2014       • History of EKG    • History of TIA (transient ischemic attack)    • Hypercholesterolemia    • Hypertension    • Left ventricular hypertrophy    • Male erectile disorder    • Obstructive sleep apnea syndrome    • Primary osteoarthritis of knees, bilateral    • Rectal bleed     painful   • Rectal hemorrhage    • Sleep disorder    • Transient cerebral ischemia    • Upper respiratory infection    • Weakness     Attacks of weakness     Past Surgical History:   Procedure Laterality Date   • COLONOSCOPY  04/13/2015    Internal and external hemorrhoids found. 04/13/2015    • ENDOSCOPY      Internal & external hemorrhoids found. 1 polyp in sigmoid colon; removed by snare cautery polypectomy. 09/26/2012    • ENDOSCOPY      Gastritis found in the stomach. Biopsy taken.Enlarged folds were found in the body of the stomach.Biopsy taken.Normal duodenum.A hiatus hernia was found in the esophagus. 04/13/2015    • ENDOSCOPY      Hiatus hernia in esophagus. Gastritis in stomach. Biopsy taken. Normal duodenum. Biopsy taken. 09/26/2012       • ENDOSCOPY N/A 12/18/2017    Procedure: ESOPHAGOGASTRODUODENOSCOPY--attn mid esophagus, abnl on CT;  Surgeon: Alli Dockery MD;  Location: [facility] ENDOSCOPY;  Service:    • ENDOSCOPY N/A 3/6/2019    Procedure: ESOPHAGOGASTRODUODENOSCOPY WITH DILATATION;  Surgeon: Alli Dockery MD;  Location: [facility] ENDOSCOPY;   Service: Gastroenterology   • UPPER GASTROINTESTINAL ENDOSCOPY  08/24/2016   • UPPER GASTROINTESTINAL ENDOSCOPY  04/13/2015   • UPPER GASTROINTESTINAL ENDOSCOPY  12/18/2017   • UPPER GASTROINTESTINAL ENDOSCOPY  03/06/2019     Family History   Problem Relation Age of Onset   • Cancer Mother    • Cirrhosis Father    • Diabetes Other    • Hypertension Other        Allergies   Allergen Reactions   • Ace Inhibitors Angioedema     hypotension   • Lisinopril Other (See Comments)     cough   • Vistaril [Hydroxyzine Hcl] Other (See Comments)     Patient is unsure      Social History     Socioeconomic History   • Marital status: Single     Spouse name: Not on file   • Number of children: 0   • Years of education: 12   • Highest education level: Not on file   Occupational History   • Occupation: retired   Tobacco Use   • Smoking status: Current Every Day Smoker     Packs/day: 1.50     Years: 40.00     Pack years: 60.00     Types: Cigarettes   • Smokeless tobacco: Never Used   Substance and Sexual Activity   • Alcohol use: Yes     Alcohol/week: 6.0 standard drinks     Types: 6 Cans of beer per week     Frequency: 2-4 times a month     Comment: when can get   • Drug use: No   • Sexual activity: Defer     Partners: Female     Current Medications:  Prior to Admission medications    Medication Sig Start Date End Date Taking? Authorizing Provider   albuterol sulfate  (90 Base) MCG/ACT inhaler Inhale 2 puffs Every 4 (Four) Hours As Needed for Wheezing or Shortness of Air. 6/10/20  Yes Yolanda Phan MD   ASPIRIN ADULT LOW STRENGTH 81 MG EC tablet TAKE 1 TABLET BY MOUTH DAILY. 8/20/20  Yes Jeri Danielson MD   Blood Pressure Monitoring (ADULT BLOOD PRESSURE CUFF LG) kit Check BP daily 8/9/19  Yes Jeri Danielson MD   cetirizine (zyrTEC) 10 MG tablet Take 1 tablet by mouth Daily. 6/10/20  Yes Yolanda Phan MD   ferrous sulfate (Feosol) 325 (65 FE) MG tablet Take 1 tablet by mouth 3 (Three) Times a Day With Meals.  "8/6/20  Yes Son Li MD   fluticasone (FLONASE) 50 MCG/ACT nasal spray 2 sprays into the nostril(s) as directed by provider Daily. 6/10/20  Yes Yolanda Phan MD   hydrALAZINE (APRESOLINE) 10 MG tablet Take 1 tablet by mouth 3 (Three) Times a Day. 11/8/19  Yes Jeri Danielson MD   losartan (COZAAR) 100 MG tablet Take 1 tablet by mouth Daily. 11/8/19  Yes Jeri Danielson MD   magnesium oxide (MAGOX) 400 (241.3 Mg) MG tablet tablet Take 1 tablet by mouth Daily. 11/8/19  Yes Jeri Danielson MD   metFORMIN (GLUCOPHAGE) 500 MG tablet Take 1 tablet by mouth Daily With Breakfast. 6/18/20  Yes Jeri Danielson MD   metoprolol tartrate (LOPRESSOR) 25 MG tablet Take 1 tablet by mouth Every 12 (Twelve) Hours. 11/8/19  Yes Jeri Danielson MD   Multiple Vitamins-Minerals (MULTIVITAL) tablet Take 1 tablet by mouth Daily. 11/8/19 11/7/20 Yes Jeri Danielson MD   omeprazole (priLOSEC) 20 MG capsule Take 1 capsule by mouth Every Morning. 6/16/20  Yes Jeri Danielson MD   tiotropium (SPIRIVA) 18 MCG per inhalation capsule Place 1 capsule into inhaler and inhale Daily. 6/10/20  Yes Yolanda Phan MD     Review of Systems  Review of Systems   Constitutional: Positive for unexpected weight change. Negative for fever.   HENT: Positive for trouble swallowing.    Gastrointestinal: Positive for abdominal pain and nausea. Negative for rectal pain and vomiting.   Genitourinary: Negative for difficulty urinating.          Objective    /80   Pulse 94   Ht 185.4 cm (73\")   Wt 81.7 kg (180 lb 3.2 oz)   BMI 23.77 kg/m²   Physical Exam   Constitutional: He is oriented to person, place, and time. He appears well-developed and well-nourished. No distress.   AA   HENT:   Head: Normocephalic and atraumatic.   Eyes: Pupils are equal, round, and reactive to light. EOM are normal.   Neck: Normal range of motion.   Cardiovascular: Normal rate, regular rhythm and normal heart sounds.   Pulmonary/Chest: Effort normal. "   coarse   Abdominal: Soft. Bowel sounds are normal. He exhibits no shifting dullness, no distension, no abdominal bruit, no ascites and no mass. There is no hepatosplenomegaly. There is tenderness. There is no rigidity, no rebound, no guarding and no CVA tenderness. No hernia. Hernia confirmed negative in the ventral area.   mild   Musculoskeletal: Normal range of motion.   Neurological: He is alert and oriented to person, place, and time.   Skin: Skin is warm and dry.   Psychiatric: He has a normal mood and affect. His behavior is normal. Judgment and thought content normal.   Nursing note and vitals reviewed.    Assessment/Plan      1. Esophageal varices without bleeding, unspecified esophageal varices type (CMS/HCC)    2. Gastroesophageal reflux disease with esophagitis    3. Alcoholic fatty liver    4. Hepatic fibrosis     5. Encounter for screening for malignant neoplasm of colon    .   Jameel was seen today for esophageal varices, heartburn and fatty liver.    Diagnoses and all orders for this visit:    Esophageal varices without bleeding, unspecified esophageal varices type (CMS/HCC)  -     Case Request; Standing  -     Case Request    Gastroesophageal reflux disease with esophagitis  -     Case Request; Standing  -     Case Request    Alcoholic fatty liver  -     Case Request; Standing  -     Case Request    Hepatic fibrosis   -     Case Request; Standing  -     Case Request    Encounter for screening for malignant neoplasm of colon  -     Case Request; Standing  -     Case Request    Other orders  -     Follow Anesthesia Guidelines / Standing Orders; Future  -     Obtain Informed Consent; Future        Orders placed during this encounter include:  Orders Placed This Encounter   Procedures   • Follow Anesthesia Guidelines / Standing Orders     Standing Status:   Future   • Obtain Informed Consent     Standing Status:   Future     Order Specific Question:   Informed Consent Given For     Answer:    ESOPHAGOGASTRODUODENOSCOPY and colonoscopy       Medications prescribed:  No orders of the defined types were placed in this encounter.      Requested Prescriptions      No prescriptions requested or ordered in this encounter       Review and/or summary of lab tests, radiology, procedures, medications. Review and summary of old records and obtaining of history. The risks and benefits of my recommendations, as well as other treatment options were discussed with the patient today. Questions were answered.    Follow-up: Return in about 6 weeks (around 10/15/2020), or if symptoms worsen or fail to improve.     ESOPHAGOGASTRODUODENOSCOPY eval varices (N/A), COLONOSCOPY (N/A)      This document has been electronically signed by Tim Chen PA-C on September 9, 2020 19:10      Results for orders placed or performed in visit on 04/16/20   REYNOSO Fibrosure   Result Value Ref Range    Fibrosis Score (References) 0.45 (H) 0.00 - 0.21    Fibrosis Stage (Reference) F1-F2     Steatosis Score (Reference) 0.28 0.00 - 0.30    Steatosis Grade (Reference) Comment     REYNOSO Score (Reference) 0.25 0.25    Reynoso Grade (Reference) Comment     Height: (Reference) 73 in    Weight: (Reference) 174 LBS    Alpha 2-Macroglobulins, Qn 245 110 - 276 mg/dL    Haptoglobin 159 32 - 363 mg/dL    Apolipoprotein A-1 167 101 - 178 mg/dL    Total Bilirubin 0.9 0.0 - 1.2 mg/dL    GGT 53 0 - 65 IU/L    ALT (SGPT) 37 0 - 55 IU/L    AST (SGOT) P5P (Reference) 40 0 - 40 IU/L    Cholesterol, Total (Reference) 141 100 - 199 mg/dL    Glucose, Serum (Reference) 104 (H) 65 - 99 mg/dL    Triglycerides 91 0 - 149 mg/dL    Interpretations: (Reference) Comment     Fibrosis Scoring: Comment     Steatosis Grading (Reference) Comment     Reynoso Scoring (Reference) Comment     Limitations: (Reference) Comment     Comment (Reference) Comment    AFP Tumor Marker   Result Value Ref Range    ALPHA-FETOPROTEIN 2.02 0 - 8.3 ng/mL   Protime-INR   Result Value Ref Range     Protime 12.6 11.1 - 15.3 Seconds    INR 0.96 0.80 - 1.20   Results for orders placed or performed in visit on 11/08/19   Protime-INR   Result Value Ref Range    Protime 12.5 11.1 - 15.3 Seconds    INR 0.95 0.80 - 1.20   Hemoglobin A1c   Result Value Ref Range    Hemoglobin A1C 6.01 (H) 4.80 - 5.60 %   Bilirubin, Direct   Result Value Ref Range    Bilirubin, Direct 0.2 0.2 - 0.3 mg/dL   Comprehensive metabolic panel   Result Value Ref Range    Glucose 92 65 - 99 mg/dL    BUN 7 (L) 8 - 23 mg/dL    Creatinine 0.85 0.76 - 1.27 mg/dL    Sodium 147 (H) 136 - 145 mmol/L    Potassium 4.1 3.5 - 5.2 mmol/L    Chloride 105 98 - 107 mmol/L    CO2 26.4 22.0 - 29.0 mmol/L    Calcium 9.6 8.6 - 10.5 mg/dL    Total Protein 8.2 6.0 - 8.5 g/dL    Albumin 4.50 3.50 - 5.20 g/dL    ALT (SGPT) 13 1 - 41 U/L    AST (SGOT) 17 1 - 40 U/L    Alkaline Phosphatase 66 39 - 117 U/L    Total Bilirubin 0.8 0.2 - 1.2 mg/dL    eGFR  African Amer 111 >60 mL/min/1.73    Globulin 3.7 gm/dL    A/G Ratio 1.2 g/dL    BUN/Creatinine Ratio 8.2 7.0 - 25.0    Anion Gap 15.6 (H) 5.0 - 15.0 mmol/L   Results for orders placed or performed in visit on 08/09/19   Hemoglobin A1c   Result Value Ref Range    Hemoglobin A1C 5.00 4.80 - 5.60 %   Results for orders placed or performed in visit on 08/01/19   REYNOSO Fibrosure   Result Value Ref Range    Fibrosis Score (References) 0.32 (H) 0.00 - 0.21    Fibrosis Stage (Reference) F1-F2     Steatosis Score (Reference) 0.29 0.00 - 0.30    Steatosis Grade (Reference) Comment     REYNOSO Score (Reference) 0.25 0.25    Reynoso Grade (Reference) Comment     Height: (Reference) 73 in    Weight: (Reference) 194 LBS    Alpha 2-Macroglobulins, Qn 256 110 - 276 mg/dL    Haptoglobin 147 34 - 200 mg/dL    Apolipoprotein A-1 187 (H) 101 - 178 mg/dL    Total Bilirubin 0.5 0.0 - 1.2 mg/dL    GGT 54 0 - 65 IU/L    ALT (SGPT) 17 0 - 55 IU/L    AST (SGOT) P5P (Reference) 20 0 - 40 IU/L    Cholesterol, Total (Reference) 233 (H) 100 - 199 mg/dL     Glucose, Serum (Reference) 90 65 - 99 mg/dL    Triglycerides 153 (H) 0 - 149 mg/dL    Interpretations: (Reference) Comment     Fibrosis Scoring: Comment     Steatosis Grading (Reference) Comment     Jorgensen Scoring (Reference) Comment     Limitations: (Reference) Comment     Comment (Reference) Comment    CBC Auto Differential   Result Value Ref Range    WBC 5.06 3.40 - 10.80 10*3/mm3    RBC 6.18 (H) 4.14 - 5.80 10*6/mm3    Hemoglobin 15.6 13.0 - 17.7 g/dL    Hematocrit 49.0 37.5 - 51.0 %    MCV 79.3 79.0 - 97.0 fL    MCH 25.2 (L) 26.6 - 33.0 pg    MCHC 31.8 31.5 - 35.7 g/dL    RDW 15.2 12.3 - 15.4 %    RDW-SD 41.5 37.0 - 54.0 fl    MPV 13.1 (H) 6.0 - 12.0 fL    Platelets 238 140 - 450 10*3/mm3    Neutrophil % 52.2 42.7 - 76.0 %    Lymphocyte % 32.0 19.6 - 45.3 %    Monocyte % 8.9 5.0 - 12.0 %    Eosinophil % 4.7 0.3 - 6.2 %    Basophil % 1.8 (H) 0.0 - 1.5 %    Immature Grans % 0.4 0.0 - 0.5 %    Neutrophils, Absolute 2.64 1.70 - 7.00 10*3/mm3    Lymphocytes, Absolute 1.62 0.70 - 3.10 10*3/mm3    Monocytes, Absolute 0.45 0.10 - 0.90 10*3/mm3    Eosinophils, Absolute 0.24 0.00 - 0.40 10*3/mm3    Basophils, Absolute 0.09 0.00 - 0.20 10*3/mm3    Immature Grans, Absolute 0.02 0.00 - 0.05 10*3/mm3    nRBC 0.2 0.0 - 0.2 /100 WBC   AFP Tumor Marker   Result Value Ref Range    ALPHA-FETOPROTEIN 1.43 0 - 8.3 ng/mL     *Note: Due to a large number of results and/or encounters for the requested time period, some results have not been displayed. A complete set of results can be found in Results Review.       Some portions of this note have been dictated using voice recognition software and may contain errors and/or omissions.

## 2020-09-03 NOTE — PROGRESS NOTES
Chief Complaint   Patient presents with   • Esophageal Varices   • Heartburn   • Fatty Liver       ENDO PROCEDURE ORDERED:EGDeval varices/COLON screen    Subjective    Jameel Dozier is a 60 y.o. male. he is here today for follow-up.    History of Present Illness    Patient seen on a recheck of his esophageal varices, GERD, alcoholic fatty liver, abdominal pain. Last seen 04/16/2020. Patient is on Prilosec 20 mg. He is not on a Beta-blocker. Last EGD on 03/06/2019 showed esophageal stricture with grade 1 varices. He is on iron for persistent anemia. Weight is down 16 pounds since last visit. He states he has not been eating.     Laboratory 06/18/2018 INR 0.96, normal AFP. REYNOSO FibroSure 0.45/F1-F2, steatosis 0.28/S0, 0.25/N0. Glucose 104.     A/P: Patient with previous esophageal varices. Question whether this was related to liver disease or other etiologies. He has had chronic hiccups. Recommend EGD, would consider dilation. Will reevaluate his varices. Consider banding. I am concerned about his weight loss. Previous iron deficiency anemia. He is due for screening colonoscopy as it has been 5 years since his last colonoscopy and he is . Recommend colonoscopy. Will plan follow-up after the above, further pending clinical course and the results of the above.       The following portions of the patient's history were reviewed and updated as appropriate:   Past Medical History:   Diagnosis Date   • Abdominal pain    • Acquired achalasia of esophagus    • Adenomatous polyp of colon    • Adverse drug effect    • Alcohol dependence with alcohol-induced disorder (CMS/HCC)    • Allergic rhinitis    • Anal fissure    • Anemia due to blood loss    • Benign essential hypertension    • Central obesity    • Cerebrovascular disease    • Chronic obstructive lung disease (CMS/HCC)    • Claudication (CMS/HCC)    • Disorder of peripheral nervous system    • Diverticular disease of colon     completed antibx, ?  neoplasm on CT   • Dysphagia    • Dyspnea    • ED (erectile dysfunction)    • Epigastric pain    • Esophageal dysmotility    • Esophageal reflux    • Esophagitis    • Essential hypertension    • External hemorrhoids    • Gastritis    • GERD with esophagitis    • Heavy tobacco smoker    • Hemorrhoids    • Hiatal hernia    • Hiccough    • History of colon polyps    • History of echocardiogram     Normal LV funciton with Ef of 65% to 70% without regional wall motion abnormalities.Mild CLVH. Normal RV size and function. No significant valvular regurgitation or stenosis. 09/19/2014       • History of EKG    • History of TIA (transient ischemic attack)    • Hypercholesterolemia    • Hypertension    • Left ventricular hypertrophy    • Male erectile disorder    • Obstructive sleep apnea syndrome    • Primary osteoarthritis of knees, bilateral    • Rectal bleed     painful   • Rectal hemorrhage    • Sleep disorder    • Transient cerebral ischemia    • Upper respiratory infection    • Weakness     Attacks of weakness     Past Surgical History:   Procedure Laterality Date   • COLONOSCOPY  04/13/2015    Internal and external hemorrhoids found. 04/13/2015    • ENDOSCOPY      Internal & external hemorrhoids found. 1 polyp in sigmoid colon; removed by snare cautery polypectomy. 09/26/2012    • ENDOSCOPY      Gastritis found in the stomach. Biopsy taken.Enlarged folds were found in the body of the stomach.Biopsy taken.Normal duodenum.A hiatus hernia was found in the esophagus. 04/13/2015    • ENDOSCOPY      Hiatus hernia in esophagus. Gastritis in stomach. Biopsy taken. Normal duodenum. Biopsy taken. 09/26/2012       • ENDOSCOPY N/A 12/18/2017    Procedure: ESOPHAGOGASTRODUODENOSCOPY--attn mid esophagus, abnl on CT;  Surgeon: Alli Dockery MD;  Location: [facility] ENDOSCOPY;  Service:    • ENDOSCOPY N/A 3/6/2019    Procedure: ESOPHAGOGASTRODUODENOSCOPY WITH DILATATION;  Surgeon: Alli Dockery MD;  Location: [facility] ENDOSCOPY;   Service: Gastroenterology   • UPPER GASTROINTESTINAL ENDOSCOPY  08/24/2016   • UPPER GASTROINTESTINAL ENDOSCOPY  04/13/2015   • UPPER GASTROINTESTINAL ENDOSCOPY  12/18/2017   • UPPER GASTROINTESTINAL ENDOSCOPY  03/06/2019     Family History   Problem Relation Age of Onset   • Cancer Mother    • Cirrhosis Father    • Diabetes Other    • Hypertension Other        Allergies   Allergen Reactions   • Ace Inhibitors Angioedema     hypotension   • Lisinopril Other (See Comments)     cough   • Vistaril [Hydroxyzine Hcl] Other (See Comments)     Patient is unsure      Social History     Socioeconomic History   • Marital status: Single     Spouse name: Not on file   • Number of children: 0   • Years of education: 12   • Highest education level: Not on file   Occupational History   • Occupation: retired   Tobacco Use   • Smoking status: Current Every Day Smoker     Packs/day: 1.50     Years: 40.00     Pack years: 60.00     Types: Cigarettes   • Smokeless tobacco: Never Used   Substance and Sexual Activity   • Alcohol use: Yes     Alcohol/week: 6.0 standard drinks     Types: 6 Cans of beer per week     Frequency: 2-4 times a month     Comment: when can get   • Drug use: No   • Sexual activity: Defer     Partners: Female     Current Medications:  Prior to Admission medications    Medication Sig Start Date End Date Taking? Authorizing Provider   albuterol sulfate  (90 Base) MCG/ACT inhaler Inhale 2 puffs Every 4 (Four) Hours As Needed for Wheezing or Shortness of Air. 6/10/20  Yes Yolanda Phan MD   ASPIRIN ADULT LOW STRENGTH 81 MG EC tablet TAKE 1 TABLET BY MOUTH DAILY. 8/20/20  Yes Jeri Danielson MD   Blood Pressure Monitoring (ADULT BLOOD PRESSURE CUFF LG) kit Check BP daily 8/9/19  Yes Jeri Danielson MD   cetirizine (zyrTEC) 10 MG tablet Take 1 tablet by mouth Daily. 6/10/20  Yes Yolanda Phan MD   ferrous sulfate (Feosol) 325 (65 FE) MG tablet Take 1 tablet by mouth 3 (Three) Times a Day With Meals.  "8/6/20  Yes Son Li MD   fluticasone (FLONASE) 50 MCG/ACT nasal spray 2 sprays into the nostril(s) as directed by provider Daily. 6/10/20  Yes Yolanda Phan MD   hydrALAZINE (APRESOLINE) 10 MG tablet Take 1 tablet by mouth 3 (Three) Times a Day. 11/8/19  Yes Jeri Danielson MD   losartan (COZAAR) 100 MG tablet Take 1 tablet by mouth Daily. 11/8/19  Yes Jeri Danielson MD   magnesium oxide (MAGOX) 400 (241.3 Mg) MG tablet tablet Take 1 tablet by mouth Daily. 11/8/19  Yes Jeri Danielson MD   metFORMIN (GLUCOPHAGE) 500 MG tablet Take 1 tablet by mouth Daily With Breakfast. 6/18/20  Yes Jeri Danielson MD   metoprolol tartrate (LOPRESSOR) 25 MG tablet Take 1 tablet by mouth Every 12 (Twelve) Hours. 11/8/19  Yes Jeri Danielson MD   Multiple Vitamins-Minerals (MULTIVITAL) tablet Take 1 tablet by mouth Daily. 11/8/19 11/7/20 Yes Jeri Danielson MD   omeprazole (priLOSEC) 20 MG capsule Take 1 capsule by mouth Every Morning. 6/16/20  Yes Jeri Danielson MD   tiotropium (SPIRIVA) 18 MCG per inhalation capsule Place 1 capsule into inhaler and inhale Daily. 6/10/20  Yes Yolanda Phan MD     Review of Systems  Review of Systems   Constitutional: Positive for unexpected weight change. Negative for fever.   HENT: Positive for trouble swallowing.    Gastrointestinal: Positive for abdominal pain and nausea. Negative for rectal pain and vomiting.   Genitourinary: Negative for difficulty urinating.          Objective    /80   Pulse 94   Ht 185.4 cm (73\")   Wt 81.7 kg (180 lb 3.2 oz)   BMI 23.77 kg/m²   Physical Exam   Constitutional: He is oriented to person, place, and time. He appears well-developed and well-nourished. No distress.   AA   HENT:   Head: Normocephalic and atraumatic.   Eyes: Pupils are equal, round, and reactive to light. EOM are normal.   Neck: Normal range of motion.   Cardiovascular: Normal rate, regular rhythm and normal heart sounds.   Pulmonary/Chest: Effort normal. "   coarse   Abdominal: Soft. Bowel sounds are normal. He exhibits no shifting dullness, no distension, no abdominal bruit, no ascites and no mass. There is no hepatosplenomegaly. There is tenderness. There is no rigidity, no rebound, no guarding and no CVA tenderness. No hernia. Hernia confirmed negative in the ventral area.   mild   Musculoskeletal: Normal range of motion.   Neurological: He is alert and oriented to person, place, and time.   Skin: Skin is warm and dry.   Psychiatric: He has a normal mood and affect. His behavior is normal. Judgment and thought content normal.   Nursing note and vitals reviewed.    Assessment/Plan      1. Esophageal varices without bleeding, unspecified esophageal varices type (CMS/HCC)    2. Gastroesophageal reflux disease with esophagitis    3. Alcoholic fatty liver    4. Hepatic fibrosis     5. Encounter for screening for malignant neoplasm of colon    .   Jameel was seen today for esophageal varices, heartburn and fatty liver.    Diagnoses and all orders for this visit:    Esophageal varices without bleeding, unspecified esophageal varices type (CMS/HCC)  -     Case Request; Standing  -     Case Request    Gastroesophageal reflux disease with esophagitis  -     Case Request; Standing  -     Case Request    Alcoholic fatty liver  -     Case Request; Standing  -     Case Request    Hepatic fibrosis   -     Case Request; Standing  -     Case Request    Encounter for screening for malignant neoplasm of colon  -     Case Request; Standing  -     Case Request    Other orders  -     Follow Anesthesia Guidelines / Standing Orders; Future  -     Obtain Informed Consent; Future        Orders placed during this encounter include:  Orders Placed This Encounter   Procedures   • Follow Anesthesia Guidelines / Standing Orders     Standing Status:   Future   • Obtain Informed Consent     Standing Status:   Future     Order Specific Question:   Informed Consent Given For     Answer:    ESOPHAGOGASTRODUODENOSCOPY and colonoscopy       Medications prescribed:  No orders of the defined types were placed in this encounter.      Requested Prescriptions      No prescriptions requested or ordered in this encounter       Review and/or summary of lab tests, radiology, procedures, medications. Review and summary of old records and obtaining of history. The risks and benefits of my recommendations, as well as other treatment options were discussed with the patient today. Questions were answered.    Follow-up: Return in about 6 weeks (around 10/15/2020), or if symptoms worsen or fail to improve.     ESOPHAGOGASTRODUODENOSCOPY eval varices (N/A), COLONOSCOPY (N/A)      This document has been electronically signed by Tim Chen PA-C on September 9, 2020 19:10      Results for orders placed or performed in visit on 04/16/20   REYNOSO Fibrosure   Result Value Ref Range    Fibrosis Score (References) 0.45 (H) 0.00 - 0.21    Fibrosis Stage (Reference) F1-F2     Steatosis Score (Reference) 0.28 0.00 - 0.30    Steatosis Grade (Reference) Comment     REYNOSO Score (Reference) 0.25 0.25    Reynoso Grade (Reference) Comment     Height: (Reference) 73 in    Weight: (Reference) 174 LBS    Alpha 2-Macroglobulins, Qn 245 110 - 276 mg/dL    Haptoglobin 159 32 - 363 mg/dL    Apolipoprotein A-1 167 101 - 178 mg/dL    Total Bilirubin 0.9 0.0 - 1.2 mg/dL    GGT 53 0 - 65 IU/L    ALT (SGPT) 37 0 - 55 IU/L    AST (SGOT) P5P (Reference) 40 0 - 40 IU/L    Cholesterol, Total (Reference) 141 100 - 199 mg/dL    Glucose, Serum (Reference) 104 (H) 65 - 99 mg/dL    Triglycerides 91 0 - 149 mg/dL    Interpretations: (Reference) Comment     Fibrosis Scoring: Comment     Steatosis Grading (Reference) Comment     Reynoso Scoring (Reference) Comment     Limitations: (Reference) Comment     Comment (Reference) Comment    AFP Tumor Marker   Result Value Ref Range    ALPHA-FETOPROTEIN 2.02 0 - 8.3 ng/mL   Protime-INR   Result Value Ref Range     Protime 12.6 11.1 - 15.3 Seconds    INR 0.96 0.80 - 1.20   Results for orders placed or performed in visit on 11/08/19   Protime-INR   Result Value Ref Range    Protime 12.5 11.1 - 15.3 Seconds    INR 0.95 0.80 - 1.20   Hemoglobin A1c   Result Value Ref Range    Hemoglobin A1C 6.01 (H) 4.80 - 5.60 %   Bilirubin, Direct   Result Value Ref Range    Bilirubin, Direct 0.2 0.2 - 0.3 mg/dL   Comprehensive metabolic panel   Result Value Ref Range    Glucose 92 65 - 99 mg/dL    BUN 7 (L) 8 - 23 mg/dL    Creatinine 0.85 0.76 - 1.27 mg/dL    Sodium 147 (H) 136 - 145 mmol/L    Potassium 4.1 3.5 - 5.2 mmol/L    Chloride 105 98 - 107 mmol/L    CO2 26.4 22.0 - 29.0 mmol/L    Calcium 9.6 8.6 - 10.5 mg/dL    Total Protein 8.2 6.0 - 8.5 g/dL    Albumin 4.50 3.50 - 5.20 g/dL    ALT (SGPT) 13 1 - 41 U/L    AST (SGOT) 17 1 - 40 U/L    Alkaline Phosphatase 66 39 - 117 U/L    Total Bilirubin 0.8 0.2 - 1.2 mg/dL    eGFR  African Amer 111 >60 mL/min/1.73    Globulin 3.7 gm/dL    A/G Ratio 1.2 g/dL    BUN/Creatinine Ratio 8.2 7.0 - 25.0    Anion Gap 15.6 (H) 5.0 - 15.0 mmol/L   Results for orders placed or performed in visit on 08/09/19   Hemoglobin A1c   Result Value Ref Range    Hemoglobin A1C 5.00 4.80 - 5.60 %   Results for orders placed or performed in visit on 08/01/19   REYNOSO Fibrosure   Result Value Ref Range    Fibrosis Score (References) 0.32 (H) 0.00 - 0.21    Fibrosis Stage (Reference) F1-F2     Steatosis Score (Reference) 0.29 0.00 - 0.30    Steatosis Grade (Reference) Comment     REYNOSO Score (Reference) 0.25 0.25    Reynoso Grade (Reference) Comment     Height: (Reference) 73 in    Weight: (Reference) 194 LBS    Alpha 2-Macroglobulins, Qn 256 110 - 276 mg/dL    Haptoglobin 147 34 - 200 mg/dL    Apolipoprotein A-1 187 (H) 101 - 178 mg/dL    Total Bilirubin 0.5 0.0 - 1.2 mg/dL    GGT 54 0 - 65 IU/L    ALT (SGPT) 17 0 - 55 IU/L    AST (SGOT) P5P (Reference) 20 0 - 40 IU/L    Cholesterol, Total (Reference) 233 (H) 100 - 199 mg/dL     Glucose, Serum (Reference) 90 65 - 99 mg/dL    Triglycerides 153 (H) 0 - 149 mg/dL    Interpretations: (Reference) Comment     Fibrosis Scoring: Comment     Steatosis Grading (Reference) Comment     Jorgensen Scoring (Reference) Comment     Limitations: (Reference) Comment     Comment (Reference) Comment    CBC Auto Differential   Result Value Ref Range    WBC 5.06 3.40 - 10.80 10*3/mm3    RBC 6.18 (H) 4.14 - 5.80 10*6/mm3    Hemoglobin 15.6 13.0 - 17.7 g/dL    Hematocrit 49.0 37.5 - 51.0 %    MCV 79.3 79.0 - 97.0 fL    MCH 25.2 (L) 26.6 - 33.0 pg    MCHC 31.8 31.5 - 35.7 g/dL    RDW 15.2 12.3 - 15.4 %    RDW-SD 41.5 37.0 - 54.0 fl    MPV 13.1 (H) 6.0 - 12.0 fL    Platelets 238 140 - 450 10*3/mm3    Neutrophil % 52.2 42.7 - 76.0 %    Lymphocyte % 32.0 19.6 - 45.3 %    Monocyte % 8.9 5.0 - 12.0 %    Eosinophil % 4.7 0.3 - 6.2 %    Basophil % 1.8 (H) 0.0 - 1.5 %    Immature Grans % 0.4 0.0 - 0.5 %    Neutrophils, Absolute 2.64 1.70 - 7.00 10*3/mm3    Lymphocytes, Absolute 1.62 0.70 - 3.10 10*3/mm3    Monocytes, Absolute 0.45 0.10 - 0.90 10*3/mm3    Eosinophils, Absolute 0.24 0.00 - 0.40 10*3/mm3    Basophils, Absolute 0.09 0.00 - 0.20 10*3/mm3    Immature Grans, Absolute 0.02 0.00 - 0.05 10*3/mm3    nRBC 0.2 0.0 - 0.2 /100 WBC   AFP Tumor Marker   Result Value Ref Range    ALPHA-FETOPROTEIN 1.43 0 - 8.3 ng/mL     *Note: Due to a large number of results and/or encounters for the requested time period, some results have not been displayed. A complete set of results can be found in Results Review.       Some portions of this note have been dictated using voice recognition software and may contain errors and/or omissions.

## 2020-09-03 NOTE — PATIENT INSTRUCTIONS

## 2020-09-15 ENCOUNTER — LAB (OUTPATIENT)
Dept: LAB | Facility: HOSPITAL | Age: 60
End: 2020-09-15

## 2020-09-15 PROCEDURE — U0003 INFECTIOUS AGENT DETECTION BY NUCLEIC ACID (DNA OR RNA); SEVERE ACUTE RESPIRATORY SYNDROME CORONAVIRUS 2 (SARS-COV-2) (CORONAVIRUS DISEASE [COVID-19]), AMPLIFIED PROBE TECHNIQUE, MAKING USE OF HIGH THROUGHPUT TECHNOLOGIES AS DESCRIBED BY CMS-2020-01-R: HCPCS | Performed by: INTERNAL MEDICINE

## 2020-09-15 PROCEDURE — C9803 HOPD COVID-19 SPEC COLLECT: HCPCS | Performed by: INTERNAL MEDICINE

## 2020-09-16 LAB
COVID LABCORP PRIORITY: NORMAL
SARS-COV-2 RNA RESP QL NAA+PROBE: NOT DETECTED

## 2020-09-17 DIAGNOSIS — I10 ESSENTIAL HYPERTENSION: ICD-10-CM

## 2020-09-17 RX ORDER — LOSARTAN POTASSIUM 100 MG/1
100 TABLET ORAL DAILY
Qty: 30 TABLET | Refills: 5 | Status: SHIPPED | OUTPATIENT
Start: 2020-09-17 | End: 2020-09-19 | Stop reason: SDUPTHER

## 2020-09-17 RX ORDER — LOSARTAN POTASSIUM 100 MG/1
100 TABLET ORAL DAILY
Qty: 30 TABLET | Refills: 5 | Status: CANCELLED | OUTPATIENT
Start: 2020-09-17

## 2020-09-17 NOTE — TELEPHONE ENCOUNTER
JOHN FROM [facility] CALLED AND THE PATIENT IS NEEDING REFILLS ON HIS losartan (COZAAR) 100 MG tablet CALLED INTO [facility] PLEASE. NUMBER TO CALL BACK [phone].      THANK YOU,      ASCENCION

## 2020-09-18 ENCOUNTER — ANESTHESIA EVENT (OUTPATIENT)
Dept: GASTROENTEROLOGY | Facility: HOSPITAL | Age: 60
End: 2020-09-18

## 2020-09-18 ENCOUNTER — HOSPITAL ENCOUNTER (OUTPATIENT)
Facility: HOSPITAL | Age: 60
Setting detail: HOSPITAL OUTPATIENT SURGERY
Discharge: HOME | End: 2020-09-18
Attending: INTERNAL MEDICINE | Admitting: INTERNAL MEDICINE

## 2020-09-18 ENCOUNTER — ANESTHESIA (OUTPATIENT)
Dept: GASTROENTEROLOGY | Facility: HOSPITAL | Age: 60
End: 2020-09-18

## 2020-09-18 VITALS
WEIGHT: 184 LBS | SYSTOLIC BLOOD PRESSURE: 132 MMHG | HEART RATE: 92 BPM | OXYGEN SATURATION: 97 % | RESPIRATION RATE: 18 BRPM | TEMPERATURE: 97 F | HEIGHT: 73 IN | BODY MASS INDEX: 24.39 KG/M2 | DIASTOLIC BLOOD PRESSURE: 64 MMHG

## 2020-09-18 DIAGNOSIS — K70.0 ALCOHOLIC FATTY LIVER: ICD-10-CM

## 2020-09-18 DIAGNOSIS — K21.00 GASTROESOPHAGEAL REFLUX DISEASE WITH ESOPHAGITIS: ICD-10-CM

## 2020-09-18 DIAGNOSIS — K74.00 HEPATIC FIBROSIS: ICD-10-CM

## 2020-09-18 DIAGNOSIS — I85.00 ESOPHAGEAL VARICES WITHOUT BLEEDING, UNSPECIFIED ESOPHAGEAL VARICES TYPE (HCC): ICD-10-CM

## 2020-09-18 DIAGNOSIS — Z12.11 ENCOUNTER FOR SCREENING FOR MALIGNANT NEOPLASM OF COLON: ICD-10-CM

## 2020-09-18 LAB — GLUCOSE BLDC GLUCOMTR-MCNC: 87 MG/DL (ref 70–130)

## 2020-09-18 PROCEDURE — 45380 COLONOSCOPY AND BIOPSY: CPT | Performed by: INTERNAL MEDICINE

## 2020-09-18 PROCEDURE — 25010000002 PROPOFOL 10 MG/ML EMULSION: Performed by: NURSE ANESTHETIST, CERTIFIED REGISTERED

## 2020-09-18 PROCEDURE — 43239 EGD BIOPSY SINGLE/MULTIPLE: CPT | Performed by: INTERNAL MEDICINE

## 2020-09-18 PROCEDURE — 82962 GLUCOSE BLOOD TEST: CPT

## 2020-09-18 PROCEDURE — 88305 TISSUE EXAM BY PATHOLOGIST: CPT

## 2020-09-18 RX ORDER — DEXTROSE AND SODIUM CHLORIDE 5; .45 G/100ML; G/100ML
30 INJECTION, SOLUTION INTRAVENOUS CONTINUOUS PRN
Status: DISCONTINUED | OUTPATIENT
Start: 2020-09-18 | End: 2020-09-18 | Stop reason: HOSPADM

## 2020-09-18 RX ORDER — ONDANSETRON 2 MG/ML
4 INJECTION INTRAMUSCULAR; INTRAVENOUS ONCE AS NEEDED
Status: DISCONTINUED | OUTPATIENT
Start: 2020-09-18 | End: 2020-09-18 | Stop reason: HOSPADM

## 2020-09-18 RX ORDER — LIDOCAINE HYDROCHLORIDE 20 MG/ML
INJECTION, SOLUTION INTRAVENOUS AS NEEDED
Status: DISCONTINUED | OUTPATIENT
Start: 2020-09-18 | End: 2020-09-18 | Stop reason: SURG

## 2020-09-18 RX ORDER — MEPERIDINE HYDROCHLORIDE 25 MG/ML
12.5 INJECTION INTRAMUSCULAR; INTRAVENOUS; SUBCUTANEOUS
Status: DISCONTINUED | OUTPATIENT
Start: 2020-09-18 | End: 2020-09-18 | Stop reason: HOSPADM

## 2020-09-18 RX ORDER — PROMETHAZINE HYDROCHLORIDE 25 MG/1
25 SUPPOSITORY RECTAL ONCE AS NEEDED
Status: DISCONTINUED | OUTPATIENT
Start: 2020-09-18 | End: 2020-09-18 | Stop reason: HOSPADM

## 2020-09-18 RX ORDER — PROMETHAZINE HYDROCHLORIDE 25 MG/1
25 TABLET ORAL ONCE AS NEEDED
Status: DISCONTINUED | OUTPATIENT
Start: 2020-09-18 | End: 2020-09-18 | Stop reason: HOSPADM

## 2020-09-18 RX ORDER — PROPOFOL 10 MG/ML
VIAL (ML) INTRAVENOUS AS NEEDED
Status: DISCONTINUED | OUTPATIENT
Start: 2020-09-18 | End: 2020-09-18 | Stop reason: SURG

## 2020-09-18 RX ADMIN — LIDOCAINE HYDROCHLORIDE 100 MG: 20 INJECTION, SOLUTION INTRAVENOUS at 11:00

## 2020-09-18 RX ADMIN — PROPOFOL 50 MG: 10 INJECTION, EMULSION INTRAVENOUS at 11:05

## 2020-09-18 RX ADMIN — PROPOFOL 75 MG: 10 INJECTION, EMULSION INTRAVENOUS at 11:09

## 2020-09-18 RX ADMIN — DEXTROSE AND SODIUM CHLORIDE 30 ML/HR: 5; 450 INJECTION, SOLUTION INTRAVENOUS at 10:27

## 2020-09-18 RX ADMIN — PROPOFOL 75 MG: 10 INJECTION, EMULSION INTRAVENOUS at 11:00

## 2020-09-18 RX ADMIN — PROPOFOL 50 MG: 10 INJECTION, EMULSION INTRAVENOUS at 11:01

## 2020-09-18 NOTE — ANESTHESIA PREPROCEDURE EVALUATION
Anesthesia Evaluation     NPO Solid Status: > 8 hours  NPO Liquid Status: > 8 hours           Airway   Mallampati: II  TM distance: >3 FB  No difficulty expected  Dental - normal exam     Pulmonary - normal exam   (+) COPD, shortness of breath, recent URI, sleep apnea,   Cardiovascular - normal exam    (+) hypertension, hyperlipidemia,       Neuro/Psych  (+) weakness, numbness, psychiatric history,     GI/Hepatic/Renal/Endo    (+) obesity, morbid obesity, hiatal hernia, GERD, GI bleeding , liver disease, diabetes mellitus type 2,     Musculoskeletal     Abdominal    Substance History      OB/GYN          Other                        Anesthesia Plan    ASA 3     MAC     intravenous induction     Anesthetic plan, all risks, benefits, and alternatives have been provided, discussed and informed consent has been obtained with: patient.

## 2020-09-18 NOTE — ANESTHESIA POSTPROCEDURE EVALUATION
Patient: Jameel Dozier    Procedure Summary     Date: 09/18/20 Room / Location: [facility] ENDOSCOPY 1 / [facility] ENDOSCOPY    Anesthesia Start: 1057 Anesthesia Stop: 1119    Procedures:       ESOPHAGOGASTRODUODENOSCOPY eval varices (N/A )      COLONOSCOPY (N/A ) Diagnosis:       Esophageal varices without bleeding, unspecified esophageal varices type (CMS/HCC)      Gastroesophageal reflux disease with esophagitis      Alcoholic fatty liver      Hepatic fibrosis      Encounter for screening for malignant neoplasm of colon      (Esophageal varices without bleeding, unspecified esophageal varices type (CMS/HCC) [I85.00])      (Gastroesophageal reflux disease with esophagitis [K21.0])      (Alcoholic fatty liver [K70.0])      (Hepatic fibrosis [K74.0])      (Encounter for screening for malignant neoplasm of colon [Z12.11])    Surgeon: Alli Dockery MD Provider: Devaughn Morales CRNA    Anesthesia Type: MAC ASA Status: 3          Anesthesia Type: MAC    Vitals  No vitals data found for the desired time range.          Post Anesthesia Care and Evaluation    Patient location during evaluation: bedside  Patient participation: complete - patient cannot participate  Level of consciousness: awake  Pain score: 0  Pain management: adequate  Airway patency: patent  Anesthetic complications: No anesthetic complications  PONV Status: none  Cardiovascular status: acceptable  Respiratory status: acceptable  Hydration status: acceptable

## 2020-09-19 RX ORDER — LOSARTAN POTASSIUM 100 MG/1
100 TABLET ORAL DAILY
Qty: 30 TABLET | Refills: 5 | Status: SHIPPED | OUTPATIENT
Start: 2020-09-19 | End: 2020-12-18 | Stop reason: SDUPTHER

## 2020-09-22 LAB
LAB AP CASE REPORT: NORMAL
PATH REPORT.FINAL DX SPEC: NORMAL

## 2020-10-15 ENCOUNTER — OFFICE VISIT (OUTPATIENT)
Dept: GASTROENTEROLOGY | Facility: CLINIC | Age: 60
End: 2020-10-15

## 2020-10-15 VITALS
HEART RATE: 99 BPM | HEIGHT: 73 IN | WEIGHT: 183.8 LBS | SYSTOLIC BLOOD PRESSURE: 169 MMHG | BODY MASS INDEX: 24.36 KG/M2 | DIASTOLIC BLOOD PRESSURE: 103 MMHG

## 2020-10-15 DIAGNOSIS — K63.5 HYPERPLASTIC POLYP OF SIGMOID COLON: ICD-10-CM

## 2020-10-15 DIAGNOSIS — I85.00 ESOPHAGEAL VARICES WITHOUT BLEEDING, UNSPECIFIED ESOPHAGEAL VARICES TYPE (HCC): Primary | ICD-10-CM

## 2020-10-15 DIAGNOSIS — K21.00 GASTROESOPHAGEAL REFLUX DISEASE WITH ESOPHAGITIS WITHOUT HEMORRHAGE: ICD-10-CM

## 2020-10-15 DIAGNOSIS — K70.0 ALCOHOLIC FATTY LIVER: ICD-10-CM

## 2020-10-15 DIAGNOSIS — K74.00 HEPATIC FIBROSIS: ICD-10-CM

## 2020-10-15 PROCEDURE — 99214 OFFICE O/P EST MOD 30 MIN: CPT | Performed by: PHYSICIAN ASSISTANT

## 2020-10-15 RX ORDER — NADOLOL 40 MG/1
40 TABLET ORAL DAILY
Qty: 30 TABLET | Refills: 2 | Status: SHIPPED | OUTPATIENT
Start: 2020-10-15 | End: 2020-10-20

## 2020-10-15 NOTE — PATIENT INSTRUCTIONS
"BMI for Adults  What is BMI?  Body mass index (BMI) is a number that is calculated from a person's weight and height. BMI can help estimate how much of a person's weight is composed of fat. BMI does not measure body fat directly. Rather, it is an alternative to procedures that directly measure body fat, which can be difficult and expensive.  BMI can help identify people who may be at higher risk for certain medical problems.  What are BMI measurements used for?  BMI is used as a screening tool to identify possible weight problems. It helps determine whether a person is obese, overweight, a healthy weight, or underweight.  BMI is useful for:  · Identifying a weight problem that may be related to a medical condition or may increase the risk for medical problems.  · Promoting changes, such as changes in diet and exercise, to help reach a healthy weight. BMI screening can be repeated to see if these changes are working.  How is BMI calculated?  BMI involves measuring your weight in relation to your height. Both height and weight are measured, and the BMI is calculated from those numbers. This can be done either in English ([place] or metric measurements. Note that charts and online BMI calculators are available to help you find your BMI quickly and easily without having to do these calculations yourself.  To calculate your BMI in English ([place] measurements:    1. Measure your weight in pounds (lb).  2. Multiply the number of pounds by 703.  ? For example, for a person who weighs 180 lb, multiply that number by 703, which equals 126,540.  3. Measure your height in inches. Then multiply that number by itself to get a measurement called \"inches squared.\"  ? For example, for a person who is 70 inches tall, the \"inches squared\" measurement is 70 inches x 70 inches, which equals 4,900 inches squared.  4. Divide the total from step 2 (number of lb x 703) by the total from step 3 (inches squared): 126,540 ÷ 4,900 = 25.8. This is " "your BMI.  To calculate your BMI in metric measurements:  1. Measure your weight in kilograms (kg).  2. Measure your height in meters (m). Then multiply that number by itself to get a measurement called \"meters squared.\"  ? For example, for a person who is 1.75 m tall, the \"meters squared\" measurement is 1.75 m x 1.75 m, which is equal to 3.1 meters squared.  3. Divide the number of kilograms (your weight) by the meters squared number. In this example: 70 ÷ 3.1 = 22.6. This is your BMI.  What do the results mean?  BMI charts are used to identify whether you are underweight, normal weight, overweight, or obese. The following guidelines will be used:  · Underweight: BMI less than 18.5.  · Normal weight: BMI between 18.5 and 24.9.  · Overweight: BMI between 25 and 29.9.  · Obese: BMI of 30 or above.  Keep these notes in mind:  · Weight includes both fat and muscle, so someone with a muscular build, such as an athlete, may have a BMI that is higher than 24.9. In cases like these, BMI is not an accurate measure of body fat.  · To determine if excess body fat is the cause of a BMI of 25 or higher, further assessments may need to be done by a health care provider.  · BMI is usually interpreted in the same way for men and women.  Where to find more information  For more information about BMI, including tools to quickly calculate your BMI, go to these websites:  · Centers for Disease Control and Prevention: www.cdc.gov  · American Heart Association: www.heart.org  · National Heart, Lung, and Blood [place]: www.nhlbi.nih.gov  Summary  · Body mass index (BMI) is a number that is calculated from a person's weight and height.  · BMI may help estimate how much of a person's weight is composed of fat. BMI can help identify those who may be at higher risk for certain medical problems.  · BMI can be measured using English measurements or metric measurements.  · BMI charts are used to identify whether you are underweight, normal " weight, overweight, or obese.  This information is not intended to replace advice given to you by your health care provider. Make sure you discuss any questions you have with your health care provider.  Document Released: 08/29/2005 Document Revised: 09/09/2020 Document Reviewed: 07/17/2020  Elsevier Patient Education © 2020 [employer]

## 2020-10-15 NOTE — PROGRESS NOTES
Chief Complaint   Patient presents with   • Esophageal Varices   • Heartburn   • Alcoholic Fatty Liver       ENDO PROCEDURE ORDERED:    Subjective    Jameel Dozier is a 60 y.o. male. he is here today for follow-up.    History of Present Illness    Patient seen on a recheck of his esophageal varices with alcoholic fatty liver, F1-F2/S0/N0, GERD, chronic hiccups. Last seen 09/03/2020. The patient had a prior EGD with dilatation of esophageal stricture on 03/06/2019, showed grade 1 esophageal varices. He underwent repeat EGD/colonoscopy on 09/18/2020. This showed grade 2 esophageal varices in the distal esophagus. Portal hypertensive gastropathy. Antral biopsy showed reactive gastropathy with nonspecific chronic inflammation, negative for H. Pylori. Colonoscopy did show a small polyp removed from the sigmoid colon, this was hyperplastic. Showed multiple diverticula in the sigmoid and descending colon. Recommend repeat colonoscopy in no greater than 5 years.      The patient is still having some nausea at times. He does take Prilosec 20 mg. He does not have a lot of heartburn symptoms. He did have hiccups through his whole visit today. His dysphagia is better currently. Bowels are moving without blood or mucus. Weight is up 2.5 pounds since last visit.       A/P: Patient with worsening apparent portal hypertensive gastropathy with grade 2 esophageal varices. Blood pressure was significantly elevated today. He states he forgot to take his blood pressure medication. Will stop his metoprolol and try switching him to Nadolol 40 mg daily to start. I encouraged him to take his blood pressure medication regularly, may need to adjust that up. Given his persistent varices and chronic hiccups we would still have some concern for possible underlying abnormality. Previous imaging of his liver appeared normal. Because of this I recommended a CT scan abdomen to further evaluate. He does have a low dose CT chest ordered by   [facility] and may do that at the same time. I recommended CBC, CMP, AFP, INR. Will plan follow-up after the above, further pending clinical course and the results of the above.       The following portions of the patient's history were reviewed and updated as appropriate:   Past Medical History:   Diagnosis Date   • Abdominal pain    • Acquired achalasia of esophagus    • Adenomatous polyp of colon    • Adverse drug effect    • Alcohol dependence with alcohol-induced disorder (CMS/HCC)    • Allergic rhinitis    • Anal fissure    • Anemia due to blood loss    • Benign essential hypertension    • Central obesity    • Cerebrovascular disease    • Chronic obstructive lung disease (CMS/HCC)    • Claudication (CMS/HCC)    • Disorder of peripheral nervous system    • Diverticular disease of colon     completed antibx, ? neoplasm on CT   • Dysphagia    • Dyspnea    • ED (erectile dysfunction)    • Epigastric pain    • Esophageal dysmotility    • Esophageal reflux    • Esophagitis    • Essential hypertension    • External hemorrhoids    • Gastritis    • GERD with esophagitis    • Heavy tobacco smoker    • Hemorrhoids    • Hiatal hernia    • Hiccough    • History of colon polyps    • History of echocardiogram     Normal LV funciton with Ef of 65% to 70% without regional wall motion abnormalities.Mild CLVH. Normal RV size and function. No significant valvular regurgitation or stenosis. 09/19/2014       • History of EKG    • History of TIA (transient ischemic attack)    • Hypercholesterolemia    • Hypertension    • Left ventricular hypertrophy    • Male erectile disorder    • Obstructive sleep apnea syndrome    • Primary osteoarthritis of knees, bilateral    • Rectal bleed     painful   • Rectal hemorrhage    • Sleep disorder    • Transient cerebral ischemia    • Upper respiratory infection    • Weakness     Attacks of weakness     Past Surgical History:   Procedure Laterality Date   • COLONOSCOPY  04/13/2015    Internal and  external hemorrhoids found. 04/13/2015    • COLONOSCOPY N/A 9/18/2020    Procedure: COLONOSCOPY;  Surgeon: Alli Dockery MD;  Location: [facility] ENDOSCOPY;  Service: Gastroenterology;  Laterality: N/A;   • ENDOSCOPY      Internal & external hemorrhoids found. 1 polyp in sigmoid colon; removed by snare cautery polypectomy. 09/26/2012    • ENDOSCOPY      Gastritis found in the stomach. Biopsy taken.Enlarged folds were found in the body of the stomach.Biopsy taken.Normal duodenum.A hiatus hernia was found in the esophagus. 04/13/2015    • ENDOSCOPY      Hiatus hernia in esophagus. Gastritis in stomach. Biopsy taken. Normal duodenum. Biopsy taken. 09/26/2012       • ENDOSCOPY N/A 12/18/2017    Procedure: ESOPHAGOGASTRODUODENOSCOPY--attn mid esophagus, abnl on CT;  Surgeon: Alli Dockery MD;  Location: [facility] ENDOSCOPY;  Service:    • ENDOSCOPY N/A 3/6/2019    Procedure: ESOPHAGOGASTRODUODENOSCOPY WITH DILATATION;  Surgeon: Alli Dockery MD;  Location: [facility] ENDOSCOPY;  Service: Gastroenterology   • ENDOSCOPY N/A 9/18/2020    Procedure: ESOPHAGOGASTRODUODENOSCOPY eval varices;  Surgeon: Alli Dockery MD;  Location: [facility] ENDOSCOPY;  Service: Gastroenterology;  Laterality: N/A;   • UPPER GASTROINTESTINAL ENDOSCOPY  08/24/2016   • UPPER GASTROINTESTINAL ENDOSCOPY  04/13/2015   • UPPER GASTROINTESTINAL ENDOSCOPY  12/18/2017   • UPPER GASTROINTESTINAL ENDOSCOPY  03/06/2019   • UPPER GASTROINTESTINAL ENDOSCOPY  09/18/2020     Family History   Problem Relation Age of Onset   • Cancer Mother    • Cirrhosis Father    • Diabetes Other    • Hypertension Other        Allergies   Allergen Reactions   • Ace Inhibitors Angioedema     hypotension   • Lisinopril Other (See Comments)     cough   • Vistaril [Hydroxyzine Hcl] Other (See Comments)     Patient is unsure      Social History     Socioeconomic History   • Marital status: Single     Spouse name: Not on file   • Number of children: 0   • Years of education: 12   •  Highest education level: Not on file   Occupational History   • Occupation: retired   Tobacco Use   • Smoking status: Current Every Day Smoker     Packs/day: 1.50     Years: 40.00     Pack years: 60.00     Types: Cigarettes   • Smokeless tobacco: Never Used   Substance and Sexual Activity   • Alcohol use: Yes     Alcohol/week: 6.0 standard drinks     Types: 6 Cans of beer per week     Frequency: 2-4 times a month     Comment: when can get   • Drug use: No   • Sexual activity: Defer     Partners: Female     Current Medications:  Prior to Admission medications    Medication Sig Start Date End Date Taking? Authorizing Provider   albuterol sulfate  (90 Base) MCG/ACT inhaler Inhale 2 puffs Every 4 (Four) Hours As Needed for Wheezing or Shortness of Air. 6/10/20  Yes Yolanda Phan MD   ASPIRIN ADULT LOW STRENGTH 81 MG EC tablet TAKE 1 TABLET BY MOUTH DAILY. 8/20/20  Yes Jeri Danielson MD   Blood Pressure Monitoring (ADULT BLOOD PRESSURE CUFF LG) kit Check BP daily 8/9/19  Yes Jeri Danielson MD   cetirizine (zyrTEC) 10 MG tablet Take 1 tablet by mouth Daily. 6/10/20  Yes Yolanda Phan MD   ferrous sulfate (Feosol) 325 (65 FE) MG tablet Take 1 tablet by mouth 3 (Three) Times a Day With Meals. 8/6/20  Yes Son Li MD   fluticasone (FLONASE) 50 MCG/ACT nasal spray 2 sprays into the nostril(s) as directed by provider Daily. 6/10/20  Yes Yolanda Phan MD   hydrALAZINE (APRESOLINE) 10 MG tablet Take 1 tablet by mouth 3 (Three) Times a Day. 11/8/19  Yes Jeri Danielson MD   losartan (COZAAR) 100 MG tablet Take 1 tablet by mouth Daily. 9/19/20  Yes Jeri Danielson MD   magnesium oxide (MAGOX) 400 (241.3 Mg) MG tablet tablet TAKE 1 TABLET BY MOUTH DAILY. 9/11/20  Yes Jeri Danielson MD   metFORMIN (GLUCOPHAGE) 500 MG tablet Take 1 tablet by mouth Daily With Breakfast. 6/18/20  Yes Jeri Danielson MD   metoprolol tartrate (LOPRESSOR) 25 MG tablet Take 1 tablet by mouth Every 12 (Twelve)  "Hours. 11/8/19  Yes Jeri Danielson MD   Multiple Vitamins-Minerals (MULTIVITAL) tablet Take 1 tablet by mouth Daily. 11/8/19 11/7/20 Yes Jeri Danielson MD   omeprazole (priLOSEC) 20 MG capsule Take 1 capsule by mouth Every Morning. 6/16/20  Yes Jeri Danielson MD   tiotropium (SPIRIVA) 18 MCG per inhalation capsule Place 1 capsule into inhaler and inhale Daily. 9/21/20  Yes Yolanda Phan MD     Review of Systems  Review of Systems       Objective    BP (!) 169/103   Pulse 99   Ht 185.4 cm (73\")   Wt 83.4 kg (183 lb 12.8 oz)   BMI 24.25 kg/m²   Physical Exam  Vitals signs and nursing note reviewed.   Constitutional:       General: He is not in acute distress.     Appearance: He is well-developed.      Comments: AA   HENT:      Head: Normocephalic and atraumatic.   Eyes:      Pupils: Pupils are equal, round, and reactive to light.   Neck:      Musculoskeletal: Normal range of motion.   Cardiovascular:      Rate and Rhythm: Normal rate and regular rhythm.      Heart sounds: Normal heart sounds.   Pulmonary:      Effort: Pulmonary effort is normal.      Breath sounds: Normal breath sounds.      Comments: Chronic hiccups  Abdominal:      General: Bowel sounds are normal. There is no distension or abdominal bruit.      Palpations: Abdomen is soft. Abdomen is not rigid. There is no shifting dullness or mass.      Tenderness: There is abdominal tenderness. There is no guarding or rebound.      Hernia: No hernia is present. There is no hernia in the ventral area.   Musculoskeletal: Normal range of motion.   Skin:     General: Skin is warm and dry.   Neurological:      Mental Status: He is alert and oriented to person, place, and time.   Psychiatric:         Behavior: Behavior normal.         Thought Content: Thought content normal.         Judgment: Judgment normal.       Assessment/Plan      1. Esophageal varices without bleeding, unspecified esophageal varices type (CMS/HCC)    2. Alcoholic fatty liver  "   3. Hepatic fibrosis     4. Gastroesophageal reflux disease with esophagitis without hemorrhage    5. Hyperplastic polyp of sigmoid colon    .   Diagnoses and all orders for this visit:    1. Esophageal varices without bleeding, unspecified esophageal varices type (CMS/HCC) (Primary)  -     CT Abdomen With Contrast  -     Comprehensive Metabolic Panel  -     Protime-INR  -     CBC & Differential  -     Ammonia  -     AFP Tumor Marker    2. Alcoholic fatty liver  -     CT Abdomen With Contrast  -     Comprehensive Metabolic Panel  -     Protime-INR  -     CBC & Differential  -     Ammonia  -     AFP Tumor Marker    3. Hepatic fibrosis   -     CT Abdomen With Contrast  -     Comprehensive Metabolic Panel  -     Protime-INR  -     CBC & Differential  -     Ammonia  -     AFP Tumor Marker    4. Gastroesophageal reflux disease with esophagitis without hemorrhage  -     CT Abdomen With Contrast  -     Comprehensive Metabolic Panel  -     Protime-INR  -     CBC & Differential  -     Ammonia  -     AFP Tumor Marker    5. Hyperplastic polyp of sigmoid colon  -     CT Abdomen With Contrast  -     Comprehensive Metabolic Panel  -     Protime-INR  -     CBC & Differential  -     Ammonia  -     AFP Tumor Marker    Other orders  -     nadolol (Corgard) 40 MG tablet; Take 1 tablet by mouth Daily.  Dispense: 30 tablet; Refill: 2        Orders placed during this encounter include:  Orders Placed This Encounter   Procedures   • CT Abdomen With Contrast     Order Specific Question:   Will Oral Contrast be needed for this procedure?     Answer:   Yes   • Comprehensive Metabolic Panel   • Protime-INR   • Ammonia   • AFP Tumor Marker   • CBC & Differential     Order Specific Question:   Manual Differential     Answer:   No       Medications prescribed:  New Medications Ordered This Visit   Medications   • nadolol (Corgard) 40 MG tablet     Sig: Take 1 tablet by mouth Daily.     Dispense:  30 tablet     Refill:  2       Requested  Prescriptions     Signed Prescriptions Disp Refills   • nadolol (Corgard) 40 MG tablet 30 tablet 2     Sig: Take 1 tablet by mouth Daily.       Review and/or summary of lab tests, radiology, procedures, medications. Review and summary of old records and obtaining of history. The risks and benefits of my recommendations, as well as other treatment options were discussed with the patient today. Questions were answered.    Follow-up: Return in about 1 month (around 11/15/2020), or if symptoms worsen or fail to improve.     * Surgery not found *      This document has been electronically signed by Tim Chen PA-C on October 15, 2020 17:24 CDT      Results for orders placed or performed during the hospital encounter of 09/18/20   COVID [facility] Priority - Swab, Nasopharynx    Specimen: Nasopharynx; Swab   Result Value Ref Range    COVID [facility] PRIORITY Comment    COVID-19,[facility] ROUTINE, NP/OP SWAB IN TRANSPORT MEDIA OR ESWAB 72 HR TAT - Swab, Nasopharynx    Specimen: Nasopharynx; Swab   Result Value Ref Range    SARS-CoV-2, BRAD Not Detected Not Detected   Tissue Pathology Exam    Specimen: A: Gastric, Antrum; Tissue    B: Large Intestine, Sigmoid Colon; Polyp   Result Value Ref Range    Case Report       Surgical Pathology Report                         Case: [id number]                                  Authorizing Provider:  Alli Dockery MD        Collected:           09/18/2020 11:08 AM          Ordering Location:     [facility]             Received:            09/18/2020 11:51 AM                                 [facility]                                                     Pathologist:           Madonna Andrews DO                                                        Specimens:   1) - Gastric, Antrum                                                                                2) - Large Intestine, Sigmoid Colon, polyp                                                 Final Diagnosis        SEE SCANNED REPORT       POC Glucose Once    Specimen: Blood   Result Value Ref Range    Glucose 87 70 - 130 mg/dL   Results for orders placed or performed in visit on 04/16/20   REYNOSO Fibrosure    Specimen: Blood   Result Value Ref Range    Fibrosis Score (References) 0.45 (H) 0.00 - 0.21    Fibrosis Stage (Reference) F1-F2     Steatosis Score (Reference) 0.28 0.00 - 0.30    Steatosis Grade (Reference) Comment     REYNOSO Score (Reference) 0.25 0.25    Reynoso Grade (Reference) Comment     Height: (Reference) 73 in    Weight: (Reference) 174 LBS    Alpha 2-Macroglobulins, Qn 245 110 - 276 mg/dL    Haptoglobin 159 32 - 363 mg/dL    Apolipoprotein A-1 167 101 - 178 mg/dL    Total Bilirubin 0.9 0.0 - 1.2 mg/dL    GGT 53 0 - 65 IU/L    ALT (SGPT) 37 0 - 55 IU/L    AST (SGOT) P5P (Reference) 40 0 - 40 IU/L    Cholesterol, Total (Reference) 141 100 - 199 mg/dL    Glucose, Serum (Reference) 104 (H) 65 - 99 mg/dL    Triglycerides 91 0 - 149 mg/dL    Interpretations: (Reference) Comment     Fibrosis Scoring: Comment     Steatosis Grading (Reference) Comment     Reynoso Scoring (Reference) Comment     Limitations: (Reference) Comment     Comment (Reference) Comment    AFP Tumor Marker    Specimen: Blood   Result Value Ref Range    ALPHA-FETOPROTEIN 2.02 0 - 8.3 ng/mL   Protime-INR    Specimen: Blood   Result Value Ref Range    Protime 12.6 11.1 - 15.3 Seconds    INR 0.96 0.80 - 1.20   Results for orders placed or performed in visit on 11/08/19   Protime-INR    Specimen: Blood   Result Value Ref Range    Protime 12.5 11.1 - 15.3 Seconds    INR 0.95 0.80 - 1.20   Hemoglobin A1c    Specimen: Blood   Result Value Ref Range    Hemoglobin A1C 6.01 (H) 4.80 - 5.60 %   Bilirubin, Direct    Specimen: Blood   Result Value Ref Range    Bilirubin, Direct 0.2 0.2 - 0.3 mg/dL   Comprehensive metabolic panel    Specimen: Blood   Result Value Ref Range    Glucose 92 65 - 99 mg/dL    BUN 7 (L) 8 - 23 mg/dL    Creatinine 0.85 0.76 - 1.27 mg/dL     Sodium 147 (H) 136 - 145 mmol/L    Potassium 4.1 3.5 - 5.2 mmol/L    Chloride 105 98 - 107 mmol/L    CO2 26.4 22.0 - 29.0 mmol/L    Calcium 9.6 8.6 - 10.5 mg/dL    Total Protein 8.2 6.0 - 8.5 g/dL    Albumin 4.50 3.50 - 5.20 g/dL    ALT (SGPT) 13 1 - 41 U/L    AST (SGOT) 17 1 - 40 U/L    Alkaline Phosphatase 66 39 - 117 U/L    Total Bilirubin 0.8 0.2 - 1.2 mg/dL    eGFR  African Amer 111 >60 mL/min/1.73    Globulin 3.7 gm/dL    A/G Ratio 1.2 g/dL    BUN/Creatinine Ratio 8.2 7.0 - 25.0    Anion Gap 15.6 (H) 5.0 - 15.0 mmol/L   Results for orders placed or performed in visit on 08/09/19   Hemoglobin A1c    Specimen: Blood   Result Value Ref Range    Hemoglobin A1C 5.00 4.80 - 5.60 %   Results for orders placed or performed in visit on 08/01/19   REYNOSO Fibrosure    Specimen: Blood   Result Value Ref Range    Fibrosis Score (References) 0.32 (H) 0.00 - 0.21    Fibrosis Stage (Reference) F1-F2     Steatosis Score (Reference) 0.29 0.00 - 0.30    Steatosis Grade (Reference) Comment     REYNOSO Score (Reference) 0.25 0.25    Reynoso Grade (Reference) Comment     Height: (Reference) 73 in    Weight: (Reference) 194 LBS    Alpha 2-Macroglobulins, Qn 256 110 - 276 mg/dL    Haptoglobin 147 34 - 200 mg/dL    Apolipoprotein A-1 187 (H) 101 - 178 mg/dL    Total Bilirubin 0.5 0.0 - 1.2 mg/dL    GGT 54 0 - 65 IU/L    ALT (SGPT) 17 0 - 55 IU/L    AST (SGOT) P5P (Reference) 20 0 - 40 IU/L    Cholesterol, Total (Reference) 233 (H) 100 - 199 mg/dL    Glucose, Serum (Reference) 90 65 - 99 mg/dL    Triglycerides 153 (H) 0 - 149 mg/dL    Interpretations: (Reference) Comment     Fibrosis Scoring: Comment     Steatosis Grading (Reference) Comment     Reynoso Scoring (Reference) Comment     Limitations: (Reference) Comment     Comment (Reference) Comment    CBC Auto Differential    Specimen: Blood   Result Value Ref Range    WBC 5.06 3.40 - 10.80 10*3/mm3    RBC 6.18 (H) 4.14 - 5.80 10*6/mm3    Hemoglobin 15.6 13.0 - 17.7 g/dL    Hematocrit 49.0 37.5 -  51.0 %    MCV 79.3 79.0 - 97.0 fL    MCH 25.2 (L) 26.6 - 33.0 pg    MCHC 31.8 31.5 - 35.7 g/dL    RDW 15.2 12.3 - 15.4 %    RDW-SD 41.5 37.0 - 54.0 fl    MPV 13.1 (H) 6.0 - 12.0 fL    Platelets 238 140 - 450 10*3/mm3    Neutrophil % 52.2 42.7 - 76.0 %    Lymphocyte % 32.0 19.6 - 45.3 %    Monocyte % 8.9 5.0 - 12.0 %    Eosinophil % 4.7 0.3 - 6.2 %    Basophil % 1.8 (H) 0.0 - 1.5 %    Immature Grans % 0.4 0.0 - 0.5 %    Neutrophils, Absolute 2.64 1.70 - 7.00 10*3/mm3    Lymphocytes, Absolute 1.62 0.70 - 3.10 10*3/mm3    Monocytes, Absolute 0.45 0.10 - 0.90 10*3/mm3    Eosinophils, Absolute 0.24 0.00 - 0.40 10*3/mm3    Basophils, Absolute 0.09 0.00 - 0.20 10*3/mm3    Immature Grans, Absolute 0.02 0.00 - 0.05 10*3/mm3    nRBC 0.2 0.0 - 0.2 /100 WBC     *Note: Due to a large number of results and/or encounters for the requested time period, some results have not been displayed. A complete set of results can be found in Results Review.       Some portions of this note have been dictated using voice recognition software and may contain errors and/or omissions.

## 2020-10-20 RX ORDER — PROPRANOLOL HYDROCHLORIDE AND HYDROCHLOROTHIAZIDE 40; 25 MG/1; MG/1
1 TABLET ORAL DAILY
Qty: 30 TABLET | Refills: 2 | Status: SHIPPED | OUTPATIENT
Start: 2020-10-20 | End: 2020-10-21

## 2020-10-21 ENCOUNTER — TELEPHONE (OUTPATIENT)
Dept: GASTROENTEROLOGY | Facility: CLINIC | Age: 60
End: 2020-10-21

## 2020-10-21 RX ORDER — PROPRANOLOL HYDROCHLORIDE 40 MG/1
40 TABLET ORAL DAILY
Qty: 30 TABLET | Refills: 3 | Status: SHIPPED | OUTPATIENT
Start: 2020-10-21 | End: 2021-01-20 | Stop reason: SDUPTHER

## 2020-10-21 NOTE — TELEPHONE ENCOUNTER
----- Message from Tim Chne PA-C sent at 10/20/2020  6:41 PM CDT -----  sent  ----- Message -----  From: Snow Monae MA  Sent: 10/20/2020  12:07 PM CDT  To: Tim Chen PA-C    PRIOR AUTH FOR NADOLOL DENIED, WOULD YOU SEND IN ALTERNATE MEDICATION FOR HIS VARICES?

## 2020-10-21 NOTE — TELEPHONE ENCOUNTER
Patient has been contacted and made aware that his insurance company would not cover the original rx for nadolol therefore a new rx has been sent to his pharmacy of choice. Patient has also been reminded to have labs drawn at his earliest convenience. Patient voiced understanding.

## 2020-11-16 ENCOUNTER — LAB (OUTPATIENT)
Dept: LAB | Facility: HOSPITAL | Age: 60
End: 2020-11-16

## 2020-11-16 LAB
ALBUMIN SERPL-MCNC: 4.4 G/DL (ref 3.5–5.2)
ALBUMIN/GLOB SERPL: 1.4 G/DL
ALP SERPL-CCNC: 75 U/L (ref 39–117)
ALPHA-FETOPROTEIN: 2.61 NG/ML (ref 0–8.3)
ALT SERPL W P-5'-P-CCNC: 13 U/L (ref 1–41)
AMMONIA BLD-SCNC: 27 UMOL/L (ref 16–60)
ANION GAP SERPL CALCULATED.3IONS-SCNC: 8.9 MMOL/L (ref 5–15)
AST SERPL-CCNC: 14 U/L (ref 1–40)
BASOPHILS # BLD AUTO: 0.07 10*3/MM3 (ref 0–0.2)
BASOPHILS NFR BLD AUTO: 0.7 % (ref 0–1.5)
BILIRUB SERPL-MCNC: 1.6 MG/DL (ref 0–1.2)
BUN SERPL-MCNC: 5 MG/DL (ref 8–23)
BUN/CREAT SERPL: 6.8 (ref 7–25)
CALCIUM SPEC-SCNC: 8.8 MG/DL (ref 8.6–10.5)
CHLORIDE SERPL-SCNC: 101 MMOL/L (ref 98–107)
CO2 SERPL-SCNC: 30.1 MMOL/L (ref 22–29)
CREAT SERPL-MCNC: 0.73 MG/DL (ref 0.76–1.27)
DEPRECATED RDW RBC AUTO: 39.4 FL (ref 37–54)
EOSINOPHIL # BLD AUTO: 0.15 10*3/MM3 (ref 0–0.4)
EOSINOPHIL NFR BLD AUTO: 1.4 % (ref 0.3–6.2)
ERYTHROCYTE [DISTWIDTH] IN BLOOD BY AUTOMATED COUNT: 15.6 % (ref 12.3–15.4)
GFR SERPL CREATININE-BSD FRML MDRD: 132 ML/MIN/1.73
GLOBULIN UR ELPH-MCNC: 3.1 GM/DL
GLUCOSE SERPL-MCNC: 106 MG/DL (ref 65–99)
HCT VFR BLD AUTO: 46.6 % (ref 37.5–51)
HGB BLD-MCNC: 15.5 G/DL (ref 13–17.7)
IMM GRANULOCYTES # BLD AUTO: 0.03 10*3/MM3 (ref 0–0.05)
IMM GRANULOCYTES NFR BLD AUTO: 0.3 % (ref 0–0.5)
INR PPP: 1.01 (ref 0.8–1.2)
LYMPHOCYTES # BLD AUTO: 1.17 10*3/MM3 (ref 0.7–3.1)
LYMPHOCYTES NFR BLD AUTO: 11.1 % (ref 19.6–45.3)
MCH RBC QN AUTO: 25.4 PG (ref 26.6–33)
MCHC RBC AUTO-ENTMCNC: 33.3 G/DL (ref 31.5–35.7)
MCV RBC AUTO: 76.4 FL (ref 79–97)
MONOCYTES # BLD AUTO: 0.92 10*3/MM3 (ref 0.1–0.9)
MONOCYTES NFR BLD AUTO: 8.7 % (ref 5–12)
NEUTROPHILS NFR BLD AUTO: 77.8 % (ref 42.7–76)
NEUTROPHILS NFR BLD AUTO: 8.2 10*3/MM3 (ref 1.7–7)
NRBC BLD AUTO-RTO: 0.1 /100 WBC (ref 0–0.2)
PLATELET # BLD AUTO: 299 10*3/MM3 (ref 140–450)
PMV BLD AUTO: 11.5 FL (ref 6–12)
POTASSIUM SERPL-SCNC: 3.1 MMOL/L (ref 3.5–5.2)
PROT SERPL-MCNC: 7.5 G/DL (ref 6–8.5)
PROTHROMBIN TIME: 13.7 SECONDS (ref 11.1–15.3)
RBC # BLD AUTO: 6.1 10*6/MM3 (ref 4.14–5.8)
SODIUM SERPL-SCNC: 140 MMOL/L (ref 136–145)
WBC # BLD AUTO: 10.54 10*3/MM3 (ref 3.4–10.8)

## 2020-11-16 PROCEDURE — 82140 ASSAY OF AMMONIA: CPT | Performed by: PHYSICIAN ASSISTANT

## 2020-11-16 PROCEDURE — 36415 COLL VENOUS BLD VENIPUNCTURE: CPT | Performed by: PHYSICIAN ASSISTANT

## 2020-11-16 PROCEDURE — 80053 COMPREHEN METABOLIC PANEL: CPT | Performed by: PHYSICIAN ASSISTANT

## 2020-11-16 PROCEDURE — 82105 ALPHA-FETOPROTEIN SERUM: CPT | Performed by: PHYSICIAN ASSISTANT

## 2020-11-16 PROCEDURE — 85610 PROTHROMBIN TIME: CPT | Performed by: PHYSICIAN ASSISTANT

## 2020-11-16 PROCEDURE — 85025 COMPLETE CBC W/AUTO DIFF WBC: CPT | Performed by: PHYSICIAN ASSISTANT

## 2020-11-16 NOTE — PROGRESS NOTES
Pulmonary Office Follow-up    Subjective     Jameel Dozier is seen today at the office for   Chief Complaint   Patient presents with   • COPD   • CT results         HPI  Jameel Dozier is a 60 y.o. male with a PMH significant for COPD, tobacco use, [place], obesity, HTN, and GERD who presents for follow-up of COPD.      8/30/19: Recommended continuing on Spiriva daily and using albuterol only as needed.  I also encouraged him to use his Flonase more frequently for his chronic rhinitis and counseled him on the importance of complete tobacco cessation.  Patient was agreeable to undergoing lung cancer screening as scheduled.    11/12/19: He remained stable on Spiriva so I again spent time counseling on importance of complete tobacco cessation but he is not willing to quit.  I also reviewed his lung cancer screening CT which did not show any concerning nodules.    2/21/20: He is experiencing symptoms which I felt were secondary to a mild viral URI so I recommended supportive care.  He was otherwise continue on his Spiriva and allergy regimen.  I did spend time again counseled on the importance of complete tobacco cessation but he was still not willing to quit.    5/29/20: He remained stable on Spiriva so I spent time counseling on the importance of complete tobacco cessation and recommended he undergo his annual lung cancer screening in November.    11/17/20: He states he is tired as he had to get up early to be here for his CTs.  Otherwise, his breathing is stable.  He continues to use Spiriva daily but rarely uses his albuterol.  He does admit to occasional cough, but it is nonproductive and mainly occurs when he smokes.  He denies any wheeze, chest pain, leg swelling, or recent illness.  He does continue to smoke but is now contemplating cessation.  He is not ready to set a quit date.  Patient refuses a flu vaccine.    Tobacco use history:  Type: cigarettes  Amount: 1 ppd  Duration: 45 years  Cessation: N/a     Willing to quit: No      Patient Active Problem List   Diagnosis   • Central obesity   • Hypercholesterolemia   • Essential hypertension   • Astigmatism   • Type 2 diabetes mellitus with complication, without long-term current use of insulin (CMS/HCC)   • Cigarette nicotine dependence without complication   • Tobacco use disorder   • Chronic obstructive pulmonary disease (CMS/HCC)   • Overweight (BMI 25.0-29.9)   • Physical deconditioning   • Chronic non-seasonal allergic rhinitis   • Imaging of gastrointestinal tract abnormal   • Pain of upper abdomen   • Gastroesophageal reflux disease with esophagitis   • Screening for hyperlipidemia   • Weakness   • Transient cerebral ischemia   • Sleep disorder   • Rectal hemorrhage   • Rectal bleed   • Primary osteoarthritis of knees, bilateral   • Obstructive sleep apnea syndrome   • Male erectile disorder   • Left ventricular hypertrophy   • Hypertension   • History of TIA (transient ischemic attack)   • History of EKG   • History of echocardiogram   • History of colon polyps   • Hiccough   • Hiatal hernia   • Hemorrhoids   • Heavy tobacco smoker   • Gastritis   • External hemorrhoids   • Esophagitis   • Esophageal reflux   • Esophageal dysmotility   • Epigastric pain   • ED (erectile dysfunction)   • Dyspnea   • Dysphagia   • Diverticular disease of colon   • Disorder of peripheral nervous system   • Claudication (CMS/HCC)   • Chronic obstructive lung disease (CMS/HCC)   • Cerebrovascular disease   • Benign essential hypertension   • Anemia due to blood loss   • Anal fissure   • Allergic rhinitis   • Alcohol dependence with alcohol-induced disorder (CMS/HCC)   • Adenomatous polyp of colon   • Acquired achalasia of esophagus   • Abdominal pain   • Adverse drug effect   • Intractable hiccups   • Non-intractable vomiting with nausea   • Closed displaced fracture of shaft of fifth metacarpal bone with routine healing, subsequent encounter   • Pain of right hand   • Esophageal  varices without bleeding (CMS/HCC)   • Alcoholic fatty liver   • Hepatic fibrosis   • Encounter for screening for malignant neoplasm of colon       Review of Systems  Review of Systems   Constitutional: Negative for fever and unexpected weight change.   HENT: Positive for congestion. Negative for postnasal drip.    Respiratory: Positive for cough and shortness of breath. Negative for wheezing.    Cardiovascular: Negative for chest pain and leg swelling.     As described in the HPI. Otherwise, remainder of ROS (14 systems) were negative.    Medications, Allergies, Social, and Family Histories reviewed as per EMR.    Objective     Vitals:    11/17/20 1014   BP: 126/90   Pulse: 87   SpO2: 98%         11/17/20  1014   Weight: 86.2 kg (190 lb)     Physical Exam   Constitutional: He is oriented to person, place, and time. He appears well-developed.   HENT:   Head: Normocephalic and atraumatic.   Nose: Nose normal.   Eyes: Pupils are equal, round, and reactive to light. Conjunctivae and lids are normal.   Neck: Trachea normal and normal range of motion. No tracheal tenderness present.   Cardiovascular: Normal rate, regular rhythm and normal heart sounds. PMI is not displaced. Exam reveals no gallop.   No murmur heard.  Pulmonary/Chest: Effort normal and breath sounds normal. No respiratory distress. He has no decreased breath sounds. He has no wheezes. He has no rhonchi. He has no rales. He exhibits no tenderness.   Abdominal: Soft. Normal appearance and bowel sounds are normal. There is no abdominal tenderness.   Musculoskeletal:      Comments: Normal gait, no extremity edema     Vascular Status -  His right foot exhibits no edema. His left foot exhibits no edema.  Lymphadenopathy:        Head (right side): No submandibular adenopathy present.        Head (left side): No submandibular adenopathy present.     He has no cervical adenopathy.        Right: No supraclavicular adenopathy present.        Left: No supraclavicular  adenopathy present.   Neurological: He is alert and oriented to person, place, and time.   Skin: Skin is warm and dry. Nails show no clubbing.   Psychiatric: His speech is normal and behavior is normal. Judgment normal.   Nursing note and vitals reviewed.    PFTs: 11/27/17  Ratio 89  FVC 2.4/ 55%  FEV1 2.13/ 63%  Poor effort.  Reduced FVC and FEV1 suggesting restriction.  No bronchodilator response.  No comparative data available.     IMAGING: LDCT 11/17/20 (independently reviewed and interpreted by me) showed a groundglass opacity in the right lower lobe as well as multifocal groundglass opacity in the lateral left upper lobe      Assessment/Plan     Diagnoses and all orders for this visit:    1. Chronic obstructive pulmonary disease, unspecified COPD type (CMS/HCC) (Primary)    2. Chronic non-seasonal allergic rhinitis    3. Cigarette nicotine dependence without complication    4. Physical deconditioning    5. Abnormal CT of the chest  -     doxycycline (VIBRAMYICN) 100 MG tablet; Take 1 tablet by mouth 2 (Two) Times a Day for 7 days.  Dispense: 14 tablet; Refill: 0  -     CT Chest Without Contrast; Future         Discussion/ Recommendations:   I personally reviewed his lung cancer screening CT which showed bilateral groundglass opacities concerning for infection/inflammation.  I have recommended an empiric course of antibiotics and a repeat CT chest in 6-8 weeks to ensure improvement.  Otherwise, he is to continue on his maintenance bronchodilators and I again stressed the importance of complete tobacco cessation.    -Doxycycline 100 mg twice daily for 7 days  -Repeat CT chest in 6-8 weeks.  -Follow-up radiology dilatation of lung cancer screening CT.  -Continue Spiriva handihaler daily   -Use Albuterol as needed for dyspnea or wheeze.    -Continue Zyrtec and Flonase daily.  Use frequent nasal saline.  -Jameel Dozier is a current cigarettes user.  He currently smokes 1 pack of cigarettes per day for a  duration of 45 years. I have educated him on the risk of diseases from using tobacco products such as cancer, COPD and heart diease.  I advised him to quit and he is not willing to quit. I spent 1 minutes counseling the patient.  -Annual lung cancer screening CT on or after 11/17/2021; 1 PPD x 45 years with no symptoms of lung cancer.    -Up-to-date with pneumococcal vaccine.  Refuses flu vaccination despite counseling.    Patient's Body mass index is 25.07 kg/m². BMI is above normal parameters. Recommendations include: exercise counseling.      Return in about 6 weeks (around 12/29/2020) for Review results.          This document has been electronically signed by Yolanda Phan MD on November 17, 2020 11:11 CST      Dictated using Dragon

## 2020-11-17 ENCOUNTER — HOSPITAL ENCOUNTER (OUTPATIENT)
Dept: CT IMAGING | Facility: HOSPITAL | Age: 60
Discharge: HOME | End: 2020-11-17

## 2020-11-17 ENCOUNTER — OFFICE VISIT (OUTPATIENT)
Dept: PULMONOLOGY | Facility: CLINIC | Age: 60
End: 2020-11-17

## 2020-11-17 VITALS
DIASTOLIC BLOOD PRESSURE: 90 MMHG | HEIGHT: 73 IN | WEIGHT: 190 LBS | BODY MASS INDEX: 25.18 KG/M2 | HEART RATE: 87 BPM | SYSTOLIC BLOOD PRESSURE: 126 MMHG | OXYGEN SATURATION: 98 %

## 2020-11-17 DIAGNOSIS — J44.9 CHRONIC OBSTRUCTIVE PULMONARY DISEASE, UNSPECIFIED COPD TYPE (HCC): Primary | ICD-10-CM

## 2020-11-17 DIAGNOSIS — J30.89 CHRONIC NON-SEASONAL ALLERGIC RHINITIS: ICD-10-CM

## 2020-11-17 DIAGNOSIS — R53.81 PHYSICAL DECONDITIONING: ICD-10-CM

## 2020-11-17 DIAGNOSIS — R93.89 ABNORMAL CT OF THE CHEST: ICD-10-CM

## 2020-11-17 DIAGNOSIS — F17.210 CIGARETTE NICOTINE DEPENDENCE WITHOUT COMPLICATION: ICD-10-CM

## 2020-11-17 PROCEDURE — 74160 CT ABDOMEN W/CONTRAST: CPT

## 2020-11-17 PROCEDURE — G0297 LDCT FOR LUNG CA SCREEN: HCPCS

## 2020-11-17 PROCEDURE — 99214 OFFICE O/P EST MOD 30 MIN: CPT | Performed by: INTERNAL MEDICINE

## 2020-11-17 PROCEDURE — 25010000002 IOPAMIDOL 61 % SOLUTION: Performed by: PHYSICIAN ASSISTANT

## 2020-11-17 RX ORDER — DOXYCYCLINE HYCLATE 100 MG
100 TABLET ORAL 2 TIMES DAILY
Qty: 14 TABLET | Refills: 0 | Status: SHIPPED | OUTPATIENT
Start: 2020-11-17 | End: 2020-11-24

## 2020-11-17 RX ADMIN — IOPAMIDOL 90 ML: 612 INJECTION, SOLUTION INTRAVENOUS at 09:41

## 2020-11-19 ENCOUNTER — OFFICE VISIT (OUTPATIENT)
Dept: GASTROENTEROLOGY | Facility: CLINIC | Age: 60
End: 2020-11-19

## 2020-11-19 VITALS — WEIGHT: 188.2 LBS | BODY MASS INDEX: 24.94 KG/M2 | HEIGHT: 73 IN

## 2020-11-19 DIAGNOSIS — K76.0 FATTY (CHANGE OF) LIVER, NOT ELSEWHERE CLASSIFIED: ICD-10-CM

## 2020-11-19 DIAGNOSIS — E71.30 DISORDER OF FATTY-ACID METABOLISM, UNSPECIFIED: ICD-10-CM

## 2020-11-19 DIAGNOSIS — K21.00 GASTROESOPHAGEAL REFLUX DISEASE WITH ESOPHAGITIS WITHOUT HEMORRHAGE: ICD-10-CM

## 2020-11-19 DIAGNOSIS — K70.0 ALCOHOLIC FATTY LIVER: ICD-10-CM

## 2020-11-19 DIAGNOSIS — I85.00 ESOPHAGEAL VARICES WITHOUT BLEEDING, UNSPECIFIED ESOPHAGEAL VARICES TYPE (HCC): Primary | ICD-10-CM

## 2020-11-19 DIAGNOSIS — K74.00 HEPATIC FIBROSIS: ICD-10-CM

## 2020-11-19 PROCEDURE — 99214 OFFICE O/P EST MOD 30 MIN: CPT | Performed by: PHYSICIAN ASSISTANT

## 2020-12-07 DIAGNOSIS — E65 CENTRAL OBESITY: ICD-10-CM

## 2020-12-07 DIAGNOSIS — E78.00 HYPERCHOLESTEROLEMIA: ICD-10-CM

## 2020-12-07 DIAGNOSIS — I10 ESSENTIAL HYPERTENSION: ICD-10-CM

## 2020-12-07 RX ORDER — ASPIRIN 81 MG/1
TABLET ORAL
Qty: 30 TABLET | Refills: 2 | Status: SHIPPED | OUTPATIENT
Start: 2020-12-07 | End: 2021-07-14 | Stop reason: SDUPTHER

## 2020-12-16 ENCOUNTER — OFFICE VISIT (OUTPATIENT)
Dept: FAMILY MEDICINE CLINIC | Facility: CLINIC | Age: 60
End: 2020-12-16

## 2020-12-16 ENCOUNTER — LAB (OUTPATIENT)
Dept: LAB | Facility: HOSPITAL | Age: 60
End: 2020-12-16

## 2020-12-16 VITALS
BODY MASS INDEX: 24.43 KG/M2 | DIASTOLIC BLOOD PRESSURE: 80 MMHG | HEIGHT: 73 IN | WEIGHT: 184.3 LBS | HEART RATE: 91 BPM | OXYGEN SATURATION: 98 % | SYSTOLIC BLOOD PRESSURE: 138 MMHG

## 2020-12-16 DIAGNOSIS — E11.8 TYPE 2 DIABETES MELLITUS WITH COMPLICATION, WITHOUT LONG-TERM CURRENT USE OF INSULIN (HCC): Primary | ICD-10-CM

## 2020-12-16 DIAGNOSIS — E11.8 TYPE 2 DIABETES MELLITUS WITH COMPLICATION, WITHOUT LONG-TERM CURRENT USE OF INSULIN (HCC): ICD-10-CM

## 2020-12-16 LAB — HBA1C MFR BLD: 5.36 % (ref 4.8–5.6)

## 2020-12-16 PROCEDURE — 36415 COLL VENOUS BLD VENIPUNCTURE: CPT

## 2020-12-16 PROCEDURE — 99213 OFFICE O/P EST LOW 20 MIN: CPT | Performed by: STUDENT IN AN ORGANIZED HEALTH CARE EDUCATION/TRAINING PROGRAM

## 2020-12-16 PROCEDURE — 83036 HEMOGLOBIN GLYCOSYLATED A1C: CPT

## 2020-12-16 NOTE — PROGRESS NOTES
Subjective   Jameel Dozier is a 60 y.o. male who presents for follow up for DMT2. Pt states he checks his glucose regularly and is always below 120 on his readings. Currently maintained on Metformin. He was ordered an A1C on his previous visit but never had this lab performed. States he's currently doing well on medications with no ill effects.       Past Medical Hx:  Past Medical History:   Diagnosis Date   • Abdominal pain    • Acquired achalasia of esophagus    • Adenomatous polyp of colon    • Adverse drug effect    • Alcohol dependence with alcohol-induced disorder (CMS/HCC)    • Allergic rhinitis    • Anal fissure    • Anemia due to blood loss    • Benign essential hypertension    • Central obesity    • Cerebrovascular disease    • Chronic obstructive lung disease (CMS/HCC)    • Claudication (CMS/HCC)    • Disorder of peripheral nervous system    • Diverticular disease of colon     completed antibx, ? neoplasm on CT   • Dysphagia    • Dyspnea    • ED (erectile dysfunction)    • Epigastric pain    • Esophageal dysmotility    • Esophageal reflux    • Esophagitis    • Essential hypertension    • External hemorrhoids    • Gastritis    • GERD with esophagitis    • Heavy tobacco smoker    • Hemorrhoids    • Hiatal hernia    • Hiccough    • History of colon polyps    • History of echocardiogram     Normal LV funciton with Ef of 65% to 70% without regional wall motion abnormalities.Mild CLVH. Normal RV size and function. No significant valvular regurgitation or stenosis. 09/19/2014       • History of EKG    • History of TIA (transient ischemic attack)    • Hypercholesterolemia    • Hypertension    • Left ventricular hypertrophy    • Male erectile disorder    • Obstructive sleep apnea syndrome    • Primary osteoarthritis of knees, bilateral    • Rectal bleed     painful   • Rectal hemorrhage    • Sleep disorder    • Transient cerebral ischemia    • Upper respiratory infection    • Weakness      Attacks of weakness       Past Surgical Hx:  Past Surgical History:   Procedure Laterality Date   • COLONOSCOPY  04/13/2015    Internal and external hemorrhoids found. 04/13/2015    • COLONOSCOPY N/A 9/18/2020    Procedure: COLONOSCOPY;  Surgeon: Alli Dockery MD;  Location: [facility] ENDOSCOPY;  Service: Gastroenterology;  Laterality: N/A;   • ENDOSCOPY      Internal & external hemorrhoids found. 1 polyp in sigmoid colon; removed by snare cautery polypectomy. 09/26/2012    • ENDOSCOPY      Gastritis found in the stomach. Biopsy taken.Enlarged folds were found in the body of the stomach.Biopsy taken.Normal duodenum.A hiatus hernia was found in the esophagus. 04/13/2015    • ENDOSCOPY      Hiatus hernia in esophagus. Gastritis in stomach. Biopsy taken. Normal duodenum. Biopsy taken. 09/26/2012       • ENDOSCOPY N/A 12/18/2017    Procedure: ESOPHAGOGASTRODUODENOSCOPY--attn mid esophagus, abnl on CT;  Surgeon: Alli Dockery MD;  Location: [facility] ENDOSCOPY;  Service:    • ENDOSCOPY N/A 3/6/2019    Procedure: ESOPHAGOGASTRODUODENOSCOPY WITH DILATATION;  Surgeon: Alli Dockery MD;  Location: [facility] ENDOSCOPY;  Service: Gastroenterology   • ENDOSCOPY N/A 9/18/2020    Procedure: ESOPHAGOGASTRODUODENOSCOPY eval varices;  Surgeon: Alli Dockery MD;  Location: [facility] ENDOSCOPY;  Service: Gastroenterology;  Laterality: N/A;   • UPPER GASTROINTESTINAL ENDOSCOPY  08/24/2016   • UPPER GASTROINTESTINAL ENDOSCOPY  04/13/2015   • UPPER GASTROINTESTINAL ENDOSCOPY  12/18/2017   • UPPER GASTROINTESTINAL ENDOSCOPY  03/06/2019   • UPPER GASTROINTESTINAL ENDOSCOPY  09/18/2020       Health Maintenance:  Health Maintenance   Topic Date Due   • Hepatitis B (1 of 3 - Risk 3-dose series) [birth year]1977   • ZOSTER VACCINE (1 of 2) 07/17/2008   • ANNUAL WELLNESS VISIT  12/08/2016   • DIABETIC EYE EXAM  02/10/2018   • DIABETIC FOOT EXAM  02/07/2020   • LIPID PANEL  02/07/2020   • URINE MICROALBUMIN  02/07/2020   • HEMOGLOBIN A1C  05/08/2020    • INFLUENZA VACCINE  08/01/2020   • LUNG CANCER SCREENING  11/17/2021   • TDAP/TD VACCINES (2 - Td) 08/15/2024   • COLONOSCOPY  09/18/2025   • HEPATITIS C SCREENING  Completed   • Pneumococcal Vaccine 0-64  Completed       Current Meds:    Current Outpatient Medications:   •  albuterol sulfate  (90 Base) MCG/ACT inhaler, Inhale 2 puffs Every 4 (Four) Hours As Needed for Wheezing or Shortness of Air., Disp: 18 g, Rfl: 5  •  Aspirin Adult Low Strength 81 MG EC tablet, TAKE 1 TABLET BY MOUTH DAILY., Disp: 30 tablet, Rfl: 2  •  Blood Pressure Monitoring (ADULT BLOOD PRESSURE CUFF LG) kit, Check BP daily, Disp: 1 each, Rfl: 0  •  cetirizine (zyrTEC) 10 MG tablet, Take 1 tablet by mouth Daily., Disp: 30 tablet, Rfl: 5  •  ferrous sulfate (Feosol) 325 (65 FE) MG tablet, Take 1 tablet by mouth 3 (Three) Times a Day With Meals., Disp: 90 tablet, Rfl: 5  •  fluticasone (FLONASE) 50 MCG/ACT nasal spray, 2 sprays into the nostril(s) as directed by provider Daily., Disp: 15.8 mL, Rfl: 5  •  hydrALAZINE (APRESOLINE) 10 MG tablet, Take 1 tablet by mouth 3 (Three) Times a Day., Disp: 90 tablet, Rfl: 5  •  losartan (COZAAR) 100 MG tablet, Take 1 tablet by mouth Daily., Disp: 30 tablet, Rfl: 5  •  magnesium oxide (MAGOX) 400 (241.3 Mg) MG tablet tablet, TAKE 1 TABLET BY MOUTH DAILY., Disp: 30 tablet, Rfl: 4  •  metFORMIN (GLUCOPHAGE) 500 MG tablet, Take 1 tablet by mouth Daily With Breakfast., Disp: 30 tablet, Rfl: 4  •  omeprazole (priLOSEC) 20 MG capsule, Take 1 capsule by mouth Every Morning., Disp: 30 capsule, Rfl: 5  •  propranolol (INDERAL) 40 MG tablet, Take 1 tablet by mouth Daily., Disp: 30 tablet, Rfl: 3  •  tiotropium (SPIRIVA) 18 MCG per inhalation capsule, Place 1 capsule into inhaler and inhale Daily., Disp: 30 capsule, Rfl: 5    Allergies:  Ace inhibitors, Lisinopril, and Vistaril [hydroxyzine hcl]    Family Hx:  Family History   Problem Relation Age of Onset   • Cancer Mother    • Cirrhosis Father    •  "Diabetes Other    • Hypertension Other         Social History:  Social History     Socioeconomic History   • Marital status: Single     Spouse name: Not on file   • Number of children: 0   • Years of education: 12   • Highest education level: Not on file   Occupational History   • Occupation: retired   Tobacco Use   • Smoking status: Current Every Day Smoker     Packs/day: 1.50     Years: 40.00     Pack years: 60.00     Types: Cigarettes   • Smokeless tobacco: Never Used   Substance and Sexual Activity   • Alcohol use: Yes     Alcohol/week: 6.0 standard drinks     Types: 6 Cans of beer per week     Frequency: 2-4 times a month     Comment: when can get   • Drug use: No   • Sexual activity: Defer     Partners: Female       Review of Systems  Review of Systems   Constitutional: Negative for appetite change, chills, diaphoresis, fatigue and fever.   HENT: Negative for ear discharge, ear pain, facial swelling, hearing loss, rhinorrhea, sinus pressure, sinus pain, sneezing, sore throat and voice change.    Eyes: Negative for pain, discharge and visual disturbance.   Respiratory: Negative for apnea, cough, chest tightness, shortness of breath, wheezing and stridor.    Cardiovascular: Negative for chest pain, palpitations and leg swelling.   Gastrointestinal: Negative for abdominal distention, abdominal pain, constipation, diarrhea, nausea and vomiting.   Genitourinary: Negative for dysuria, frequency and urgency.   Musculoskeletal: Negative for arthralgias, back pain, myalgias and neck pain.   Skin: Negative for color change, rash and wound.   Neurological: Negative for facial asymmetry, speech difficulty, light-headedness and headaches.   Psychiatric/Behavioral: Negative for agitation and decreased concentration. The patient is not nervous/anxious.             Objective:     /80   Pulse 91   Ht 185.4 cm (73\")   Wt 83.6 kg (184 lb 4.8 oz)   SpO2 98%   BMI 24.32 kg/m²       Physical Exam  Constitutional:       " General: He is not in acute distress.     Appearance: He is well-developed. He is not diaphoretic.   HENT:      Head: Normocephalic and atraumatic.      Right Ear: External ear normal.      Left Ear: External ear normal.   Eyes:      General: No scleral icterus.     Conjunctiva/sclera: Conjunctivae normal.      Pupils: Pupils are equal, round, and reactive to light.   Neck:      Musculoskeletal: Normal range of motion.   Cardiovascular:      Rate and Rhythm: Normal rate and regular rhythm.      Heart sounds: Normal heart sounds. No murmur. No friction rub. No gallop.    Pulmonary:      Effort: Pulmonary effort is normal. No respiratory distress.      Breath sounds: Normal breath sounds. No stridor. No wheezing or rales.   Chest:      Chest wall: No tenderness.   Abdominal:      General: Bowel sounds are normal. There is no distension.      Palpations: Abdomen is soft.      Tenderness: There is no abdominal tenderness.   Musculoskeletal: Normal range of motion.         General: No tenderness.   Skin:     General: Skin is warm and dry.      Findings: No erythema.   Neurological:      Mental Status: He is alert and oriented to person, place, and time.   Psychiatric:         Behavior: Behavior normal.         Assessment/Plan:     Diagnoses and all orders for this visit:    1. Type 2 diabetes mellitus with complication, without long-term current use of insulin (CMS/[facility]) (Primary)  -     Hemoglobin A1c; Future       Last A1C was performed 11/2019 and was 6.01. Will repeat today.   Follow-up:     No follow-ups on file.    Goals     • Quit smoking / using tobacco (pt-stated)           Preventative:    Vaccines Recommended at this visit:   Influenza    Vaccines Received at this visit:  Patient refused all vaccinations at this visit.    Screenings Recommended at this visit:  No Screenings offered today. Patient is up to date on all screenings at this time.     Screenings Ordered at this visit:  No screenings were offered  today. Patient is up to date on all screenings.     Smoking Status:  Patient is current smoker. Patient is not interested in smoking cessation.    Alcohol Intake:  Regular (moderate)    Patient's Body mass index is 24.32 kg/m². BMI is within normal parameters. No follow-up required..         RISK SCORE: 3          This document has been electronically signed by Jeri Danielson MD on December 16, 2020 09:10 CST

## 2020-12-16 NOTE — PROGRESS NOTES
I have reviewed the notes, assessments, and/or procedures performed by Dr. Jeri Danielson, I concur with his  documentation and assessment and plan for Jameel Berkowitz Jacoby        This document has been electronically signed by Ham Gruber MD on December 16, 2020 09:47 CST

## 2020-12-17 ENCOUNTER — TELEPHONE (OUTPATIENT)
Dept: FAMILY MEDICINE CLINIC | Facility: CLINIC | Age: 60
End: 2020-12-17

## 2020-12-17 NOTE — TELEPHONE ENCOUNTER
Patient called advising he saw Dr JONNA Danielson 12/16/2020 and was put on new medication.  He advised the medication is potassium, and was not called into the pharmacy along with any refills that may be needed.       [facility] [address] - [phone]  - [phone] FX   Phone:  [phone]   Fax:  [phone]     Please advise  [phone]    Thank you

## 2020-12-18 DIAGNOSIS — I10 ESSENTIAL HYPERTENSION: ICD-10-CM

## 2020-12-18 DIAGNOSIS — E11.8 TYPE 2 DIABETES MELLITUS WITH COMPLICATION, WITHOUT LONG-TERM CURRENT USE OF INSULIN (HCC): ICD-10-CM

## 2020-12-18 RX ORDER — LOSARTAN POTASSIUM 100 MG/1
100 TABLET ORAL DAILY
Qty: 30 TABLET | Refills: 5 | Status: SHIPPED | OUTPATIENT
Start: 2020-12-18 | End: 2021-04-02 | Stop reason: SDUPTHER

## 2020-12-18 RX ORDER — POTASSIUM CHLORIDE 750 MG/1
20 CAPSULE, EXTENDED RELEASE ORAL DAILY
Qty: 14 CAPSULE | Refills: 0 | Status: SHIPPED | OUTPATIENT
Start: 2020-12-18 | End: 2020-12-25

## 2020-12-18 RX ORDER — HYDRALAZINE HYDROCHLORIDE 10 MG/1
10 TABLET, FILM COATED ORAL 3 TIMES DAILY
Qty: 90 TABLET | Refills: 5 | Status: SHIPPED | OUTPATIENT
Start: 2020-12-18 | End: 2021-04-02 | Stop reason: SDUPTHER

## 2020-12-29 ENCOUNTER — TELEPHONE (OUTPATIENT)
Dept: GENERAL RADIOLOGY | Facility: HOSPITAL | Age: 60
End: 2020-12-29

## 2021-01-07 ENCOUNTER — OFFICE VISIT (OUTPATIENT)
Dept: PULMONOLOGY | Facility: CLINIC | Age: 61
End: 2021-01-07

## 2021-01-07 VITALS
WEIGHT: 184 LBS | SYSTOLIC BLOOD PRESSURE: 140 MMHG | OXYGEN SATURATION: 98 % | HEART RATE: 85 BPM | DIASTOLIC BLOOD PRESSURE: 76 MMHG | BODY MASS INDEX: 24.39 KG/M2 | HEIGHT: 73 IN

## 2021-01-07 DIAGNOSIS — R91.8 LUNG MASS: Primary | ICD-10-CM

## 2021-01-07 PROCEDURE — 99213 OFFICE O/P EST LOW 20 MIN: CPT | Performed by: INTERNAL MEDICINE

## 2021-01-07 NOTE — PROGRESS NOTES
Pulmonary Office Follow-up    Subjective     Jameel Dozier is seen today at the office for   Chief Complaint   Patient presents with   • COPD     Ct Results former Dr. Phan          HPI  Jameel Dozier is a 60 y.o. male with a PMH significant for abnormal chest CT scan.  He was in the office on 11/17/2020 and saw Dr. Phan at that time.  A CAT scan had been done of his chest in May 2020 which showed some rounded infiltrates.  With his long smoking history there was concern of malignancy.  Dr. Phan ordered a follow-up chest CT to be done in November 2020 but unfortunately the patient was noncompliant in following up with that.      Tobacco use history:  He has smoked his entire adult life and still actively smoking now.    Patient Active Problem List   Diagnosis   • Central obesity   • Hypercholesterolemia   • Essential hypertension   • Astigmatism   • Type 2 diabetes mellitus with complication, without long-term current use of insulin (CMS/HCC)   • Cigarette nicotine dependence without complication   • Tobacco use disorder   • Chronic obstructive pulmonary disease (CMS/HCC)   • Overweight (BMI 25.0-29.9)   • Physical deconditioning   • Chronic non-seasonal allergic rhinitis   • Imaging of gastrointestinal tract abnormal   • Pain of upper abdomen   • Gastroesophageal reflux disease with esophagitis   • Screening for hyperlipidemia   • Weakness   • Transient cerebral ischemia   • Sleep disorder   • Rectal hemorrhage   • Rectal bleed   • Primary osteoarthritis of knees, bilateral   • Obstructive sleep apnea syndrome   • Male erectile disorder   • Left ventricular hypertrophy   • Hypertension   • History of TIA (transient ischemic attack)   • History of EKG   • History of echocardiogram   • History of colon polyps   • Hiccough   • Hiatal hernia   • Hemorrhoids   • Heavy tobacco smoker   • Gastritis   • External hemorrhoids   • Esophagitis   • Esophageal reflux   • Esophageal dysmotility   •  Epigastric pain   • ED (erectile dysfunction)   • Dyspnea   • Dysphagia   • Diverticular disease of colon   • Disorder of peripheral nervous system   • Claudication (CMS/HCC)   • Chronic obstructive lung disease (CMS/HCC)   • Cerebrovascular disease   • Benign essential hypertension   • Anemia due to blood loss   • Anal fissure   • Allergic rhinitis   • Alcohol dependence with alcohol-induced disorder (CMS/HCC)   • Adenomatous polyp of colon   • Acquired achalasia of esophagus   • Abdominal pain   • Adverse drug effect   • Intractable hiccups   • Non-intractable vomiting with nausea   • Closed displaced fracture of shaft of fifth metacarpal bone with routine healing, subsequent encounter   • Pain of right hand   • Esophageal varices without bleeding (CMS/HCC)   • Alcoholic fatty liver   • Hepatic fibrosis   • Encounter for screening for malignant neoplasm of colon         Medications, Allergies, Social, and Family Histories reviewed as per EMR.    Objective     Vitals:    01/07/21 0916   BP: 140/76   Pulse: 85   SpO2: 98%         01/07/21 0916   Weight: 83.5 kg (184 lb)     [unfilled]  Physical Exam  Vitals signs reviewed.   Constitutional:       General: He is not in acute distress.     Appearance: He is normal weight.      Comments: Disheveled   HENT:      Head: Atraumatic.      Nose: Nose normal.   Neck:      Musculoskeletal: Normal range of motion. No neck rigidity.   Cardiovascular:      Rate and Rhythm: Regular rhythm.      Heart sounds: No murmur. No gallop.    Pulmonary:      Effort: No respiratory distress.      Breath sounds: No stridor. No wheezing or rhonchi.   Abdominal:      Palpations: Abdomen is soft. There is no mass.   Musculoskeletal:         General: No swelling.   Lymphadenopathy:      Cervical: No cervical adenopathy.   Skin:     General: Skin is warm.      Findings: No rash.   Neurological:      General: No focal deficit present.      Mental Status: He is oriented to person, place, and  time.      Cranial Nerves: No cranial nerve deficit.   Psychiatric:         Mood and Affect: Mood normal.         Thought Content: Thought content normal.         Judgment: Judgment normal.             Assessment/Plan     Diagnoses and all orders for this visit:    1. Lung mass (Primary)  -     CT Chest Without Contrast; Future         Discussion/ Recommendations:   1.  Abnormal chest CT scan: This was noted in May 2020.  He had rounded infiltrates diffusely.  When he saw Dr. Phan in November 2020 a follow-up chest CT was ordered but the patient was noncompliant in having this performed.  I took liberty to order another chest CT and asked him to get it as soon as possible.  I instructed him to call our office as soon as he got the CT scan done to get the results of it.  I will schedule him for a visit with me again in 1 month to go over it.    2.  Ongoing smoking addiction: He has at least 45 pack years.  I offered him assistance to stop but he told me he was not interested in stopping at this time.    3.  Diabetes    4.  Hypertension    5.  Sleep apnea    6.  Restrictive lung disease: His pulmonary functions in November 2017 showed no evidence of obstructive airway disease.  His forced vital capacity was 2.40 L 5055% predicted).    Patient's Body mass index is 24.28 kg/m². BMI is within normal parameters. No follow-up required..        Return in about 4 weeks (around 2/4/2021).          This document has been electronically signed by Andrew Garcia MD on January 7, 2021 09:30 CST      Dictated using Dragon

## 2021-01-08 ENCOUNTER — TELEPHONE (OUTPATIENT)
Dept: FAMILY MEDICINE CLINIC | Facility: CLINIC | Age: 61
End: 2021-01-08

## 2021-01-11 DIAGNOSIS — K21.00 GERD WITH ESOPHAGITIS: ICD-10-CM

## 2021-01-11 RX ORDER — OMEPRAZOLE 20 MG/1
20 CAPSULE, DELAYED RELEASE ORAL EVERY MORNING
Qty: 30 CAPSULE | Refills: 5 | Status: SHIPPED | OUTPATIENT
Start: 2021-01-11 | End: 2021-04-02 | Stop reason: SDUPTHER

## 2021-01-11 NOTE — TELEPHONE ENCOUNTER
PATIENTS EC CALLED AGAIN THIS MORNING REQUESTING PATIENTS MEDICATION REFILLS TO BE SENT TO [facility].    CALL BACK NUMBER FOR THEM IS [phone].    THANKS,  QUINN

## 2021-01-13 ENCOUNTER — HOSPITAL ENCOUNTER (OUTPATIENT)
Dept: CT IMAGING | Facility: HOSPITAL | Age: 61
Discharge: HOME | End: 2021-01-13
Admitting: INTERNAL MEDICINE

## 2021-01-13 DIAGNOSIS — R91.8 LUNG MASS: ICD-10-CM

## 2021-01-13 PROCEDURE — 71250 CT THORAX DX C-: CPT

## 2021-01-20 RX ORDER — PROPRANOLOL HYDROCHLORIDE 40 MG/1
40 TABLET ORAL DAILY
Qty: 30 TABLET | Refills: 3 | Status: SHIPPED | OUTPATIENT
Start: 2021-01-20 | End: 2021-06-10 | Stop reason: SDUPTHER

## 2021-02-09 ENCOUNTER — TELEPHONE (OUTPATIENT)
Dept: CARDIOLOGY | Facility: CLINIC | Age: 61
End: 2021-02-09

## 2021-02-09 NOTE — TELEPHONE ENCOUNTER
Per Dr. Garcia, patient was notified of his Chest CT results.        ----- Message from Andrew Garcia MD sent at 2021 11:35 AM CST -----  Regarding: RE: results  Please go ahead and read the results to him.  He really needs a follow-up visit to go over them in person.  ----- Message -----  From: Sun Sellers CSA  Sent: 2021  11:22 AM CST  To: Andrew Garcia MD  Subject: FW: results                                      Please advise    ----- Message -----  From: Priscila Todd  Sent: 2021   1:57 PM CST  To: Sun Sellers CSA  Subject: FW: results                                        ----- Message -----  From: Whitney Diop RN  Sent: 2021   1:20 PM CST  To: Priscila Todd  Subject: RE: results                                      This will need to go to pulmonary pool. Thanks    ----- Message -----  From: Priscila Todd  Sent: 2021   1:15 PM CST  To: Whitney Diop, AZIZA  Subject: results                                          Jameel Dozier  [birth year] wanted his results from his CT, he wanted a call back @ [phone] to talk about this. George

## 2021-03-22 ENCOUNTER — TRANSCRIBE ORDERS (OUTPATIENT)
Dept: GENERAL RADIOLOGY | Facility: HOSPITAL | Age: 61
End: 2021-03-22

## 2021-03-22 ENCOUNTER — OFFICE VISIT (OUTPATIENT)
Dept: GASTROENTEROLOGY | Facility: CLINIC | Age: 61
End: 2021-03-22

## 2021-03-22 VITALS
HEIGHT: 73 IN | BODY MASS INDEX: 24.01 KG/M2 | WEIGHT: 181.2 LBS | HEART RATE: 92 BPM | DIASTOLIC BLOOD PRESSURE: 93 MMHG | SYSTOLIC BLOOD PRESSURE: 149 MMHG

## 2021-03-22 DIAGNOSIS — R06.6 INTRACTABLE HICCUPS: ICD-10-CM

## 2021-03-22 DIAGNOSIS — K21.00 GASTROESOPHAGEAL REFLUX DISEASE WITH ESOPHAGITIS WITHOUT HEMORRHAGE: ICD-10-CM

## 2021-03-22 DIAGNOSIS — R13.10 DYSPHAGIA, UNSPECIFIED TYPE: Primary | ICD-10-CM

## 2021-03-22 DIAGNOSIS — I85.00 ESOPHAGEAL VARICES WITHOUT BLEEDING, UNSPECIFIED ESOPHAGEAL VARICES TYPE (HCC): ICD-10-CM

## 2021-03-22 DIAGNOSIS — R93.3 ABNORMAL CT SCAN, ESOPHAGUS: ICD-10-CM

## 2021-03-22 DIAGNOSIS — Z01.818 PREOP TESTING: Primary | ICD-10-CM

## 2021-03-22 PROCEDURE — 99214 OFFICE O/P EST MOD 30 MIN: CPT | Performed by: PHYSICIAN ASSISTANT

## 2021-03-22 NOTE — PROGRESS NOTES
Chief Complaint   Patient presents with   • Esophageal Varices   • Fatty Liver   • Hepatic Fibrosis       ENDO PROCEDURE ORDERED:    Subjective    Jameel Dozier is a 60 y.o. male. he is here today for follow-up.    History of Present Illness    Patient is seen on a recheck of his esophageal varices, fatty liver, hepatic fibrosis, F2/S0/N0, GERD. Last seen on 11/19/2020. Patient had a normal A1c on 12/16/2020. CT scan of the chest without contrast on 01/13/2021 showed a distended upper esophagus with focal caliber change possibly indicating malignancy. Patient had a prior EGD/colonoscopy on 09/18/2020 that showed portal hypertensive gastropathy, varices, diverticulosis and colon polyp removed. He states he is having worsening dysphagia over the last few months both to solids and liquids. He is on propranolol for the varices. He is on Protonix 20 mg daily for chronic heartburn. He continues to have chronic hiccups. Bowels are moving without blood or mucus. Weight is down 7 pounds since last visit.     ASSESSMENT/PLAN:  Patient with questionable abnormal findings on CT imaging of the chest. May have esophageal spasm. Certainly an occult lesion is of concern. Recommend barium swallow to evaluate. Would consider endoscopic evaluation depending on that result. We will continue current medications. We will plan followup in a couple of weeks, sooner if needed. Further pending clinical course and the results of the above.      The following portions of the patient's history were reviewed and updated as appropriate:   Past Medical History:   Diagnosis Date   • Abdominal pain    • Acquired achalasia of esophagus    • Adenomatous polyp of colon    • Adverse drug effect    • Alcohol dependence with alcohol-induced disorder (CMS/HCC)    • Allergic rhinitis    • Anal fissure    • Anemia due to blood loss    • Benign essential hypertension    • Central obesity    • Cerebrovascular disease    • Chronic obstructive lung  disease (CMS/HCC)    • Claudication (CMS/HCC)    • Disorder of peripheral nervous system    • Diverticular disease of colon     completed antibx, ? neoplasm on CT   • Dysphagia    • Dyspnea    • ED (erectile dysfunction)    • Epigastric pain    • Esophageal dysmotility    • Esophageal reflux    • Esophagitis    • Essential hypertension    • External hemorrhoids    • Gastritis    • GERD with esophagitis    • Heavy tobacco smoker    • Hemorrhoids    • Hiatal hernia    • Hiccough    • History of colon polyps    • History of echocardiogram     Normal LV funciton with Ef of 65% to 70% without regional wall motion abnormalities.Mild CLVH. Normal RV size and function. No significant valvular regurgitation or stenosis. 09/19/2014       • History of EKG    • History of TIA (transient ischemic attack)    • Hypercholesterolemia    • Hypertension    • Left ventricular hypertrophy    • Male erectile disorder    • Obstructive sleep apnea syndrome    • Primary osteoarthritis of knees, bilateral    • Rectal bleed     painful   • Rectal hemorrhage    • Sleep disorder    • Transient cerebral ischemia    • Upper respiratory infection    • Weakness     Attacks of weakness     Past Surgical History:   Procedure Laterality Date   • COLONOSCOPY  04/13/2015    Internal and external hemorrhoids found. 04/13/2015    • COLONOSCOPY N/A 9/18/2020    Procedure: COLONOSCOPY;  Surgeon: Alli Dockery MD;  Location: [facility] ENDOSCOPY;  Service: Gastroenterology;  Laterality: N/A;   • ENDOSCOPY      Internal & external hemorrhoids found. 1 polyp in sigmoid colon; removed by snare cautery polypectomy. 09/26/2012    • ENDOSCOPY      Gastritis found in the stomach. Biopsy taken.Enlarged folds were found in the body of the stomach.Biopsy taken.Normal duodenum.A hiatus hernia was found in the esophagus. 04/13/2015    • ENDOSCOPY      Hiatus hernia in esophagus. Gastritis in stomach. Biopsy taken. Normal duodenum. Biopsy taken. 09/26/2012       •  ENDOSCOPY N/A 12/18/2017    Procedure: ESOPHAGOGASTRODUODENOSCOPY--attn mid esophagus, abnl on CT;  Surgeon: Alli Dockery MD;  Location: [facility] ENDOSCOPY;  Service:    • ENDOSCOPY N/A 3/6/2019    Procedure: ESOPHAGOGASTRODUODENOSCOPY WITH DILATATION;  Surgeon: Alli Dockery MD;  Location: [facility] ENDOSCOPY;  Service: Gastroenterology   • ENDOSCOPY N/A 9/18/2020    Procedure: ESOPHAGOGASTRODUODENOSCOPY eval varices;  Surgeon: Alli Dockery MD;  Location: [facility] ENDOSCOPY;  Service: Gastroenterology;  Laterality: N/A;   • UPPER GASTROINTESTINAL ENDOSCOPY  08/24/2016   • UPPER GASTROINTESTINAL ENDOSCOPY  04/13/2015   • UPPER GASTROINTESTINAL ENDOSCOPY  12/18/2017   • UPPER GASTROINTESTINAL ENDOSCOPY  03/06/2019   • UPPER GASTROINTESTINAL ENDOSCOPY  09/18/2020     Family History   Problem Relation Age of Onset   • Cancer Mother    • Cirrhosis Father    • Diabetes Other    • Hypertension Other        Allergies   Allergen Reactions   • Ace Inhibitors Angioedema     hypotension   • Lisinopril Other (See Comments)     cough   • Vistaril [Hydroxyzine Hcl] Other (See Comments)     Patient is unsure      Social History     Socioeconomic History   • Marital status: Single     Spouse name: Not on file   • Number of children: 0   • Years of education: 12   • Highest education level: Not on file   Tobacco Use   • Smoking status: Current Every Day Smoker     Packs/day: 1.50     Years: 40.00     Pack years: 60.00     Types: Cigarettes   • Smokeless tobacco: Never Used   Vaping Use   • Vaping Use: Never used   Substance and Sexual Activity   • Alcohol use: Yes     Alcohol/week: 6.0 standard drinks     Types: 6 Cans of beer per week     Comment: when can get   • Drug use: No   • Sexual activity: Defer     Partners: Female     Current Medications:  Prior to Admission medications    Medication Sig Start Date End Date Taking? Authorizing Provider   albuterol sulfate  (90 Base) MCG/ACT inhaler Inhale 2 puffs Every 4 (Four)  "Hours As Needed for Wheezing or Shortness of Air. 6/10/20  Yes Yolanda Phan MD   Aspirin Adult Low Strength 81 MG EC tablet TAKE 1 TABLET BY MOUTH DAILY. 12/7/20  Yes Son Li MD   Blood Pressure Monitoring (ADULT BLOOD PRESSURE CUFF LG) kit Check BP daily 8/9/19  Yes Jeri Danielson MD   cetirizine (zyrTEC) 10 MG tablet Take 1 tablet by mouth Daily. 6/10/20  Yes Yolanda Phan MD   ferrous sulfate (Feosol) 325 (65 FE) MG tablet Take 1 tablet by mouth 3 (Three) Times a Day With Meals. 8/6/20  Yes Son Li MD   fluticasone (FLONASE) 50 MCG/ACT nasal spray 2 sprays into the nostril(s) as directed by provider Daily. 6/10/20  Yes Yolanda Phan MD   hydrALAZINE (APRESOLINE) 10 MG tablet Take 1 tablet by mouth 3 (Three) Times a Day. 12/18/20  Yes Jeri Danielson MD   losartan (COZAAR) 100 MG tablet Take 1 tablet by mouth Daily. 12/18/20  Yes Jeri Danielson MD   magnesium oxide (MAGOX) 400 (241.3 Mg) MG tablet tablet TAKE 1 TABLET BY MOUTH DAILY. 2/9/21  Yes Jeri Danielson MD   metFORMIN (GLUCOPHAGE) 500 MG tablet Take 1 tablet by mouth Daily With Breakfast. 12/18/20  Yes Jeri Danielson MD   omeprazole (priLOSEC) 20 MG capsule Take 1 capsule by mouth Every Morning. 1/11/21  Yes Jeri Danielson MD   propranolol (INDERAL) 40 MG tablet Take 1 tablet by mouth Daily. 1/20/21  Yes Tim Chen PA-C   tiotropium (SPIRIVA) 18 MCG per inhalation capsule Place 1 capsule into inhaler and inhale Daily. 9/21/20  Yes Yolanda Phan MD     Review of Systems  Review of Systems       Objective    /93   Pulse 92   Ht 185.4 cm (73\")   Wt 82.2 kg (181 lb 3.2 oz)   BMI 23.91 kg/m²   Physical Exam  Vitals and nursing note reviewed.   Constitutional:       General: He is not in acute distress.     Appearance: He is well-developed.      Comments: NATALIA   HENT:      Head: Normocephalic and atraumatic.   Eyes:      Pupils: Pupils are equal, round, and reactive to light.   Cardiovascular: "      Rate and Rhythm: Normal rate and regular rhythm.      Heart sounds: Normal heart sounds.   Pulmonary:      Effort: Pulmonary effort is normal.      Breath sounds: Normal breath sounds.   Abdominal:      General: Bowel sounds are normal. There is no distension or abdominal bruit.      Palpations: Abdomen is soft. Abdomen is not rigid. There is no shifting dullness or mass.      Tenderness: There is abdominal tenderness. There is no guarding or rebound.      Hernia: No hernia is present. There is no hernia in the ventral area.      Comments: mild   Musculoskeletal:         General: Normal range of motion.      Cervical back: Normal range of motion.   Skin:     General: Skin is warm and dry.   Neurological:      Mental Status: He is alert and oriented to person, place, and time.   Psychiatric:         Behavior: Behavior normal.         Thought Content: Thought content normal.         Judgment: Judgment normal.       Assessment/Plan      1. Dysphagia, unspecified type    2. Abnormal CT scan, esophagus    3. Gastroesophageal reflux disease with esophagitis without hemorrhage    4. Esophageal varices without bleeding, unspecified esophageal varices type (CMS/HCC)    5. Intractable hiccups    .   Diagnoses and all orders for this visit:    1. Dysphagia, unspecified type (Primary)  -     FL Esophagram Complete Single Contrast    2. Abnormal CT scan, esophagus  -     FL Esophagram Complete Single Contrast    3. Gastroesophageal reflux disease with esophagitis without hemorrhage  -     FL Esophagram Complete Single Contrast    4. Esophageal varices without bleeding, unspecified esophageal varices type (CMS/HCC)  -     FL Esophagram Complete Single Contrast    5. Intractable hiccups  -     FL Esophagram Complete Single Contrast        Orders placed during this encounter include:  Orders Placed This Encounter   Procedures   • FL Esophagram Complete Single Contrast     Order Specific Question:   Reason for Exam:     Answer:    dyphagia, abnl imaging on Ct of esophagus, Varices       Medications prescribed:  No orders of the defined types were placed in this encounter.      Requested Prescriptions      No prescriptions requested or ordered in this encounter       Review and/or summary of lab tests, radiology, procedures, medications. Review and summary of old records and obtaining of history. The risks and benefits of my recommendations, as well as other treatment options were discussed with the patient today. Questions were answered.    Follow-up: Return in about 3 weeks (around 4/12/2021), or if symptoms worsen or fail to improve.     * Surgery not found *      This document has been electronically signed by Tim Chen PA-C on March 22, 2021 18:31 CDT      Results for orders placed or performed in visit on 12/16/20   Hemoglobin A1c    Specimen: Blood   Result Value Ref Range    Hemoglobin A1C 5.36 4.80 - 5.60 %   Results for orders placed or performed in visit on 10/15/20   CBC Auto Differential    Specimen: Blood   Result Value Ref Range    WBC 10.54 3.40 - 10.80 10*3/mm3    RBC 6.10 (H) 4.14 - 5.80 10*6/mm3    Hemoglobin 15.5 13.0 - 17.7 g/dL    Hematocrit 46.6 37.5 - 51.0 %    MCV 76.4 (L) 79.0 - 97.0 fL    MCH 25.4 (L) 26.6 - 33.0 pg    MCHC 33.3 31.5 - 35.7 g/dL    RDW 15.6 (H) 12.3 - 15.4 %    RDW-SD 39.4 37.0 - 54.0 fl    MPV 11.5 6.0 - 12.0 fL    Platelets 299 140 - 450 10*3/mm3    Neutrophil % 77.8 (H) 42.7 - 76.0 %    Lymphocyte % 11.1 (L) 19.6 - 45.3 %    Monocyte % 8.7 5.0 - 12.0 %    Eosinophil % 1.4 0.3 - 6.2 %    Basophil % 0.7 0.0 - 1.5 %    Immature Grans % 0.3 0.0 - 0.5 %    Neutrophils, Absolute 8.20 (H) 1.70 - 7.00 10*3/mm3    Lymphocytes, Absolute 1.17 0.70 - 3.10 10*3/mm3    Monocytes, Absolute 0.92 (H) 0.10 - 0.90 10*3/mm3    Eosinophils, Absolute 0.15 0.00 - 0.40 10*3/mm3    Basophils, Absolute 0.07 0.00 - 0.20 10*3/mm3    Immature Grans, Absolute 0.03 0.00 - 0.05 10*3/mm3    nRBC 0.1 0.0 - 0.2 /100 WBC    AFP Tumor Marker    Specimen: Blood   Result Value Ref Range    ALPHA-FETOPROTEIN 2.61 0 - 8.3 ng/mL   Protime-INR    Specimen: Blood   Result Value Ref Range    Protime 13.7 11.1 - 15.3 Seconds    INR 1.01 0.80 - 1.20   Ammonia    Specimen: Blood   Result Value Ref Range    Ammonia 27 16 - 60 umol/L   Comprehensive Metabolic Panel    Specimen: Blood   Result Value Ref Range    Glucose 106 (H) 65 - 99 mg/dL    BUN 5 (L) 8 - 23 mg/dL    Creatinine 0.73 (L) 0.76 - 1.27 mg/dL    Sodium 140 136 - 145 mmol/L    Potassium 3.1 (L) 3.5 - 5.2 mmol/L    Chloride 101 98 - 107 mmol/L    CO2 30.1 (H) 22.0 - 29.0 mmol/L    Calcium 8.8 8.6 - 10.5 mg/dL    Total Protein 7.5 6.0 - 8.5 g/dL    Albumin 4.40 3.50 - 5.20 g/dL    ALT (SGPT) 13 1 - 41 U/L    AST (SGOT) 14 1 - 40 U/L    Alkaline Phosphatase 75 39 - 117 U/L    Total Bilirubin 1.6 (H) 0.0 - 1.2 mg/dL    eGFR  African Amer 132 >60 mL/min/1.73    Globulin 3.1 gm/dL    A/G Ratio 1.4 g/dL    BUN/Creatinine Ratio 6.8 (L) 7.0 - 25.0    Anion Gap 8.9 5.0 - 15.0 mmol/L   Results for orders placed or performed during the hospital encounter of 09/18/20   COVID [facility] Priority - Swab, Nasopharynx    Specimen: Nasopharynx; Swab   Result Value Ref Range    COVID [facility] PRIORITY Comment    COVID-19,[facility] ROUTINE, NP/OP SWAB IN TRANSPORT MEDIA OR ESWAB 72 HR TAT - Swab, Nasopharynx    Specimen: Nasopharynx; Swab   Result Value Ref Range    SARS-CoV-2, BRAD Not Detected Not Detected   Tissue Pathology Exam    Specimen: A: Gastric, Antrum; Tissue    B: Large Intestine, Sigmoid Colon; Tissue   Result Value Ref Range    Case Report       Surgical Pathology Report                         Case: [id number]                                  Authorizing Provider:  Alli Dockery MD        Collected:           09/18/2020 11:08 AM          Ordering Location:     [facility]             Received:            09/18/2020 11:51 AM                                 [facility]                                                      Pathologist:           Madonna Andrews DO                                                        Specimens:   1) - Gastric, Antrum                                                                                2) - Large Intestine, Sigmoid Colon, polyp                                                 Final Diagnosis       SEE SCANNED REPORT       POC Glucose Once    Specimen: Blood   Result Value Ref Range    Glucose 87 70 - 130 mg/dL   Results for orders placed or performed in visit on 04/16/20   REYNOSO Fibrosure    Specimen: Blood   Result Value Ref Range    Fibrosis Score (References) 0.45 (H) 0.00 - 0.21    Fibrosis Stage (Reference) F1-F2     Steatosis Score (Reference) 0.28 0.00 - 0.30    Steatosis Grade (Reference) Comment     REYNOSO Score (Reference) 0.25 0.25    Reynoso Grade (Reference) Comment     Height: (Reference) 73 in    Weight: (Reference) 174 LBS    Alpha 2-Macroglobulins, Qn 245 110 - 276 mg/dL    Haptoglobin 159 32 - 363 mg/dL    Apolipoprotein A-1 167 101 - 178 mg/dL    Total Bilirubin 0.9 0.0 - 1.2 mg/dL    GGT 53 0 - 65 IU/L    ALT (SGPT) 37 0 - 55 IU/L    AST (SGOT) P5P (Reference) 40 0 - 40 IU/L    Cholesterol, Total (Reference) 141 100 - 199 mg/dL    Glucose, Serum (Reference) 104 (H) 65 - 99 mg/dL    Triglycerides 91 0 - 149 mg/dL    Interpretations: (Reference) Comment     Fibrosis Scoring: Comment     Steatosis Grading (Reference) Comment     Reynoso Scoring (Reference) Comment     Limitations: (Reference) Comment     Comment (Reference) Comment    AFP Tumor Marker    Specimen: Blood   Result Value Ref Range    ALPHA-FETOPROTEIN 2.02 0 - 8.3 ng/mL   Protime-INR    Specimen: Blood   Result Value Ref Range    Protime 12.6 11.1 - 15.3 Seconds    INR 0.96 0.80 - 1.20   Results for orders placed or performed in visit on 11/08/19   Protime-INR    Specimen: Blood   Result Value Ref Range    Protime 12.5 11.1 - 15.3 Seconds    INR 0.95 0.80 - 1.20   Hemoglobin  A1c    Specimen: Blood   Result Value Ref Range    Hemoglobin A1C 6.01 (H) 4.80 - 5.60 %   Bilirubin, Direct    Specimen: Blood   Result Value Ref Range    Bilirubin, Direct 0.2 0.2 - 0.3 mg/dL   Comprehensive metabolic panel    Specimen: Blood   Result Value Ref Range    Glucose 92 65 - 99 mg/dL    BUN 7 (L) 8 - 23 mg/dL    Creatinine 0.85 0.76 - 1.27 mg/dL    Sodium 147 (H) 136 - 145 mmol/L    Potassium 4.1 3.5 - 5.2 mmol/L    Chloride 105 98 - 107 mmol/L    CO2 26.4 22.0 - 29.0 mmol/L    Calcium 9.6 8.6 - 10.5 mg/dL    Total Protein 8.2 6.0 - 8.5 g/dL    Albumin 4.50 3.50 - 5.20 g/dL    ALT (SGPT) 13 1 - 41 U/L    AST (SGOT) 17 1 - 40 U/L    Alkaline Phosphatase 66 39 - 117 U/L    Total Bilirubin 0.8 0.2 - 1.2 mg/dL    eGFR  African Amer 111 >60 mL/min/1.73    Globulin 3.7 gm/dL    A/G Ratio 1.2 g/dL    BUN/Creatinine Ratio 8.2 7.0 - 25.0    Anion Gap 15.6 (H) 5.0 - 15.0 mmol/L     *Note: Due to a large number of results and/or encounters for the requested time period, some results have not been displayed. A complete set of results can be found in Results Review.       Some portions of this note have been dictated using voice recognition software and may contain errors and/or omissions.

## 2021-03-22 NOTE — PATIENT INSTRUCTIONS
MyPlate from USDA    MyPlate is an outline of a general healthy diet based on the 2010 Dietary Guidelines for Americans, from the U.[place]. Department of Agriculture (USDA). It sets guidelines for how much food you should eat from each food group based on your age, sex, and level of physical activity.  What are tips for following MyPlate?  To follow MyPlate recommendations:  · Eat a wide variety of fruits and vegetables, grains, and protein foods.  · Serve smaller portions and eat less food throughout the day.  · Limit portion sizes to avoid overeating.  · Enjoy your food.  · Get at least 150 minutes of exercise every week. This is about 30 minutes each day, 5 or more days per week.  It can be difficult to have every meal look like MyPlate. Think about MyPlate as eating guidelines for an entire day, rather than each individual meal.  Fruits and vegetables  · Make half of your plate fruits and vegetables.  · Eat many different colors of fruits and vegetables each day.  · For a 2,000 calorie daily food plan, eat:  ? 2½ cups of vegetables every day.  ? 2 cups of fruit every day.  · 1 cup is equal to:  ? 1 cup raw or cooked vegetables.  ? 1 cup raw fruit.  ? 1 medium-sized orange, apple, or banana.  ? 1 cup 100% fruit or vegetable juice.  ? 2 cups raw leafy greens, such as lettuce, spinach, or kale.  ? ½ cup dried fruit.  Grains  · One fourth of your plate should be grains.  · Make at least half of the grains you eat each day whole grains.  · For a 2,000 calorie daily food plan, eat 6 oz of grains every day.  · 1 oz is equal to:  ? 1 slice bread.  ? 1 cup cereal.  ? ½ cup cooked rice, cereal, or pasta.  Protein  · One fourth of your plate should be protein.  · Eat a wide variety of protein foods, including meat, poultry, fish, eggs, beans, nuts, and tofu.  · For a 2,000 calorie daily food plan, eat 5½ oz of protein every day.  · 1 oz is equal to:  ? 1 oz meat, poultry, or fish.  ? ¼ cup cooked beans.  ? 1 egg.  ? ½ oz nuts  or seeds.  ? 1 Tbsp peanut butter.  Dairy  · Drink fat-free or low-fat (1%) milk.  · Eat or drink dairy as a side to meals.  · For a 2,000 calorie daily food plan, eat or drink 3 cups of dairy every day.  · 1 cup is equal to:  ? 1 cup milk, yogurt, cottage cheese, or soy milk (soy beverage).  ? 2 oz processed cheese.  ? 1½ oz natural cheese.  Fats, oils, salt, and sugars  · Only small amounts of oils are recommended.  · Avoid foods that are high in calories and low in nutritional value (empty calories), like foods high in fat or added sugars.  · Choose foods that are low in salt (sodium). Choose foods that have less than 140 milligrams (mg) of sodium per serving.  · Drink water instead of sugary drinks. Drink enough water each day to keep your urine pale yellow.  Where to find support  · Work with your health care provider or a nutrition specialist (dietitian) to develop a customized eating plan that is right for you.  · Download an tom (mobile application) to help you track your daily food intake.  Where to find more information  · Go to ChooseMyPlate.gov for more information.  Summary  · MyPlate is a general guideline for healthy eating from the USDA. It is based on the 2010 Dietary Guidelines for Americans.  · In general, fruits and vegetables should take up ½ of your plate, grains should take up ¼ of your plate, and protein should take up ¼ of your plate.  This information is not intended to replace advice given to you by your health care provider. Make sure you discuss any questions you have with your health care provider.  Document Revised: 05/21/2020 Document Reviewed: 03/19/2018  Elsevier Patient Education © 2021 [employer] Inc.  BMI for Adults  What is BMI?  Body mass index (BMI) is a number that is calculated from a person's weight and height. BMI can help estimate how much of a person's weight is composed of fat. BMI does not measure body fat directly. Rather, it is an alternative to procedures that  "directly measure body fat, which can be difficult and expensive.  BMI can help identify people who may be at higher risk for certain medical problems.  What are BMI measurements used for?  BMI is used as a screening tool to identify possible weight problems. It helps determine whether a person is obese, overweight, a healthy weight, or underweight.  BMI is useful for:  · Identifying a weight problem that may be related to a medical condition or may increase the risk for medical problems.  · Promoting changes, such as changes in diet and exercise, to help reach a healthy weight. BMI screening can be repeated to see if these changes are working.  How is BMI calculated?  BMI involves measuring your weight in relation to your height. Both height and weight are measured, and the BMI is calculated from those numbers. This can be done either in English ([place] or metric measurements. Note that charts and online BMI calculators are available to help you find your BMI quickly and easily without having to do these calculations yourself.  To calculate your BMI in English ([place] measurements:    1. Measure your weight in pounds (lb).  2. Multiply the number of pounds by 703.  ? For example, for a person who weighs 180 lb, multiply that number by 703, which equals 126,540.  3. Measure your height in inches. Then multiply that number by itself to get a measurement called \"inches squared.\"  ? For example, for a person who is 70 inches tall, the \"inches squared\" measurement is 70 inches x 70 inches, which equals 4,900 inches squared.  4. Divide the total from step 2 (number of lb x 703) by the total from step 3 (inches squared): 126,540 ÷ 4,900 = 25.8. This is your BMI.  To calculate your BMI in metric measurements:  1. Measure your weight in kilograms (kg).  2. Measure your height in meters (m). Then multiply that number by itself to get a measurement called \"meters squared.\"  ? For example, for a person who is 1.75 m tall, the " "\"meters squared\" measurement is 1.75 m x 1.75 m, which is equal to 3.1 meters squared.  3. Divide the number of kilograms (your weight) by the meters squared number. In this example: 70 ÷ 3.1 = 22.6. This is your BMI.  What do the results mean?  BMI charts are used to identify whether you are underweight, normal weight, overweight, or obese. The following guidelines will be used:  · Underweight: BMI less than 18.5.  · Normal weight: BMI between 18.5 and 24.9.  · Overweight: BMI between 25 and 29.9.  · Obese: BMI of 30 or above.  Keep these notes in mind:  · Weight includes both fat and muscle, so someone with a muscular build, such as an athlete, may have a BMI that is higher than 24.9. In cases like these, BMI is not an accurate measure of body fat.  · To determine if excess body fat is the cause of a BMI of 25 or higher, further assessments may need to be done by a health care provider.  · BMI is usually interpreted in the same way for men and women.  Where to find more information  For more information about BMI, including tools to quickly calculate your BMI, go to these websites:  · Centers for Disease Control and Prevention: www.cdc.gov  · American Heart Association: www.heart.org  · National Heart, Lung, and Blood [place]: www.nhlbi.nih.gov  Summary  · Body mass index (BMI) is a number that is calculated from a person's weight and height.  · BMI may help estimate how much of a person's weight is composed of fat. BMI can help identify those who may be at higher risk for certain medical problems.  · BMI can be measured using English measurements or metric measurements.  · BMI charts are used to identify whether you are underweight, normal weight, overweight, or obese.  This information is not intended to replace advice given to you by your health care provider. Make sure you discuss any questions you have with your health care provider.  Document Revised: 09/09/2020 Document Reviewed: 07/17/2020  Isabelle " Patient Education © 2021 Elsevier Inc.

## 2021-03-26 ENCOUNTER — IMMUNIZATION (OUTPATIENT)
Dept: VACCINE CLINIC | Facility: HOSPITAL | Age: 61
End: 2021-03-26

## 2021-03-26 PROCEDURE — 91300 HC SARSCOV02 VAC 30MCG/0.3ML IM: CPT | Performed by: THORACIC SURGERY (CARDIOTHORACIC VASCULAR SURGERY)

## 2021-03-26 PROCEDURE — 0001A: CPT | Performed by: THORACIC SURGERY (CARDIOTHORACIC VASCULAR SURGERY)

## 2021-03-27 ENCOUNTER — LAB (OUTPATIENT)
Dept: LAB | Facility: HOSPITAL | Age: 61
End: 2021-03-27

## 2021-03-27 DIAGNOSIS — Z01.818 PREOP TESTING: Primary | ICD-10-CM

## 2021-03-27 LAB — SARS-COV-2 N GENE RESP QL NAA+PROBE: NOT DETECTED

## 2021-03-27 PROCEDURE — 87635 SARS-COV-2 COVID-19 AMP PRB: CPT

## 2021-03-27 PROCEDURE — C9803 HOPD COVID-19 SPEC COLLECT: HCPCS

## 2021-03-30 ENCOUNTER — HOSPITAL ENCOUNTER (OUTPATIENT)
Dept: GENERAL RADIOLOGY | Facility: HOSPITAL | Age: 61
Discharge: HOME | End: 2021-03-30
Admitting: PHYSICIAN ASSISTANT

## 2021-03-30 PROCEDURE — A9270 NON-COVERED ITEM OR SERVICE: HCPCS | Performed by: PHYSICIAN ASSISTANT

## 2021-03-30 PROCEDURE — 74220 X-RAY XM ESOPHAGUS 1CNTRST: CPT

## 2021-03-30 PROCEDURE — 63710000001 BARIUM SULFATE 96 % RECONSTITUTED SUSPENSION: Performed by: PHYSICIAN ASSISTANT

## 2021-03-30 RX ADMIN — BARIUM SULFATE 80 ML: 960 POWDER, FOR SUSPENSION ORAL at 09:25

## 2021-04-02 ENCOUNTER — LAB (OUTPATIENT)
Dept: LAB | Facility: HOSPITAL | Age: 61
End: 2021-04-02

## 2021-04-02 ENCOUNTER — OFFICE VISIT (OUTPATIENT)
Dept: FAMILY MEDICINE CLINIC | Facility: CLINIC | Age: 61
End: 2021-04-02

## 2021-04-02 VITALS
DIASTOLIC BLOOD PRESSURE: 82 MMHG | BODY MASS INDEX: 23.86 KG/M2 | SYSTOLIC BLOOD PRESSURE: 140 MMHG | OXYGEN SATURATION: 98 % | HEIGHT: 73 IN | HEART RATE: 87 BPM | WEIGHT: 180 LBS

## 2021-04-02 DIAGNOSIS — K21.00 GERD WITH ESOPHAGITIS: ICD-10-CM

## 2021-04-02 DIAGNOSIS — E11.8 TYPE 2 DIABETES MELLITUS WITH COMPLICATION, WITHOUT LONG-TERM CURRENT USE OF INSULIN (HCC): Primary | ICD-10-CM

## 2021-04-02 DIAGNOSIS — K70.0 ALCOHOLIC FATTY LIVER: ICD-10-CM

## 2021-04-02 DIAGNOSIS — I10 ESSENTIAL HYPERTENSION: ICD-10-CM

## 2021-04-02 DIAGNOSIS — K74.00 HEPATIC FIBROSIS: ICD-10-CM

## 2021-04-02 DIAGNOSIS — D50.9 IRON DEFICIENCY ANEMIA, UNSPECIFIED IRON DEFICIENCY ANEMIA TYPE: ICD-10-CM

## 2021-04-02 DIAGNOSIS — E71.30 DISORDER OF FATTY-ACID METABOLISM, UNSPECIFIED: ICD-10-CM

## 2021-04-02 DIAGNOSIS — K76.0 FATTY (CHANGE OF) LIVER, NOT ELSEWHERE CLASSIFIED: ICD-10-CM

## 2021-04-02 DIAGNOSIS — K21.00 GASTROESOPHAGEAL REFLUX DISEASE WITH ESOPHAGITIS WITHOUT HEMORRHAGE: ICD-10-CM

## 2021-04-02 DIAGNOSIS — I85.00 ESOPHAGEAL VARICES WITHOUT BLEEDING, UNSPECIFIED ESOPHAGEAL VARICES TYPE (HCC): ICD-10-CM

## 2021-04-02 DIAGNOSIS — E11.8 TYPE 2 DIABETES MELLITUS WITH COMPLICATION, WITHOUT LONG-TERM CURRENT USE OF INSULIN (HCC): ICD-10-CM

## 2021-04-02 LAB
ALBUMIN SERPL-MCNC: 4.1 G/DL (ref 3.5–5.2)
ALBUMIN/GLOB SERPL: 1.2 G/DL
ALP SERPL-CCNC: 74 U/L (ref 39–117)
ALT SERPL W P-5'-P-CCNC: 27 U/L (ref 1–41)
ANION GAP SERPL CALCULATED.3IONS-SCNC: 12.1 MMOL/L (ref 5–15)
AST SERPL-CCNC: 38 U/L (ref 1–40)
BILIRUB SERPL-MCNC: 0.5 MG/DL (ref 0–1.2)
BUN SERPL-MCNC: 6 MG/DL (ref 8–23)
BUN/CREAT SERPL: 7.1 (ref 7–25)
CALCIUM SPEC-SCNC: 9.1 MG/DL (ref 8.6–10.5)
CHLORIDE SERPL-SCNC: 104 MMOL/L (ref 98–107)
CO2 SERPL-SCNC: 23.9 MMOL/L (ref 22–29)
CREAT SERPL-MCNC: 0.85 MG/DL (ref 0.76–1.27)
GFR SERPL CREATININE-BSD FRML MDRD: 111 ML/MIN/1.73
GLOBULIN UR ELPH-MCNC: 3.4 GM/DL
GLUCOSE SERPL-MCNC: 90 MG/DL (ref 65–99)
HBA1C MFR BLD: 5.21 % (ref 4.8–5.6)
INR PPP: 0.93 (ref 0.8–1.2)
POTASSIUM SERPL-SCNC: 3.6 MMOL/L (ref 3.5–5.2)
PROT SERPL-MCNC: 7.5 G/DL (ref 6–8.5)
PROTHROMBIN TIME: 12.8 SECONDS (ref 11.1–15.3)
SODIUM SERPL-SCNC: 140 MMOL/L (ref 136–145)

## 2021-04-02 PROCEDURE — 99213 OFFICE O/P EST LOW 20 MIN: CPT | Performed by: STUDENT IN AN ORGANIZED HEALTH CARE EDUCATION/TRAINING PROGRAM

## 2021-04-02 PROCEDURE — 83010 ASSAY OF HAPTOGLOBIN QUANT: CPT

## 2021-04-02 PROCEDURE — 84478 ASSAY OF TRIGLYCERIDES: CPT

## 2021-04-02 PROCEDURE — 82977 ASSAY OF GGT: CPT

## 2021-04-02 PROCEDURE — 83036 HEMOGLOBIN GLYCOSYLATED A1C: CPT

## 2021-04-02 PROCEDURE — 84450 TRANSFERASE (AST) (SGOT): CPT

## 2021-04-02 PROCEDURE — 84460 ALANINE AMINO (ALT) (SGPT): CPT

## 2021-04-02 PROCEDURE — 82247 BILIRUBIN TOTAL: CPT

## 2021-04-02 PROCEDURE — 85610 PROTHROMBIN TIME: CPT

## 2021-04-02 PROCEDURE — 82947 ASSAY GLUCOSE BLOOD QUANT: CPT

## 2021-04-02 PROCEDURE — 82172 ASSAY OF APOLIPOPROTEIN: CPT

## 2021-04-02 PROCEDURE — 80053 COMPREHEN METABOLIC PANEL: CPT

## 2021-04-02 PROCEDURE — 36415 COLL VENOUS BLD VENIPUNCTURE: CPT

## 2021-04-02 PROCEDURE — 82465 ASSAY BLD/SERUM CHOLESTEROL: CPT

## 2021-04-02 PROCEDURE — 83883 ASSAY NEPHELOMETRY NOT SPEC: CPT

## 2021-04-02 RX ORDER — OMEPRAZOLE 20 MG/1
20 CAPSULE, DELAYED RELEASE ORAL EVERY MORNING
Qty: 30 CAPSULE | Refills: 5 | Status: SHIPPED | OUTPATIENT
Start: 2021-04-02 | End: 2021-05-05 | Stop reason: DRUGHIGH

## 2021-04-02 RX ORDER — HYDRALAZINE HYDROCHLORIDE 10 MG/1
10 TABLET, FILM COATED ORAL 3 TIMES DAILY
Qty: 90 TABLET | Refills: 5 | Status: SHIPPED | OUTPATIENT
Start: 2021-04-02 | End: 2021-06-11

## 2021-04-02 RX ORDER — LOSARTAN POTASSIUM 100 MG/1
100 TABLET ORAL DAILY
Qty: 30 TABLET | Refills: 5 | Status: SHIPPED | OUTPATIENT
Start: 2021-04-02 | End: 2021-07-14 | Stop reason: SDUPTHER

## 2021-04-02 RX ORDER — FERROUS SULFATE 324(65)MG
324 TABLET, DELAYED RELEASE (ENTERIC COATED) ORAL
Qty: 30 TABLET | Refills: 2 | Status: SHIPPED | OUTPATIENT
Start: 2021-04-02 | End: 2021-06-14

## 2021-04-02 NOTE — PROGRESS NOTES
Subjective   Jameel Dozier is a 60 y.o. male who presents for follow up for medication refills of HTN, and DMT2. States good compliance and response with current regimen.       Past Medical Hx:  Past Medical History:   Diagnosis Date   • Abdominal pain    • Acquired achalasia of esophagus    • Adenomatous polyp of colon    • Adverse drug effect    • Alcohol dependence with alcohol-induced disorder (CMS/HCC)    • Allergic rhinitis    • Anal fissure    • Anemia due to blood loss    • Benign essential hypertension    • Central obesity    • Cerebrovascular disease    • Chronic obstructive lung disease (CMS/HCC)    • Claudication (CMS/HCC)    • Disorder of peripheral nervous system    • Diverticular disease of colon     completed antibx, ? neoplasm on CT   • Dysphagia    • Dyspnea    • ED (erectile dysfunction)    • Epigastric pain    • Esophageal dysmotility    • Esophageal reflux    • Esophagitis    • Essential hypertension    • External hemorrhoids    • Gastritis    • GERD with esophagitis    • Heavy tobacco smoker    • Hemorrhoids    • Hiatal hernia    • Hiccough    • History of colon polyps    • History of echocardiogram     Normal LV funciton with Ef of 65% to 70% without regional wall motion abnormalities.Mild CLVH. Normal RV size and function. No significant valvular regurgitation or stenosis. 09/19/2014       • History of EKG    • History of TIA (transient ischemic attack)    • Hypercholesterolemia    • Hypertension    • Left ventricular hypertrophy    • Male erectile disorder    • Obstructive sleep apnea syndrome    • Primary osteoarthritis of knees, bilateral    • Rectal bleed     painful   • Rectal hemorrhage    • Sleep disorder    • Transient cerebral ischemia    • Upper respiratory infection    • Weakness     Attacks of weakness       Past Surgical Hx:  Past Surgical History:   Procedure Laterality Date   • COLONOSCOPY  04/13/2015    Internal and external hemorrhoids found.  04/13/2015    • COLONOSCOPY N/A 9/18/2020    Procedure: COLONOSCOPY;  Surgeon: Alli Dockery MD;  Location: [facility] ENDOSCOPY;  Service: Gastroenterology;  Laterality: N/A;   • ENDOSCOPY      Internal & external hemorrhoids found. 1 polyp in sigmoid colon; removed by snare cautery polypectomy. 09/26/2012    • ENDOSCOPY      Gastritis found in the stomach. Biopsy taken.Enlarged folds were found in the body of the stomach.Biopsy taken.Normal duodenum.A hiatus hernia was found in the esophagus. 04/13/2015    • ENDOSCOPY      Hiatus hernia in esophagus. Gastritis in stomach. Biopsy taken. Normal duodenum. Biopsy taken. 09/26/2012       • ENDOSCOPY N/A 12/18/2017    Procedure: ESOPHAGOGASTRODUODENOSCOPY--attn mid esophagus, abnl on CT;  Surgeon: Alli Dockery MD;  Location: [facility] ENDOSCOPY;  Service:    • ENDOSCOPY N/A 3/6/2019    Procedure: ESOPHAGOGASTRODUODENOSCOPY WITH DILATATION;  Surgeon: Alli Dockery MD;  Location: [facility] ENDOSCOPY;  Service: Gastroenterology   • ENDOSCOPY N/A 9/18/2020    Procedure: ESOPHAGOGASTRODUODENOSCOPY eval varices;  Surgeon: Alli Dockery MD;  Location: [facility] ENDOSCOPY;  Service: Gastroenterology;  Laterality: N/A;   • UPPER GASTROINTESTINAL ENDOSCOPY  08/24/2016   • UPPER GASTROINTESTINAL ENDOSCOPY  04/13/2015   • UPPER GASTROINTESTINAL ENDOSCOPY  12/18/2017   • UPPER GASTROINTESTINAL ENDOSCOPY  03/06/2019   • UPPER GASTROINTESTINAL ENDOSCOPY  09/18/2020       Health Maintenance:  Health Maintenance   Topic Date Due   • Hepatitis B (1 of 3 - Risk 3-dose series) Never done   • ZOSTER VACCINE (1 of 2) Never done   • ANNUAL WELLNESS VISIT  Never done   • DIABETIC EYE EXAM  09/11/2019   • DIABETIC FOOT EXAM  02/07/2020   • URINE MICROALBUMIN  02/07/2020   • LIPID PANEL  08/09/2020   • COVID-19 Vaccine (2 - Pfizer 2-dose series) 04/16/2021   • HEMOGLOBIN A1C  06/16/2021   • INFLUENZA VACCINE  08/01/2021   • LUNG CANCER SCREENING  01/13/2022   • TDAP/TD VACCINES (2 - Td)  08/15/2024   • COLONOSCOPY  09/18/2025   • HEPATITIS C SCREENING  Completed   • Pneumococcal Vaccine 0-64  Completed   • MENINGOCOCCAL VACCINE  Aged Out       Current Meds:    Current Outpatient Medications:   •  albuterol sulfate  (90 Base) MCG/ACT inhaler, Inhale 2 puffs Every 4 (Four) Hours As Needed for Wheezing or Shortness of Air., Disp: 18 g, Rfl: 5  •  Aspirin Adult Low Strength 81 MG EC tablet, TAKE 1 TABLET BY MOUTH DAILY., Disp: 30 tablet, Rfl: 2  •  Blood Pressure Monitoring (ADULT BLOOD PRESSURE CUFF LG) kit, Check BP daily, Disp: 1 each, Rfl: 0  •  cetirizine (zyrTEC) 10 MG tablet, Take 1 tablet by mouth Daily., Disp: 30 tablet, Rfl: 5  •  ferrous sulfate (Feosol) 325 (65 FE) MG tablet, Take 1 tablet by mouth 3 (Three) Times a Day With Meals., Disp: 90 tablet, Rfl: 5  •  fluticasone (FLONASE) 50 MCG/ACT nasal spray, 2 sprays into the nostril(s) as directed by provider Daily., Disp: 15.8 mL, Rfl: 5  •  hydrALAZINE (APRESOLINE) 10 MG tablet, Take 1 tablet by mouth 3 (Three) Times a Day., Disp: 90 tablet, Rfl: 5  •  losartan (COZAAR) 100 MG tablet, Take 1 tablet by mouth Daily., Disp: 30 tablet, Rfl: 5  •  magnesium oxide (MAGOX) 400 (241.3 Mg) MG tablet tablet, TAKE 1 TABLET BY MOUTH DAILY., Disp: 30 tablet, Rfl: 4  •  metFORMIN (GLUCOPHAGE) 500 MG tablet, Take 1 tablet by mouth Daily With Breakfast., Disp: 30 tablet, Rfl: 4  •  omeprazole (priLOSEC) 20 MG capsule, Take 1 capsule by mouth Every Morning., Disp: 30 capsule, Rfl: 5  •  propranolol (INDERAL) 40 MG tablet, Take 1 tablet by mouth Daily., Disp: 30 tablet, Rfl: 3  •  tiotropium (SPIRIVA) 18 MCG per inhalation capsule, Place 1 capsule into inhaler and inhale Daily., Disp: 30 capsule, Rfl: 5  •  ferrous sulfate 324 MG tablet delayed-release, Take 1 tablet by mouth Daily With Breakfast., Disp: 30 tablet, Rfl: 2    Allergies:  Ace inhibitors, Lisinopril, and Vistaril [hydroxyzine hcl]    Family Hx:  Family History   Problem Relation Age of  "Onset   • Cancer Mother    • Cirrhosis Father    • Diabetes Other    • Hypertension Other         Social History:  Social History     Socioeconomic History   • Marital status: Single     Spouse name: Not on file   • Number of children: 0   • Years of education: 12   • Highest education level: Not on file   Tobacco Use   • Smoking status: Current Every Day Smoker     Packs/day: 1.50     Years: 40.00     Pack years: 60.00     Types: Cigarettes   • Smokeless tobacco: Never Used   Vaping Use   • Vaping Use: Never used   Substance and Sexual Activity   • Alcohol use: Yes     Alcohol/week: 6.0 standard drinks     Types: 6 Cans of beer per week     Comment: when can get   • Drug use: No   • Sexual activity: Defer     Partners: Female       Review of Systems  Review of Systems   Constitutional: Negative for appetite change, chills, diaphoresis, fatigue and fever.   HENT: Negative for ear discharge, ear pain, facial swelling, hearing loss, rhinorrhea, sinus pressure, sinus pain, sneezing, sore throat and voice change.    Eyes: Negative for pain, discharge and visual disturbance.   Respiratory: Negative for apnea, cough, chest tightness, shortness of breath, wheezing and stridor.    Cardiovascular: Negative for chest pain, palpitations and leg swelling.   Gastrointestinal: Negative for abdominal distention, abdominal pain, constipation, diarrhea, nausea and vomiting.   Genitourinary: Negative for dysuria, frequency and urgency.   Musculoskeletal: Negative for arthralgias, back pain, myalgias and neck pain.   Skin: Negative for color change, rash and wound.   Neurological: Negative for facial asymmetry, speech difficulty, light-headedness and headaches.   Psychiatric/Behavioral: Negative for agitation and decreased concentration. The patient is not nervous/anxious.             Objective:     /82   Pulse 87   Ht 185.4 cm (73\")   Wt 81.6 kg (180 lb)   SpO2 98%   BMI 23.75 kg/m²       Physical Exam  Constitutional:     "   General: He is not in acute distress.     Appearance: He is well-developed. He is not diaphoretic.   HENT:      Head: Normocephalic and atraumatic.      Right Ear: External ear normal.      Left Ear: External ear normal.   Eyes:      General: No scleral icterus.     Conjunctiva/sclera: Conjunctivae normal.      Pupils: Pupils are equal, round, and reactive to light.   Cardiovascular:      Rate and Rhythm: Normal rate and regular rhythm.      Heart sounds: Normal heart sounds. No murmur heard.   No friction rub. No gallop.    Pulmonary:      Effort: Pulmonary effort is normal. No respiratory distress.      Breath sounds: Normal breath sounds. No stridor. No wheezing or rales.   Chest:      Chest wall: No tenderness.   Abdominal:      General: Bowel sounds are normal. There is no distension.      Palpations: Abdomen is soft.      Tenderness: There is no abdominal tenderness.   Musculoskeletal:         General: No tenderness. Normal range of motion.      Cervical back: Normal range of motion.   Skin:     General: Skin is warm and dry.      Findings: No erythema.   Neurological:      Mental Status: He is alert and oriented to person, place, and time.   Psychiatric:         Behavior: Behavior normal.         Assessment/Plan:     Diagnoses and all orders for this visit:    1. Type 2 diabetes mellitus with complication, without long-term current use of insulin (CMS/[facility]) (Primary)  -     Hemoglobin A1c; Future  -     metFORMIN (GLUCOPHAGE) 500 MG tablet; Take 1 tablet by mouth Daily With Breakfast.  Dispense: 30 tablet; Refill: 4    2. Iron deficiency anemia, unspecified iron deficiency anemia type  -     ferrous sulfate 324 MG tablet delayed-release; Take 1 tablet by mouth Daily With Breakfast.  Dispense: 30 tablet; Refill: 2    3. Essential hypertension  -     hydrALAZINE (APRESOLINE) 10 MG tablet; Take 1 tablet by mouth 3 (Three) Times a Day.  Dispense: 90 tablet; Refill: 5  -     losartan (COZAAR) 100 MG tablet; Take  1 tablet by mouth Daily.  Dispense: 30 tablet; Refill: 5    4. GERD with esophagitis  -     omeprazole (priLOSEC) 20 MG capsule; Take 1 capsule by mouth Every Morning.  Dispense: 30 capsule; Refill: 5       Will refill Pts current medication regimen as he is having good response. Previous A1C was 5.3 and we will recheck today. Pt currently on Metformin and may be able to stop this medication due to his A1C. He would like to recheck thi value today before agreeing to this change. If A1C remains low will stop Metformin and continue to check periodically.   Follow-up:     Return in about 3 months (around 7/2/2021).    Goals     •  Quit smoking / using tobacco (pt-stated)           Preventative:    Vaccines Recommended at this visit:   No Vaccines recommended today. Patient is up to date on all vaccines.     Vaccines Received at this visit:  No Vaccines recommended today. Patient is up to date on all vaccines.     Screenings Recommended at this visit:  No Screenings offered today. Patient is up to date on all screenings at this time.     Screenings Ordered at this visit:  No screenings were offered today. Patient is up to date on all screenings.     Smoking Status:  Patient is current smoker. Patient is not interested in smoking cessation.    Alcohol Intake:  Regular (moderate)    Patient's Body mass index is 23.75 kg/m². BMI is within normal parameters. No follow-up required..         RISK SCORE: 3          This document has been electronically signed by Jeri Danielson MD on April 2, 2021 11:23 CDT

## 2021-04-06 LAB
A2 MACROGLOB SERPL-MCNC: 253 MG/DL (ref 110–276)
ALT SERPL W P-5'-P-CCNC: 28 IU/L (ref 0–55)
APO A-I SERPL-MCNC: 181 MG/DL (ref 101–178)
AST SERPL W P-5'-P-CCNC: 45 IU/L (ref 0–40)
BILIRUB SERPL-MCNC: 0.4 MG/DL (ref 0–1.2)
CHOLEST SERPL-MCNC: 128 MG/DL (ref 100–199)
FIBROSIS SCORING:: ABNORMAL
FIBROSIS STAGE SERPL QL: ABNORMAL
GGT SERPL-CCNC: 54 IU/L (ref 0–65)
GLUCOSE SERPL-MCNC: 89 MG/DL (ref 65–99)
HAPTOGLOB SERPL-MCNC: 160 MG/DL (ref 32–363)
INTERPRETATIONS: (REFERENCE): ABNORMAL
LABORATORY COMMENT REPORT: ABNORMAL
LIVER FIBR SCORE SERPL CALC.FIBROSURE: 0.29 (ref 0–0.21)
NASH SCORING (REFERENCE): ABNORMAL
NECROINFLAMMATORY ACT GRADE SERPL QL: ABNORMAL
NECROINFLAMMATORY ACT SCORE SERPL: 0.25
SERVICE CMNT-IMP: ABNORMAL
STEATOSIS GRADE (REFERENCE): ABNORMAL
STEATOSIS GRADING (REFERENCE): ABNORMAL
STEATOSIS SCORE (REFERENCE): 0.21 (ref 0–0.3)
TRIGL SERPL-MCNC: 76 MG/DL (ref 0–149)

## 2021-04-12 ENCOUNTER — OFFICE VISIT (OUTPATIENT)
Dept: GASTROENTEROLOGY | Facility: CLINIC | Age: 61
End: 2021-04-12

## 2021-04-12 VITALS
HEART RATE: 118 BPM | BODY MASS INDEX: 23.33 KG/M2 | SYSTOLIC BLOOD PRESSURE: 154 MMHG | HEIGHT: 73 IN | DIASTOLIC BLOOD PRESSURE: 106 MMHG | WEIGHT: 176 LBS

## 2021-04-12 DIAGNOSIS — K22.0 ACHALASIA: ICD-10-CM

## 2021-04-12 DIAGNOSIS — R13.10 DYSPHAGIA, UNSPECIFIED TYPE: Primary | ICD-10-CM

## 2021-04-12 DIAGNOSIS — I85.00 ESOPHAGEAL VARICES WITHOUT BLEEDING, UNSPECIFIED ESOPHAGEAL VARICES TYPE (HCC): ICD-10-CM

## 2021-04-12 DIAGNOSIS — K21.00 GASTROESOPHAGEAL REFLUX DISEASE WITH ESOPHAGITIS WITHOUT HEMORRHAGE: ICD-10-CM

## 2021-04-12 DIAGNOSIS — R63.4 WEIGHT LOSS: ICD-10-CM

## 2021-04-12 PROCEDURE — 99214 OFFICE O/P EST MOD 30 MIN: CPT | Performed by: PHYSICIAN ASSISTANT

## 2021-04-12 RX ORDER — DEXTROSE AND SODIUM CHLORIDE 5; .45 G/100ML; G/100ML
30 INJECTION, SOLUTION INTRAVENOUS CONTINUOUS PRN
Status: CANCELLED | OUTPATIENT
Start: 2021-04-28

## 2021-04-12 NOTE — PROGRESS NOTES
Chief Complaint   Patient presents with   • Difficulty Swallowing   • Heartburn   • Esophageal Varices       ENDO PROCEDURE ORDERED: EGDw/botox, achalasia. abnl imaging stomach poss malignancy    Subjective    Jameel Dozier is a 60 y.o. male. he is here today for follow-up.    History of Present Illness    Patient seen on a recheck of his esophageal varices, chronic hiccups, dysphagia and GERD.  Last seen 03/22/2021.  Esophagram on 03/30/2021 showed tertiary waves, again showed achalasia, limited views of the stomach, but still suggested linitis plastic, recommended endoscopic evaluation.  Previous CT imaging showed also some abnormalities of the stomach.  He does have portal hypertensive gastropathy and varices on last EGD on 09/18/2020.  He also has known diverticular disease.  He states he is eating okay, but his weight is down 5 pounds since last visit.  Sometimes he has more trouble.  He does still have the chronic hiccups.  He is still having problems with swallowing.  Previous notes indicate he had improvement with his dysphagia with Botox injection.  He is on Prilosec 20 mg daily for chronic heartburn.  He is on iron.  No other recent studies.  Blood pressure was significantly elevated today, but he states he did not take his medication this morning.  He was encouraged to take that when he gets home.     ASSESSMENT AND PLAN:  Patient with achalasia, dysphagia, GERD, abdominal pain and previous portal hypertension.  Recommend EGD with Botox injection.  We will need to assess the stomach carefully for possible malignancy.  We may need to consider endoscopic ultrasound.  We will see him in followup after the above.  Further pending clinical course and the results of the above.       The following portions of the patient's history were reviewed and updated as appropriate:   Past Medical History:   Diagnosis Date   • Abdominal pain    • Acquired achalasia of esophagus    • Adenomatous polyp of colon    •  Adverse drug effect    • Alcohol dependence with alcohol-induced disorder (CMS/HCC)    • Allergic rhinitis    • Anal fissure    • Anemia due to blood loss    • Benign essential hypertension    • Central obesity    • Cerebrovascular disease    • Chronic obstructive lung disease (CMS/HCC)    • Claudication (CMS/HCC)    • Disorder of peripheral nervous system    • Diverticular disease of colon     completed antibx, ? neoplasm on CT   • Dysphagia    • Dyspnea    • ED (erectile dysfunction)    • Epigastric pain    • Esophageal dysmotility    • Esophageal reflux    • Esophagitis    • Essential hypertension    • External hemorrhoids    • Gastritis    • GERD with esophagitis    • Heavy tobacco smoker    • Hemorrhoids    • Hiatal hernia    • Hiccough    • History of colon polyps    • History of echocardiogram     Normal LV funciton with Ef of 65% to 70% without regional wall motion abnormalities.Mild CLVH. Normal RV size and function. No significant valvular regurgitation or stenosis. 09/19/2014       • History of EKG    • History of TIA (transient ischemic attack)    • Hypercholesterolemia    • Hypertension    • Left ventricular hypertrophy    • Male erectile disorder    • Obstructive sleep apnea syndrome    • Primary osteoarthritis of knees, bilateral    • Rectal bleed     painful   • Rectal hemorrhage    • Sleep disorder    • Transient cerebral ischemia    • Upper respiratory infection    • Weakness     Attacks of weakness     Past Surgical History:   Procedure Laterality Date   • COLONOSCOPY  04/13/2015    Internal and external hemorrhoids found. 04/13/2015    • COLONOSCOPY N/A 9/18/2020    Procedure: COLONOSCOPY;  Surgeon: Alli Dockery MD;  Location: [facility] ENDOSCOPY;  Service: Gastroenterology;  Laterality: N/A;   • ENDOSCOPY      Internal & external hemorrhoids found. 1 polyp in sigmoid colon; removed by snare cautery polypectomy. 09/26/2012    • ENDOSCOPY      Gastritis found in the stomach. Biopsy taken.Enlarged  folds were found in the body of the stomach.Biopsy taken.Normal duodenum.A hiatus hernia was found in the esophagus. 04/13/2015    • ENDOSCOPY      Hiatus hernia in esophagus. Gastritis in stomach. Biopsy taken. Normal duodenum. Biopsy taken. 09/26/2012       • ENDOSCOPY N/A 12/18/2017    Procedure: ESOPHAGOGASTRODUODENOSCOPY--attn mid esophagus, abnl on CT;  Surgeon: Alli Dockery MD;  Location: [facility] ENDOSCOPY;  Service:    • ENDOSCOPY N/A 3/6/2019    Procedure: ESOPHAGOGASTRODUODENOSCOPY WITH DILATATION;  Surgeon: Alli Dockery MD;  Location: [facility] ENDOSCOPY;  Service: Gastroenterology   • ENDOSCOPY N/A 9/18/2020    Procedure: ESOPHAGOGASTRODUODENOSCOPY eval varices;  Surgeon: Alli Dockery MD;  Location: [facility] ENDOSCOPY;  Service: Gastroenterology;  Laterality: N/A;   • UPPER GASTROINTESTINAL ENDOSCOPY  08/24/2016   • UPPER GASTROINTESTINAL ENDOSCOPY  04/13/2015   • UPPER GASTROINTESTINAL ENDOSCOPY  12/18/2017   • UPPER GASTROINTESTINAL ENDOSCOPY  03/06/2019   • UPPER GASTROINTESTINAL ENDOSCOPY  09/18/2020     Family History   Problem Relation Age of Onset   • Cancer Mother    • Cirrhosis Father    • Diabetes Other    • Hypertension Other        Allergies   Allergen Reactions   • Ace Inhibitors Angioedema     hypotension   • Lisinopril Other (See Comments)     cough   • Vistaril [Hydroxyzine Hcl] Other (See Comments)     Patient is unsure      Social History     Socioeconomic History   • Marital status: Single     Spouse name: Not on file   • Number of children: 0   • Years of education: 12   • Highest education level: Not on file   Tobacco Use   • Smoking status: Current Every Day Smoker     Packs/day: 1.50     Years: 40.00     Pack years: 60.00     Types: Cigarettes   • Smokeless tobacco: Never Used   Vaping Use   • Vaping Use: Never used   Substance and Sexual Activity   • Alcohol use: Yes     Alcohol/week: 6.0 standard drinks     Types: 6 Cans of beer per week     Comment: when can get   • Drug  use: No   • Sexual activity: Defer     Partners: Female     Current Medications:  Prior to Admission medications    Medication Sig Start Date End Date Taking? Authorizing Provider   albuterol sulfate  (90 Base) MCG/ACT inhaler Inhale 2 puffs Every 4 (Four) Hours As Needed for Wheezing or Shortness of Air. 6/10/20  Yes Yolanda Phan MD   Aspirin Adult Low Strength 81 MG EC tablet TAKE 1 TABLET BY MOUTH DAILY. 12/7/20  Yes Son Li MD   Blood Pressure Monitoring (ADULT BLOOD PRESSURE CUFF LG) kit Check BP daily 8/9/19  Yes Jeri Danielson MD   cetirizine (zyrTEC) 10 MG tablet Take 1 tablet by mouth Daily. 6/10/20  Yes Yolanda Phan MD   ferrous sulfate 324 MG tablet delayed-release Take 1 tablet by mouth Daily With Breakfast. 4/2/21  Yes Jeri Danielson MD   fluticasone (FLONASE) 50 MCG/ACT nasal spray 2 sprays into the nostril(s) as directed by provider Daily. 6/10/20  Yes Yolanda Phan MD   hydrALAZINE (APRESOLINE) 10 MG tablet Take 1 tablet by mouth 3 (Three) Times a Day. 4/2/21  Yes Jeri Danielson MD   losartan (COZAAR) 100 MG tablet Take 1 tablet by mouth Daily. 4/2/21  Yes Jeri Danielson MD   magnesium oxide (MAGOX) 400 (241.3 Mg) MG tablet tablet TAKE 1 TABLET BY MOUTH DAILY. 2/9/21  Yes Jeri Danielson MD   metFORMIN (GLUCOPHAGE) 500 MG tablet Take 1 tablet by mouth Daily With Breakfast. 4/2/21  Yes Jeri Danielson MD   omeprazole (priLOSEC) 20 MG capsule Take 1 capsule by mouth Every Morning. 4/2/21  Yes Jeri Danielson MD   propranolol (INDERAL) 40 MG tablet Take 1 tablet by mouth Daily. 1/20/21  Yes Tim Chen PA-C   tiotropium (SPIRIVA) 18 MCG per inhalation capsule Place 1 capsule into inhaler and inhale Daily. 9/21/20  Yes Yolanda Phan MD   ferrous sulfate (Feosol) 325 (65 FE) MG tablet Take 1 tablet by mouth 3 (Three) Times a Day With Meals. 8/6/20   Son Li MD     Review of Systems  Review of Systems   Constitutional: Positive for  "unexpected weight change.   HENT: Positive for trouble swallowing.    Gastrointestinal: Positive for abdominal pain and nausea.          Objective    BP (!) 154/106 Comment: manual left arm patient has not had any medication this morn  Pulse 118   Ht 185.4 cm (73\")   Wt 79.8 kg (176 lb)   BMI 23.22 kg/m²   Physical Exam  Vitals and nursing note reviewed.   Constitutional:       General: He is not in acute distress.     Appearance: He is well-developed.      Comments: AA   HENT:      Head: Normocephalic and atraumatic.   Eyes:      Pupils: Pupils are equal, round, and reactive to light.   Cardiovascular:      Rate and Rhythm: Normal rate and regular rhythm.      Heart sounds: Normal heart sounds.   Pulmonary:      Effort: Pulmonary effort is normal.      Breath sounds: Normal breath sounds.   Abdominal:      General: Bowel sounds are normal. There is no distension or abdominal bruit.      Palpations: Abdomen is soft. Abdomen is not rigid. There is no shifting dullness or mass.      Tenderness: There is abdominal tenderness. There is no guarding or rebound.      Hernia: No hernia is present. There is no hernia in the ventral area.      Comments: Mild upper   Musculoskeletal:         General: Normal range of motion.      Cervical back: Normal range of motion.   Skin:     General: Skin is warm and dry.   Neurological:      Mental Status: He is alert and oriented to person, place, and time.   Psychiatric:         Behavior: Behavior normal.         Thought Content: Thought content normal.         Judgment: Judgment normal.       Assessment/Plan      1. Dysphagia, unspecified type    2. Gastroesophageal reflux disease with esophagitis without hemorrhage    3. Esophageal varices without bleeding, unspecified esophageal varices type (CMS/HCC)    4. Achalasia    5. Weight loss    .   Diagnoses and all orders for this visit:    1. Dysphagia, unspecified type (Primary)  -     Case Request; Standing  -     dextrose 5 % and " sodium chloride 0.45 % infusion  -     Case Request    2. Gastroesophageal reflux disease with esophagitis without hemorrhage  -     Case Request; Standing  -     dextrose 5 % and sodium chloride 0.45 % infusion  -     Case Request    3. Esophageal varices without bleeding, unspecified esophageal varices type (CMS/HCC)  -     Case Request; Standing  -     dextrose 5 % and sodium chloride 0.45 % infusion  -     Case Request    4. Achalasia  -     Case Request; Standing  -     dextrose 5 % and sodium chloride 0.45 % infusion  -     Case Request    5. Weight loss  -     Case Request; Standing  -     dextrose 5 % and sodium chloride 0.45 % infusion  -     Case Request    Other orders  -     Follow Anesthesia Guidelines / Standing Orders; Future  -     Obtain Informed Consent; Future  -     Obtain Informed Consent; Standing  -     POC Glucose Once; Standing        Orders placed during this encounter include:  Orders Placed This Encounter   Procedures   • Follow Anesthesia Guidelines / Standing Orders     Standing Status:   Future   • Obtain Informed Consent     Standing Status:   Future     Order Specific Question:   Informed Consent Given For     Answer:   ESOPHAGOGASTRODUODENOSCOPY       Medications prescribed:  No orders of the defined types were placed in this encounter.      Requested Prescriptions      No prescriptions requested or ordered in this encounter       Review and/or summary of lab tests, radiology, procedures, medications. Review and summary of old records and obtaining of history. The risks and benefits of my recommendations, as well as other treatment options were discussed with the patient today. Questions were answered.    Follow-up: Return in about 1 month (around 5/12/2021), or if symptoms worsen or fail to improve.     ESOPHAGOGASTRODUODENOSCOPY WITH ANESTHESIA--with botoxcortney (N/A)      This document has been electronically signed by Tim Chen PA-C on April 21, 2021  18:08 CDT      Results for orders placed or performed in visit on 04/02/21   REYNOSO Fibrosure    Specimen: Blood   Result Value Ref Range    Fibrosis Score (References) 0.29 (H) 0.00 - 0.21    Fibrosis Stage (Reference) Comment     Steatosis Score (Reference) 0.21 0.00 - 0.30    Steatosis Grade (Reference) Comment     REYNOSO Score (Reference) 0.25 0.25    Reynoso Grade (Reference) Comment     Height: (Reference) 73 in    Weight: (Reference) 180 LBS    Alpha 2-Macroglobulins, Qn 253 110 - 276 mg/dL    Haptoglobin 160 32 - 363 mg/dL    Apolipoprotein A-1 181 (H) 101 - 178 mg/dL    Total Bilirubin 0.4 0.0 - 1.2 mg/dL    GGT 54 0 - 65 IU/L    ALT (SGPT) 28 0 - 55 IU/L    AST (SGOT) P5P (Reference) 45 (H) 0 - 40 IU/L    Cholesterol, Total (Reference) 128 100 - 199 mg/dL    Glucose, Serum (Reference) 89 65 - 99 mg/dL    Triglycerides 76 0 - 149 mg/dL    Interpretations: (Reference) Comment     Fibrosis Scoring: Comment     Steatosis Grading (Reference) Comment     Reynoso Scoring (Reference) Comment     Limitations: (Reference) Comment     Comment (Reference) Comment    Protime-INR    Specimen: Blood   Result Value Ref Range    Protime 12.8 11.1 - 15.3 Seconds    INR 0.93 0.80 - 1.20   Hemoglobin A1c    Specimen: Blood   Result Value Ref Range    Hemoglobin A1C 5.21 4.80 - 5.60 %   Comprehensive Metabolic Panel    Specimen: Blood   Result Value Ref Range    Glucose 90 65 - 99 mg/dL    BUN 6 (L) 8 - 23 mg/dL    Creatinine 0.85 0.76 - 1.27 mg/dL    Sodium 140 136 - 145 mmol/L    Potassium 3.6 3.5 - 5.2 mmol/L    Chloride 104 98 - 107 mmol/L    CO2 23.9 22.0 - 29.0 mmol/L    Calcium 9.1 8.6 - 10.5 mg/dL    Total Protein 7.5 6.0 - 8.5 g/dL    Albumin 4.10 3.50 - 5.20 g/dL    ALT (SGPT) 27 1 - 41 U/L    AST (SGOT) 38 1 - 40 U/L    Alkaline Phosphatase 74 39 - 117 U/L    Total Bilirubin 0.5 0.0 - 1.2 mg/dL    eGFR  African Amer 111 >60 mL/min/1.73    Globulin 3.4 gm/dL    A/G Ratio 1.2 g/dL    BUN/Creatinine Ratio 7.1 7.0 - 25.0    Anion  Gap 12.1 5.0 - 15.0 mmol/L   Results for orders placed or performed in visit on 03/27/21   COVID-19, [facility] IN-HOUSE, NP SWAB IN TRANSPORT MEDIA 8-10 HR TAT - Swab, Nasopharynx    Specimen: Nasopharynx; Swab   Result Value Ref Range    COVID19 Not Detected Not Detected - Ref. Range   Results for orders placed or performed in visit on 12/16/20   Hemoglobin A1c    Specimen: Blood   Result Value Ref Range    Hemoglobin A1C 5.36 4.80 - 5.60 %   Results for orders placed or performed in visit on 10/15/20   CBC Auto Differential    Specimen: Blood   Result Value Ref Range    WBC 10.54 3.40 - 10.80 10*3/mm3    RBC 6.10 (H) 4.14 - 5.80 10*6/mm3    Hemoglobin 15.5 13.0 - 17.7 g/dL    Hematocrit 46.6 37.5 - 51.0 %    MCV 76.4 (L) 79.0 - 97.0 fL    MCH 25.4 (L) 26.6 - 33.0 pg    MCHC 33.3 31.5 - 35.7 g/dL    RDW 15.6 (H) 12.3 - 15.4 %    RDW-SD 39.4 37.0 - 54.0 fl    MPV 11.5 6.0 - 12.0 fL    Platelets 299 140 - 450 10*3/mm3    Neutrophil % 77.8 (H) 42.7 - 76.0 %    Lymphocyte % 11.1 (L) 19.6 - 45.3 %    Monocyte % 8.7 5.0 - 12.0 %    Eosinophil % 1.4 0.3 - 6.2 %    Basophil % 0.7 0.0 - 1.5 %    Immature Grans % 0.3 0.0 - 0.5 %    Neutrophils, Absolute 8.20 (H) 1.70 - 7.00 10*3/mm3    Lymphocytes, Absolute 1.17 0.70 - 3.10 10*3/mm3    Monocytes, Absolute 0.92 (H) 0.10 - 0.90 10*3/mm3    Eosinophils, Absolute 0.15 0.00 - 0.40 10*3/mm3    Basophils, Absolute 0.07 0.00 - 0.20 10*3/mm3    Immature Grans, Absolute 0.03 0.00 - 0.05 10*3/mm3    nRBC 0.1 0.0 - 0.2 /100 WBC   AFP Tumor Marker    Specimen: Blood   Result Value Ref Range    ALPHA-FETOPROTEIN 2.61 0 - 8.3 ng/mL   Protime-INR    Specimen: Blood   Result Value Ref Range    Protime 13.7 11.1 - 15.3 Seconds    INR 1.01 0.80 - 1.20   Ammonia    Specimen: Blood   Result Value Ref Range    Ammonia 27 16 - 60 umol/L   Comprehensive Metabolic Panel    Specimen: Blood   Result Value Ref Range    Glucose 106 (H) 65 - 99 mg/dL    BUN 5 (L) 8 - 23 mg/dL    Creatinine 0.73 (L) 0.76 -  1.27 mg/dL    Sodium 140 136 - 145 mmol/L    Potassium 3.1 (L) 3.5 - 5.2 mmol/L    Chloride 101 98 - 107 mmol/L    CO2 30.1 (H) 22.0 - 29.0 mmol/L    Calcium 8.8 8.6 - 10.5 mg/dL    Total Protein 7.5 6.0 - 8.5 g/dL    Albumin 4.40 3.50 - 5.20 g/dL    ALT (SGPT) 13 1 - 41 U/L    AST (SGOT) 14 1 - 40 U/L    Alkaline Phosphatase 75 39 - 117 U/L    Total Bilirubin 1.6 (H) 0.0 - 1.2 mg/dL    eGFR  African Amer 132 >60 mL/min/1.73    Globulin 3.1 gm/dL    A/G Ratio 1.4 g/dL    BUN/Creatinine Ratio 6.8 (L) 7.0 - 25.0    Anion Gap 8.9 5.0 - 15.0 mmol/L   Results for orders placed or performed during the hospital encounter of 09/18/20   COVID [facility] Priority - Swab, Nasopharynx    Specimen: Nasopharynx; Swab   Result Value Ref Range    COVID [facility] PRIORITY Comment    COVID-19,[facility] ROUTINE, NP/OP SWAB IN TRANSPORT MEDIA OR ESWAB 72 HR TAT - Swab, Nasopharynx    Specimen: Nasopharynx; Swab   Result Value Ref Range    SARS-CoV-2, [facility] Not Detected Not Detected   Tissue Pathology Exam    Specimen: A: Gastric, Antrum; Tissue    B: Large Intestine, Sigmoid Colon; Tissue   Result Value Ref Range    Case Report       Surgical Pathology Report                         Case: [id number]                                  Authorizing Provider:  Alli Dockery MD        Collected:           09/18/2020 11:08 AM          Ordering Location:     [facility]             Received:            09/18/2020 11:51 AM                                 [facility]                                                     Pathologist:           Madonna Andrews DO                                                        Specimens:   1) - Gastric, Antrum                                                                                2) - Large Intestine, Sigmoid Colon, polyp                                                 Final Diagnosis       SEE SCANNED REPORT       POC Glucose Once    Specimen: Blood   Result Value Ref Range    Glucose 87  70 - 130 mg/dL     *Note: Due to a large number of results and/or encounters for the requested time period, some results have not been displayed. A complete set of results can be found in Results Review.       Some portions of this note have been dictated using voice recognition software and may contain errors and/or omissions.

## 2021-04-12 NOTE — PATIENT INSTRUCTIONS
MyPlate from USDA    MyPlate is an outline of a general healthy diet based on the 2010 Dietary Guidelines for Americans, from the [place] Department of Agriculture (USDA). It sets guidelines for how much food you should eat from each food group based on your age, sex, and level of physical activity.  What are tips for following MyPlate?  To follow MyPlate recommendations:  · Eat a wide variety of fruits and vegetables, grains, and protein foods.  · Serve smaller portions and eat less food throughout the day.  · Limit portion sizes to avoid overeating.  · Enjoy your food.  · Get at least 150 minutes of exercise every week. This is about 30 minutes each day, 5 or more days per week.  It can be difficult to have every meal look like MyPlate. Think about MyPlate as eating guidelines for an entire day, rather than each individual meal.  Fruits and vegetables  · Make half of your plate fruits and vegetables.  · Eat many different colors of fruits and vegetables each day.  · For a 2,000 calorie daily food plan, eat:  ? 2½ cups of vegetables every day.  ? 2 cups of fruit every day.  · 1 cup is equal to:  ? 1 cup raw or cooked vegetables.  ? 1 cup raw fruit.  ? 1 medium-sized orange, apple, or banana.  ? 1 cup 100% fruit or vegetable juice.  ? 2 cups raw leafy greens, such as lettuce, spinach, or kale.  ? ½ cup dried fruit.  Grains  · One fourth of your plate should be grains.  · Make at least half of the grains you eat each day whole grains.  · For a 2,000 calorie daily food plan, eat 6 oz of grains every day.  · 1 oz is equal to:  ? 1 slice bread.  ? 1 cup cereal.  ? ½ cup cooked rice, cereal, or pasta.  Protein  · One fourth of your plate should be protein.  · Eat a wide variety of protein foods, including meat, poultry, fish, eggs, beans, nuts, and tofu.  · For a 2,000 calorie daily food plan, eat 5½ oz of protein every day.  · 1 oz is equal to:  ? 1 oz meat, poultry, or fish.  ? ¼ cup cooked beans.  ? 1 egg.  ? ½ oz nuts  or seeds.  ? 1 Tbsp peanut butter.  Dairy  · Drink fat-free or low-fat (1%) milk.  · Eat or drink dairy as a side to meals.  · For a 2,000 calorie daily food plan, eat or drink 3 cups of dairy every day.  · 1 cup is equal to:  ? 1 cup milk, yogurt, cottage cheese, or soy milk (soy beverage).  ? 2 oz processed cheese.  ? 1½ oz natural cheese.  Fats, oils, salt, and sugars  · Only small amounts of oils are recommended.  · Avoid foods that are high in calories and low in nutritional value (empty calories), like foods high in fat or added sugars.  · Choose foods that are low in salt (sodium). Choose foods that have less than 140 milligrams (mg) of sodium per serving.  · Drink water instead of sugary drinks. Drink enough water each day to keep your urine pale yellow.  Where to find support  · Work with your health care provider or a nutrition specialist (dietitian) to develop a customized eating plan that is right for you.  · Download an tom (mobile application) to help you track your daily food intake.  Where to find more information  · Go to ChooseMyPlate.gov for more information.  Summary  · MyPlate is a general guideline for healthy eating from the USDA. It is based on the 2010 Dietary Guidelines for Americans.  · In general, fruits and vegetables should take up ½ of your plate, grains should take up ¼ of your plate, and protein should take up ¼ of your plate.  This information is not intended to replace advice given to you by your health care provider. Make sure you discuss any questions you have with your health care provider.  Document Revised: 05/21/2020 Document Reviewed: 03/19/2018  Elsevier Patient Education © 2021 [employer]  BMI for Adults  What is BMI?  Body mass index (BMI) is a number that is calculated from a person's weight and height. BMI can help estimate how much of a person's weight is composed of fat. BMI does not measure body fat directly. Rather, it is an alternative to procedures that  "directly measure body fat, which can be difficult and expensive.  BMI can help identify people who may be at higher risk for certain medical problems.  What are BMI measurements used for?  BMI is used as a screening tool to identify possible weight problems. It helps determine whether a person is obese, overweight, a healthy weight, or underweight.  BMI is useful for:  · Identifying a weight problem that may be related to a medical condition or may increase the risk for medical problems.  · Promoting changes, such as changes in diet and exercise, to help reach a healthy weight. BMI screening can be repeated to see if these changes are working.  How is BMI calculated?  BMI involves measuring your weight in relation to your height. Both height and weight are measured, and the BMI is calculated from those numbers. This can be done either in English ([place] or metric measurements. Note that charts and online BMI calculators are available to help you find your BMI quickly and easily without having to do these calculations yourself.  To calculate your BMI in English ([place] measurements:    1. Measure your weight in pounds (lb).  2. Multiply the number of pounds by 703.  ? For example, for a person who weighs 180 lb, multiply that number by 703, which equals 126,540.  3. Measure your height in inches. Then multiply that number by itself to get a measurement called \"inches squared.\"  ? For example, for a person who is 70 inches tall, the \"inches squared\" measurement is 70 inches x 70 inches, which equals 4,900 inches squared.  4. Divide the total from step 2 (number of lb x 703) by the total from step 3 (inches squared): 126,540 ÷ 4,900 = 25.8. This is your BMI.  To calculate your BMI in metric measurements:  1. Measure your weight in kilograms (kg).  2. Measure your height in meters (m). Then multiply that number by itself to get a measurement called \"meters squared.\"  ? For example, for a person who is 1.75 m tall, the " "\"meters squared\" measurement is 1.75 m x 1.75 m, which is equal to 3.1 meters squared.  3. Divide the number of kilograms (your weight) by the meters squared number. In this example: 70 ÷ 3.1 = 22.6. This is your BMI.  What do the results mean?  BMI charts are used to identify whether you are underweight, normal weight, overweight, or obese. The following guidelines will be used:  · Underweight: BMI less than 18.5.  · Normal weight: BMI between 18.5 and 24.9.  · Overweight: BMI between 25 and 29.9.  · Obese: BMI of 30 or above.  Keep these notes in mind:  · Weight includes both fat and muscle, so someone with a muscular build, such as an athlete, may have a BMI that is higher than 24.9. In cases like these, BMI is not an accurate measure of body fat.  · To determine if excess body fat is the cause of a BMI of 25 or higher, further assessments may need to be done by a health care provider.  · BMI is usually interpreted in the same way for men and women.  Where to find more information  For more information about BMI, including tools to quickly calculate your BMI, go to these websites:  · Centers for Disease Control and Prevention: www.cdc.gov  · American Heart Association: www.heart.org  · National Heart, Lung, and Blood [place]: www.nhlbi.nih.gov  Summary  · Body mass index (BMI) is a number that is calculated from a person's weight and height.  · BMI may help estimate how much of a person's weight is composed of fat. BMI can help identify those who may be at higher risk for certain medical problems.  · BMI can be measured using English measurements or metric measurements.  · BMI charts are used to identify whether you are underweight, normal weight, overweight, or obese.  This information is not intended to replace advice given to you by your health care provider. Make sure you discuss any questions you have with your health care provider.  Document Revised: 09/09/2020 Document Reviewed: 07/17/2020  [employer]abelle " Patient Education © 2021 [employer].

## 2021-04-16 ENCOUNTER — IMMUNIZATION (OUTPATIENT)
Dept: VACCINE CLINIC | Facility: HOSPITAL | Age: 61
End: 2021-04-16

## 2021-04-16 PROCEDURE — 91300 HC SARSCOV02 VAC 30MCG/0.3ML IM: CPT | Performed by: THORACIC SURGERY (CARDIOTHORACIC VASCULAR SURGERY)

## 2021-04-16 PROCEDURE — 0002A: CPT | Performed by: THORACIC SURGERY (CARDIOTHORACIC VASCULAR SURGERY)

## 2021-04-25 ENCOUNTER — LAB (OUTPATIENT)
Dept: LAB | Facility: HOSPITAL | Age: 61
End: 2021-04-25

## 2021-04-25 DIAGNOSIS — Z01.818 PREOP TESTING: Primary | ICD-10-CM

## 2021-04-25 LAB — SARS-COV-2 N GENE RESP QL NAA+PROBE: NOT DETECTED

## 2021-04-25 PROCEDURE — C9803 HOPD COVID-19 SPEC COLLECT: HCPCS

## 2021-04-25 PROCEDURE — 87635 SARS-COV-2 COVID-19 AMP PRB: CPT

## 2021-04-28 ENCOUNTER — ANESTHESIA EVENT (OUTPATIENT)
Dept: GASTROENTEROLOGY | Facility: HOSPITAL | Age: 61
End: 2021-04-28

## 2021-04-28 ENCOUNTER — HOSPITAL ENCOUNTER (OUTPATIENT)
Facility: HOSPITAL | Age: 61
Setting detail: HOSPITAL OUTPATIENT SURGERY
Discharge: HOME | End: 2021-04-28
Attending: INTERNAL MEDICINE | Admitting: INTERNAL MEDICINE

## 2021-04-28 ENCOUNTER — ANESTHESIA (OUTPATIENT)
Dept: GASTROENTEROLOGY | Facility: HOSPITAL | Age: 61
End: 2021-04-28

## 2021-04-28 VITALS
DIASTOLIC BLOOD PRESSURE: 92 MMHG | HEIGHT: 70 IN | RESPIRATION RATE: 20 BRPM | BODY MASS INDEX: 25.25 KG/M2 | OXYGEN SATURATION: 98 % | HEART RATE: 102 BPM | SYSTOLIC BLOOD PRESSURE: 132 MMHG | TEMPERATURE: 97 F

## 2021-04-28 DIAGNOSIS — R13.10 DYSPHAGIA, UNSPECIFIED TYPE: ICD-10-CM

## 2021-04-28 DIAGNOSIS — R63.4 WEIGHT LOSS: ICD-10-CM

## 2021-04-28 DIAGNOSIS — K22.0 ACHALASIA: ICD-10-CM

## 2021-04-28 DIAGNOSIS — K21.00 GASTROESOPHAGEAL REFLUX DISEASE WITH ESOPHAGITIS WITHOUT HEMORRHAGE: ICD-10-CM

## 2021-04-28 DIAGNOSIS — I85.00 ESOPHAGEAL VARICES WITHOUT BLEEDING, UNSPECIFIED ESOPHAGEAL VARICES TYPE (HCC): ICD-10-CM

## 2021-04-28 LAB — GLUCOSE BLDC GLUCOMTR-MCNC: 97 MG/DL (ref 70–130)

## 2021-04-28 PROCEDURE — 25010000002 ONABOTULINUMTOXINA 100 UNITS RECONSTITUTED SOLUTION: Performed by: INTERNAL MEDICINE

## 2021-04-28 PROCEDURE — 25010000002 PROPOFOL 10 MG/ML EMULSION: Performed by: NURSE ANESTHETIST, CERTIFIED REGISTERED

## 2021-04-28 PROCEDURE — 43239 EGD BIOPSY SINGLE/MULTIPLE: CPT | Performed by: INTERNAL MEDICINE

## 2021-04-28 PROCEDURE — 93010 ELECTROCARDIOGRAM REPORT: CPT | Performed by: INTERNAL MEDICINE

## 2021-04-28 PROCEDURE — 93005 ELECTROCARDIOGRAM TRACING: CPT | Performed by: INTERNAL MEDICINE

## 2021-04-28 PROCEDURE — 88305 TISSUE EXAM BY PATHOLOGIST: CPT

## 2021-04-28 PROCEDURE — 82962 GLUCOSE BLOOD TEST: CPT

## 2021-04-28 RX ORDER — PROPOFOL 10 MG/ML
VIAL (ML) INTRAVENOUS AS NEEDED
Status: DISCONTINUED | OUTPATIENT
Start: 2021-04-28 | End: 2021-04-28 | Stop reason: SURG

## 2021-04-28 RX ORDER — LIDOCAINE HYDROCHLORIDE 20 MG/ML
INJECTION, SOLUTION INTRAVENOUS AS NEEDED
Status: DISCONTINUED | OUTPATIENT
Start: 2021-04-28 | End: 2021-04-28 | Stop reason: SURG

## 2021-04-28 RX ORDER — DEXTROSE AND SODIUM CHLORIDE 5; .45 G/100ML; G/100ML
30 INJECTION, SOLUTION INTRAVENOUS CONTINUOUS PRN
Status: DISCONTINUED | OUTPATIENT
Start: 2021-04-28 | End: 2021-04-28 | Stop reason: HOSPADM

## 2021-04-28 RX ADMIN — PROPOFOL 120 MG: 10 INJECTION, EMULSION INTRAVENOUS at 10:03

## 2021-04-28 RX ADMIN — PROPOFOL 20 MG: 10 INJECTION, EMULSION INTRAVENOUS at 10:05

## 2021-04-28 RX ADMIN — DEXTROSE AND SODIUM CHLORIDE 30 ML/HR: 5; 450 INJECTION, SOLUTION INTRAVENOUS at 09:52

## 2021-04-28 RX ADMIN — PROPOFOL 30 MG: 10 INJECTION, EMULSION INTRAVENOUS at 10:07

## 2021-04-28 RX ADMIN — LIDOCAINE HYDROCHLORIDE 100 MG: 20 INJECTION, SOLUTION INTRAVENOUS at 10:03

## 2021-04-28 RX ADMIN — PROPOFOL 30 MG: 10 INJECTION, EMULSION INTRAVENOUS at 10:06

## 2021-04-28 NOTE — ANESTHESIA POSTPROCEDURE EVALUATION
Patient: Jameel Dozier    Procedure Summary     Date: 04/28/21 Room / Location: [facility] ENDOSCOPY 1 / [facility] ENDOSCOPY    Anesthesia Start: 1001 Anesthesia Stop: 1012    Procedure: ESOPHAGOGASTRODUODENOSCOPY WITH ANESTHESIA--with botox, attn antrum poss malig (N/A ) Diagnosis:       Dysphagia, unspecified type      Gastroesophageal reflux disease with esophagitis without hemorrhage      Esophageal varices without bleeding, unspecified esophageal varices type (CMS/HCC)      Achalasia      Weight loss      (Dysphagia, unspecified type [R13.10])      (Gastroesophageal reflux disease with esophagitis without hemorrhage [K21.00])      (Esophageal varices without bleeding, unspecified esophageal varices type (CMS/HCC) [I85.00])      (Achalasia [K22.0])      (Weight loss [R63.4])    Surgeons: Alli Dockery MD Provider: Pavithra Angela CRNA    Anesthesia Type: MAC ASA Status: 3          Anesthesia Type: MAC    Vitals  No vitals data found for the desired time range.          Post Anesthesia Care and Evaluation    Patient location during evaluation: bedside  Patient participation: waiting for patient participation  Level of consciousness: sleepy but conscious  Pain score: 0  Pain management: adequate  Airway patency: patent  Anesthetic complications: No anesthetic complications  PONV Status: none  Cardiovascular status: acceptable  Respiratory status: acceptable  Hydration status: acceptable

## 2021-04-28 NOTE — ANESTHESIA PREPROCEDURE EVALUATION
Anesthesia Evaluation     Patient summary reviewed and Nursing notes reviewed   NPO Solid Status: > 8 hours  NPO Liquid Status: > 8 hours           Airway   Mallampati: II  TM distance: >3 FB  Neck ROM: full  No difficulty expected  Dental      Pulmonary    (+) COPD moderate, shortness of breath, recent URI resolved, sleep apnea, decreased breath sounds,   Cardiovascular - normal exam    (+) hypertension poorly controlled 2 medications or greater, hyperlipidemia,       Neuro/Psych  (+) TIA, weakness, numbness, psychiatric history Anxiety and Depression,     (-) CVA    ROS Comment: Pt says he has not had a stroke  GI/Hepatic/Renal/Endo    (+)  hiatal hernia, GERD, GI bleeding resolved, liver disease, diabetes mellitus type 2 well controlled,   (-)  obesity, morbid obesity    Musculoskeletal     Abdominal  - normal exam   Substance History - negative use     OB/GYN negative ob/gyn ROS         Other   arthritis,                    Anesthesia Plan    ASA 3     MAC     intravenous induction     Anesthetic plan, all risks, benefits, and alternatives have been provided, discussed and informed consent has been obtained with: patient.

## 2021-05-03 LAB
LAB AP CASE REPORT: NORMAL
PATH REPORT.FINAL DX SPEC: NORMAL

## 2021-05-05 ENCOUNTER — OFFICE VISIT (OUTPATIENT)
Dept: GASTROENTEROLOGY | Facility: CLINIC | Age: 61
End: 2021-05-05

## 2021-05-05 VITALS
HEART RATE: 85 BPM | BODY MASS INDEX: 24.34 KG/M2 | HEIGHT: 70 IN | DIASTOLIC BLOOD PRESSURE: 67 MMHG | WEIGHT: 170 LBS | SYSTOLIC BLOOD PRESSURE: 104 MMHG

## 2021-05-05 DIAGNOSIS — R11.0 NAUSEA: ICD-10-CM

## 2021-05-05 DIAGNOSIS — I85.00 ESOPHAGEAL VARICES WITHOUT BLEEDING, UNSPECIFIED ESOPHAGEAL VARICES TYPE (HCC): ICD-10-CM

## 2021-05-05 DIAGNOSIS — K76.6 PORTAL HYPERTENSION (HCC): ICD-10-CM

## 2021-05-05 DIAGNOSIS — K76.0 FATTY (CHANGE OF) LIVER, NOT ELSEWHERE CLASSIFIED: ICD-10-CM

## 2021-05-05 DIAGNOSIS — K22.0 ACHALASIA: Primary | ICD-10-CM

## 2021-05-05 DIAGNOSIS — K21.00 GASTROESOPHAGEAL REFLUX DISEASE WITH ESOPHAGITIS WITHOUT HEMORRHAGE: ICD-10-CM

## 2021-05-05 DIAGNOSIS — K74.00 HEPATIC FIBROSIS: ICD-10-CM

## 2021-05-05 DIAGNOSIS — E71.30 DISORDER OF FATTY-ACID METABOLISM, UNSPECIFIED: ICD-10-CM

## 2021-05-05 PROCEDURE — 99214 OFFICE O/P EST MOD 30 MIN: CPT | Performed by: PHYSICIAN ASSISTANT

## 2021-05-05 RX ORDER — OMEPRAZOLE 20 MG/1
20 CAPSULE, DELAYED RELEASE ORAL 2 TIMES DAILY
Qty: 60 CAPSULE | Refills: 3 | Status: SHIPPED | OUTPATIENT
Start: 2021-05-05 | End: 2021-07-14 | Stop reason: SDUPTHER

## 2021-05-10 LAB
QT INTERVAL: 368 MS
QTC INTERVAL: 462 MS

## 2021-05-19 ENCOUNTER — LAB (OUTPATIENT)
Dept: LAB | Facility: HOSPITAL | Age: 61
End: 2021-05-19

## 2021-05-19 LAB
ALBUMIN SERPL-MCNC: 4.6 G/DL (ref 3.5–5.2)
ALBUMIN/GLOB SERPL: 1.4 G/DL
ALP SERPL-CCNC: 73 U/L (ref 39–117)
ALPHA-FETOPROTEIN: 1.99 NG/ML (ref 0–8.3)
ALT SERPL W P-5'-P-CCNC: 22 U/L (ref 1–41)
AMMONIA BLD-SCNC: 96 UMOL/L (ref 16–60)
ANION GAP SERPL CALCULATED.3IONS-SCNC: 14.6 MMOL/L (ref 5–15)
AST SERPL-CCNC: 24 U/L (ref 1–40)
BASOPHILS # BLD AUTO: 0.03 10*3/MM3 (ref 0–0.2)
BASOPHILS NFR BLD AUTO: 1 % (ref 0–1.5)
BILIRUB SERPL-MCNC: 1.3 MG/DL (ref 0–1.2)
BUN SERPL-MCNC: 13 MG/DL (ref 8–23)
BUN/CREAT SERPL: 9.9 (ref 7–25)
CALCIUM SPEC-SCNC: 9.7 MG/DL (ref 8.6–10.5)
CHLORIDE SERPL-SCNC: 94 MMOL/L (ref 98–107)
CO2 SERPL-SCNC: 21.4 MMOL/L (ref 22–29)
CREAT SERPL-MCNC: 1.31 MG/DL (ref 0.76–1.27)
DEPRECATED RDW RBC AUTO: 43.6 FL (ref 37–54)
EOSINOPHIL # BLD AUTO: 0.28 10*3/MM3 (ref 0–0.4)
EOSINOPHIL NFR BLD AUTO: 9.1 % (ref 0.3–6.2)
ERYTHROCYTE [DISTWIDTH] IN BLOOD BY AUTOMATED COUNT: 17 % (ref 12.3–15.4)
GFR SERPL CREATININE-BSD FRML MDRD: 67 ML/MIN/1.73
GLOBULIN UR ELPH-MCNC: 3.4 GM/DL
GLUCOSE SERPL-MCNC: 125 MG/DL (ref 65–99)
HCT VFR BLD AUTO: 51 % (ref 37.5–51)
HGB BLD-MCNC: 16.9 G/DL (ref 13–17.7)
IMM GRANULOCYTES # BLD AUTO: 0.01 10*3/MM3 (ref 0–0.05)
IMM GRANULOCYTES NFR BLD AUTO: 0.3 % (ref 0–0.5)
INR PPP: 0.95 (ref 0.8–1.2)
IRON 24H UR-MRATE: 177 MCG/DL (ref 59–158)
LYMPHOCYTES # BLD AUTO: 1.09 10*3/MM3 (ref 0.7–3.1)
LYMPHOCYTES NFR BLD AUTO: 35.3 % (ref 19.6–45.3)
MCH RBC QN AUTO: 26.7 PG (ref 26.6–33)
MCHC RBC AUTO-ENTMCNC: 33.1 G/DL (ref 31.5–35.7)
MCV RBC AUTO: 80.7 FL (ref 79–97)
MONOCYTES # BLD AUTO: 0.45 10*3/MM3 (ref 0.1–0.9)
MONOCYTES NFR BLD AUTO: 14.6 % (ref 5–12)
NEUTROPHILS NFR BLD AUTO: 1.23 10*3/MM3 (ref 1.7–7)
NEUTROPHILS NFR BLD AUTO: 39.7 % (ref 42.7–76)
NRBC BLD AUTO-RTO: 0.3 /100 WBC (ref 0–0.2)
PLATELET # BLD AUTO: 267 10*3/MM3 (ref 140–450)
PMV BLD AUTO: 10.7 FL (ref 6–12)
POTASSIUM SERPL-SCNC: 3 MMOL/L (ref 3.5–5.2)
PROT SERPL-MCNC: 8 G/DL (ref 6–8.5)
PROTHROMBIN TIME: 13.1 SECONDS (ref 11.1–15.3)
RBC # BLD AUTO: 6.32 10*6/MM3 (ref 4.14–5.8)
SODIUM SERPL-SCNC: 130 MMOL/L (ref 136–145)
WBC # BLD AUTO: 3.09 10*3/MM3 (ref 3.4–10.8)

## 2021-05-19 PROCEDURE — 83540 ASSAY OF IRON: CPT | Performed by: PHYSICIAN ASSISTANT

## 2021-05-19 PROCEDURE — 85025 COMPLETE CBC W/AUTO DIFF WBC: CPT | Performed by: PHYSICIAN ASSISTANT

## 2021-05-19 PROCEDURE — 80053 COMPREHEN METABOLIC PANEL: CPT | Performed by: PHYSICIAN ASSISTANT

## 2021-05-19 PROCEDURE — 85610 PROTHROMBIN TIME: CPT | Performed by: PHYSICIAN ASSISTANT

## 2021-05-19 PROCEDURE — 36415 COLL VENOUS BLD VENIPUNCTURE: CPT | Performed by: PHYSICIAN ASSISTANT

## 2021-05-19 PROCEDURE — 82140 ASSAY OF AMMONIA: CPT | Performed by: PHYSICIAN ASSISTANT

## 2021-05-19 PROCEDURE — 82105 ALPHA-FETOPROTEIN SERUM: CPT | Performed by: PHYSICIAN ASSISTANT

## 2021-06-01 ENCOUNTER — HOSPITAL ENCOUNTER (OUTPATIENT)
Dept: ULTRASOUND IMAGING | Facility: HOSPITAL | Age: 61
Discharge: HOME | End: 2021-06-01

## 2021-06-01 PROCEDURE — 93976 VASCULAR STUDY: CPT

## 2021-06-01 PROCEDURE — 76705 ECHO EXAM OF ABDOMEN: CPT

## 2021-06-10 RX ORDER — PROPRANOLOL HYDROCHLORIDE 40 MG/1
40 TABLET ORAL DAILY
Qty: 30 TABLET | Refills: 3 | Status: SHIPPED | OUTPATIENT
Start: 2021-06-10 | End: 2021-07-14 | Stop reason: SDUPTHER

## 2021-06-11 DIAGNOSIS — I10 ESSENTIAL HYPERTENSION: ICD-10-CM

## 2021-06-11 RX ORDER — HYDRALAZINE HYDROCHLORIDE 10 MG/1
TABLET, FILM COATED ORAL
Qty: 90 TABLET | Refills: 5 | Status: SHIPPED | OUTPATIENT
Start: 2021-06-11 | End: 2021-08-13 | Stop reason: SDDI

## 2021-06-14 ENCOUNTER — OFFICE VISIT (OUTPATIENT)
Dept: GASTROENTEROLOGY | Facility: CLINIC | Age: 61
End: 2021-06-14

## 2021-06-14 VITALS
WEIGHT: 174.8 LBS | BODY MASS INDEX: 25.03 KG/M2 | DIASTOLIC BLOOD PRESSURE: 84 MMHG | HEIGHT: 70 IN | SYSTOLIC BLOOD PRESSURE: 142 MMHG | HEART RATE: 74 BPM

## 2021-06-14 DIAGNOSIS — K74.00 HEPATIC FIBROSIS: ICD-10-CM

## 2021-06-14 DIAGNOSIS — K21.00 GASTROESOPHAGEAL REFLUX DISEASE WITH ESOPHAGITIS WITHOUT HEMORRHAGE: ICD-10-CM

## 2021-06-14 DIAGNOSIS — I85.00 ESOPHAGEAL VARICES WITHOUT BLEEDING, UNSPECIFIED ESOPHAGEAL VARICES TYPE (HCC): ICD-10-CM

## 2021-06-14 DIAGNOSIS — K22.0 ACHALASIA: Primary | ICD-10-CM

## 2021-06-14 PROCEDURE — 99214 OFFICE O/P EST MOD 30 MIN: CPT | Performed by: PHYSICIAN ASSISTANT

## 2021-06-14 NOTE — PROGRESS NOTES
Chief Complaint   Patient presents with   • Achalasia   • Heartburn   • Esophageal Varices   • Hepatic Fibrosis       ENDO PROCEDURE ORDERED:    Subjective    Jameel Dozier is a 60 y.o. male. he is here today for follow-up.    History of Present Illness    Patient is seen on a recheck of his achalasia, GERD, esophageal varices with hepatic fibrosis, abdominal pain. Last seen on 05/05/2021. At that time increased his Prilosec to b.i.d. He states it is helping some. He is having some dysphagia and complains of gurgling in his throat. Bowels are moving without blood or mucus. Weight is up 4.8 pounds since last visit. Last colonoscopy on 09/18/2020. Last EGD with injection of Botox did not give him significant improvement. The last EGD with dilatation he did not feel particularly improved either.     Laboratory on 05/19/2021 showed normal AFP, INR. Serum iron 177. CBC showed a white count of 3.09, MCV 96, otherwise fairly normal. CMP showed glucose 125, creatinine 1.31, sodium 130, potassium 3.0, chloride 94, CO2 of 21.6, total bilirubin 1.3, otherwise normal. Ammonia was increased at 96. Abdominal ultrasound with vasculature on 06/01/2021 showed normal-appearing liver. Normal Doppler of the liver. Sludge in the gallbladder. Normal common bile duct.     ASSESSMENT/PLAN:  Patient with achalasia, chronic hiccups, esophageal varices with hepatic fibrosis. Encouraged abstinence from alcohol. Laboratories are fairly stable. He appears asymptomatic from his gallbladder at this point. He may stop the iron given it is increased recently. Will be due for hepatoma screening in 11/2021. We will plan followup in 3 months with CBC, CMP, ammonia, INR, iron studies prior; sooner if he has further problems.      The following portions of the patient's history were reviewed and updated as appropriate:   Past Medical History:   Diagnosis Date   • Abdominal pain    • Acquired achalasia of esophagus    • Adenomatous polyp of colon     • Adverse drug effect    • Alcohol dependence with alcohol-induced disorder (CMS/HCC)    • Allergic rhinitis    • Anal fissure    • Anemia due to blood loss    • Benign essential hypertension    • Central obesity    • Cerebrovascular disease    • Chronic obstructive lung disease (CMS/HCC)    • Claudication (CMS/HCC)    • Disorder of peripheral nervous system    • Diverticular disease of colon     completed antibx, ? neoplasm on CT   • Dysphagia    • Dyspnea    • ED (erectile dysfunction)    • Epigastric pain    • Esophageal dysmotility    • Esophageal reflux    • Esophagitis    • Essential hypertension    • External hemorrhoids    • Gastritis    • GERD with esophagitis    • Heavy tobacco smoker    • Hemorrhoids    • Hiatal hernia    • Hiccough    • History of colon polyps    • History of echocardiogram     Normal LV funciton with Ef of 65% to 70% without regional wall motion abnormalities.Mild CLVH. Normal RV size and function. No significant valvular regurgitation or stenosis. 09/19/2014       • History of EKG    • History of TIA (transient ischemic attack)    • Hypercholesterolemia    • Hypertension    • Left ventricular hypertrophy    • Male erectile disorder    • Obstructive sleep apnea syndrome    • Primary osteoarthritis of knees, bilateral    • Rectal bleed     painful   • Rectal hemorrhage    • Sleep disorder    • Transient cerebral ischemia    • Upper respiratory infection    • Weakness     Attacks of weakness     Past Surgical History:   Procedure Laterality Date   • COLONOSCOPY  04/13/2015    Internal and external hemorrhoids found. 04/13/2015    • COLONOSCOPY N/A 9/18/2020    Procedure: COLONOSCOPY;  Surgeon: Alli Dockery MD;  Location: [facility] ENDOSCOPY;  Service: Gastroenterology;  Laterality: N/A;   • ENDOSCOPY      Internal & external hemorrhoids found. 1 polyp in sigmoid colon; removed by snare cautery polypectomy. 09/26/2012    • ENDOSCOPY      Gastritis found in the stomach. Biopsy  taken.Enlarged folds were found in the body of the stomach.Biopsy taken.Normal duodenum.A hiatus hernia was found in the esophagus. 04/13/2015    • ENDOSCOPY      Hiatus hernia in esophagus. Gastritis in stomach. Biopsy taken. Normal duodenum. Biopsy taken. 09/26/2012       • ENDOSCOPY N/A 12/18/2017    Procedure: ESOPHAGOGASTRODUODENOSCOPY--attn mid esophagus, abnl on CT;  Surgeon: Alli Dockery MD;  Location: [facility] ENDOSCOPY;  Service:    • ENDOSCOPY N/A 3/6/2019    Procedure: ESOPHAGOGASTRODUODENOSCOPY WITH DILATATION;  Surgeon: Alli Dockery MD;  Location: [facility] ENDOSCOPY;  Service: Gastroenterology   • ENDOSCOPY N/A 9/18/2020    Procedure: ESOPHAGOGASTRODUODENOSCOPY eval varices;  Surgeon: Alli Dockery MD;  Location: [facility] ENDOSCOPY;  Service: Gastroenterology;  Laterality: N/A;   • ENDOSCOPY N/A 4/28/2021    Procedure: ESOPHAGOGASTRODUODENOSCOPY WITH ANESTHESIA--with botox, attn antrum poss malig;  Surgeon: Alli Dockery MD;  Location: [facility] ENDOSCOPY;  Service: Gastroenterology;  Laterality: N/A;   • UPPER GASTROINTESTINAL ENDOSCOPY  08/24/2016   • UPPER GASTROINTESTINAL ENDOSCOPY  04/13/2015   • UPPER GASTROINTESTINAL ENDOSCOPY  12/18/2017   • UPPER GASTROINTESTINAL ENDOSCOPY  03/06/2019   • UPPER GASTROINTESTINAL ENDOSCOPY  09/18/2020   • UPPER GASTROINTESTINAL ENDOSCOPY  04/28/2021     Family History   Problem Relation Age of Onset   • Cancer Mother    • Cirrhosis Father    • Diabetes Other    • Hypertension Other        Allergies   Allergen Reactions   • Ace Inhibitors Angioedema     hypotension   • Lisinopril Other (See Comments)     cough   • Vistaril [Hydroxyzine Hcl] Other (See Comments)     Patient is unsure      Social History     Socioeconomic History   • Marital status: Single     Spouse name: Not on file   • Number of children: 0   • Years of education: 12   • Highest education level: Not on file   Tobacco Use   • Smoking status: Current Every Day Smoker     Packs/day: 1.50      Years: 40.00     Pack years: 60.00     Types: Cigarettes   • Smokeless tobacco: Never Used   Vaping Use   • Vaping Use: Never used   Substance and Sexual Activity   • Alcohol use: Yes     Alcohol/week: 6.0 standard drinks     Types: 6 Cans of beer per week     Comment: when can get   • Drug use: No   • Sexual activity: Defer     Partners: Female     Current Medications:  Prior to Admission medications    Medication Sig Start Date End Date Taking? Authorizing Provider   albuterol sulfate  (90 Base) MCG/ACT inhaler Inhale 2 puffs Every 4 (Four) Hours As Needed for Wheezing or Shortness of Air. 6/10/20  Yes Yolanda Phan MD   Aspirin Adult Low Strength 81 MG EC tablet TAKE 1 TABLET BY MOUTH DAILY. 12/7/20  Yes Son Li MD   Blood Pressure Monitoring (ADULT BLOOD PRESSURE CUFF LG) kit Check BP daily 8/9/19  Yes Jeri Danielson MD   cetirizine (zyrTEC) 10 MG tablet Take 1 tablet by mouth Daily. 6/10/20  Yes Yolanda Phan MD   ferrous sulfate 324 MG tablet delayed-release Take 1 tablet by mouth Daily With Breakfast. 4/2/21  Yes Jeri Danielson MD   fluticasone (FLONASE) 50 MCG/ACT nasal spray 2 sprays into the nostril(s) as directed by provider Daily. 6/10/20  Yes Yolanda Phan MD   hydrALAZINE (APRESOLINE) 10 MG tablet TAKE 1 TABLET THREE  TIMES DAILY 6/11/21  Yes Jeri Danielson MD   losartan (COZAAR) 100 MG tablet Take 1 tablet by mouth Daily. 4/2/21  Yes Jeri Danielson MD   magnesium oxide (MAGOX) 400 (241.3 Mg) MG tablet tablet TAKE 1 TABLET BY MOUTH DAILY. 2/9/21  Yes Jeri Danielson MD   metFORMIN (GLUCOPHAGE) 500 MG tablet Take 1 tablet by mouth Daily With Breakfast. 4/2/21  Yes Jeri Danielson MD   omeprazole (priLOSEC) 20 MG capsule Take 1 capsule by mouth 2 (Two) Times a Day. 5/5/21  Yes Tim Chen PA-C   propranolol (INDERAL) 40 MG tablet Take 1 tablet by mouth Daily. 6/10/21  Yes Tim Chen PA-C   tiotropium (SPIRIVA) 18 MCG per inhalation capsule Place  "1 capsule into inhaler and inhale Daily. 9/21/20  Yes Yolanda Phan MD     Review of Systems  Review of Systems       Objective    /84   Pulse 74   Ht 177.8 cm (70\")   Wt 79.3 kg (174 lb 12.8 oz)   BMI 25.08 kg/m²   Physical Exam  Vitals and nursing note reviewed.   Constitutional:       General: He is not in acute distress.     Appearance: He is well-developed.      Comments: AA, chronic hiccups   HENT:      Head: Normocephalic and atraumatic.   Eyes:      Pupils: Pupils are equal, round, and reactive to light.   Cardiovascular:      Rate and Rhythm: Normal rate and regular rhythm.      Heart sounds: Normal heart sounds.   Pulmonary:      Effort: Pulmonary effort is normal.      Breath sounds: Normal breath sounds.   Abdominal:      General: Bowel sounds are normal. There is no distension or abdominal bruit.      Palpations: Abdomen is soft. Abdomen is not rigid. There is no shifting dullness or mass.      Tenderness: There is abdominal tenderness. There is no guarding or rebound.      Hernia: No hernia is present. There is no hernia in the ventral area.   Musculoskeletal:         General: Normal range of motion.      Cervical back: Normal range of motion.   Skin:     General: Skin is warm and dry.   Neurological:      Mental Status: He is alert and oriented to person, place, and time.   Psychiatric:         Behavior: Behavior normal.         Thought Content: Thought content normal.         Judgment: Judgment normal.       Assessment/Plan      1. Achalasia    2. Gastroesophageal reflux disease with esophagitis without hemorrhage    3. Esophageal varices without bleeding, unspecified esophageal varices type (CMS/HCC)    4. Hepatic fibrosis    .   Diagnoses and all orders for this visit:    1. Achalasia (Primary)  -     Ammonia; Future  -     CBC & Differential; Future  -     Comprehensive Metabolic Panel; Future  -     Ferritin; Future  -     Iron Profile; Future  -     Protime-INR; Future    2. " Gastroesophageal reflux disease with esophagitis without hemorrhage  -     Ammonia; Future  -     CBC & Differential; Future  -     Comprehensive Metabolic Panel; Future  -     Ferritin; Future  -     Iron Profile; Future  -     Protime-INR; Future    3. Esophageal varices without bleeding, unspecified esophageal varices type (CMS/HCC)  -     Ammonia; Future  -     CBC & Differential; Future  -     Comprehensive Metabolic Panel; Future  -     Ferritin; Future  -     Iron Profile; Future  -     Protime-INR; Future    4. Hepatic fibrosis  -     Ammonia; Future  -     CBC & Differential; Future  -     Comprehensive Metabolic Panel; Future  -     Ferritin; Future  -     Iron Profile; Future  -     Protime-INR; Future        Orders placed during this encounter include:  Orders Placed This Encounter   Procedures   • Ammonia     Due prior to follow up in Sept     Standing Status:   Future     Standing Expiration Date:   9/30/2021     Order Specific Question:   Release to patient     Answer:   Immediate   • Comprehensive Metabolic Panel     Due prior to follow up in Sept     Standing Status:   Future     Standing Expiration Date:   9/30/2021     Order Specific Question:   Release to patient     Answer:   Immediate   • Ferritin     Due prior to follow up in Sept     Standing Status:   Future     Standing Expiration Date:   9/30/2021   • Iron Profile     Due prior to follow up in Sept     Standing Status:   Future     Standing Expiration Date:   9/30/2021   • Protime-INR     Due prior to follow up in Sept     Standing Status:   Future     Standing Expiration Date:   9/30/2021   • CBC & Differential     Due prior to follow up in Sept     Standing Status:   Future     Standing Expiration Date:   9/30/2021     Order Specific Question:   Manual Differential     Answer:   No       Medications prescribed:  No orders of the defined types were placed in this encounter.      Requested Prescriptions      No prescriptions requested or  ordered in this encounter       Review and/or summary of lab tests, radiology, procedures, medications. Review and summary of old records and obtaining of history. The risks and benefits of my recommendations, as well as other treatment options were discussed with the patient today. Questions were answered.    Follow-up: No follow-ups on file.     * Surgery not found *      This document has been electronically signed by Tim Chen PA-C on June 21, 2021 18:00 CDT      Results for orders placed or performed in visit on 05/05/21   CBC Auto Differential    Specimen: Blood   Result Value Ref Range    WBC 3.09 (L) 3.40 - 10.80 10*3/mm3    RBC 6.32 (H) 4.14 - 5.80 10*6/mm3    Hemoglobin 16.9 13.0 - 17.7 g/dL    Hematocrit 51.0 37.5 - 51.0 %    MCV 80.7 79.0 - 97.0 fL    MCH 26.7 26.6 - 33.0 pg    MCHC 33.1 31.5 - 35.7 g/dL    RDW 17.0 (H) 12.3 - 15.4 %    RDW-SD 43.6 37.0 - 54.0 fl    MPV 10.7 6.0 - 12.0 fL    Platelets 267 140 - 450 10*3/mm3    Neutrophil % 39.7 (L) 42.7 - 76.0 %    Lymphocyte % 35.3 19.6 - 45.3 %    Monocyte % 14.6 (H) 5.0 - 12.0 %    Eosinophil % 9.1 (H) 0.3 - 6.2 %    Basophil % 1.0 0.0 - 1.5 %    Immature Grans % 0.3 0.0 - 0.5 %    Neutrophils, Absolute 1.23 (L) 1.70 - 7.00 10*3/mm3    Lymphocytes, Absolute 1.09 0.70 - 3.10 10*3/mm3    Monocytes, Absolute 0.45 0.10 - 0.90 10*3/mm3    Eosinophils, Absolute 0.28 0.00 - 0.40 10*3/mm3    Basophils, Absolute 0.03 0.00 - 0.20 10*3/mm3    Immature Grans, Absolute 0.01 0.00 - 0.05 10*3/mm3    nRBC 0.3 (H) 0.0 - 0.2 /100 WBC   AFP Tumor Marker    Specimen: Blood   Result Value Ref Range    ALPHA-FETOPROTEIN 1.99 0 - 8.3 ng/mL   Protime-INR    Specimen: Blood   Result Value Ref Range    Protime 13.1 11.1 - 15.3 Seconds    INR 0.95 0.80 - 1.20   Iron    Specimen: Blood   Result Value Ref Range    Iron 177 (H) 59 - 158 mcg/dL   Ammonia    Specimen: Blood   Result Value Ref Range    Ammonia 96 (H) 16 - 60 umol/L   Comprehensive Metabolic Panel    Specimen:  Blood   Result Value Ref Range    Glucose 125 (H) 65 - 99 mg/dL    BUN 13 8 - 23 mg/dL    Creatinine 1.31 (H) 0.76 - 1.27 mg/dL    Sodium 130 (L) 136 - 145 mmol/L    Potassium 3.0 (L) 3.5 - 5.2 mmol/L    Chloride 94 (L) 98 - 107 mmol/L    CO2 21.4 (L) 22.0 - 29.0 mmol/L    Calcium 9.7 8.6 - 10.5 mg/dL    Total Protein 8.0 6.0 - 8.5 g/dL    Albumin 4.60 3.50 - 5.20 g/dL    ALT (SGPT) 22 1 - 41 U/L    AST (SGOT) 24 1 - 40 U/L    Alkaline Phosphatase 73 39 - 117 U/L    Total Bilirubin 1.3 (H) 0.0 - 1.2 mg/dL    eGFR  African Amer 67 >60 mL/min/1.73    Globulin 3.4 gm/dL    A/G Ratio 1.4 g/dL    BUN/Creatinine Ratio 9.9 7.0 - 25.0    Anion Gap 14.6 5.0 - 15.0 mmol/L   Results for orders placed or performed during the hospital encounter of 04/28/21   Tissue Pathology Exam    Specimen: Stomach; Tissue   Result Value Ref Range    Case Report       Surgical Pathology Report                         Case: [id number]                                  Authorizing Provider:  Alli Dockery MD        Collected:           04/28/2021 10:07 AM          Ordering Location:     [facility]             Received:            04/29/2021 07:16 AM                                 [facility]                                                     Pathologist:           Gideon Weir MD                                                           Specimen:    Stomach, body of stomach                                                                   Final Diagnosis       See Scanned Report       POC Glucose Once    Specimen: Blood   Result Value Ref Range    Glucose 97 70 - 130 mg/dL   ECG 12 Lead   Result Value Ref Range    QT Interval 368 ms    QTC Interval 462 ms   Results for orders placed or performed in visit on 04/25/21   COVID-19, [facility] IN-HOUSE, NP SWAB IN TRANSPORT MEDIA 8-10 HR TAT - Swab, Nasopharynx    Specimen: Nasopharynx; Swab   Result Value Ref Range    COVID19 Not Detected Not Detected - Ref. Range   Results for  orders placed or performed in visit on 04/02/21   REYNOSO Fibrosure    Specimen: Blood   Result Value Ref Range    Fibrosis Score (References) 0.29 (H) 0.00 - 0.21    Fibrosis Stage (Reference) Comment     Steatosis Score (Reference) 0.21 0.00 - 0.30    Steatosis Grade (Reference) Comment     REYNOSO Score (Reference) 0.25 0.25    Reynoso Grade (Reference) Comment     Height: (Reference) 73 in    Weight: (Reference) 180 LBS    Alpha 2-Macroglobulins, Qn 253 110 - 276 mg/dL    Haptoglobin 160 32 - 363 mg/dL    Apolipoprotein A-1 181 (H) 101 - 178 mg/dL    Total Bilirubin 0.4 0.0 - 1.2 mg/dL    GGT 54 0 - 65 IU/L    ALT (SGPT) 28 0 - 55 IU/L    AST (SGOT) P5P (Reference) 45 (H) 0 - 40 IU/L    Cholesterol, Total (Reference) 128 100 - 199 mg/dL    Glucose, Serum (Reference) 89 65 - 99 mg/dL    Triglycerides 76 0 - 149 mg/dL    Interpretations: (Reference) Comment     Fibrosis Scoring: Comment     Steatosis Grading (Reference) Comment     Reynoso Scoring (Reference) Comment     Limitations: (Reference) Comment     Comment (Reference) Comment    Protime-INR    Specimen: Blood   Result Value Ref Range    Protime 12.8 11.1 - 15.3 Seconds    INR 0.93 0.80 - 1.20   Hemoglobin A1c    Specimen: Blood   Result Value Ref Range    Hemoglobin A1C 5.21 4.80 - 5.60 %   Comprehensive Metabolic Panel    Specimen: Blood   Result Value Ref Range    Glucose 90 65 - 99 mg/dL    BUN 6 (L) 8 - 23 mg/dL    Creatinine 0.85 0.76 - 1.27 mg/dL    Sodium 140 136 - 145 mmol/L    Potassium 3.6 3.5 - 5.2 mmol/L    Chloride 104 98 - 107 mmol/L    CO2 23.9 22.0 - 29.0 mmol/L    Calcium 9.1 8.6 - 10.5 mg/dL    Total Protein 7.5 6.0 - 8.5 g/dL    Albumin 4.10 3.50 - 5.20 g/dL    ALT (SGPT) 27 1 - 41 U/L    AST (SGOT) 38 1 - 40 U/L    Alkaline Phosphatase 74 39 - 117 U/L    Total Bilirubin 0.5 0.0 - 1.2 mg/dL    eGFR  African Amer 111 >60 mL/min/1.73    Globulin 3.4 gm/dL    A/G Ratio 1.2 g/dL    BUN/Creatinine Ratio 7.1 7.0 - 25.0    Anion Gap 12.1 5.0 - 15.0 mmol/L      *Note: Due to a large number of results and/or encounters for the requested time period, some results have not been displayed. A complete set of results can be found in Results Review.       Some portions of this note have been dictated using voice recognition software and may contain errors and/or omissions.

## 2021-06-14 NOTE — PATIENT INSTRUCTIONS
MyPlate from USDA    MyPlate is an outline of a general healthy diet based on the 2010 Dietary Guidelines for Americans, from the U.S. Department of Agriculture (USDA). It sets guidelines for how much food you should eat from each food group based on your age, sex, and level of physical activity.  What are tips for following MyPlate?  To follow MyPlate recommendations:  · Eat a wide variety of fruits and vegetables, grains, and protein foods.  · Serve smaller portions and eat less food throughout the day.  · Limit portion sizes to avoid overeating.  · Enjoy your food.  · Get at least 150 minutes of exercise every week. This is about 30 minutes each day, 5 or more days per week.  It can be difficult to have every meal look like MyPlate. Think about MyPlate as eating guidelines for an entire day, rather than each individual meal.  Fruits and vegetables  · Make half of your plate fruits and vegetables.  · Eat many different colors of fruits and vegetables each day.  · For a 2,000 calorie daily food plan, eat:  ? 2½ cups of vegetables every day.  ? 2 cups of fruit every day.  · 1 cup is equal to:  ? 1 cup raw or cooked vegetables.  ? 1 cup raw fruit.  ? 1 medium-sized orange, apple, or banana.  ? 1 cup 100% fruit or vegetable juice.  ? 2 cups raw leafy greens, such as lettuce, spinach, or kale.  ? ½ cup dried fruit.  Grains  · One fourth of your plate should be grains.  · Make at least half of the grains you eat each day whole grains.  · For a 2,000 calorie daily food plan, eat 6 oz of grains every day.  · 1 oz is equal to:  ? 1 slice bread.  ? 1 cup cereal.  ? ½ cup cooked rice, cereal, or pasta.  Protein  · One fourth of your plate should be protein.  · Eat a wide variety of protein foods, including meat, poultry, fish, eggs, beans, nuts, and tofu.  · For a 2,000 calorie daily food plan, eat 5½ oz of protein every day.  · 1 oz is equal to:  ? 1 oz meat, poultry, or fish.  ? ¼ cup cooked beans.  ? 1 egg.  ? ½ oz nuts  or seeds.  ? 1 Tbsp peanut butter.  Dairy  · Drink fat-free or low-fat (1%) milk.  · Eat or drink dairy as a side to meals.  · For a 2,000 calorie daily food plan, eat or drink 3 cups of dairy every day.  · 1 cup is equal to:  ? 1 cup milk, yogurt, cottage cheese, or soy milk (soy beverage).  ? 2 oz processed cheese.  ? 1½ oz natural cheese.  Fats, oils, salt, and sugars  · Only small amounts of oils are recommended.  · Avoid foods that are high in calories and low in nutritional value (empty calories), like foods high in fat or added sugars.  · Choose foods that are low in salt (sodium). Choose foods that have less than 140 milligrams (mg) of sodium per serving.  · Drink water instead of sugary drinks. Drink enough water each day to keep your urine pale yellow.  Where to find support  · Work with your health care provider or a nutrition specialist (dietitian) to develop a customized eating plan that is right for you.  · Download an tom (mobile application) to help you track your daily food intake.  Where to find more information  · Go to ChooseMyPlate.gov for more information.  Summary  · MyPlate is a general guideline for healthy eating from the USDA. It is based on the 2010 Dietary Guidelines for Americans.  · In general, fruits and vegetables should take up ½ of your plate, grains should take up ¼ of your plate, and protein should take up ¼ of your plate.  This information is not intended to replace advice given to you by your health care provider. Make sure you discuss any questions you have with your health care provider.  Document Revised: 05/21/2020 Document Reviewed: 03/19/2018  Elsevier Patient Education © 2021 Elsevier Inc.  BMI for Adults  What is BMI?  Body mass index (BMI) is a number that is calculated from a person's weight and height. BMI can help estimate how much of a person's weight is composed of fat. BMI does not measure body fat directly. Rather, it is an alternative to procedures that  "directly measure body fat, which can be difficult and expensive.  BMI can help identify people who may be at higher risk for certain medical problems.  What are BMI measurements used for?  BMI is used as a screening tool to identify possible weight problems. It helps determine whether a person is obese, overweight, a healthy weight, or underweight.  BMI is useful for:  · Identifying a weight problem that may be related to a medical condition or may increase the risk for medical problems.  · Promoting changes, such as changes in diet and exercise, to help reach a healthy weight. BMI screening can be repeated to see if these changes are working.  How is BMI calculated?  BMI involves measuring your weight in relation to your height. Both height and weight are measured, and the BMI is calculated from those numbers. This can be done either in English (U[place] or metric measurements. Note that charts and online BMI calculators are available to help you find your BMI quickly and easily without having to do these calculations yourself.  To calculate your BMI in English (U.S[place] measurements:    1. Measure your weight in pounds (lb).  2. Multiply the number of pounds by 703.  ? For example, for a person who weighs 180 lb, multiply that number by 703, which equals 126,540.  3. Measure your height in inches. Then multiply that number by itself to get a measurement called \"inches squared.\"  ? For example, for a person who is 70 inches tall, the \"inches squared\" measurement is 70 inches x 70 inches, which equals 4,900 inches squared.  4. Divide the total from step 2 (number of lb x 703) by the total from step 3 (inches squared): 126,540 ÷ 4,900 = 25.8. This is your BMI.  To calculate your BMI in metric measurements:  1. Measure your weight in kilograms (kg).  2. Measure your height in meters (m). Then multiply that number by itself to get a measurement called \"meters squared.\"  ? For example, for a person who is 1.75 m tall, the " "\"meters squared\" measurement is 1.75 m x 1.75 m, which is equal to 3.1 meters squared.  3. Divide the number of kilograms (your weight) by the meters squared number. In this example: 70 ÷ 3.1 = 22.6. This is your BMI.  What do the results mean?  BMI charts are used to identify whether you are underweight, normal weight, overweight, or obese. The following guidelines will be used:  · Underweight: BMI less than 18.5.  · Normal weight: BMI between 18.5 and 24.9.  · Overweight: BMI between 25 and 29.9.  · Obese: BMI of 30 or above.  Keep these notes in mind:  · Weight includes both fat and muscle, so someone with a muscular build, such as an athlete, may have a BMI that is higher than 24.9. In cases like these, BMI is not an accurate measure of body fat.  · To determine if excess body fat is the cause of a BMI of 25 or higher, further assessments may need to be done by a health care provider.  · BMI is usually interpreted in the same way for men and women.  Where to find more information  For more information about BMI, including tools to quickly calculate your BMI, go to these websites:  · Centers for Disease Control and Prevention: www.cdc.gov  · American Heart Association: www.heart.org  · National Heart, Lung, and Blood [place]: www.nhlbi.nih.gov  Summary  · Body mass index (BMI) is a number that is calculated from a person's weight and height.  · BMI may help estimate how much of a person's weight is composed of fat. BMI can help identify those who may be at higher risk for certain medical problems.  · BMI can be measured using English measurements or metric measurements.  · BMI charts are used to identify whether you are underweight, normal weight, overweight, or obese.  This information is not intended to replace advice given to you by your health care provider. Make sure you discuss any questions you have with your health care provider.  Document Revised: 09/09/2020 Document Reviewed: 07/17/2020  Isabelle " Patient Education © 2021 Elsevier Inc.

## 2021-07-13 ENCOUNTER — TELEPHONE (OUTPATIENT)
Dept: FAMILY MEDICINE CLINIC | Facility: CLINIC | Age: 61
End: 2021-07-13

## 2021-07-13 NOTE — TELEPHONE ENCOUNTER
PATIENT NEEDS REFILL FOR metFORMIN (GLUCOPHAGE) 500 MG tablet     PLEASE SEND TO     [facility] - [place], [place] - [address] [facility] - [phone]  - [phone] [address] [address]   Phone:  [phone]  Fax:  [phone]    THANK YOU

## 2021-07-14 ENCOUNTER — OFFICE VISIT (OUTPATIENT)
Dept: FAMILY MEDICINE CLINIC | Facility: CLINIC | Age: 61
End: 2021-07-14

## 2021-07-14 VITALS
BODY MASS INDEX: 24.81 KG/M2 | TEMPERATURE: 95.9 F | SYSTOLIC BLOOD PRESSURE: 162 MMHG | HEART RATE: 86 BPM | OXYGEN SATURATION: 96 % | DIASTOLIC BLOOD PRESSURE: 78 MMHG | WEIGHT: 173.3 LBS | HEIGHT: 70 IN

## 2021-07-14 DIAGNOSIS — I10 ESSENTIAL HYPERTENSION: ICD-10-CM

## 2021-07-14 DIAGNOSIS — J44.9 CHRONIC OBSTRUCTIVE PULMONARY DISEASE, UNSPECIFIED COPD TYPE (HCC): ICD-10-CM

## 2021-07-14 DIAGNOSIS — K21.00 GERD WITH ESOPHAGITIS: ICD-10-CM

## 2021-07-14 DIAGNOSIS — E11.8 TYPE 2 DIABETES MELLITUS WITH COMPLICATION, WITHOUT LONG-TERM CURRENT USE OF INSULIN (HCC): ICD-10-CM

## 2021-07-14 PROCEDURE — 99214 OFFICE O/P EST MOD 30 MIN: CPT | Performed by: STUDENT IN AN ORGANIZED HEALTH CARE EDUCATION/TRAINING PROGRAM

## 2021-07-14 RX ORDER — ASPIRIN 81 MG/1
81 TABLET ORAL DAILY
Qty: 30 TABLET | Refills: 2 | Status: SHIPPED | OUTPATIENT
Start: 2021-07-14 | End: 2022-01-27 | Stop reason: SDUPTHER

## 2021-07-14 RX ORDER — AMLODIPINE BESYLATE 5 MG/1
5 TABLET ORAL DAILY
Qty: 30 TABLET | Refills: 2 | Status: SHIPPED | OUTPATIENT
Start: 2021-07-14 | End: 2021-09-15 | Stop reason: ALTCHOICE

## 2021-07-14 RX ORDER — OMEPRAZOLE 20 MG/1
20 CAPSULE, DELAYED RELEASE ORAL 2 TIMES DAILY
Qty: 60 CAPSULE | Refills: 3 | Status: SHIPPED | OUTPATIENT
Start: 2021-07-14 | End: 2022-05-16 | Stop reason: SDUPTHER

## 2021-07-14 RX ORDER — CETIRIZINE HYDROCHLORIDE 10 MG/1
10 TABLET ORAL DAILY
Qty: 30 TABLET | Refills: 5 | Status: SHIPPED | OUTPATIENT
Start: 2021-07-14 | End: 2022-01-13

## 2021-07-14 RX ORDER — PROPRANOLOL HYDROCHLORIDE 40 MG/1
40 TABLET ORAL DAILY
Qty: 30 TABLET | Refills: 3 | Status: SHIPPED | OUTPATIENT
Start: 2021-07-14 | End: 2022-05-16 | Stop reason: SDUPTHER

## 2021-07-14 RX ORDER — HYDRALAZINE HYDROCHLORIDE 10 MG/1
10 TABLET, FILM COATED ORAL 3 TIMES DAILY
Qty: 90 TABLET | Refills: 5 | Status: CANCELLED | OUTPATIENT
Start: 2021-07-14

## 2021-07-14 RX ORDER — NEBULIZER AND COMPRESSOR
EACH MISCELLANEOUS
Qty: 1 EACH | Refills: 0 | Status: SHIPPED | OUTPATIENT
Start: 2021-07-14 | End: 2021-08-13 | Stop reason: SDUPTHER

## 2021-07-14 RX ORDER — ALBUTEROL SULFATE 90 UG/1
2 AEROSOL, METERED RESPIRATORY (INHALATION) EVERY 4 HOURS PRN
Qty: 18 G | Refills: 5 | Status: SHIPPED | OUTPATIENT
Start: 2021-07-14 | End: 2022-01-27 | Stop reason: SDUPTHER

## 2021-07-14 RX ORDER — FLUTICASONE PROPIONATE 50 MCG
2 SPRAY, SUSPENSION (ML) NASAL DAILY
Qty: 15.8 ML | Refills: 5 | Status: SHIPPED | OUTPATIENT
Start: 2021-07-14 | End: 2022-05-23 | Stop reason: SDUPTHER

## 2021-07-14 RX ORDER — LOSARTAN POTASSIUM 100 MG/1
100 TABLET ORAL DAILY
Qty: 30 TABLET | Refills: 5 | Status: SHIPPED | OUTPATIENT
Start: 2021-07-14 | End: 2022-01-27 | Stop reason: SDUPTHER

## 2021-07-14 NOTE — PROGRESS NOTES
Family Medicine Residency  Catrachito Milligan MD    Subjective:     Jameel Dozier is a 60 y.o. male medical history of hypertension, type 2 diabetes mellitus, and GERD who presents for follow-up and recheck of his hypertension and type 2 diabetes mellitus.    Patient reports compliance with Metformin 500 mg and denies any complications or issues while taking this medication.  Patient does not express any hypoglycemic episodes.  Patient denies any vision changes, blurry vision, nausea/vomiting, abdominal pain, diarrhea, numbness or tingling in extremities.  Patient's last A1c in April 2 was 5.2.  Previous plan was for patient to potentially discontinue Metformin since his last hemoglobin A1c levels have been at goal and stable but patient wishes to continue taking Metformin.     Blood pressure today was elevated at 162/78 in office.  Patient confirms having a monitor at home but does not check his blood pressures does not keep a log.  Patient reports compliance with current medications and denies any adverse effects.  Patient does report that he smoked a cigarette this morning.  Says that he took his medicines this morning.  Patient reports only taking his medicines 1 time per day even though his hydralazine is scheduled to be taken 3 times per day.    The following portions of the patient's history were reviewed and updated as appropriate: allergies, current medications, past family history, past medical history, past social history, past surgical history and problem list.    Past Medical Hx:  Past Medical History:   Diagnosis Date   • Abdominal pain    • Acquired achalasia of esophagus    • Adenomatous polyp of colon    • Adverse drug effect    • Alcohol dependence with alcohol-induced disorder (CMS/HCC)    • Allergic rhinitis    • Anal fissure    • Anemia due to blood loss    • Benign essential hypertension    • Central obesity    • Cerebrovascular disease    • Chronic obstructive lung disease  (CMS/HCC)    • Claudication (CMS/HCC)    • Disorder of peripheral nervous system    • Diverticular disease of colon     completed antibx, ? neoplasm on CT   • Dysphagia    • Dyspnea    • ED (erectile dysfunction)    • Epigastric pain    • Esophageal dysmotility    • Esophageal reflux    • Esophagitis    • Essential hypertension    • External hemorrhoids    • Gastritis    • GERD with esophagitis    • Heavy tobacco smoker    • Hemorrhoids    • Hiatal hernia    • Hiccough    • History of colon polyps    • History of echocardiogram     Normal LV funciton with Ef of 65% to 70% without regional wall motion abnormalities.Mild CLVH. Normal RV size and function. No significant valvular regurgitation or stenosis. 09/19/2014       • History of EKG    • History of TIA (transient ischemic attack)    • Hypercholesterolemia    • Hypertension    • Left ventricular hypertrophy    • Male erectile disorder    • Obstructive sleep apnea syndrome    • Primary osteoarthritis of knees, bilateral    • Rectal bleed     painful   • Rectal hemorrhage    • Sleep disorder    • Transient cerebral ischemia    • Upper respiratory infection    • Weakness     Attacks of weakness       Past Surgical Hx:  Past Surgical History:   Procedure Laterality Date   • COLONOSCOPY  04/13/2015    Internal and external hemorrhoids found. 04/13/2015    • COLONOSCOPY N/A 9/18/2020    Procedure: COLONOSCOPY;  Surgeon: Alli Dockery MD;  Location: [facility] ENDOSCOPY;  Service: Gastroenterology;  Laterality: N/A;   • ENDOSCOPY      Internal & external hemorrhoids found. 1 polyp in sigmoid colon; removed by snare cautery polypectomy. 09/26/2012    • ENDOSCOPY      Gastritis found in the stomach. Biopsy taken.Enlarged folds were found in the body of the stomach.Biopsy taken.Normal duodenum.A hiatus hernia was found in the esophagus. 04/13/2015    • ENDOSCOPY      Hiatus hernia in esophagus. Gastritis in stomach. Biopsy taken. Normal duodenum. Biopsy taken. 09/26/2012        • ENDOSCOPY N/A 12/18/2017    Procedure: ESOPHAGOGASTRODUODENOSCOPY--attn mid esophagus, abnl on CT;  Surgeon: Alli Dockery MD;  Location: [facility] ENDOSCOPY;  Service:    • ENDOSCOPY N/A 3/6/2019    Procedure: ESOPHAGOGASTRODUODENOSCOPY WITH DILATATION;  Surgeon: Alli Dockery MD;  Location: [facility] ENDOSCOPY;  Service: Gastroenterology   • ENDOSCOPY N/A 9/18/2020    Procedure: ESOPHAGOGASTRODUODENOSCOPY eval varices;  Surgeon: Alli Dockery MD;  Location: [facility] ENDOSCOPY;  Service: Gastroenterology;  Laterality: N/A;   • ENDOSCOPY N/A 4/28/2021    Procedure: ESOPHAGOGASTRODUODENOSCOPY WITH ANESTHESIA--with botox, attn antrum poss malig;  Surgeon: Alli Dockery MD;  Location: [facility] ENDOSCOPY;  Service: Gastroenterology;  Laterality: N/A;   • UPPER GASTROINTESTINAL ENDOSCOPY  08/24/2016   • UPPER GASTROINTESTINAL ENDOSCOPY  04/13/2015   • UPPER GASTROINTESTINAL ENDOSCOPY  12/18/2017   • UPPER GASTROINTESTINAL ENDOSCOPY  03/06/2019   • UPPER GASTROINTESTINAL ENDOSCOPY  09/18/2020   • UPPER GASTROINTESTINAL ENDOSCOPY  04/28/2021       Current Meds:    Current Outpatient Medications:   •  albuterol sulfate  (90 Base) MCG/ACT inhaler, Inhale 2 puffs Every 4 (Four) Hours As Needed for Wheezing or Shortness of Air., Disp: 18 g, Rfl: 5  •  aspirin (Aspirin Adult Low Strength) 81 MG EC tablet, Take 1 tablet by mouth Daily., Disp: 30 tablet, Rfl: 2  •  Blood Pressure Monitoring (Adult Blood Pressure Cuff Lg) kit, Check BP daily, Disp: 1 each, Rfl: 0  •  cetirizine (zyrTEC) 10 MG tablet, Take 1 tablet by mouth Daily., Disp: 30 tablet, Rfl: 5  •  fluticasone (FLONASE) 50 MCG/ACT nasal spray, 2 sprays into the nostril(s) as directed by provider Daily., Disp: 15.8 mL, Rfl: 5  •  hydrALAZINE (APRESOLINE) 10 MG tablet, TAKE 1 TABLET THREE  TIMES DAILY, Disp: 90 tablet, Rfl: 5  •  losartan (COZAAR) 100 MG tablet, Take 1 tablet by mouth Daily., Disp: 30 tablet, Rfl: 5  •  magnesium oxide (MAGOX) 400 (241.3 Mg)  MG tablet tablet, Take 1 tablet by mouth Daily., Disp: 30 tablet, Rfl: 4  •  metFORMIN (GLUCOPHAGE) 500 MG tablet, Take 1 tablet by mouth Daily With Breakfast., Disp: 30 tablet, Rfl: 4  •  omeprazole (priLOSEC) 20 MG capsule, Take 1 capsule by mouth 2 (Two) Times a Day., Disp: 60 capsule, Rfl: 3  •  propranolol (INDERAL) 40 MG tablet, Take 1 tablet by mouth Daily., Disp: 30 tablet, Rfl: 3  •  tiotropium (SPIRIVA) 18 MCG per inhalation capsule, Place 1 capsule into inhaler and inhale Daily., Disp: 30 capsule, Rfl: 5  •  amLODIPine (Norvasc) 5 MG tablet, Take 1 tablet by mouth Daily., Disp: 30 tablet, Rfl: 2  •  fluticasone (FLONASE) 50 MCG/ACT nasal spray, 2 sprays into each nostril Daily for 30 days., Disp: 1 bottle, Rfl: 11    Allergies:  Allergies   Allergen Reactions   • Ace Inhibitors Angioedema     hypotension   • Lisinopril Other (See Comments)     cough   • Vistaril [Hydroxyzine Hcl] Other (See Comments)     Patient is unsure        Family Hx:  Family History   Problem Relation Age of Onset   • Cancer Mother    • Cirrhosis Father    • Diabetes Other    • Hypertension Other         Social History:  Social History     Socioeconomic History   • Marital status: Single     Spouse name: Not on file   • Number of children: 0   • Years of education: 12   • Highest education level: Not on file   Tobacco Use   • Smoking status: Current Every Day Smoker     Packs/day: 1.50     Years: 40.00     Pack years: 60.00     Types: Cigarettes   • Smokeless tobacco: Never Used   Vaping Use   • Vaping Use: Never used   Substance and Sexual Activity   • Alcohol use: Yes     Alcohol/week: 6.0 standard drinks     Types: 6 Cans of beer per week     Comment: when can get   • Drug use: No   • Sexual activity: Defer     Partners: Female       Review of Systems  Review of Systems   Constitutional: Negative for fever and unexpected weight change.   Respiratory: Positive for cough and wheezing.    Cardiovascular: Negative for chest pain and  "leg swelling.   Gastrointestinal: Negative for abdominal pain, constipation, diarrhea, nausea and vomiting.   Genitourinary: Negative for difficulty urinating and dysuria.   Neurological: Negative for dizziness and headaches.       Objective:     /78   Pulse 86   Temp 95.9 °F (35.5 °C)   Ht 177.8 cm (70\")   Wt 78.6 kg (173 lb 4.8 oz)   SpO2 96%   BMI 24.87 kg/m²   Physical Exam  HENT:      Head: Normocephalic and atraumatic.   Cardiovascular:      Rate and Rhythm: Normal rate and regular rhythm.      Pulses: Normal pulses.           Radial pulses are 2+ on the right side and 2+ on the left side.        Dorsalis pedis pulses are 2+ on the right side and 2+ on the left side.        Posterior tibial pulses are 2+ on the right side and 2+ on the left side.      Heart sounds: Normal heart sounds.   Pulmonary:      Effort: Pulmonary effort is normal.      Breath sounds: No wheezing, rhonchi or rales.   Abdominal:      General: Abdomen is flat.      Palpations: Abdomen is soft.   Skin:     General: Skin is warm and dry.   Neurological:      Mental Status: He is alert and oriented to person, place, and time.          Assessment/Plan:     Diagnoses and all orders for this visit:    1. Essential hypertension  -     aspirin (Aspirin Adult Low Strength) 81 MG EC tablet; Take 1 tablet by mouth Daily.  Dispense: 30 tablet; Refill: 2  -     Blood Pressure Monitoring (Adult Blood Pressure Cuff Lg) kit; Check BP daily  Dispense: 1 each; Refill: 0  -     losartan (COZAAR) 100 MG tablet; Take 1 tablet by mouth Daily.  Dispense: 30 tablet; Refill: 5  - Blood pressure was rechecked in office by myself and Dr. Bailey and found to be 146/96.   - Due to patient's noncompliance with properly taking his hydralazine we will switch to amlodipine 5 mg once daily  - Patient was asked to check blood pressure at home and keep a log and bring those back at his next appointment    2. Type 2 diabetes mellitus with complication, without " long-term current use of insulin (CMS/[facility])  -     metFORMIN (GLUCOPHAGE) 500 MG tablet; Take 1 tablet by mouth Daily With Breakfast.  Dispense: 30 tablet; Refill: 4  - Diabetes is stable on current treatment.  We will refill this medication today and continue to monitor.   - Plan to reorder hemoglobin A1c in 3 months  -Patient was informed of need of diabetic foot exam but patient refused at this time.  Will perform diabetic foot exam at next visit.  -Patient needs diabetic eye exam and was agreeable to referral for examination.  Will make referral to ophthalmology.     3. Chronic obstructive pulmonary disease, unspecified COPD type (CMS/[facility])  -     albuterol sulfate  (90 Base) MCG/ACT inhaler; Inhale 2 puffs Every 4 (Four) Hours As Needed for Wheezing or Shortness of Air.  Dispense: 18 g; Refill: 5  -Stable on current treatment.  Medicines refilled today.  We will continue to monitor and adjust treatment accordingly.    4. GERD with esophagitis  -     cetirizine (zyrTEC) 10 MG tablet; Take 1 tablet by mouth Daily.  Dispense: 30 tablet; Refill: 5  -Stable on current treatment.  Medicines refilled today.  We will continue to monitor and adjust treatment accordingly.    Other orders  -     fluticasone (FLONASE) 50 MCG/ACT nasal spray; 2 sprays into the nostril(s) as directed by provider Daily.  Dispense: 15.8 mL; Refill: 5  -     Cancel: hydrALAZINE (APRESOLINE) 10 MG tablet; Take 1 tablet by mouth 3 (Three) Times a Day.  Dispense: 90 tablet; Refill: 5  -     tiotropium (SPIRIVA) 18 MCG per inhalation capsule; Place 1 capsule into inhaler and inhale Daily.  Dispense: 30 capsule; Refill: 5  -     propranolol (INDERAL) 40 MG tablet; Take 1 tablet by mouth Daily.  Dispense: 30 tablet; Refill: 3  -     omeprazole (priLOSEC) 20 MG capsule; Take 1 capsule by mouth 2 (Two) Times a Day.  Dispense: 60 capsule; Refill: 3  -     magnesium oxide (MAGOX) 400 (241.3 Mg) MG tablet tablet; Take 1 tablet by mouth Daily.   Dispense: 30 tablet; Refill: 4  -     amLODIPine (Norvasc) 5 MG tablet; Take 1 tablet by mouth Daily.  Dispense: 30 tablet; Refill: 2    All medications refilled today    · Rx changes: Discontinue hydralazine 10 mg and start amlodipine 5 mg once daily  · Patient Education: Patient was advised to stop smoking and informed of adverse effects to health patient denied interest in quitting at this time  · Compliance at present is estimated to be fair.   · Efforts to improve compliance (if necessary) will be directed at Medication compliance.    Depression screening: Patient screened positive for depression based on a PHQ-9 score of 0 on 4/2/2021. Follow-up recommendations include: None.     Follow-up:     Return in about 1 month (around 8/14/2021) for Recheck HTN and DM.    Preventative:  Health Maintenance   Topic Date Due   • Hepatitis B (1 of 3 - Risk 3-dose series) Never done   • ZOSTER VACCINE (1 of 2) Never done   • ANNUAL WELLNESS VISIT  Never done   • DIABETIC EYE EXAM  09/11/2019   • DIABETIC FOOT EXAM  02/07/2020   • URINE MICROALBUMIN  02/07/2020   • INFLUENZA VACCINE  08/01/2021   • HEMOGLOBIN A1C  10/02/2021   • LUNG CANCER SCREENING  01/13/2022   • LIPID PANEL  04/02/2022   • TDAP/TD VACCINES (2 - Td or Tdap) 08/15/2024   • COLORECTAL CANCER SCREENING  09/18/2025   • HEPATITIS C SCREENING  Completed   • COVID-19 Vaccine  Completed   • Pneumococcal Vaccine 0-64  Completed     Male Preventative: Colon cancer screening is up to date  Recommended: Zoster  Vaccine Counseling: Will  at next visit    Weight  -Class: Normal: 18.5-24.9kg/m2  -Patient's Body mass index is 24.87 kg/m². indicating that he is within normal range (BMI 18.5-24.9). No BMI management plan needed..   eat more fruits and vegetables and increase water intake    Alcohol use:  reports current alcohol use of about 6.0 standard drinks of alcohol per week.  Nicotine status  reports that he has been smoking cigarettes. He has a 60.00  pack-year smoking history. He has never used smokeless tobacco.    Goals     •  Quit smoking / using tobacco (pt-stated)           RISK SCORE: 3      This document has been electronically signed by Catrachito Milligan MD on July 14, 2021 10:38 CDT

## 2021-07-16 NOTE — PROGRESS NOTES
Jameel Dozier  7/14/21  Subjective:     Jameel Dozier is a 60 y.o. male who presents for   Chief Complaint   Patient presents with   • Hypertension     HTN AND DM        60-year-old male history of type 2 diabetes hypertension and GERD presents today to establish with new PCP and follow-up.  Blood sugars have been well controlled his most recent A1c was 5.2 he continues on Metformin and wishes to continue on it he is due for diabetic eye and foot exam however he declines these he states he does not drive and is dependent on his significant other who was unable to weigh patient is currently not monitoring his blood pressures at home he is in the precontemplative stage of smoking cessation does note occasional exertional dyspnea denies chest pain denies palpitations please see Dr. Milligan's note for more detailed information regarding today's encounter we did go over his medications.  Please see Dr. Milligan's note for more detailed information regarding presentation        Past Medical Hx:  Past Medical History:   Diagnosis Date   • Abdominal pain    • Acquired achalasia of esophagus    • Adenomatous polyp of colon    • Adverse drug effect    • Alcohol dependence with alcohol-induced disorder (CMS/HCC)    • Allergic rhinitis    • Anal fissure    • Anemia due to blood loss    • Benign essential hypertension    • Central obesity    • Cerebrovascular disease    • Chronic obstructive lung disease (CMS/HCC)    • Claudication (CMS/HCC)    • Disorder of peripheral nervous system    • Diverticular disease of colon     completed antibx, ? neoplasm on CT   • Dysphagia    • Dyspnea    • ED (erectile dysfunction)    • Epigastric pain    • Esophageal dysmotility    • Esophageal reflux    • Esophagitis    • Essential hypertension    • External hemorrhoids    • Gastritis    • GERD with esophagitis    • Heavy tobacco smoker    • Hemorrhoids    • Hiatal hernia    • Hiccough    • History of colon polyps    • History of  echocardiogram     Normal LV funciton with Ef of 65% to 70% without regional wall motion abnormalities.Mild CLVH. Normal RV size and function. No significant valvular regurgitation or stenosis. 09/19/2014       • History of EKG    • History of TIA (transient ischemic attack)    • Hypercholesterolemia    • Hypertension    • Left ventricular hypertrophy    • Male erectile disorder    • Obstructive sleep apnea syndrome    • Primary osteoarthritis of knees, bilateral    • Rectal bleed     painful   • Rectal hemorrhage    • Sleep disorder    • Transient cerebral ischemia    • Upper respiratory infection    • Weakness     Attacks of weakness       Past Surgical Hx:  Past Surgical History:   Procedure Laterality Date   • COLONOSCOPY  04/13/2015    Internal and external hemorrhoids found. 04/13/2015    • COLONOSCOPY N/A 9/18/2020    Procedure: COLONOSCOPY;  Surgeon: Alli Dockery MD;  Location: [facility] ENDOSCOPY;  Service: Gastroenterology;  Laterality: N/A;   • ENDOSCOPY      Internal & external hemorrhoids found. 1 polyp in sigmoid colon; removed by snare cautery polypectomy. 09/26/2012    • ENDOSCOPY      Gastritis found in the stomach. Biopsy taken.Enlarged folds were found in the body of the stomach.Biopsy taken.Normal duodenum.A hiatus hernia was found in the esophagus. 04/13/2015    • ENDOSCOPY      Hiatus hernia in esophagus. Gastritis in stomach. Biopsy taken. Normal duodenum. Biopsy taken. 09/26/2012       • ENDOSCOPY N/A 12/18/2017    Procedure: ESOPHAGOGASTRODUODENOSCOPY--attn mid esophagus, abnl on CT;  Surgeon: Alli Dockery MD;  Location: [facility] ENDOSCOPY;  Service:    • ENDOSCOPY N/A 3/6/2019    Procedure: ESOPHAGOGASTRODUODENOSCOPY WITH DILATATION;  Surgeon: Alli Dockery MD;  Location: [facility] ENDOSCOPY;  Service: Gastroenterology   • ENDOSCOPY N/A 9/18/2020    Procedure: ESOPHAGOGASTRODUODENOSCOPY eval varices;  Surgeon: Alli Dockery MD;  Location: [facility] ENDOSCOPY;  Service: Gastroenterology;   Laterality: N/A;   • ENDOSCOPY N/A 4/28/2021    Procedure: ESOPHAGOGASTRODUODENOSCOPY WITH ANESTHESIA--with botox, attn antrum poss malnina;  Surgeon: Alli Dockery MD;  Location: [facility] ENDOSCOPY;  Service: Gastroenterology;  Laterality: N/A;   • UPPER GASTROINTESTINAL ENDOSCOPY  08/24/2016   • UPPER GASTROINTESTINAL ENDOSCOPY  04/13/2015   • UPPER GASTROINTESTINAL ENDOSCOPY  12/18/2017   • UPPER GASTROINTESTINAL ENDOSCOPY  03/06/2019   • UPPER GASTROINTESTINAL ENDOSCOPY  09/18/2020   • UPPER GASTROINTESTINAL ENDOSCOPY  04/28/2021       Health Maintenance:  Health Maintenance   Topic Date Due   • Hepatitis B (1 of 3 - Risk 3-dose series) Never done   • ZOSTER VACCINE (1 of 2) Never done   • ANNUAL WELLNESS VISIT  Never done   • DIABETIC EYE EXAM  09/11/2019   • DIABETIC FOOT EXAM  02/07/2020   • URINE MICROALBUMIN  02/07/2020   • INFLUENZA VACCINE  08/01/2021   • HEMOGLOBIN A1C  10/02/2021   • LUNG CANCER SCREENING  01/13/2022   • LIPID PANEL  04/02/2022   • TDAP/TD VACCINES (2 - Td or Tdap) 08/15/2024   • COLORECTAL CANCER SCREENING  09/18/2025   • HEPATITIS C SCREENING  Completed   • COVID-19 Vaccine  Completed   • Pneumococcal Vaccine 0-64  Completed       Current Meds:    Current Outpatient Medications:   •  albuterol sulfate  (90 Base) MCG/ACT inhaler, Inhale 2 puffs Every 4 (Four) Hours As Needed for Wheezing or Shortness of Air., Disp: 18 g, Rfl: 5  •  aspirin (Aspirin Adult Low Strength) 81 MG EC tablet, Take 1 tablet by mouth Daily., Disp: 30 tablet, Rfl: 2  •  Blood Pressure Monitoring (Adult Blood Pressure Cuff Lg) kit, Check BP daily, Disp: 1 each, Rfl: 0  •  cetirizine (zyrTEC) 10 MG tablet, Take 1 tablet by mouth Daily., Disp: 30 tablet, Rfl: 5  •  fluticasone (FLONASE) 50 MCG/ACT nasal spray, 2 sprays into the nostril(s) as directed by provider Daily., Disp: 15.8 mL, Rfl: 5  •  hydrALAZINE (APRESOLINE) 10 MG tablet, TAKE 1 TABLET THREE  TIMES DAILY, Disp: 90 tablet, Rfl: 5  •  losartan  "(COZAAR) 100 MG tablet, Take 1 tablet by mouth Daily., Disp: 30 tablet, Rfl: 5  •  magnesium oxide (MAGOX) 400 (241.3 Mg) MG tablet tablet, Take 1 tablet by mouth Daily., Disp: 30 tablet, Rfl: 4  •  metFORMIN (GLUCOPHAGE) 500 MG tablet, Take 1 tablet by mouth Daily With Breakfast., Disp: 30 tablet, Rfl: 4  •  omeprazole (priLOSEC) 20 MG capsule, Take 1 capsule by mouth 2 (Two) Times a Day., Disp: 60 capsule, Rfl: 3  •  propranolol (INDERAL) 40 MG tablet, Take 1 tablet by mouth Daily., Disp: 30 tablet, Rfl: 3  •  tiotropium (SPIRIVA) 18 MCG per inhalation capsule, Place 1 capsule into inhaler and inhale Daily., Disp: 30 capsule, Rfl: 5  •  amLODIPine (Norvasc) 5 MG tablet, Take 1 tablet by mouth Daily., Disp: 30 tablet, Rfl: 2  •  fluticasone (FLONASE) 50 MCG/ACT nasal spray, 2 sprays into each nostril Daily for 30 days., Disp: 1 bottle, Rfl: 11    Allergies:  Ace inhibitors, Lisinopril, and Vistaril [hydroxyzine hcl]    Family Hx:  Family History   Problem Relation Age of Onset   • Cancer Mother    • Cirrhosis Father    • Diabetes Other    • Hypertension Other         Social History:  Social History     Socioeconomic History   • Marital status: Single     Spouse name: Not on file   • Number of children: 0   • Years of education: 12   • Highest education level: Not on file   Tobacco Use   • Smoking status: Current Every Day Smoker     Packs/day: 1.50     Years: 40.00     Pack years: 60.00     Types: Cigarettes   • Smokeless tobacco: Never Used   Vaping Use   • Vaping Use: Never used   Substance and Sexual Activity   • Alcohol use: Yes     Alcohol/week: 6.0 standard drinks     Types: 6 Cans of beer per week     Comment: when can get   • Drug use: No   • Sexual activity: Defer     Partners: Female       Review of Systems  Review of Systems    Objective:     /78   Pulse 86   Temp 95.9 °F (35.5 °C)   Ht 177.8 cm (70\")   Wt 78.6 kg (173 lb 4.8 oz)   SpO2 96%   BMI 24.87 kg/m²   Physical Exam   General " appearance is that of a well-developed well-nourished appearing adult male no distress HEENT grossly normal without asymmetry neck supple without JVD please see Dr. Milligan's note for more detailed information regarding physical exam findings    Lab Review  Results for orders placed or performed in visit on 05/05/21   Ammonia    Specimen: Blood   Result Value Ref Range    Ammonia 96 (H) 16 - 60 umol/L   Comprehensive Metabolic Panel    Specimen: Blood   Result Value Ref Range    Glucose 125 (H) 65 - 99 mg/dL    BUN 13 8 - 23 mg/dL    Creatinine 1.31 (H) 0.76 - 1.27 mg/dL    Sodium 130 (L) 136 - 145 mmol/L    Potassium 3.0 (L) 3.5 - 5.2 mmol/L    Chloride 94 (L) 98 - 107 mmol/L    CO2 21.4 (L) 22.0 - 29.0 mmol/L    Calcium 9.7 8.6 - 10.5 mg/dL    Total Protein 8.0 6.0 - 8.5 g/dL    Albumin 4.60 3.50 - 5.20 g/dL    ALT (SGPT) 22 1 - 41 U/L    AST (SGOT) 24 1 - 40 U/L    Alkaline Phosphatase 73 39 - 117 U/L    Total Bilirubin 1.3 (H) 0.0 - 1.2 mg/dL    eGFR  African Amer 67 >60 mL/min/1.73    Globulin 3.4 gm/dL    A/G Ratio 1.4 g/dL    BUN/Creatinine Ratio 9.9 7.0 - 25.0    Anion Gap 14.6 5.0 - 15.0 mmol/L   Iron    Specimen: Blood   Result Value Ref Range    Iron 177 (H) 59 - 158 mcg/dL   Protime-INR    Specimen: Blood   Result Value Ref Range    Protime 13.1 11.1 - 15.3 Seconds    INR 0.95 0.80 - 1.20   AFP Tumor Marker    Specimen: Blood   Result Value Ref Range    ALPHA-FETOPROTEIN 1.99 0 - 8.3 ng/mL   CBC Auto Differential    Specimen: Blood   Result Value Ref Range    WBC 3.09 (L) 3.40 - 10.80 10*3/mm3    RBC 6.32 (H) 4.14 - 5.80 10*6/mm3    Hemoglobin 16.9 13.0 - 17.7 g/dL    Hematocrit 51.0 37.5 - 51.0 %    MCV 80.7 79.0 - 97.0 fL    MCH 26.7 26.6 - 33.0 pg    MCHC 33.1 31.5 - 35.7 g/dL    RDW 17.0 (H) 12.3 - 15.4 %    RDW-SD 43.6 37.0 - 54.0 fl    MPV 10.7 6.0 - 12.0 fL    Platelets 267 140 - 450 10*3/mm3    Neutrophil % 39.7 (L) 42.7 - 76.0 %    Lymphocyte % 35.3 19.6 - 45.3 %    Monocyte % 14.6 (H) 5.0 - 12.0  %    Eosinophil % 9.1 (H) 0.3 - 6.2 %    Basophil % 1.0 0.0 - 1.5 %    Immature Grans % 0.3 0.0 - 0.5 %    Neutrophils, Absolute 1.23 (L) 1.70 - 7.00 10*3/mm3    Lymphocytes, Absolute 1.09 0.70 - 3.10 10*3/mm3    Monocytes, Absolute 0.45 0.10 - 0.90 10*3/mm3    Eosinophils, Absolute 0.28 0.00 - 0.40 10*3/mm3    Basophils, Absolute 0.03 0.00 - 0.20 10*3/mm3    Immature Grans, Absolute 0.01 0.00 - 0.05 10*3/mm3    nRBC 0.3 (H) 0.0 - 0.2 /100 WBC            Assessment:     Diagnoses and all orders for this visit:    1. Essential hypertension  -     aspirin (Aspirin Adult Low Strength) 81 MG EC tablet; Take 1 tablet by mouth Daily.  Dispense: 30 tablet; Refill: 2  -     Blood Pressure Monitoring (Adult Blood Pressure Cuff Lg) kit; Check BP daily  Dispense: 1 each; Refill: 0  -     losartan (COZAAR) 100 MG tablet; Take 1 tablet by mouth Daily.  Dispense: 30 tablet; Refill: 5    2. Type 2 diabetes mellitus with complication, without long-term current use of insulin (CMS/[facility])  -     metFORMIN (GLUCOPHAGE) 500 MG tablet; Take 1 tablet by mouth Daily With Breakfast.  Dispense: 30 tablet; Refill: 4  -     Ambulatory Referral for Diabetic Eye Exam-Ophthalmology    3. Chronic obstructive pulmonary disease, unspecified COPD type (CMS/[facility])  -     albuterol sulfate  (90 Base) MCG/ACT inhaler; Inhale 2 puffs Every 4 (Four) Hours As Needed for Wheezing or Shortness of Air.  Dispense: 18 g; Refill: 5    4. GERD with esophagitis  -     cetirizine (zyrTEC) 10 MG tablet; Take 1 tablet by mouth Daily.  Dispense: 30 tablet; Refill: 5    Other orders  -     fluticasone (FLONASE) 50 MCG/ACT nasal spray; 2 sprays into the nostril(s) as directed by provider Daily.  Dispense: 15.8 mL; Refill: 5  -     Cancel: hydrALAZINE (APRESOLINE) 10 MG tablet; Take 1 tablet by mouth 3 (Three) Times a Day.  Dispense: 90 tablet; Refill: 5  -     tiotropium (SPIRIVA) 18 MCG per inhalation capsule; Place 1 capsule into inhaler and inhale Daily.   Dispense: 30 capsule; Refill: 5  -     propranolol (INDERAL) 40 MG tablet; Take 1 tablet by mouth Daily.  Dispense: 30 tablet; Refill: 3  -     omeprazole (priLOSEC) 20 MG capsule; Take 1 capsule by mouth 2 (Two) Times a Day.  Dispense: 60 capsule; Refill: 3  -     magnesium oxide (MAGOX) 400 (241.3 Mg) MG tablet tablet; Take 1 tablet by mouth Daily.  Dispense: 30 tablet; Refill: 4  -     amLODIPine (Norvasc) 5 MG tablet; Take 1 tablet by mouth Daily.  Dispense: 30 tablet; Refill: 2        Plan:   Have opted to add amlodipine 5 mg daily to current regimen and discontinue hydralazine recheck 1 month or as needed  I have seen and examined the patient.  I have reviewed the notes, assessments, and/or procedures performed by Catrachito Milligan MD, I concur with her/his documentation and assessment and plan for Jameel Dozier.            This document has been electronically signed by Son Li MD on July 16, 2021 16:29 CDT

## 2021-07-22 DIAGNOSIS — E11.8 TYPE 2 DIABETES MELLITUS WITH COMPLICATION, WITHOUT LONG-TERM CURRENT USE OF INSULIN (HCC): ICD-10-CM

## 2021-07-29 ENCOUNTER — OFFICE VISIT (OUTPATIENT)
Dept: PULMONOLOGY | Facility: CLINIC | Age: 61
End: 2021-07-29

## 2021-07-29 VITALS
DIASTOLIC BLOOD PRESSURE: 84 MMHG | BODY MASS INDEX: 25.34 KG/M2 | HEIGHT: 70 IN | HEART RATE: 94 BPM | WEIGHT: 177 LBS | SYSTOLIC BLOOD PRESSURE: 152 MMHG | TEMPERATURE: 97.1 F | OXYGEN SATURATION: 99 %

## 2021-07-29 DIAGNOSIS — G47.33 OBSTRUCTIVE SLEEP APNEA SYNDROME: ICD-10-CM

## 2021-07-29 DIAGNOSIS — R93.3 IMAGING OF GASTROINTESTINAL TRACT ABNORMAL: ICD-10-CM

## 2021-07-29 DIAGNOSIS — R93.89 ABNORMAL CT OF THE CHEST: ICD-10-CM

## 2021-07-29 DIAGNOSIS — F17.210 CIGARETTE NICOTINE DEPENDENCE WITHOUT COMPLICATION: Primary | ICD-10-CM

## 2021-07-29 PROBLEM — J44.9 CHRONIC OBSTRUCTIVE PULMONARY DISEASE: Status: RESOLVED | Noted: 2017-11-27 | Resolved: 2021-07-29

## 2021-07-29 PROCEDURE — 99214 OFFICE O/P EST MOD 30 MIN: CPT | Performed by: INTERNAL MEDICINE

## 2021-07-29 RX ORDER — AMLODIPINE BESYLATE 10 MG/1
TABLET ORAL
COMMUNITY
Start: 2021-07-14 | End: 2021-08-13

## 2021-07-29 NOTE — PROGRESS NOTES
Pulmonary Office Follow-up    Subjective     Jameel Dozier is seen today at the office for   Chief Complaint   Patient presents with   • COPD   • Mass         HPI  Jameel Dozier is a 61 y.o. male   Patient last seen in Jan by Dr Garcia. He was there for a follow up on an abnormal CT scan. He hadn't actually had the follow up CT at that time, so Dr Garcia reordered and told him to come back. He had the CT scan which did not demonstrate any concerning pulmonary findings. It did show a widely patent esophagus with debris in it. He follows with GI for varices.    Patient is a current smoker. He reports being on Spiriva from family doc. He had fab in 2017 that did not show obstruction. He denies pulmonary symptoms to me    Tobacco use history:  Type: cigarettes  Amount: 1 ppd  Duration: 40 years  Cessation: no        Patient Active Problem List   Diagnosis   • Central obesity   • Hypercholesterolemia   • Essential hypertension   • Astigmatism   • Type 2 diabetes mellitus with complication, without long-term current use of insulin (CMS/[facility])   • Cigarette nicotine dependence without complication   • Tobacco use disorder   • Overweight (BMI 25.0-29.9)   • Physical deconditioning   • Chronic non-seasonal allergic rhinitis   • Imaging of gastrointestinal tract abnormal   • Pain of upper abdomen   • Gastroesophageal reflux disease with esophagitis   • Screening for hyperlipidemia   • Weakness   • Transient cerebral ischemia   • Sleep disorder   • Rectal hemorrhage   • Rectal bleed   • Primary osteoarthritis of knees, bilateral   • Obstructive sleep apnea syndrome   • Male erectile disorder   • Left ventricular hypertrophy   • Hypertension   • History of TIA (transient ischemic attack)   • History of EKG   • History of echocardiogram   • History of colon polyps   • Hiccough   • Hiatal hernia   • Hemorrhoids   • Heavy tobacco smoker   • Gastritis   • External hemorrhoids   • Esophagitis   • Esophageal reflux    • Esophageal dysmotility   • Epigastric pain   • ED (erectile dysfunction)   • Dyspnea   • Dysphagia   • Diverticular disease of colon   • Disorder of peripheral nervous system   • Claudication (CMS/HCC)   • Cerebrovascular disease   • Benign essential hypertension   • Anemia due to blood loss   • Anal fissure   • Allergic rhinitis   • Alcohol dependence with alcohol-induced disorder (CMS/HCC)   • Adenomatous polyp of colon   • Acquired achalasia of esophagus   • Abdominal pain   • Adverse drug effect   • Intractable hiccups   • Non-intractable vomiting with nausea   • Closed displaced fracture of shaft of fifth metacarpal bone with routine healing, subsequent encounter   • Pain of right hand   • Esophageal varices without bleeding (CMS/HCC)   • Alcoholic fatty liver   • Hepatic fibrosis   • Encounter for screening for malignant neoplasm of colon   • Gastroesophageal reflux disease with esophagitis without hemorrhage   • Achalasia   • Weight loss         Medications, Allergies, Social, and Family Histories reviewed as per EMR.    Objective     Vitals:    07/29/21 0747   BP: 152/84   Pulse: 94   Temp: 97.1 °F (36.2 °C)   SpO2: 99%         07/29/21 0747   Weight: 80.3 kg (177 lb)     [unfilled]  Physical Exam  Vitals reviewed.   Constitutional:       Appearance: Normal appearance.   HENT:      Head: Normocephalic and atraumatic.      Right Ear: Tympanic membrane normal.      Left Ear: Tympanic membrane normal.      Nose: Nose normal.      Mouth/Throat:      Mouth: Mucous membranes are moist.      Pharynx: Oropharynx is clear.   Eyes:      Conjunctiva/sclera: Conjunctivae normal.      Pupils: Pupils are equal, round, and reactive to light.   Cardiovascular:      Rate and Rhythm: Normal rate and regular rhythm.      Pulses: Normal pulses.      Heart sounds: Normal heart sounds.   Pulmonary:      Effort: Pulmonary effort is normal.      Breath sounds: Normal breath sounds.   Abdominal:      General: Abdomen is  flat. Bowel sounds are normal.      Palpations: Abdomen is soft.   Musculoskeletal:         General: Normal range of motion.      Cervical back: Normal range of motion and neck supple.   Skin:     General: Skin is warm and dry.   Neurological:      General: No focal deficit present.      Mental Status: He is alert and oriented to person, place, and time.   Psychiatric:         Mood and Affect: Mood normal.         Behavior: Behavior normal.       Radiology (independently reviewed and interpreted by me)  CT chest Jan 2020- no suspicious pulmonary findings. Widely patent esophagus filled with fluid/debris    PFTs (independently reviewed and interpreted by me)  11/27/17  FVC 2.40L, 55%  FEV1 2.13L, 63%  Ratio 89%  Poor effort, mod restrictive pattern, needs lung volumes to confirm restriction      Assessment/Plan     Diagnoses and all orders for this visit:    1. Cigarette nicotine dependence without complication (Primary)    2. Abnormal CT of the chest    3. Obstructive sleep apnea syndrome    4. Imaging of gastrointestinal tract abnormal         Discussion/ Recommendations:   Patient with no concerning pulmonary findings on CT scan. No follow up needed on this. Abnormal finding sof esophagus are already being addressed by GI.   Patient does not have evidence of COPD on either imaging or spirometry. He has no symptoms to sugest this. He does continue to smoke and was counseled on cessation. He is not ready to quit at this time. I do not think he needs Spiriva at this time.     Recs:  -DC Spiriva  -Albuterol HFA prn  -Work on smoking cessation          Return if symptoms worsen or fail to improve.          This document has been electronically signed by Marga Casey DO on July 29, 2021 08:18 CDT

## 2021-08-12 NOTE — PROGRESS NOTES
Family Medicine Residency  Catrachito Milligan MD    Subjective:     Jameel Dozier is a 61 y.o. male with a concurrent medical history of hypertension, COPD, type 2 diabetes mellitus, and GERD who presents for follow-up for his hypertension.     Hypertension - improving.  Patient reports compliance of medications.  Denies any adverse effects.  Previously said he had blood pressure cuff at home but he realized he did not.    Type 2 diabetes - stable.  Patient reports compliance to medication.  Denies any adverse effects.  Last hemoglobin A1c was 5.2 on 5/21.     Smoking cessation -patient reports that he continues to smoke and does not wish to quit smoking at this time.      The following portions of the patient's history were reviewed and updated as appropriate: allergies, current medications, past family history, past medical history, past social history, past surgical history and problem list.    Past Medical Hx:  Past Medical History:   Diagnosis Date   • Abdominal pain    • Acquired achalasia of esophagus    • Adenomatous polyp of colon    • Adverse drug effect    • Alcohol dependence with alcohol-induced disorder (CMS/HCC)    • Allergic rhinitis    • Anal fissure    • Anemia due to blood loss    • Benign essential hypertension    • Central obesity    • Cerebrovascular disease    • Chronic obstructive lung disease (CMS/HCC)    • Claudication (CMS/HCC)    • Disorder of peripheral nervous system    • Diverticular disease of colon     completed antibx, ? neoplasm on CT   • Dysphagia    • Dyspnea    • ED (erectile dysfunction)    • Epigastric pain    • Esophageal dysmotility    • Esophageal reflux    • Esophagitis    • Essential hypertension    • External hemorrhoids    • Gastritis    • GERD with esophagitis    • Heavy tobacco smoker    • Hemorrhoids    • Hiatal hernia    • Hiccough    • History of colon polyps    • History of echocardiogram     Normal LV funciton with Ef of 65% to 70% without regional  wall motion abnormalities.Mild CLVH. Normal RV size and function. No significant valvular regurgitation or stenosis. 09/19/2014       • History of EKG    • History of TIA (transient ischemic attack)    • Hypercholesterolemia    • Hypertension    • Left ventricular hypertrophy    • Male erectile disorder    • Obstructive sleep apnea syndrome    • Primary osteoarthritis of knees, bilateral    • Rectal bleed     painful   • Rectal hemorrhage    • Sleep disorder    • Transient cerebral ischemia    • Upper respiratory infection    • Weakness     Attacks of weakness       Past Surgical Hx:  Past Surgical History:   Procedure Laterality Date   • COLONOSCOPY  04/13/2015    Internal and external hemorrhoids found. 04/13/2015    • COLONOSCOPY N/A 9/18/2020    Procedure: COLONOSCOPY;  Surgeon: Alli Dockery MD;  Location: [facility] ENDOSCOPY;  Service: Gastroenterology;  Laterality: N/A;   • ENDOSCOPY      Internal & external hemorrhoids found. 1 polyp in sigmoid colon; removed by snare cautery polypectomy. 09/26/2012    • ENDOSCOPY      Gastritis found in the stomach. Biopsy taken.Enlarged folds were found in the body of the stomach.Biopsy taken.Normal duodenum.A hiatus hernia was found in the esophagus. 04/13/2015    • ENDOSCOPY      Hiatus hernia in esophagus. Gastritis in stomach. Biopsy taken. Normal duodenum. Biopsy taken. 09/26/2012       • ENDOSCOPY N/A 12/18/2017    Procedure: ESOPHAGOGASTRODUODENOSCOPY--attn mid esophagus, abnl on CT;  Surgeon: Alli Dockery MD;  Location: [facility] ENDOSCOPY;  Service:    • ENDOSCOPY N/A 3/6/2019    Procedure: ESOPHAGOGASTRODUODENOSCOPY WITH DILATATION;  Surgeon: Alli Dockrey MD;  Location: [facility] ENDOSCOPY;  Service: Gastroenterology   • ENDOSCOPY N/A 9/18/2020    Procedure: ESOPHAGOGASTRODUODENOSCOPY eval varices;  Surgeon: Alli Dockery MD;  Location: [facility] ENDOSCOPY;  Service: Gastroenterology;  Laterality: N/A;   • ENDOSCOPY N/A 4/28/2021    Procedure:  ESOPHAGOGASTRODUODENOSCOPY WITH ANESTHESIA--with botox, attn antrum matilda jaramillo;  Surgeon: Alli Dockery MD;  Location: [facility] ENDOSCOPY;  Service: Gastroenterology;  Laterality: N/A;   • UPPER GASTROINTESTINAL ENDOSCOPY  08/24/2016   • UPPER GASTROINTESTINAL ENDOSCOPY  04/13/2015   • UPPER GASTROINTESTINAL ENDOSCOPY  12/18/2017   • UPPER GASTROINTESTINAL ENDOSCOPY  03/06/2019   • UPPER GASTROINTESTINAL ENDOSCOPY  09/18/2020   • UPPER GASTROINTESTINAL ENDOSCOPY  04/28/2021       Current Meds:    Current Outpatient Medications:   •  albuterol sulfate  (90 Base) MCG/ACT inhaler, Inhale 2 puffs Every 4 (Four) Hours As Needed for Wheezing or Shortness of Air., Disp: 18 g, Rfl: 5  •  amLODIPine (NORVASC) 10 MG tablet, , Disp: , Rfl:   •  amLODIPine (Norvasc) 5 MG tablet, Take 1 tablet by mouth Daily., Disp: 30 tablet, Rfl: 2  •  aspirin (Aspirin Adult Low Strength) 81 MG EC tablet, Take 1 tablet by mouth Daily., Disp: 30 tablet, Rfl: 2  •  Blood Pressure Monitoring (Adult Blood Pressure Cuff Lg) kit, Check BP daily, Disp: 1 each, Rfl: 0  •  cetirizine (zyrTEC) 10 MG tablet, Take 1 tablet by mouth Daily., Disp: 30 tablet, Rfl: 5  •  fluticasone (FLONASE) 50 MCG/ACT nasal spray, 2 sprays into each nostril Daily for 30 days., Disp: 1 bottle, Rfl: 11  •  fluticasone (FLONASE) 50 MCG/ACT nasal spray, 2 sprays into the nostril(s) as directed by provider Daily., Disp: 15.8 mL, Rfl: 5  •  hydrALAZINE (APRESOLINE) 10 MG tablet, TAKE 1 TABLET THREE  TIMES DAILY, Disp: 90 tablet, Rfl: 5  •  losartan (COZAAR) 100 MG tablet, Take 1 tablet by mouth Daily., Disp: 30 tablet, Rfl: 5  •  magnesium oxide (MAGOX) 400 (241.3 Mg) MG tablet tablet, Take 1 tablet by mouth Daily., Disp: 30 tablet, Rfl: 4  •  metFORMIN (GLUCOPHAGE) 500 MG tablet, Take 1 tablet by mouth Daily With Breakfast., Disp: 30 tablet, Rfl: 4  •  omeprazole (priLOSEC) 20 MG capsule, Take 1 capsule by mouth 2 (Two) Times a Day., Disp: 60 capsule, Rfl: 3  •   "propranolol (INDERAL) 40 MG tablet, Take 1 tablet by mouth Daily., Disp: 30 tablet, Rfl: 3  •  tiotropium (SPIRIVA) 18 MCG per inhalation capsule, Place 1 capsule into inhaler and inhale Daily., Disp: 30 capsule, Rfl: 5    Allergies:  Allergies   Allergen Reactions   • Ace Inhibitors Angioedema     hypotension   • Lisinopril Other (See Comments)     cough   • Vistaril [Hydroxyzine Hcl] Other (See Comments)     Patient is unsure        Family Hx:  Family History   Problem Relation Age of Onset   • Cancer Mother    • Cirrhosis Father    • Diabetes Other    • Hypertension Other         Social History:  Social History     Socioeconomic History   • Marital status: Single     Spouse name: Not on file   • Number of children: 0   • Years of education: 12   • Highest education level: Not on file   Tobacco Use   • Smoking status: Current Every Day Smoker     Packs/day: 1.50     Years: 40.00     Pack years: 60.00     Types: Cigarettes   • Smokeless tobacco: Never Used   Vaping Use   • Vaping Use: Never used   Substance and Sexual Activity   • Alcohol use: Yes     Alcohol/week: 6.0 standard drinks     Types: 6 Cans of beer per week     Comment: when can get   • Drug use: No   • Sexual activity: Defer     Partners: Female       Review of Systems  Review of Systems   Respiratory: Positive for shortness of breath.    Cardiovascular: Negative for chest pain and leg swelling.   Gastrointestinal: Positive for abdominal distention. Negative for abdominal pain, blood in stool, constipation, diarrhea, nausea and vomiting.   Genitourinary: Negative for dysuria and hematuria.   Neurological: Negative for dizziness and headaches.       Objective:     /88   Pulse 107   Ht 177.8 cm (70\")   Wt 78.5 kg (173 lb 1.6 oz)   SpO2 98%   BMI 24.84 kg/m²   Physical Exam  HENT:      Head: Normocephalic and atraumatic.   Cardiovascular:      Rate and Rhythm: Regular rhythm. Tachycardia present.      Pulses: Normal pulses.   Pulmonary:      " Effort: Pulmonary effort is normal.      Breath sounds: Examination of the right-upper field reveals wheezing. Examination of the left-upper field reveals wheezing. Wheezing present.   Abdominal:      General: There is distension.      Palpations: There is fluid wave.      Tenderness: There is no abdominal tenderness.   Musculoskeletal:         General: No swelling.   Skin:     General: Skin is warm and dry.   Neurological:      General: No focal deficit present.      Mental Status: He is alert.          Assessment/Plan:     Diagnoses and all orders for this visit:    1. Essential hypertension (Primary)  -     Blood Pressure Monitoring (Adult Blood Pressure Cuff Lg) kit; Check BP daily  Dispense: 1 each; Refill: 0  -Reordered patient blood pressure monitoring cuff and asked patient to measure blood pressure levels at home and keep a log and bring this with him to his next visit.  Blood pressure is better controlled after we switched to amlodipine.  We will continue to monitor and adjust therapy accordingly.    2. Chronic liver disease  - Gastroenterology following.  Patient has appointment on 9/15.  Patient was encouraged to keep this appointment.     3. MARIANNE (acute kidney injury) (CMS/[facility])  - Upon review of labs from May patient had elevated creatinine of 1.31 that was elevated from his previous labs in April.  Patient has not had labs done since then but he already has an order placed by Mr. Chen with gastroenterology.  Patient was encouraged to get his labs completed so that we could reevaluate.  Patient expressed understanding and was agreeable.    4. Type 2 diabetes mellitus with complication, without long-term current use of insulin (CMS/[facility])  -     Hemoglobin A1c        - Previously stable.  Will check lab results and adjust or maintain current therapy accordingly.     · Rx changes: none  · Patient Education: Patient educated on benefits of smoking cessation and adverse health effects  · Compliance at  present is estimated to be fair.   · Efforts to improve compliance (if necessary) will be directed at blood pressure checks at home.    Depression screening: Patient screened positive for depression based on a PHQ-9 score of 0 on 4/2/2021. Follow-up recommendations include: None.     Follow-up:     Return in about 5 weeks (around 9/17/2021) for Recheck.    Preventative:  Health Maintenance   Topic Date Due   • Hepatitis B (1 of 3 - Risk 3-dose series) Never done   • ZOSTER VACCINE (1 of 2) Never done   • ANNUAL WELLNESS VISIT  Never done   • DIABETIC EYE EXAM  09/11/2019   • DIABETIC FOOT EXAM  02/07/2020   • URINE MICROALBUMIN  02/07/2020   • INFLUENZA VACCINE  10/01/2021   • HEMOGLOBIN A1C  10/02/2021   • LUNG CANCER SCREENING  01/13/2022   • LIPID PANEL  04/02/2022   • Pneumococcal Vaccine 0-64 (2 of 2 - PPSV23) 07/17/2023   • TDAP/TD VACCINES (2 - Td or Tdap) 08/15/2024   • COLORECTAL CANCER SCREENING  09/18/2025   • HEPATITIS C SCREENING  Completed   • COVID-19 Vaccine  Completed     Male Preventative: Colon cancer screening is up to date  Recommended: Hep B  Vaccine Counseling: will  at next visit    Weight  -Class: Normal: 18.5-24.9kg/m2  -Patient's Body mass index is 24.84 kg/m². indicating that he is within normal range (BMI 18.5-24.9). No BMI management plan needed..   eat more fruits and vegetables and increase water intake    Alcohol use:  reports current alcohol use of about 6.0 standard drinks of alcohol per week.  Nicotine status  reports that he has been smoking cigarettes. He has a 60.00 pack-year smoking history. He has never used smokeless tobacco.    Goals     •  Quit smoking / using tobacco (pt-stated)           RISK SCORE: 4      This document has been electronically signed by Catrachito Milligan MD on August 13, 2021 10:03 CDT

## 2021-08-13 ENCOUNTER — OFFICE VISIT (OUTPATIENT)
Dept: FAMILY MEDICINE CLINIC | Facility: CLINIC | Age: 61
End: 2021-08-13

## 2021-08-13 ENCOUNTER — LAB (OUTPATIENT)
Dept: LAB | Facility: HOSPITAL | Age: 61
End: 2021-08-13

## 2021-08-13 VITALS
WEIGHT: 173.1 LBS | BODY MASS INDEX: 24.78 KG/M2 | OXYGEN SATURATION: 98 % | DIASTOLIC BLOOD PRESSURE: 88 MMHG | SYSTOLIC BLOOD PRESSURE: 140 MMHG | HEART RATE: 107 BPM | HEIGHT: 70 IN

## 2021-08-13 DIAGNOSIS — N17.9 AKI (ACUTE KIDNEY INJURY) (HCC): ICD-10-CM

## 2021-08-13 DIAGNOSIS — I85.00 ESOPHAGEAL VARICES WITHOUT BLEEDING, UNSPECIFIED ESOPHAGEAL VARICES TYPE (HCC): ICD-10-CM

## 2021-08-13 DIAGNOSIS — K22.0 ACHALASIA: ICD-10-CM

## 2021-08-13 DIAGNOSIS — K76.9 CHRONIC LIVER DISEASE: ICD-10-CM

## 2021-08-13 DIAGNOSIS — K74.00 HEPATIC FIBROSIS: ICD-10-CM

## 2021-08-13 DIAGNOSIS — I10 ESSENTIAL HYPERTENSION: Primary | ICD-10-CM

## 2021-08-13 DIAGNOSIS — K21.00 GASTROESOPHAGEAL REFLUX DISEASE WITH ESOPHAGITIS WITHOUT HEMORRHAGE: ICD-10-CM

## 2021-08-13 DIAGNOSIS — E11.8 TYPE 2 DIABETES MELLITUS WITH COMPLICATION, WITHOUT LONG-TERM CURRENT USE OF INSULIN (HCC): ICD-10-CM

## 2021-08-13 LAB
ALBUMIN SERPL-MCNC: 4.3 G/DL (ref 3.5–5.2)
ALBUMIN/GLOB SERPL: 1.3 G/DL
ALP SERPL-CCNC: 70 U/L (ref 39–117)
ALT SERPL W P-5'-P-CCNC: 16 U/L (ref 1–41)
AMMONIA BLD-SCNC: 32 UMOL/L (ref 16–60)
ANION GAP SERPL CALCULATED.3IONS-SCNC: 12.6 MMOL/L (ref 5–15)
AST SERPL-CCNC: 23 U/L (ref 1–40)
BASOPHILS # BLD AUTO: 0.03 10*3/MM3 (ref 0–0.2)
BASOPHILS NFR BLD AUTO: 0.4 % (ref 0–1.5)
BILIRUB SERPL-MCNC: 0.5 MG/DL (ref 0–1.2)
BUN SERPL-MCNC: 7 MG/DL (ref 8–23)
BUN/CREAT SERPL: 9.5 (ref 7–25)
CALCIUM SPEC-SCNC: 9 MG/DL (ref 8.6–10.5)
CHLORIDE SERPL-SCNC: 102 MMOL/L (ref 98–107)
CO2 SERPL-SCNC: 26.4 MMOL/L (ref 22–29)
CREAT SERPL-MCNC: 0.74 MG/DL (ref 0.76–1.27)
DEPRECATED RDW RBC AUTO: 43.7 FL (ref 37–54)
EOSINOPHIL # BLD AUTO: 0.13 10*3/MM3 (ref 0–0.4)
EOSINOPHIL NFR BLD AUTO: 1.9 % (ref 0.3–6.2)
ERYTHROCYTE [DISTWIDTH] IN BLOOD BY AUTOMATED COUNT: 15.3 % (ref 12.3–15.4)
FERRITIN SERPL-MCNC: 166 NG/ML (ref 30–400)
GFR SERPL CREATININE-BSD FRML MDRD: 129 ML/MIN/1.73
GLOBULIN UR ELPH-MCNC: 3.3 GM/DL
GLUCOSE SERPL-MCNC: 80 MG/DL (ref 65–99)
HBA1C MFR BLD: 5.1 % (ref 4.8–5.6)
HCT VFR BLD AUTO: 39.3 % (ref 37.5–51)
HGB BLD-MCNC: 12.8 G/DL (ref 13–17.7)
IMM GRANULOCYTES # BLD AUTO: 0.03 10*3/MM3 (ref 0–0.05)
IMM GRANULOCYTES NFR BLD AUTO: 0.4 % (ref 0–0.5)
INR PPP: 0.94 (ref 0.8–1.2)
IRON 24H UR-MRATE: 149 MCG/DL (ref 59–158)
IRON SATN MFR SERPL: 40 % (ref 20–50)
LYMPHOCYTES # BLD AUTO: 1.24 10*3/MM3 (ref 0.7–3.1)
LYMPHOCYTES NFR BLD AUTO: 17.9 % (ref 19.6–45.3)
MCH RBC QN AUTO: 26.3 PG (ref 26.6–33)
MCHC RBC AUTO-ENTMCNC: 32.6 G/DL (ref 31.5–35.7)
MCV RBC AUTO: 80.7 FL (ref 79–97)
MONOCYTES # BLD AUTO: 0.38 10*3/MM3 (ref 0.1–0.9)
MONOCYTES NFR BLD AUTO: 5.5 % (ref 5–12)
NEUTROPHILS NFR BLD AUTO: 5.13 10*3/MM3 (ref 1.7–7)
NEUTROPHILS NFR BLD AUTO: 73.9 % (ref 42.7–76)
NRBC BLD AUTO-RTO: 0.1 /100 WBC (ref 0–0.2)
PLATELET # BLD AUTO: 540 10*3/MM3 (ref 140–450)
PMV BLD AUTO: 10.6 FL (ref 6–12)
POTASSIUM SERPL-SCNC: 3.2 MMOL/L (ref 3.5–5.2)
PROT SERPL-MCNC: 7.6 G/DL (ref 6–8.5)
PROTHROMBIN TIME: 12.5 SECONDS (ref 11.1–15.3)
RBC # BLD AUTO: 4.87 10*6/MM3 (ref 4.14–5.8)
SODIUM SERPL-SCNC: 141 MMOL/L (ref 136–145)
TIBC SERPL-MCNC: 377 MCG/DL (ref 298–536)
TRANSFERRIN SERPL-MCNC: 253 MG/DL (ref 200–360)
WBC # BLD AUTO: 6.94 10*3/MM3 (ref 3.4–10.8)

## 2021-08-13 PROCEDURE — 84450 TRANSFERASE (AST) (SGOT): CPT | Performed by: PHYSICIAN ASSISTANT

## 2021-08-13 PROCEDURE — 83010 ASSAY OF HAPTOGLOBIN QUANT: CPT | Performed by: PHYSICIAN ASSISTANT

## 2021-08-13 PROCEDURE — 82465 ASSAY BLD/SERUM CHOLESTEROL: CPT | Performed by: PHYSICIAN ASSISTANT

## 2021-08-13 PROCEDURE — 82728 ASSAY OF FERRITIN: CPT

## 2021-08-13 PROCEDURE — 82140 ASSAY OF AMMONIA: CPT

## 2021-08-13 PROCEDURE — 99213 OFFICE O/P EST LOW 20 MIN: CPT | Performed by: STUDENT IN AN ORGANIZED HEALTH CARE EDUCATION/TRAINING PROGRAM

## 2021-08-13 PROCEDURE — 83883 ASSAY NEPHELOMETRY NOT SPEC: CPT | Performed by: PHYSICIAN ASSISTANT

## 2021-08-13 PROCEDURE — 84478 ASSAY OF TRIGLYCERIDES: CPT | Performed by: PHYSICIAN ASSISTANT

## 2021-08-13 PROCEDURE — 85610 PROTHROMBIN TIME: CPT

## 2021-08-13 PROCEDURE — 84466 ASSAY OF TRANSFERRIN: CPT

## 2021-08-13 PROCEDURE — 85025 COMPLETE CBC W/AUTO DIFF WBC: CPT

## 2021-08-13 PROCEDURE — 82977 ASSAY OF GGT: CPT | Performed by: PHYSICIAN ASSISTANT

## 2021-08-13 PROCEDURE — 82947 ASSAY GLUCOSE BLOOD QUANT: CPT | Performed by: PHYSICIAN ASSISTANT

## 2021-08-13 PROCEDURE — 83540 ASSAY OF IRON: CPT

## 2021-08-13 PROCEDURE — 36415 COLL VENOUS BLD VENIPUNCTURE: CPT | Performed by: PHYSICIAN ASSISTANT

## 2021-08-13 PROCEDURE — 83036 HEMOGLOBIN GLYCOSYLATED A1C: CPT | Performed by: STUDENT IN AN ORGANIZED HEALTH CARE EDUCATION/TRAINING PROGRAM

## 2021-08-13 PROCEDURE — 82172 ASSAY OF APOLIPOPROTEIN: CPT | Performed by: PHYSICIAN ASSISTANT

## 2021-08-13 PROCEDURE — 82247 BILIRUBIN TOTAL: CPT | Performed by: PHYSICIAN ASSISTANT

## 2021-08-13 PROCEDURE — 80053 COMPREHEN METABOLIC PANEL: CPT

## 2021-08-13 PROCEDURE — 84460 ALANINE AMINO (ALT) (SGPT): CPT | Performed by: PHYSICIAN ASSISTANT

## 2021-08-13 RX ORDER — NEBULIZER AND COMPRESSOR
EACH MISCELLANEOUS
Qty: 1 EACH | Refills: 0 | Status: SHIPPED | OUTPATIENT
Start: 2021-08-13

## 2021-08-16 LAB
A2 MACROGLOB SERPL-MCNC: 226 MG/DL (ref 110–276)
ALT SERPL W P-5'-P-CCNC: 15 IU/L (ref 0–55)
APO A-I SERPL-MCNC: 174 MG/DL (ref 101–178)
AST SERPL W P-5'-P-CCNC: 24 IU/L (ref 0–40)
BILIRUB SERPL-MCNC: 0.4 MG/DL (ref 0–1.2)
CHOLEST SERPL-MCNC: 172 MG/DL (ref 100–199)
FIBROSIS SCORING:: ABNORMAL
FIBROSIS STAGE SERPL QL: ABNORMAL
GGT SERPL-CCNC: 47 IU/L (ref 0–65)
GLUCOSE SERPL-MCNC: 82 MG/DL (ref 65–99)
HAPTOGLOB SERPL-MCNC: 200 MG/DL (ref 32–363)
INTERPRETATIONS: (REFERENCE): ABNORMAL
LABORATORY COMMENT REPORT: ABNORMAL
LIVER FIBR SCORE SERPL CALC.FIBROSURE: 0.23 (ref 0–0.21)
NASH SCORING (REFERENCE): ABNORMAL
NECROINFLAMMATORY ACT GRADE SERPL QL: ABNORMAL
NECROINFLAMMATORY ACT SCORE SERPL: 0.25
SERVICE CMNT-IMP: ABNORMAL
STEATOSIS GRADE (REFERENCE): ABNORMAL
STEATOSIS GRADING (REFERENCE): ABNORMAL
STEATOSIS SCORE (REFERENCE): 0.21 (ref 0–0.3)
TRIGL SERPL-MCNC: 78 MG/DL (ref 0–149)

## 2021-09-15 ENCOUNTER — OFFICE VISIT (OUTPATIENT)
Dept: GASTROENTEROLOGY | Facility: CLINIC | Age: 61
End: 2021-09-15

## 2021-09-15 VITALS
BODY MASS INDEX: 27.09 KG/M2 | HEIGHT: 70 IN | DIASTOLIC BLOOD PRESSURE: 79 MMHG | HEART RATE: 104 BPM | WEIGHT: 189.2 LBS | SYSTOLIC BLOOD PRESSURE: 149 MMHG

## 2021-09-15 DIAGNOSIS — K22.0 ACHALASIA: Primary | ICD-10-CM

## 2021-09-15 DIAGNOSIS — I85.00 ESOPHAGEAL VARICES WITHOUT BLEEDING, UNSPECIFIED ESOPHAGEAL VARICES TYPE (HCC): ICD-10-CM

## 2021-09-15 DIAGNOSIS — K21.00 GASTROESOPHAGEAL REFLUX DISEASE WITH ESOPHAGITIS WITHOUT HEMORRHAGE: ICD-10-CM

## 2021-09-15 DIAGNOSIS — K74.00 HEPATIC FIBROSIS: ICD-10-CM

## 2021-09-15 PROCEDURE — 99213 OFFICE O/P EST LOW 20 MIN: CPT | Performed by: PHYSICIAN ASSISTANT

## 2021-09-15 RX ORDER — AMLODIPINE BESYLATE 10 MG/1
10 TABLET ORAL DAILY
COMMUNITY
Start: 2021-09-13 | End: 2021-12-08 | Stop reason: SDUPTHER

## 2021-09-15 NOTE — PROGRESS NOTES
Chief Complaint   Patient presents with   • Achalasia   • Heartburn   • Esophageal Varices       ENDO PROCEDURE ORDERED:    Subjective    Jameel Dozier is a 61 y.o. male. he is here today for follow-up.    History of Present Illness    Patient seen on a recheck of his esophageal varices, achalasia, GERD, gallbladder sludge. Last seen 06/14/2021. Patient states he has been doing much better. He is not having nausea and vomiting like he was, he has had less hiccups. He is on Prilosec 20 mg BID. He does drink beer. Bowels are moving without blood or mucus. Weight is up 14-1/2 pounds since last visit which he attributes to drinking more beer. Prior EGD with Botox injection. He does not feel like it has helped. Last colonoscopy 09/18/2020. He does have a history of polyps.     Laboratory 08/13/2021 INR 0.94, serum iron 149 with 40% saturation, ferritin 166, CMP showed a potassium 3.2, otherwise normal. A CBC showed a hemoglobin 12.8, hematocrit 39.3, 540,000 platelets. Serum ammonia was normal, A1c 5.1%, REYNOSO FibroSure 0.23/F0-F1, steatosis 0.21/S0, 0.25/N0.     A/P: Patient with achalasia with chronic GERD, previous varices, appears to be improved on current regimen. I encouraged dietary modification and weight loss, avoiding gastric irritants, decreasing his alcohol intake. His FibroSure is not consistent but is often affected adversely from alcohol use. Will plan hepatoma screening in November. He is scheduled for follow-up in 3 months with further pending clinical course and the results of the above.       The following portions of the patient's history were reviewed and updated as appropriate:   Past Medical History:   Diagnosis Date   • Abdominal pain    • Acquired achalasia of esophagus    • Adenomatous polyp of colon    • Adverse drug effect    • Alcohol dependence with alcohol-induced disorder (CMS/HCC)    • Allergic rhinitis    • Anal fissure    • Anemia due to blood loss    • Benign essential  hypertension    • Central obesity    • Cerebrovascular disease    • Chronic obstructive lung disease (CMS/HCC)    • Claudication (CMS/HCC)    • Disorder of peripheral nervous system    • Diverticular disease of colon     completed antibx, ? neoplasm on CT   • Dysphagia    • Dyspnea    • ED (erectile dysfunction)    • Epigastric pain    • Esophageal dysmotility    • Esophageal reflux    • Esophagitis    • Essential hypertension    • External hemorrhoids    • Gastritis    • GERD with esophagitis    • Heavy tobacco smoker    • Hemorrhoids    • Hiatal hernia    • Hiccough    • History of colon polyps    • History of echocardiogram     Normal LV funciton with Ef of 65% to 70% without regional wall motion abnormalities.Mild CLVH. Normal RV size and function. No significant valvular regurgitation or stenosis. 09/19/2014       • History of EKG    • History of TIA (transient ischemic attack)    • Hypercholesterolemia    • Hypertension    • Left ventricular hypertrophy    • Male erectile disorder    • Obstructive sleep apnea syndrome    • Primary osteoarthritis of knees, bilateral    • Rectal bleed     painful   • Rectal hemorrhage    • Sleep disorder    • Transient cerebral ischemia    • Upper respiratory infection    • Weakness     Attacks of weakness     Past Surgical History:   Procedure Laterality Date   • COLONOSCOPY  04/13/2015    Internal and external hemorrhoids found. 04/13/2015    • COLONOSCOPY N/A 9/18/2020    Procedure: COLONOSCOPY;  Surgeon: Alli Dockery MD;  Location: [facility] ENDOSCOPY;  Service: Gastroenterology;  Laterality: N/A;   • ENDOSCOPY      Internal & external hemorrhoids found. 1 polyp in sigmoid colon; removed by snare cautery polypectomy. 09/26/2012    • ENDOSCOPY      Gastritis found in the stomach. Biopsy taken.Enlarged folds were found in the body of the stomach.Biopsy taken.Normal duodenum.A hiatus hernia was found in the esophagus. 04/13/2015    • ENDOSCOPY      Hiatus hernia in esophagus.  Gastritis in stomach. Biopsy taken. Normal duodenum. Biopsy taken. 09/26/2012       • ENDOSCOPY N/A 12/18/2017    Procedure: ESOPHAGOGASTRODUODENOSCOPY--attn mid esophagus, abnl on CT;  Surgeon: Alli Dockery MD;  Location: [facility] ENDOSCOPY;  Service:    • ENDOSCOPY N/A 3/6/2019    Procedure: ESOPHAGOGASTRODUODENOSCOPY WITH DILATATION;  Surgeon: Alli Dockery MD;  Location: [facility] ENDOSCOPY;  Service: Gastroenterology   • ENDOSCOPY N/A 9/18/2020    Procedure: ESOPHAGOGASTRODUODENOSCOPY eval varices;  Surgeon: Alli Dockery MD;  Location: [facility] ENDOSCOPY;  Service: Gastroenterology;  Laterality: N/A;   • ENDOSCOPY N/A 4/28/2021    Procedure: ESOPHAGOGASTRODUODENOSCOPY WITH ANESTHESIA--with botox, attn antrum poss malig;  Surgeon: Alli Dockery MD;  Location: [facility] ENDOSCOPY;  Service: Gastroenterology;  Laterality: N/A;   • UPPER GASTROINTESTINAL ENDOSCOPY  08/24/2016   • UPPER GASTROINTESTINAL ENDOSCOPY  04/13/2015   • UPPER GASTROINTESTINAL ENDOSCOPY  12/18/2017   • UPPER GASTROINTESTINAL ENDOSCOPY  03/06/2019   • UPPER GASTROINTESTINAL ENDOSCOPY  09/18/2020   • UPPER GASTROINTESTINAL ENDOSCOPY  04/28/2021     Family History   Problem Relation Age of Onset   • Cancer Mother    • Cirrhosis Father    • Diabetes Other    • Hypertension Other        Allergies   Allergen Reactions   • Ace Inhibitors Angioedema     hypotension   • Lisinopril Other (See Comments)     cough   • Vistaril [Hydroxyzine Hcl] Other (See Comments)     Patient is unsure      Social History     Socioeconomic History   • Marital status: Single     Spouse name: Not on file   • Number of children: 0   • Years of education: 12   • Highest education level: Not on file   Tobacco Use   • Smoking status: Current Every Day Smoker     Packs/day: 1.50     Years: 40.00     Pack years: 60.00     Types: Cigarettes   • Smokeless tobacco: Never Used   Vaping Use   • Vaping Use: Never used   Substance and Sexual Activity   • Alcohol use: Yes      Alcohol/week: 6.0 standard drinks     Types: 6 Cans of beer per week     Comment: when can get   • Drug use: No   • Sexual activity: Defer     Partners: Female     Current Medications:  Prior to Admission medications    Medication Sig Start Date End Date Taking? Authorizing Provider   albuterol sulfate  (90 Base) MCG/ACT inhaler Inhale 2 puffs Every 4 (Four) Hours As Needed for Wheezing or Shortness of Air. 7/14/21  Yes Son Li MD   amLODIPine (NORVASC) 10 MG tablet Take 10 mg by mouth Daily. 9/13/21  Yes Radha Burrell MD   aspirin (Aspirin Adult Low Strength) 81 MG EC tablet Take 1 tablet by mouth Daily. 7/14/21  Yes Son Li MD   Blood Pressure Monitoring (Adult Blood Pressure Cuff Lg) kit Check BP daily 8/13/21  Yes Sincere Narayanan MD   cetirizine (zyrTEC) 10 MG tablet Take 1 tablet by mouth Daily. 7/14/21  Yes Son Li MD   fluticasone (FLONASE) 50 MCG/ACT nasal spray 2 sprays into each nostril Daily for 30 days. 11/27/17 7/14/22 Yes Yolanda Phan MD   fluticasone (FLONASE) 50 MCG/ACT nasal spray 2 sprays into the nostril(s) as directed by provider Daily. 7/14/21  Yes Son Li MD   losartan (COZAAR) 100 MG tablet Take 1 tablet by mouth Daily. 7/14/21  Yes Son Li MD   magnesium oxide (MAGOX) 400 (241.3 Mg) MG tablet tablet Take 1 tablet by mouth Daily. 7/14/21  Yes Son Li MD   metFORMIN (GLUCOPHAGE) 500 MG tablet Take 1 tablet by mouth Daily With Breakfast. 7/22/21  Yes Son Li MD   omeprazole (priLOSEC) 20 MG capsule Take 1 capsule by mouth 2 (Two) Times a Day. 7/14/21  Yes Son Li MD   propranolol (INDERAL) 40 MG tablet Take 1 tablet by mouth Daily. 7/14/21  Yes Son Li MD   tiotropium (SPIRIVA) 18 MCG per inhalation capsule Place 1 capsule into inhaler and inhale Daily. 7/14/21  Yes Son Li MD   amLODIPine (Norvasc) 5 MG tablet Take 1 tablet by mouth Daily. 7/14/21 9/15/21  Guillermo,  "Son GONSALES MD     Review of Systems  Review of Systems       Objective    /79   Pulse 104   Ht 177.8 cm (70\")   Wt 85.8 kg (189 lb 3.2 oz)   BMI 27.15 kg/m²   Physical Exam  Vitals and nursing note reviewed.   Constitutional:       General: He is not in acute distress.     Appearance: He is well-developed.   HENT:      Head: Normocephalic and atraumatic.   Eyes:      Pupils: Pupils are equal, round, and reactive to light.   Cardiovascular:      Rate and Rhythm: Normal rate and regular rhythm.      Heart sounds: Normal heart sounds.   Pulmonary:      Effort: Pulmonary effort is normal.      Breath sounds: Normal breath sounds.   Abdominal:      General: Bowel sounds are normal. There is no distension or abdominal bruit.      Palpations: Abdomen is soft. Abdomen is not rigid. There is no shifting dullness or mass.      Tenderness: There is abdominal tenderness. There is no guarding or rebound.      Hernia: No hernia is present. There is no hernia in the ventral area.   Musculoskeletal:         General: Normal range of motion.      Cervical back: Normal range of motion.   Skin:     General: Skin is warm and dry.   Neurological:      Mental Status: He is alert and oriented to person, place, and time.   Psychiatric:         Behavior: Behavior normal.         Thought Content: Thought content normal.         Judgment: Judgment normal.       Assessment/Plan      1. Achalasia    2. Gastroesophageal reflux disease with esophagitis without hemorrhage    3. Esophageal varices without bleeding, unspecified esophageal varices type (CMS/HCC)    4. Hepatic fibrosis    .   Diagnoses and all orders for this visit:    1. Achalasia (Primary)  -     CBC & Differential; Future  -     Comprehensive Metabolic Panel; Future    2. Gastroesophageal reflux disease with esophagitis without hemorrhage  -     CBC & Differential; Future  -     Comprehensive Metabolic Panel; Future    3. Esophageal varices without bleeding, unspecified " esophageal varices type (CMS/HCC)  -     CBC & Differential; Future  -     Comprehensive Metabolic Panel; Future    4. Hepatic fibrosis  -     CBC & Differential; Future  -     Comprehensive Metabolic Panel; Future        Orders placed during this encounter include:  Orders Placed This Encounter   Procedures   • Comprehensive Metabolic Panel     Standing Status:   Future     Standing Expiration Date:   3/14/2022     Order Specific Question:   Release to patient     Answer:   Immediate   • CBC & Differential     Standing Status:   Future     Standing Expiration Date:   3/14/2022     Order Specific Question:   Manual Differential     Answer:   No       Medications prescribed:  No orders of the defined types were placed in this encounter.    Discontinued Medications       Reason for Discontinue     amLODIPine (Norvasc) 5 MG tablet    Discontinued by another clinician        Requested Prescriptions      No prescriptions requested or ordered in this encounter       Review and/or summary of lab tests, radiology, procedures, medications. Review and summary of old records and obtaining of history. The risks and benefits of my recommendations, as well as other treatment options were discussed with the patient today. Questions were answered.    Follow-up: Return in about 3 months (around 12/15/2021), or if symptoms worsen or fail to improve, for lab prior.     * Surgery not found *      This document has been electronically signed by Tim Chen PA-C on September 28, 2021 19:44 CDT      Results for orders placed or performed in visit on 08/13/21   CBC Auto Differential    Specimen: Blood   Result Value Ref Range    WBC 6.94 3.40 - 10.80 10*3/mm3    RBC 4.87 4.14 - 5.80 10*6/mm3    Hemoglobin 12.8 (L) 13.0 - 17.7 g/dL    Hematocrit 39.3 37.5 - 51.0 %    MCV 80.7 79.0 - 97.0 fL    MCH 26.3 (L) 26.6 - 33.0 pg    MCHC 32.6 31.5 - 35.7 g/dL    RDW 15.3 12.3 - 15.4 %    RDW-SD 43.7 37.0 - 54.0 fl    MPV 10.6 6.0 - 12.0 fL     Platelets 540 (H) 140 - 450 10*3/mm3    Neutrophil % 73.9 42.7 - 76.0 %    Lymphocyte % 17.9 (L) 19.6 - 45.3 %    Monocyte % 5.5 5.0 - 12.0 %    Eosinophil % 1.9 0.3 - 6.2 %    Basophil % 0.4 0.0 - 1.5 %    Immature Grans % 0.4 0.0 - 0.5 %    Neutrophils, Absolute 5.13 1.70 - 7.00 10*3/mm3    Lymphocytes, Absolute 1.24 0.70 - 3.10 10*3/mm3    Monocytes, Absolute 0.38 0.10 - 0.90 10*3/mm3    Eosinophils, Absolute 0.13 0.00 - 0.40 10*3/mm3    Basophils, Absolute 0.03 0.00 - 0.20 10*3/mm3    Immature Grans, Absolute 0.03 0.00 - 0.05 10*3/mm3    nRBC 0.1 0.0 - 0.2 /100 WBC   Iron Profile    Specimen: Blood   Result Value Ref Range    Iron 149 59 - 158 mcg/dL    Iron Saturation 40 20 - 50 %    Transferrin 253 200 - 360 mg/dL    TIBC 377 298 - 536 mcg/dL   Protime-INR    Specimen: Blood   Result Value Ref Range    Protime 12.5 11.1 - 15.3 Seconds    INR 0.94 0.80 - 1.20   Ferritin    Specimen: Blood   Result Value Ref Range    Ferritin 166.00 30.00 - 400.00 ng/mL   Ammonia    Specimen: Blood   Result Value Ref Range    Ammonia 32 16 - 60 umol/L   Comprehensive Metabolic Panel    Specimen: Blood   Result Value Ref Range    Glucose 80 65 - 99 mg/dL    BUN 7 (L) 8 - 23 mg/dL    Creatinine 0.74 (L) 0.76 - 1.27 mg/dL    Sodium 141 136 - 145 mmol/L    Potassium 3.2 (L) 3.5 - 5.2 mmol/L    Chloride 102 98 - 107 mmol/L    CO2 26.4 22.0 - 29.0 mmol/L    Calcium 9.0 8.6 - 10.5 mg/dL    Total Protein 7.6 6.0 - 8.5 g/dL    Albumin 4.30 3.50 - 5.20 g/dL    ALT (SGPT) 16 1 - 41 U/L    AST (SGOT) 23 1 - 40 U/L    Alkaline Phosphatase 70 39 - 117 U/L    Total Bilirubin 0.5 0.0 - 1.2 mg/dL    eGFR  African Amer 129 >60 mL/min/1.73    Globulin 3.3 gm/dL    A/G Ratio 1.3 g/dL    BUN/Creatinine Ratio 9.5 7.0 - 25.0    Anion Gap 12.6 5.0 - 15.0 mmol/L   Results for orders placed or performed in visit on 08/13/21   Hemoglobin A1c    Specimen: Blood   Result Value Ref Range    Hemoglobin A1C 5.10 4.80 - 5.60 %   Results for orders placed or  performed in visit on 05/05/21   REYNOSO Fibrosure    Specimen: Blood   Result Value Ref Range    Fibrosis Score (References) 0.23 (H) 0.00 - 0.21    Fibrosis Stage (Reference) F0-F1     Steatosis Score (Reference) 0.21 0.00 - 0.30    Steatosis Grade (Reference) Comment     REYNOSO Score (Reference) 0.25 0.25    Reynoso Grade (Reference) Comment     Height: (Reference) 70 in    Weight: (Reference) 173 LBS    Alpha 2-Macroglobulins, Qn 226 110 - 276 mg/dL    Haptoglobin 200 32 - 363 mg/dL    Apolipoprotein A-1 174 101 - 178 mg/dL    Total Bilirubin 0.4 0.0 - 1.2 mg/dL    GGT 47 0 - 65 IU/L    ALT (SGPT) 15 0 - 55 IU/L    AST (SGOT) P5P (Reference) 24 0 - 40 IU/L    Cholesterol, Total (Reference) 172 100 - 199 mg/dL    Glucose, Serum (Reference) 82 65 - 99 mg/dL    Triglycerides 78 0 - 149 mg/dL    Interpretations: (Reference) Comment     Fibrosis Scoring: Comment     Steatosis Grading (Reference) Comment     Reynoso Scoring (Reference) Comment     Limitations: (Reference) Comment     Comment (Reference) Comment    CBC Auto Differential    Specimen: Blood   Result Value Ref Range    WBC 3.09 (L) 3.40 - 10.80 10*3/mm3    RBC 6.32 (H) 4.14 - 5.80 10*6/mm3    Hemoglobin 16.9 13.0 - 17.7 g/dL    Hematocrit 51.0 37.5 - 51.0 %    MCV 80.7 79.0 - 97.0 fL    MCH 26.7 26.6 - 33.0 pg    MCHC 33.1 31.5 - 35.7 g/dL    RDW 17.0 (H) 12.3 - 15.4 %    RDW-SD 43.6 37.0 - 54.0 fl    MPV 10.7 6.0 - 12.0 fL    Platelets 267 140 - 450 10*3/mm3    Neutrophil % 39.7 (L) 42.7 - 76.0 %    Lymphocyte % 35.3 19.6 - 45.3 %    Monocyte % 14.6 (H) 5.0 - 12.0 %    Eosinophil % 9.1 (H) 0.3 - 6.2 %    Basophil % 1.0 0.0 - 1.5 %    Immature Grans % 0.3 0.0 - 0.5 %    Neutrophils, Absolute 1.23 (L) 1.70 - 7.00 10*3/mm3    Lymphocytes, Absolute 1.09 0.70 - 3.10 10*3/mm3    Monocytes, Absolute 0.45 0.10 - 0.90 10*3/mm3    Eosinophils, Absolute 0.28 0.00 - 0.40 10*3/mm3    Basophils, Absolute 0.03 0.00 - 0.20 10*3/mm3    Immature Grans, Absolute 0.01 0.00 - 0.05  10*3/mm3    nRBC 0.3 (H) 0.0 - 0.2 /100 WBC     *Note: Due to a large number of results and/or encounters for the requested time period, some results have not been displayed. A complete set of results can be found in Results Review.

## 2021-09-15 NOTE — PATIENT INSTRUCTIONS
MyPlate from USDA    MyPlate is an outline of a general healthy diet based on the 2010 Dietary Guidelines for Americans, from the U.S. Department of Agriculture (USDA). It sets guidelines for how much food you should eat from each food group based on your age, sex, and level of physical activity.  What are tips for following MyPlate?  To follow MyPlate recommendations:  · Eat a wide variety of fruits and vegetables, grains, and protein foods.  · Serve smaller portions and eat less food throughout the day.  · Limit portion sizes to avoid overeating.  · Enjoy your food.  · Get at least 150 minutes of exercise every week. This is about 30 minutes each day, 5 or more days per week.  It can be difficult to have every meal look like MyPlate. Think about MyPlate as eating guidelines for an entire day, rather than each individual meal.  Fruits and vegetables  · Make half of your plate fruits and vegetables.  · Eat many different colors of fruits and vegetables each day.  · For a 2,000 calorie daily food plan, eat:  ? 2½ cups of vegetables every day.  ? 2 cups of fruit every day.  · 1 cup is equal to:  ? 1 cup raw or cooked vegetables.  ? 1 cup raw fruit.  ? 1 medium-sized orange, apple, or banana.  ? 1 cup 100% fruit or vegetable juice.  ? 2 cups raw leafy greens, such as lettuce, spinach, or kale.  ? ½ cup dried fruit.  Grains  · One fourth of your plate should be grains.  · Make at least half of the grains you eat each day whole grains.  · For a 2,000 calorie daily food plan, eat 6 oz of grains every day.  · 1 oz is equal to:  ? 1 slice bread.  ? 1 cup cereal.  ? ½ cup cooked rice, cereal, or pasta.  Protein  · One fourth of your plate should be protein.  · Eat a wide variety of protein foods, including meat, poultry, fish, eggs, beans, nuts, and tofu.  · For a 2,000 calorie daily food plan, eat 5½ oz of protein every day.  · 1 oz is equal to:  ? 1 oz meat, poultry, or fish.  ? ¼ cup cooked beans.  ? 1 egg.  ? ½ oz nuts  or seeds.  ? 1 Tbsp peanut butter.  Dairy  · Drink fat-free or low-fat (1%) milk.  · Eat or drink dairy as a side to meals.  · For a 2,000 calorie daily food plan, eat or drink 3 cups of dairy every day.  · 1 cup is equal to:  ? 1 cup milk, yogurt, cottage cheese, or soy milk (soy beverage).  ? 2 oz processed cheese.  ? 1½ oz natural cheese.  Fats, oils, salt, and sugars  · Only small amounts of oils are recommended.  · Avoid foods that are high in calories and low in nutritional value (empty calories), like foods high in fat or added sugars.  · Choose foods that are low in salt (sodium). Choose foods that have less than 140 milligrams (mg) of sodium per serving.  · Drink water instead of sugary drinks. Drink enough water each day to keep your urine pale yellow.  Where to find support  · Work with your health care provider or a nutrition specialist (dietitian) to develop a customized eating plan that is right for you.  · Download an tmo (mobile application) to help you track your daily food intake.  Where to find more information  · Go to ChooseMyPlate.gov for more information.  Summary  · MyPlate is a general guideline for healthy eating from the USDA. It is based on the 2010 Dietary Guidelines for Americans.  · In general, fruits and vegetables should take up ½ of your plate, grains should take up ¼ of your plate, and protein should take up ¼ of your plate.  This information is not intended to replace advice given to you by your health care provider. Make sure you discuss any questions you have with your health care provider.  Document Revised: 05/21/2020 Document Reviewed: 03/19/2018  Elsevier Patient Education © 2021 [employer]  BMI for Adults  What is BMI?  Body mass index (BMI) is a number that is calculated from a person's weight and height. BMI can help estimate how much of a person's weight is composed of fat. BMI does not measure body fat directly. Rather, it is an alternative to procedures that  "directly measure body fat, which can be difficult and expensive.  BMI can help identify people who may be at higher risk for certain medical problems.  What are BMI measurements used for?  BMI is used as a screening tool to identify possible weight problems. It helps determine whether a person is obese, overweight, a healthy weight, or underweight.  BMI is useful for:  · Identifying a weight problem that may be related to a medical condition or may increase the risk for medical problems.  · Promoting changes, such as changes in diet and exercise, to help reach a healthy weight. BMI screening can be repeated to see if these changes are working.  How is BMI calculated?  BMI involves measuring your weight in relation to your height. Both height and weight are measured, and the BMI is calculated from those numbers. This can be done either in English ([place] or metric measurements. Note that charts and online BMI calculators are available to help you find your BMI quickly and easily without having to do these calculations yourself.  To calculate your BMI in English (U.S[place] measurements:    1. Measure your weight in pounds (lb).  2. Multiply the number of pounds by 703.  ? For example, for a person who weighs 180 lb, multiply that number by 703, which equals 126,540.  3. Measure your height in inches. Then multiply that number by itself to get a measurement called \"inches squared.\"  ? For example, for a person who is 70 inches tall, the \"inches squared\" measurement is 70 inches x 70 inches, which equals 4,900 inches squared.  4. Divide the total from step 2 (number of lb x 703) by the total from step 3 (inches squared): 126,540 ÷ 4,900 = 25.8. This is your BMI.    To calculate your BMI in metric measurements:  1. Measure your weight in kilograms (kg).  2. Measure your height in meters (m). Then multiply that number by itself to get a measurement called \"meters squared.\"  ? For example, for a person who is 1.75 m tall, the " "\"meters squared\" measurement is 1.75 m x 1.75 m, which is equal to 3.1 meters squared.  3. Divide the number of kilograms (your weight) by the meters squared number. In this example: 70 ÷ 3.1 = 22.6. This is your BMI.  What do the results mean?  BMI charts are used to identify whether you are underweight, normal weight, overweight, or obese. The following guidelines will be used:  · Underweight: BMI less than 18.5.  · Normal weight: BMI between 18.5 and 24.9.  · Overweight: BMI between 25 and 29.9.  · Obese: BMI of 30 or above.  Keep these notes in mind:  · Weight includes both fat and muscle, so someone with a muscular build, such as an athlete, may have a BMI that is higher than 24.9. In cases like these, BMI is not an accurate measure of body fat.  · To determine if excess body fat is the cause of a BMI of 25 or higher, further assessments may need to be done by a health care provider.  · BMI is usually interpreted in the same way for men and women.  Where to find more information  For more information about BMI, including tools to quickly calculate your BMI, go to these websites:  · Centers for Disease Control and Prevention: www.cdc.gov  · American Heart Association: www.heart.org  · National Heart, Lung, and Blood [place]: www.nhlbi.nih.gov  Summary  · Body mass index (BMI) is a number that is calculated from a person's weight and height.  · BMI may help estimate how much of a person's weight is composed of fat. BMI can help identify those who may be at higher risk for certain medical problems.  · BMI can be measured using English measurements or metric measurements.  · BMI charts are used to identify whether you are underweight, normal weight, overweight, or obese.  This information is not intended to replace advice given to you by your health care provider. Make sure you discuss any questions you have with your health care provider.  Document Revised: 09/09/2020 Document Reviewed: 07/17/2020  Isabelle " Patient Education © 2021 [employer] Inc.

## 2021-09-16 ENCOUNTER — OFFICE VISIT (OUTPATIENT)
Dept: FAMILY MEDICINE CLINIC | Facility: CLINIC | Age: 61
End: 2021-09-16

## 2021-09-16 VITALS
HEIGHT: 70 IN | DIASTOLIC BLOOD PRESSURE: 62 MMHG | BODY MASS INDEX: 27.22 KG/M2 | OXYGEN SATURATION: 99 % | SYSTOLIC BLOOD PRESSURE: 118 MMHG | HEART RATE: 98 BPM | WEIGHT: 190.1 LBS

## 2021-09-16 DIAGNOSIS — I10 ESSENTIAL HYPERTENSION: Primary | ICD-10-CM

## 2021-09-16 DIAGNOSIS — K74.00 HEPATIC FIBROSIS: ICD-10-CM

## 2021-09-16 DIAGNOSIS — E11.8 TYPE 2 DIABETES MELLITUS WITH COMPLICATION, WITHOUT LONG-TERM CURRENT USE OF INSULIN (HCC): ICD-10-CM

## 2021-09-16 DIAGNOSIS — K21.00 GASTROESOPHAGEAL REFLUX DISEASE WITH ESOPHAGITIS WITHOUT HEMORRHAGE: ICD-10-CM

## 2021-09-16 PROCEDURE — 99214 OFFICE O/P EST MOD 30 MIN: CPT | Performed by: STUDENT IN AN ORGANIZED HEALTH CARE EDUCATION/TRAINING PROGRAM

## 2021-09-16 NOTE — PROGRESS NOTES
Family Medicine Residency  Catrachito Milligan MD    Subjective:     Jameel Dozier is a 61 y.o. male who presents for follow-up for his hypertension and diabetes.    HTN -patient reports he has no issues at this time.  Says that things have been stable on current medical therapy.  Reports compliance with current medications and denies any adverse effects. Denies any headaches, vision changes, dizziness, chest pain, or shortness of breath.  Patient never received blood pressure cuff from pharmacy.    T2Dm -patient reports he has no issues at this time.  Says that things have been stable on current medical therapy.  Reports compliance with current medication and denies any adverse effects.  Denies any GI upset, nausea, vomiting, or diarrhea.  Denies any neuropathy or numbness in hands or feet.  Denies any blurry vision.    Smoking cessation - continues to smoke and does not wish to quit at this time    Alcohol abuse - reports that he drinks 1 case of 24 beers per week.     The following portions of the patient's history were reviewed and updated as appropriate: allergies, current medications, past family history, past medical history, past social history, past surgical history and problem list.    Past Medical Hx:  Past Medical History:   Diagnosis Date   • Abdominal pain    • Acquired achalasia of esophagus    • Adenomatous polyp of colon    • Adverse drug effect    • Alcohol dependence with alcohol-induced disorder (CMS/HCC)    • Allergic rhinitis    • Anal fissure    • Anemia due to blood loss    • Benign essential hypertension    • Central obesity    • Cerebrovascular disease    • Chronic obstructive lung disease (CMS/HCC)    • Claudication (CMS/HCC)    • Disorder of peripheral nervous system    • Diverticular disease of colon     completed antibx, ? neoplasm on CT   • Dysphagia    • Dyspnea    • ED (erectile dysfunction)    • Epigastric pain    • Esophageal dysmotility    • Esophageal reflux    •  Esophagitis    • Essential hypertension    • External hemorrhoids    • Gastritis    • GERD with esophagitis    • Heavy tobacco smoker    • Hemorrhoids    • Hiatal hernia    • Hiccough    • History of colon polyps    • History of echocardiogram     Normal LV funciton with Ef of 65% to 70% without regional wall motion abnormalities.Mild CLVH. Normal RV size and function. No significant valvular regurgitation or stenosis. 09/19/2014       • History of EKG    • History of TIA (transient ischemic attack)    • Hypercholesterolemia    • Hypertension    • Left ventricular hypertrophy    • Male erectile disorder    • Obstructive sleep apnea syndrome    • Primary osteoarthritis of knees, bilateral    • Rectal bleed     painful   • Rectal hemorrhage    • Sleep disorder    • Transient cerebral ischemia    • Upper respiratory infection    • Weakness     Attacks of weakness       Past Surgical Hx:  Past Surgical History:   Procedure Laterality Date   • COLONOSCOPY  04/13/2015    Internal and external hemorrhoids found. 04/13/2015    • COLONOSCOPY N/A 9/18/2020    Procedure: COLONOSCOPY;  Surgeon: Alli Dockery MD;  Location: [facility] ENDOSCOPY;  Service: Gastroenterology;  Laterality: N/A;   • ENDOSCOPY      Internal & external hemorrhoids found. 1 polyp in sigmoid colon; removed by snare cautery polypectomy. 09/26/2012    • ENDOSCOPY      Gastritis found in the stomach. Biopsy taken.Enlarged folds were found in the body of the stomach.Biopsy taken.Normal duodenum.A hiatus hernia was found in the esophagus. 04/13/2015    • ENDOSCOPY      Hiatus hernia in esophagus. Gastritis in stomach. Biopsy taken. Normal duodenum. Biopsy taken. 09/26/2012       • ENDOSCOPY N/A 12/18/2017    Procedure: ESOPHAGOGASTRODUODENOSCOPY--attn mid esophagus, abnl on CT;  Surgeon: Alli Dockery MD;  Location: [facility] ENDOSCOPY;  Service:    • ENDOSCOPY N/A 3/6/2019    Procedure: ESOPHAGOGASTRODUODENOSCOPY WITH DILATATION;  Surgeon: Alli Dockery MD;   Location: [facility] ENDOSCOPY;  Service: Gastroenterology   • ENDOSCOPY N/A 9/18/2020    Procedure: ESOPHAGOGASTRODUODENOSCOPY eval varices;  Surgeon: Alli Dockery MD;  Location: [facility] ENDOSCOPY;  Service: Gastroenterology;  Laterality: N/A;   • ENDOSCOPY N/A 4/28/2021    Procedure: ESOPHAGOGASTRODUODENOSCOPY WITH ANESTHESIA--with botox, attn antrum poss malig;  Surgeon: Alli Dockery MD;  Location: [facility] ENDOSCOPY;  Service: Gastroenterology;  Laterality: N/A;   • UPPER GASTROINTESTINAL ENDOSCOPY  08/24/2016   • UPPER GASTROINTESTINAL ENDOSCOPY  04/13/2015   • UPPER GASTROINTESTINAL ENDOSCOPY  12/18/2017   • UPPER GASTROINTESTINAL ENDOSCOPY  03/06/2019   • UPPER GASTROINTESTINAL ENDOSCOPY  09/18/2020   • UPPER GASTROINTESTINAL ENDOSCOPY  04/28/2021       Current Meds:    Current Outpatient Medications:   •  albuterol sulfate  (90 Base) MCG/ACT inhaler, Inhale 2 puffs Every 4 (Four) Hours As Needed for Wheezing or Shortness of Air., Disp: 18 g, Rfl: 5  •  amLODIPine (NORVASC) 10 MG tablet, Take 10 mg by mouth Daily., Disp: , Rfl:   •  aspirin (Aspirin Adult Low Strength) 81 MG EC tablet, Take 1 tablet by mouth Daily., Disp: 30 tablet, Rfl: 2  •  Blood Pressure Monitoring (Adult Blood Pressure Cuff Lg) kit, Check BP daily, Disp: 1 each, Rfl: 0  •  cetirizine (zyrTEC) 10 MG tablet, Take 1 tablet by mouth Daily., Disp: 30 tablet, Rfl: 5  •  fluticasone (FLONASE) 50 MCG/ACT nasal spray, 2 sprays into each nostril Daily for 30 days., Disp: 1 bottle, Rfl: 11  •  fluticasone (FLONASE) 50 MCG/ACT nasal spray, 2 sprays into the nostril(s) as directed by provider Daily., Disp: 15.8 mL, Rfl: 5  •  losartan (COZAAR) 100 MG tablet, Take 1 tablet by mouth Daily., Disp: 30 tablet, Rfl: 5  •  magnesium oxide (MAGOX) 400 (241.3 Mg) MG tablet tablet, Take 1 tablet by mouth Daily., Disp: 30 tablet, Rfl: 4  •  metFORMIN (GLUCOPHAGE) 500 MG tablet, Take 1 tablet by mouth Daily With Breakfast., Disp: 30 tablet, Rfl: 4  •   "omeprazole (priLOSEC) 20 MG capsule, Take 1 capsule by mouth 2 (Two) Times a Day., Disp: 60 capsule, Rfl: 3  •  propranolol (INDERAL) 40 MG tablet, Take 1 tablet by mouth Daily., Disp: 30 tablet, Rfl: 3  •  tiotropium (SPIRIVA) 18 MCG per inhalation capsule, Place 1 capsule into inhaler and inhale Daily., Disp: 30 capsule, Rfl: 5    Allergies:  Allergies   Allergen Reactions   • Ace Inhibitors Angioedema     hypotension   • Lisinopril Other (See Comments)     cough   • Vistaril [Hydroxyzine Hcl] Other (See Comments)     Patient is unsure        Family Hx:  Family History   Problem Relation Age of Onset   • Cancer Mother    • Cirrhosis Father    • Diabetes Other    • Hypertension Other         Social History:  Social History     Socioeconomic History   • Marital status: Single     Spouse name: Not on file   • Number of children: 0   • Years of education: 12   • Highest education level: Not on file   Tobacco Use   • Smoking status: Current Every Day Smoker     Packs/day: 1.50     Years: 40.00     Pack years: 60.00     Types: Cigarettes   • Smokeless tobacco: Never Used   Vaping Use   • Vaping Use: Never used   Substance and Sexual Activity   • Alcohol use: Yes     Alcohol/week: 6.0 standard drinks     Types: 6 Cans of beer per week     Comment: when can get   • Drug use: No   • Sexual activity: Defer     Partners: Female       Review of Systems  Review of Systems   Respiratory: Positive for cough. Negative for shortness of breath.    Cardiovascular: Negative for chest pain and leg swelling.   Gastrointestinal: Negative for abdominal pain, constipation, diarrhea, nausea and vomiting.   Genitourinary: Negative for difficulty urinating.   Neurological: Negative for dizziness, light-headedness and headaches.       Objective:     /62   Pulse 98   Ht 177.8 cm (70\")   Wt 86.2 kg (190 lb 1.6 oz)   SpO2 99%   BMI 27.28 kg/m²   Physical Exam  HENT:      Head: Normocephalic and atraumatic.   Cardiovascular:      Rate " and Rhythm: Normal rate and regular rhythm.      Pulses: Normal pulses.           Dorsalis pedis pulses are 2+ on the right side and 2+ on the left side.        Posterior tibial pulses are 2+ on the right side and 2+ on the left side.   Pulmonary:      Effort: Pulmonary effort is normal.      Breath sounds: No rhonchi or rales.   Abdominal:      Palpations: Abdomen is soft.      Tenderness: There is no abdominal tenderness.   Feet:      Right foot:      Protective Sensation: 7 sites tested. 5 sites sensed.      Skin integrity: Callus and dry skin present. No ulcer.      Toenail Condition: Right toenails are abnormally thick and long.      Left foot:      Protective Sensation: 7 sites tested. 5 sites sensed.      Skin integrity: Callus and dry skin present. No ulcer.      Toenail Condition: Left toenails are abnormally thick and long.      Comments: Normal proprioception of big toe bilaterally  Skin:     General: Skin is warm and dry.   Neurological:      General: No focal deficit present.      Mental Status: He is alert.          Assessment/Plan:     Diagnoses and all orders for this visit:    1. Essential hypertension (Primary)  -Stable on current medical therapy.  We will continue to monitor and make adjustments accordingly.  I have called the patient's pharmacy and they do not have any blood pressure cuffs at this time and are unsure when they will get them back in stock.  Patient is to come back to the office or go to the ER if symptoms develop.    2. Type 2 diabetes mellitus with complication, without long-term current use of insulin (CMS/[facility])  - Stable on current medical therapy.  We will continue to monitor and make adjustments accordingly. Last Hgb a1c 5.1  - We will obtain urine microalbumin at next visit.  - will recheck Hgb A1c at next visit  - referral to ophthalmology for diabetic eye exam     3. Gastroesophageal reflux disease with esophagitis without hemorrhage  -Stable on current medical therapy.  We  will continue to monitor and make adjustments accordingly.    4. Hepatic fibrosis  - Gastroenterology is following.  Stable currently but I counseled the patient on decreasing his alcohol use to prevent future health conditions such as liver cirrhosis.  I recommended that he ideally cut down to less than 7 beers per week.     5. Smoking cessation   - Counseled patient on benefits of smoking cessation.  Patient does not wish to quit at this time.      · Rx changes: none  · Patient Education: Educated on benefits of smoking and alcohol cessation  · Compliance at present is estimated to be fair.   · Efforts to improve compliance (if necessary) will be directed at working to decrease smoking and alcohol use.    Depression screening: Patient screened positive for depression based on a PHQ-9 score of 0 on 4/2/2021. Follow-up recommendations include: none.     Follow-up:     Return in about 3 months (around 12/16/2021) for Recheck.    Preventative:  Health Maintenance   Topic Date Due   • ANNUAL WELLNESS VISIT  Never done   • DIABETIC EYE EXAM  09/11/2019   • URINE MICROALBUMIN  02/07/2020   • ZOSTER VACCINE (1 of 2) 09/16/2021 (Originally 7/17/2008)   • Hepatitis B (1 of 3 - Risk 3-dose series) 09/16/2022 (Originally 7/17/1979)   • INFLUENZA VACCINE  10/01/2021   • LUNG CANCER SCREENING  01/13/2022   • HEMOGLOBIN A1C  02/13/2022   • LIPID PANEL  04/02/2022   • DIABETIC FOOT EXAM  09/16/2022   • Pneumococcal Vaccine 0-64 (2 of 2 - PPSV23) 07/17/2023   • TDAP/TD VACCINES (2 - Td or Tdap) 08/15/2024   • COLORECTAL CANCER SCREENING  09/18/2025   • HEPATITIS C SCREENING  Completed   • COVID-19 Vaccine  Completed     Male Preventative: Colon cancer screening is up to date  Recommended: Hep B  Vaccine Counseling: Patient deferred at this time    Weight  -Class: Overweight: 25.0-29.9kg/m2   -Patient's Body mass index is 27.28 kg/m². indicating that he is overweight (BMI 25-29.9). Obesity-related health conditions include the  following: hypertension, diabetes mellitus and GERD. Obesity is worsening. BMI is is above average; BMI management plan is completed. We discussed portion control, increasing exercise and decreasing alcohol consumption..   increase water intake and increase physical activity    Alcohol use:  reports current alcohol use of about 6.0 standard drinks of alcohol per week.  Nicotine status  reports that he has been smoking cigarettes. He has a 60.00 pack-year smoking history. He has never used smokeless tobacco.    Goals     •  Quit smoking / using tobacco (pt-stated)           RISK SCORE: 4      This document has been electronically signed by Catrachito Milligan MD on September 16, 2021 09:42 CDT

## 2021-09-21 NOTE — PROGRESS NOTES
Jameel Dozier  9/16/21  Subjective:     Jameel Dozier is a 61 y.o. male who presents for   Chief Complaint   Patient presents with   • Hypertension     HTN/DM RECHK    • Urinary Tract Infection       61-year-old male with history of type 2 diabetes hypertension and GERD here for follow-up visit.  Patient voices no specific ongoing complaints most recent hemoglobin A1c was 5.1 denies any signs or symptoms of hypo or hyperglycemia chest pain or dyspnea patient does have a history of fibrosis of the liver and is followed by gastroenterology unfortunately the patient does continue to smoke he denies any paresthesias or nocturia denies fevers chills or upper respiratory complaints patient states he is currently on the fence regarding Covid vaccine status and is in the precontemplative stage of smoking cessation.  Please see Dr. Milligan's note for more detailed information regarding today's encounter physical exam findings and plan of care.        Past Medical Hx:  Past Medical History:   Diagnosis Date   • Abdominal pain    • Acquired achalasia of esophagus    • Adenomatous polyp of colon    • Adverse drug effect    • Alcohol dependence with alcohol-induced disorder (CMS/HCC)    • Allergic rhinitis    • Anal fissure    • Anemia due to blood loss    • Benign essential hypertension    • Central obesity    • Cerebrovascular disease    • Chronic obstructive lung disease (CMS/HCC)    • Claudication (CMS/HCC)    • Disorder of peripheral nervous system    • Diverticular disease of colon     completed antibx, ? neoplasm on CT   • Dysphagia    • Dyspnea    • ED (erectile dysfunction)    • Epigastric pain    • Esophageal dysmotility    • Esophageal reflux    • Esophagitis    • Essential hypertension    • External hemorrhoids    • Gastritis    • GERD with esophagitis    • Heavy tobacco smoker    • Hemorrhoids    • Hiatal hernia    • Hiccough    • History of colon polyps    • History of echocardiogram     Normal LV  funciton with Ef of 65% to 70% without regional wall motion abnormalities.Mild CLVH. Normal RV size and function. No significant valvular regurgitation or stenosis. 09/19/2014       • History of EKG    • History of TIA (transient ischemic attack)    • Hypercholesterolemia    • Hypertension    • Left ventricular hypertrophy    • Male erectile disorder    • Obstructive sleep apnea syndrome    • Primary osteoarthritis of knees, bilateral    • Rectal bleed     painful   • Rectal hemorrhage    • Sleep disorder    • Transient cerebral ischemia    • Upper respiratory infection    • Weakness     Attacks of weakness       Past Surgical Hx:  Past Surgical History:   Procedure Laterality Date   • COLONOSCOPY  04/13/2015    Internal and external hemorrhoids found. 04/13/2015    • COLONOSCOPY N/A 9/18/2020    Procedure: COLONOSCOPY;  Surgeon: Alli Dockery MD;  Location: [facility] ENDOSCOPY;  Service: Gastroenterology;  Laterality: N/A;   • ENDOSCOPY      Internal & external hemorrhoids found. 1 polyp in sigmoid colon; removed by snare cautery polypectomy. 09/26/2012    • ENDOSCOPY      Gastritis found in the stomach. Biopsy taken.Enlarged folds were found in the body of the stomach.Biopsy taken.Normal duodenum.A hiatus hernia was found in the esophagus. 04/13/2015    • ENDOSCOPY      Hiatus hernia in esophagus. Gastritis in stomach. Biopsy taken. Normal duodenum. Biopsy taken. 09/26/2012       • ENDOSCOPY N/A 12/18/2017    Procedure: ESOPHAGOGASTRODUODENOSCOPY--attn mid esophagus, abnl on CT;  Surgeon: Alli Dockery MD;  Location: [facility] ENDOSCOPY;  Service:    • ENDOSCOPY N/A 3/6/2019    Procedure: ESOPHAGOGASTRODUODENOSCOPY WITH DILATATION;  Surgeon: Alli Dockery MD;  Location: [facility] ENDOSCOPY;  Service: Gastroenterology   • ENDOSCOPY N/A 9/18/2020    Procedure: ESOPHAGOGASTRODUODENOSCOPY eval varices;  Surgeon: Alli Dockery MD;  Location: [facility] ENDOSCOPY;  Service: Gastroenterology;  Laterality: N/A;   •  ENDOSCOPY N/A 4/28/2021    Procedure: ESOPHAGOGASTRODUODENOSCOPY WITH ANESTHESIA--with botox, attn antrum poss malig;  Surgeon: Alli Dockery MD;  Location: [facility] ENDOSCOPY;  Service: Gastroenterology;  Laterality: N/A;   • UPPER GASTROINTESTINAL ENDOSCOPY  08/24/2016   • UPPER GASTROINTESTINAL ENDOSCOPY  04/13/2015   • UPPER GASTROINTESTINAL ENDOSCOPY  12/18/2017   • UPPER GASTROINTESTINAL ENDOSCOPY  03/06/2019   • UPPER GASTROINTESTINAL ENDOSCOPY  09/18/2020   • UPPER GASTROINTESTINAL ENDOSCOPY  04/28/2021       Health Maintenance:  Health Maintenance   Topic Date Due   • ZOSTER VACCINE (1 of 2) Never done   • ANNUAL WELLNESS VISIT  Never done   • DIABETIC EYE EXAM  09/11/2019   • URINE MICROALBUMIN  02/07/2020   • Hepatitis B (1 of 3 - Risk 3-dose series) 09/16/2022 (Originally 7/17/1979)   • INFLUENZA VACCINE  10/01/2021   • LUNG CANCER SCREENING  01/13/2022   • HEMOGLOBIN A1C  02/13/2022   • LIPID PANEL  04/02/2022   • DIABETIC FOOT EXAM  09/16/2022   • Pneumococcal Vaccine 0-64 (2 of 2 - PPSV23) 07/17/2023   • TDAP/TD VACCINES (2 - Td or Tdap) 08/15/2024   • COLORECTAL CANCER SCREENING  09/18/2025   • HEPATITIS C SCREENING  Completed   • COVID-19 Vaccine  Completed       Current Meds:    Current Outpatient Medications:   •  albuterol sulfate  (90 Base) MCG/ACT inhaler, Inhale 2 puffs Every 4 (Four) Hours As Needed for Wheezing or Shortness of Air., Disp: 18 g, Rfl: 5  •  amLODIPine (NORVASC) 10 MG tablet, Take 10 mg by mouth Daily., Disp: , Rfl:   •  aspirin (Aspirin Adult Low Strength) 81 MG EC tablet, Take 1 tablet by mouth Daily., Disp: 30 tablet, Rfl: 2  •  Blood Pressure Monitoring (Adult Blood Pressure Cuff Lg) kit, Check BP daily, Disp: 1 each, Rfl: 0  •  cetirizine (zyrTEC) 10 MG tablet, Take 1 tablet by mouth Daily., Disp: 30 tablet, Rfl: 5  •  fluticasone (FLONASE) 50 MCG/ACT nasal spray, 2 sprays into each nostril Daily for 30 days., Disp: 1 bottle, Rfl: 11  •  fluticasone (FLONASE) 50  "MCG/ACT nasal spray, 2 sprays into the nostril(s) as directed by provider Daily., Disp: 15.8 mL, Rfl: 5  •  losartan (COZAAR) 100 MG tablet, Take 1 tablet by mouth Daily., Disp: 30 tablet, Rfl: 5  •  magnesium oxide (MAGOX) 400 (241.3 Mg) MG tablet tablet, Take 1 tablet by mouth Daily., Disp: 30 tablet, Rfl: 4  •  metFORMIN (GLUCOPHAGE) 500 MG tablet, Take 1 tablet by mouth Daily With Breakfast., Disp: 30 tablet, Rfl: 4  •  omeprazole (priLOSEC) 20 MG capsule, Take 1 capsule by mouth 2 (Two) Times a Day., Disp: 60 capsule, Rfl: 3  •  propranolol (INDERAL) 40 MG tablet, Take 1 tablet by mouth Daily., Disp: 30 tablet, Rfl: 3  •  tiotropium (SPIRIVA) 18 MCG per inhalation capsule, Place 1 capsule into inhaler and inhale Daily., Disp: 30 capsule, Rfl: 5    Allergies:  Ace inhibitors, Lisinopril, and Vistaril [hydroxyzine hcl]    Family Hx:  Family History   Problem Relation Age of Onset   • Cancer Mother    • Cirrhosis Father    • Diabetes Other    • Hypertension Other         Social History:  Social History     Socioeconomic History   • Marital status: Single     Spouse name: Not on file   • Number of children: 0   • Years of education: 12   • Highest education level: Not on file   Tobacco Use   • Smoking status: Current Every Day Smoker     Packs/day: 1.50     Years: 40.00     Pack years: 60.00     Types: Cigarettes   • Smokeless tobacco: Never Used   Vaping Use   • Vaping Use: Never used   Substance and Sexual Activity   • Alcohol use: Yes     Alcohol/week: 6.0 standard drinks     Types: 6 Cans of beer per week     Comment: when can get   • Drug use: No   • Sexual activity: Defer     Partners: Female       Review of Systems  Review of Systems    Objective:     /62   Pulse 98   Ht 177.8 cm (70\")   Wt 86.2 kg (190 lb 1.6 oz)   SpO2 99%   BMI 27.28 kg/m²   Physical Exam alert no distress ENT grossly normal without asymmetry neck supple without JVD extremities show hypertrophic nails on both feet with mycotic " changes please see Dr. Milligan's note for more detailed information regarding physical exam findings.    Lab Review  Results for orders placed or performed in visit on 08/13/21   Ammonia    Specimen: Blood   Result Value Ref Range    Ammonia 32 16 - 60 umol/L   Comprehensive Metabolic Panel    Specimen: Blood   Result Value Ref Range    Glucose 80 65 - 99 mg/dL    BUN 7 (L) 8 - 23 mg/dL    Creatinine 0.74 (L) 0.76 - 1.27 mg/dL    Sodium 141 136 - 145 mmol/L    Potassium 3.2 (L) 3.5 - 5.2 mmol/L    Chloride 102 98 - 107 mmol/L    CO2 26.4 22.0 - 29.0 mmol/L    Calcium 9.0 8.6 - 10.5 mg/dL    Total Protein 7.6 6.0 - 8.5 g/dL    Albumin 4.30 3.50 - 5.20 g/dL    ALT (SGPT) 16 1 - 41 U/L    AST (SGOT) 23 1 - 40 U/L    Alkaline Phosphatase 70 39 - 117 U/L    Total Bilirubin 0.5 0.0 - 1.2 mg/dL    eGFR  African Amer 129 >60 mL/min/1.73    Globulin 3.3 gm/dL    A/G Ratio 1.3 g/dL    BUN/Creatinine Ratio 9.5 7.0 - 25.0    Anion Gap 12.6 5.0 - 15.0 mmol/L   Ferritin    Specimen: Blood   Result Value Ref Range    Ferritin 166.00 30.00 - 400.00 ng/mL   Iron Profile    Specimen: Blood   Result Value Ref Range    Iron 149 59 - 158 mcg/dL    Iron Saturation 40 20 - 50 %    Transferrin 253 200 - 360 mg/dL    TIBC 377 298 - 536 mcg/dL   Protime-INR    Specimen: Blood   Result Value Ref Range    Protime 12.5 11.1 - 15.3 Seconds    INR 0.94 0.80 - 1.20   CBC Auto Differential    Specimen: Blood   Result Value Ref Range    WBC 6.94 3.40 - 10.80 10*3/mm3    RBC 4.87 4.14 - 5.80 10*6/mm3    Hemoglobin 12.8 (L) 13.0 - 17.7 g/dL    Hematocrit 39.3 37.5 - 51.0 %    MCV 80.7 79.0 - 97.0 fL    MCH 26.3 (L) 26.6 - 33.0 pg    MCHC 32.6 31.5 - 35.7 g/dL    RDW 15.3 12.3 - 15.4 %    RDW-SD 43.7 37.0 - 54.0 fl    MPV 10.6 6.0 - 12.0 fL    Platelets 540 (H) 140 - 450 10*3/mm3    Neutrophil % 73.9 42.7 - 76.0 %    Lymphocyte % 17.9 (L) 19.6 - 45.3 %    Monocyte % 5.5 5.0 - 12.0 %    Eosinophil % 1.9 0.3 - 6.2 %    Basophil % 0.4 0.0 - 1.5 %     Immature Grans % 0.4 0.0 - 0.5 %    Neutrophils, Absolute 5.13 1.70 - 7.00 10*3/mm3    Lymphocytes, Absolute 1.24 0.70 - 3.10 10*3/mm3    Monocytes, Absolute 0.38 0.10 - 0.90 10*3/mm3    Eosinophils, Absolute 0.13 0.00 - 0.40 10*3/mm3    Basophils, Absolute 0.03 0.00 - 0.20 10*3/mm3    Immature Grans, Absolute 0.03 0.00 - 0.05 10*3/mm3    nRBC 0.1 0.0 - 0.2 /100 WBC            Assessment:     Diagnoses and all orders for this visit:    1. Essential hypertension (Primary)    2. Type 2 diabetes mellitus with complication, without long-term current use of insulin (CMS/[facility])  -     Ambulatory Referral for Diabetic Eye Exam-Ophthalmology    3. Gastroesophageal reflux disease with esophagitis without hemorrhage    4. Hepatic fibrosis        Plan:   Patient is given referral for surveillance labs and is counseled on smoking cessation and moderation of EtOH patient will schedule back and procedure clinic for debridement of nails please see Dr. Milligan's note for more detailed information regarding plan of care recheck otherwise 3 months or as needed  I have seen and examined the patient.  I have reviewed the notes, assessments, and/or procedures performed by Catrachito Milligan MD, I concur with her/his documentation and assessment and plan for Jameel Dozier.            This document has been electronically signed by Son Li MD on September 21, 2021 10:18 CDT

## 2021-11-30 ENCOUNTER — LAB (OUTPATIENT)
Dept: LAB | Facility: HOSPITAL | Age: 61
End: 2021-11-30

## 2021-11-30 DIAGNOSIS — I85.00 ESOPHAGEAL VARICES WITHOUT BLEEDING, UNSPECIFIED ESOPHAGEAL VARICES TYPE (HCC): ICD-10-CM

## 2021-11-30 DIAGNOSIS — K21.00 GASTROESOPHAGEAL REFLUX DISEASE WITH ESOPHAGITIS WITHOUT HEMORRHAGE: ICD-10-CM

## 2021-11-30 DIAGNOSIS — K22.0 ACHALASIA: ICD-10-CM

## 2021-11-30 DIAGNOSIS — K74.00 HEPATIC FIBROSIS: ICD-10-CM

## 2021-11-30 DIAGNOSIS — E11.49 TYPE II DIABETES MELLITUS WITH NEUROLOGICAL MANIFESTATIONS (HCC): Primary | ICD-10-CM

## 2021-11-30 PROCEDURE — 82043 UR ALBUMIN QUANTITATIVE: CPT | Performed by: FAMILY MEDICINE

## 2021-11-30 PROCEDURE — 80053 COMPREHEN METABOLIC PANEL: CPT

## 2021-11-30 PROCEDURE — 36415 COLL VENOUS BLD VENIPUNCTURE: CPT | Performed by: FAMILY MEDICINE

## 2021-11-30 PROCEDURE — 83036 HEMOGLOBIN GLYCOSYLATED A1C: CPT | Performed by: FAMILY MEDICINE

## 2021-11-30 PROCEDURE — 85025 COMPLETE CBC W/AUTO DIFF WBC: CPT

## 2021-12-01 LAB
ALBUMIN SERPL-MCNC: 4.6 G/DL (ref 3.5–5.2)
ALBUMIN UR-MCNC: 218.1 MG/DL
ALBUMIN/GLOB SERPL: 1.4 G/DL
ALP SERPL-CCNC: 60 U/L (ref 39–117)
ALT SERPL W P-5'-P-CCNC: 11 U/L (ref 1–41)
ANION GAP SERPL CALCULATED.3IONS-SCNC: 12.6 MMOL/L (ref 5–15)
AST SERPL-CCNC: 21 U/L (ref 1–40)
BASOPHILS # BLD AUTO: 0.07 10*3/MM3 (ref 0–0.2)
BASOPHILS NFR BLD AUTO: 1.7 % (ref 0–1.5)
BILIRUB SERPL-MCNC: 0.8 MG/DL (ref 0–1.2)
BUN SERPL-MCNC: 9 MG/DL (ref 8–23)
BUN/CREAT SERPL: 8.7 (ref 7–25)
CALCIUM SPEC-SCNC: 9.7 MG/DL (ref 8.6–10.5)
CHLORIDE SERPL-SCNC: 101 MMOL/L (ref 98–107)
CO2 SERPL-SCNC: 28.4 MMOL/L (ref 22–29)
CREAT SERPL-MCNC: 1.04 MG/DL (ref 0.76–1.27)
DEPRECATED RDW RBC AUTO: 40.1 FL (ref 37–54)
EOSINOPHIL # BLD AUTO: 0.25 10*3/MM3 (ref 0–0.4)
EOSINOPHIL NFR BLD AUTO: 5.9 % (ref 0.3–6.2)
ERYTHROCYTE [DISTWIDTH] IN BLOOD BY AUTOMATED COUNT: 14.5 % (ref 12.3–15.4)
GFR SERPL CREATININE-BSD FRML MDRD: 87 ML/MIN/1.73
GLOBULIN UR ELPH-MCNC: 3.3 GM/DL
GLUCOSE SERPL-MCNC: 87 MG/DL (ref 65–99)
HBA1C MFR BLD: 5.64 % (ref 4.8–5.6)
HCT VFR BLD AUTO: 47.6 % (ref 37.5–51)
HGB BLD-MCNC: 15.3 G/DL (ref 13–17.7)
IMM GRANULOCYTES # BLD AUTO: 0.01 10*3/MM3 (ref 0–0.05)
IMM GRANULOCYTES NFR BLD AUTO: 0.2 % (ref 0–0.5)
LYMPHOCYTES # BLD AUTO: 1.71 10*3/MM3 (ref 0.7–3.1)
LYMPHOCYTES NFR BLD AUTO: 40.4 % (ref 19.6–45.3)
MCH RBC QN AUTO: 25.5 PG (ref 26.6–33)
MCHC RBC AUTO-ENTMCNC: 32.1 G/DL (ref 31.5–35.7)
MCV RBC AUTO: 79.3 FL (ref 79–97)
MONOCYTES # BLD AUTO: 0.36 10*3/MM3 (ref 0.1–0.9)
MONOCYTES NFR BLD AUTO: 8.5 % (ref 5–12)
NEUTROPHILS NFR BLD AUTO: 1.83 10*3/MM3 (ref 1.7–7)
NEUTROPHILS NFR BLD AUTO: 43.3 % (ref 42.7–76)
NRBC BLD AUTO-RTO: 0.2 /100 WBC (ref 0–0.2)
PLATELET # BLD AUTO: 279 10*3/MM3 (ref 140–450)
PMV BLD AUTO: 12.1 FL (ref 6–12)
POTASSIUM SERPL-SCNC: 3.5 MMOL/L (ref 3.5–5.2)
PROT SERPL-MCNC: 7.9 G/DL (ref 6–8.5)
RBC # BLD AUTO: 6 10*6/MM3 (ref 4.14–5.8)
SODIUM SERPL-SCNC: 142 MMOL/L (ref 136–145)
WBC NRBC COR # BLD: 4.23 10*3/MM3 (ref 3.4–10.8)

## 2021-12-08 RX ORDER — AMLODIPINE BESYLATE 10 MG/1
10 TABLET ORAL DAILY
Qty: 30 TABLET | Refills: 11 | Status: SHIPPED | OUTPATIENT
Start: 2021-12-08 | End: 2023-01-04

## 2021-12-15 ENCOUNTER — OFFICE VISIT (OUTPATIENT)
Dept: GASTROENTEROLOGY | Facility: CLINIC | Age: 61
End: 2021-12-15

## 2021-12-15 VITALS
DIASTOLIC BLOOD PRESSURE: 97 MMHG | WEIGHT: 191.4 LBS | BODY MASS INDEX: 27.4 KG/M2 | HEIGHT: 70 IN | HEART RATE: 82 BPM | SYSTOLIC BLOOD PRESSURE: 166 MMHG

## 2021-12-15 DIAGNOSIS — K21.00 GASTROESOPHAGEAL REFLUX DISEASE WITH ESOPHAGITIS WITHOUT HEMORRHAGE: ICD-10-CM

## 2021-12-15 DIAGNOSIS — K74.00 HEPATIC FIBROSIS: ICD-10-CM

## 2021-12-15 DIAGNOSIS — K22.0 ACHALASIA: Primary | ICD-10-CM

## 2021-12-15 DIAGNOSIS — I85.00 ESOPHAGEAL VARICES WITHOUT BLEEDING, UNSPECIFIED ESOPHAGEAL VARICES TYPE: ICD-10-CM

## 2021-12-15 PROCEDURE — 99213 OFFICE O/P EST LOW 20 MIN: CPT | Performed by: PHYSICIAN ASSISTANT

## 2021-12-16 ENCOUNTER — OFFICE VISIT (OUTPATIENT)
Dept: FAMILY MEDICINE CLINIC | Facility: CLINIC | Age: 61
End: 2021-12-16

## 2021-12-16 VITALS
DIASTOLIC BLOOD PRESSURE: 78 MMHG | HEART RATE: 81 BPM | WEIGHT: 193.4 LBS | TEMPERATURE: 96.8 F | SYSTOLIC BLOOD PRESSURE: 142 MMHG | BODY MASS INDEX: 27.69 KG/M2 | HEIGHT: 70 IN | OXYGEN SATURATION: 98 %

## 2021-12-16 DIAGNOSIS — I10 ESSENTIAL HYPERTENSION: Primary | ICD-10-CM

## 2021-12-16 DIAGNOSIS — J44.9 CHRONIC OBSTRUCTIVE PULMONARY DISEASE, UNSPECIFIED COPD TYPE (HCC): ICD-10-CM

## 2021-12-16 DIAGNOSIS — E11.65 TYPE 2 DIABETES MELLITUS WITH HYPERGLYCEMIA, WITHOUT LONG-TERM CURRENT USE OF INSULIN (HCC): ICD-10-CM

## 2021-12-16 PROCEDURE — 99213 OFFICE O/P EST LOW 20 MIN: CPT | Performed by: STUDENT IN AN ORGANIZED HEALTH CARE EDUCATION/TRAINING PROGRAM

## 2021-12-16 NOTE — PROGRESS NOTES
Family Medicine Residency  Ina Peace MD    Subjective:     Jameel Dozier is a 61 y.o. male who presents for management of his hypertension.  He states that he did not take his Norvasc medication because he did not know what the medication was for.  He states that he has been following up with the GI PA Gustavo for his GERD, hepatic fibrosis, and achalasia.  He states that his COPD has been under control and he has not had any issues with his breathing.  He also states that he has been taking his Metformin and has been informed of his hemoglobin A1c results.  He has no other concerns at this time.    The following portions of the patient's history were reviewed and updated as appropriate: allergies, current medications, past family history, past medical history, past social history, past surgical history and problem list.    Past Medical Hx:  Past Medical History:   Diagnosis Date   • Abdominal pain    • Acquired achalasia of esophagus    • Adenomatous polyp of colon    • Adverse drug effect    • Alcohol dependence with alcohol-induced disorder (HCC)    • Allergic rhinitis    • Anal fissure    • Anemia due to blood loss    • Benign essential hypertension    • Central obesity    • Cerebrovascular disease    • Chronic obstructive lung disease (HCC)    • Claudication (HCC)    • Disorder of peripheral nervous system    • Diverticular disease of colon     completed antibx, ? neoplasm on CT   • Dysphagia    • Dyspnea    • ED (erectile dysfunction)    • Epigastric pain    • Esophageal dysmotility    • Esophageal reflux    • Esophagitis    • Essential hypertension    • External hemorrhoids    • Gastritis    • GERD with esophagitis    • Heavy tobacco smoker    • Hemorrhoids    • Hiatal hernia    • Hiccough    • History of colon polyps    • History of echocardiogram     Normal LV funciton with Ef of 65% to 70% without regional wall motion abnormalities.Mild CLVH. Normal RV size and function. No significant  valvular regurgitation or stenosis. 09/19/2014       • History of EKG    • History of TIA (transient ischemic attack)    • Hypercholesterolemia    • Hypertension    • Left ventricular hypertrophy    • Male erectile disorder    • Obstructive sleep apnea syndrome    • Primary osteoarthritis of knees, bilateral    • Rectal bleed     painful   • Rectal hemorrhage    • Sleep disorder    • Transient cerebral ischemia    • Upper respiratory infection    • Weakness     Attacks of weakness       Past Surgical Hx:  Past Surgical History:   Procedure Laterality Date   • COLONOSCOPY  04/13/2015    Internal and external hemorrhoids found. 04/13/2015    • COLONOSCOPY N/A 9/18/2020    Procedure: COLONOSCOPY;  Surgeon: Alli Dockery MD;  Location: [facility] ENDOSCOPY;  Service: Gastroenterology;  Laterality: N/A;   • ENDOSCOPY      Internal & external hemorrhoids found. 1 polyp in sigmoid colon; removed by snare cautery polypectomy. 09/26/2012    • ENDOSCOPY      Gastritis found in the stomach. Biopsy taken.Enlarged folds were found in the body of the stomach.Biopsy taken.Normal duodenum.A hiatus hernia was found in the esophagus. 04/13/2015    • ENDOSCOPY      Hiatus hernia in esophagus. Gastritis in stomach. Biopsy taken. Normal duodenum. Biopsy taken. 09/26/2012       • ENDOSCOPY N/A 12/18/2017    Procedure: ESOPHAGOGASTRODUODENOSCOPY--attn mid esophagus, abnl on CT;  Surgeon: Alli Dockrey MD;  Location: [facility] ENDOSCOPY;  Service:    • ENDOSCOPY N/A 3/6/2019    Procedure: ESOPHAGOGASTRODUODENOSCOPY WITH DILATATION;  Surgeon: Alli Dockery MD;  Location: [facility] ENDOSCOPY;  Service: Gastroenterology   • ENDOSCOPY N/A 9/18/2020    Procedure: ESOPHAGOGASTRODUODENOSCOPY eval varices;  Surgeon: Alli Dockery MD;  Location: [facility] ENDOSCOPY;  Service: Gastroenterology;  Laterality: N/A;   • ENDOSCOPY N/A 4/28/2021    Procedure: ESOPHAGOGASTRODUODENOSCOPY WITH ANESTHESIA--with botox, attn antrum poss malig;  Surgeon: Sarkis  Alli LABOY MD;  Location: [facility] ENDOSCOPY;  Service: Gastroenterology;  Laterality: N/A;   • UPPER GASTROINTESTINAL ENDOSCOPY  08/24/2016   • UPPER GASTROINTESTINAL ENDOSCOPY  04/13/2015   • UPPER GASTROINTESTINAL ENDOSCOPY  12/18/2017   • UPPER GASTROINTESTINAL ENDOSCOPY  03/06/2019   • UPPER GASTROINTESTINAL ENDOSCOPY  09/18/2020   • UPPER GASTROINTESTINAL ENDOSCOPY  04/28/2021       Current Meds:    Current Outpatient Medications:   •  albuterol sulfate  (90 Base) MCG/ACT inhaler, Inhale 2 puffs Every 4 (Four) Hours As Needed for Wheezing or Shortness of Air., Disp: 18 g, Rfl: 5  •  amLODIPine (NORVASC) 10 MG tablet, Take 1 tablet by mouth Daily., Disp: 30 tablet, Rfl: 11  •  aspirin (Aspirin Adult Low Strength) 81 MG EC tablet, Take 1 tablet by mouth Daily., Disp: 30 tablet, Rfl: 2  •  Blood Pressure Monitoring (Adult Blood Pressure Cuff Lg) kit, Check BP daily, Disp: 1 each, Rfl: 0  •  cetirizine (zyrTEC) 10 MG tablet, Take 1 tablet by mouth Daily., Disp: 30 tablet, Rfl: 5  •  fluticasone (FLONASE) 50 MCG/ACT nasal spray, 2 sprays into each nostril Daily for 30 days., Disp: 1 bottle, Rfl: 11  •  fluticasone (FLONASE) 50 MCG/ACT nasal spray, 2 sprays into the nostril(s) as directed by provider Daily., Disp: 15.8 mL, Rfl: 5  •  losartan (COZAAR) 100 MG tablet, Take 1 tablet by mouth Daily., Disp: 30 tablet, Rfl: 5  •  magnesium oxide (MAGOX) 400 (241.3 Mg) MG tablet tablet, Take 1 tablet by mouth Daily., Disp: 30 tablet, Rfl: 4  •  metFORMIN (GLUCOPHAGE) 500 MG tablet, Take 1 tablet by mouth Daily With Breakfast., Disp: 30 tablet, Rfl: 4  •  omeprazole (priLOSEC) 20 MG capsule, Take 1 capsule by mouth 2 (Two) Times a Day., Disp: 60 capsule, Rfl: 3  •  propranolol (INDERAL) 40 MG tablet, Take 1 tablet by mouth Daily., Disp: 30 tablet, Rfl: 3  •  tiotropium (SPIRIVA) 18 MCG per inhalation capsule, Place 1 capsule into inhaler and inhale Daily., Disp: 30 capsule, Rfl: 5    Allergies:  Allergies   Allergen  "Reactions   • Ace Inhibitors Angioedema     hypotension   • Lisinopril Other (See Comments)     cough   • Vistaril [Hydroxyzine Hcl] Other (See Comments)     Patient is unsure        Family Hx:  Family History   Problem Relation Age of Onset   • Cancer Mother    • Cirrhosis Father    • Diabetes Other    • Hypertension Other         Social History:  Social History     Socioeconomic History   • Marital status: Single   • Number of children: 0   • Years of education: 12   Tobacco Use   • Smoking status: Current Every Day Smoker     Packs/day: 1.50     Years: 40.00     Pack years: 60.00     Types: Cigarettes   • Smokeless tobacco: Never Used   Vaping Use   • Vaping Use: Never used   Substance and Sexual Activity   • Alcohol use: Yes     Alcohol/week: 6.0 standard drinks     Types: 6 Cans of beer per week     Comment: when can get   • Drug use: No   • Sexual activity: Defer     Partners: Female       Review of Systems  Review of Systems   Constitutional: Negative.    HENT: Negative for congestion, dental problem, ear discharge, facial swelling, rhinorrhea and sore throat.    Eyes: Negative for pain and visual disturbance.   Respiratory: Negative for apnea, cough, chest tightness, shortness of breath and wheezing.    Cardiovascular: Negative for chest pain, palpitations and leg swelling.   Gastrointestinal: Negative for abdominal pain, blood in stool, constipation, diarrhea, nausea and vomiting.   Endocrine: Negative for cold intolerance, heat intolerance, polydipsia and polyuria.   Genitourinary: Negative for dysuria, flank pain and hematuria.   Musculoskeletal: Negative for back pain and neck pain.   Skin: Negative for rash.   Neurological: Negative for dizziness, syncope, weakness and headaches.       Objective:     /78   Pulse 81   Temp 96.8 °F (36 °C)   Ht 177.8 cm (70\")   Wt 87.7 kg (193 lb 6.4 oz)   SpO2 98%   BMI 27.75 kg/m²   Physical Exam  Vitals reviewed.   Constitutional:       Appearance: He is " obese.   HENT:      Head: Normocephalic and atraumatic.      Mouth/Throat:      Pharynx: Oropharynx is clear.   Eyes:      Pupils: Pupils are equal, round, and reactive to light.   Cardiovascular:      Rate and Rhythm: Normal rate and regular rhythm.      Pulses: Normal pulses.      Heart sounds: Normal heart sounds.   Pulmonary:      Effort: Pulmonary effort is normal.      Breath sounds: Normal breath sounds.   Abdominal:      General: Abdomen is flat. Bowel sounds are normal.      Palpations: Abdomen is soft.   Musculoskeletal:         General: Normal range of motion.      Cervical back: Normal range of motion and neck supple.   Skin:     General: Skin is warm and dry.      Capillary Refill: Capillary refill takes less than 2 seconds.   Neurological:      General: No focal deficit present.      Mental Status: He is alert and oriented to person, place, and time. Mental status is at baseline.   Psychiatric:         Mood and Affect: Mood normal.         Behavior: Behavior normal.          Assessment/Plan:     Diagnoses and all orders for this visit:    1. Essential hypertension (Primary)  - Patient states that he has not been taking his Norvasc medication because he did not know what it is.   - We have advised the patient to take the Norvasc medication and keep a blood pressure log if he is able to obtain a blood pressure cuff.   - The patient is advised to follow-up in 1 month to see how the medication is going for him.    2. Chronic obstructive pulmonary disease, unspecified COPD type (HCC)  -Patient states that he has no issues with his breathing. We will continue current medication management.    3. Type 2 diabetes mellitus with hyperglycemia, without long-term current use of insulin (HCC)  Patient's most recent hemoglobin A1c is 5.6 work.  We will continue current medication regimen.        · Rx changes: N/A  · Patient Education: N/A  · Compliance at present is estimated to be fair.   · Efforts to improve  compliance (if necessary) will be directed at increased exercise.    Depression screening: Up to date; last screen 4/2/2021     Follow-up:     Return in about 4 weeks (around 1/13/2022).    Preventative:  Health Maintenance   Topic Date Due   • ZOSTER VACCINE (1 of 2) Never done   • ANNUAL WELLNESS VISIT  Never done   • INFLUENZA VACCINE  08/01/2021   • COVID-19 Vaccine (3 - Booster for Pfizer series) 10/16/2021   • Hepatitis B (1 of 3 - Risk 3-dose series) 09/16/2022 (Originally 7/17/1979)   • LUNG CANCER SCREENING  01/13/2022   • HEMOGLOBIN A1C  05/30/2022   • LIPID PANEL  08/13/2022   • DIABETIC FOOT EXAM  09/16/2022   • DIABETIC EYE EXAM  11/09/2022   • URINE MICROALBUMIN  11/30/2022   • Pneumococcal Vaccine 0-64 (2 of 2 - PPSV23) 07/17/2023   • TDAP/TD VACCINES (2 - Td or Tdap) 08/15/2024   • COLORECTAL CANCER SCREENING  09/18/2025   • HEPATITIS C SCREENING  Completed     Male Preventative: Exercises regularly  Recommended: none  Vaccine Counseling: N/A    Weight  -Class: Overweight: 25.0-29.9kg/m2   -Patient's Body mass index is 27.75 kg/m². indicating that he is overweight (BMI 25-29.9). Obesity-related health conditions include the following: hypertension. Obesity is unchanged. BMI is is above average; BMI management plan is completed. We discussed portion control and increasing exercise..   eat more fruits and vegetables    Alcohol use:  reports current alcohol use of about 6.0 standard drinks of alcohol per week.  Nicotine status  reports that he has been smoking cigarettes. He has a 60.00 pack-year smoking history. He has never used smokeless tobacco.    Goals     •  Quit smoking / using tobacco (pt-stated)           RISK SCORE: 3        This document has been electronically signed by Ina Peace MD on December 16, 2021 09:14 CST

## 2021-12-20 NOTE — PROGRESS NOTES
I saw and evaluated the patient with the resident. I was present in the room throughout the visit. I did help formulate the assessment and plan with Dr. Ina Peace.   I discussed the case with the resident and agree with the findings and plan as documented in the residents note.  I have helped formulate and discussed the assessment and plan with Dr.Asha Peace.           Subjective   Jameel Dozier is a 61 y.o. male.     History of Present Illness   Patient came in for follow-up of hypertension.  He has been prescribed amlodipine but he says he did not know what it was for so he did not take it at all.  Also his diabetes has been fairly well controlled at home    The following portions of the patient's history were reviewed and updated as appropriate: allergies, current medications,  and problem list.    Review of Systems  Has been followed up with the GI for achalasia and seems to be doing well  No chest pain or shortness of breath  Remainder review of systems per Dr. Peace       Physical Exam    Objective   He is alert oriented he is in no distress.  Answers questions appropriately is not short of breath  Remainder the examination per Dr. Peace    Assessment/Plan   Diagnoses and all orders for this visit:    1. Essential hypertension (Primary)    2. Chronic obstructive pulmonary disease, unspecified COPD type (HCC)    3. Type 2 diabetes mellitus with hyperglycemia, without long-term current use of insulin (HCC)        Encouraged to start on the amlodipine.  He brought the bottle with him but he did not know what it was for and he is agreeable to taking it.  He will have lab work to see how his renal function and hemoglobin A1c are.  Last hemoglobin A1c was well controlled.  Will be notified of lab work and see back in 1 to 2 months or as needed  He will continue to follow-up with [facility] Dodd obstructive pulmonary disease seems to be fairly well controlled.           This document has been electronically  signed by Ham Gruber MD on December 20, 2021 14:57 CST

## 2022-01-12 DIAGNOSIS — K21.00 GERD WITH ESOPHAGITIS: ICD-10-CM

## 2022-01-13 RX ORDER — CETIRIZINE HYDROCHLORIDE 10 MG/1
TABLET ORAL
Qty: 30 TABLET | Refills: 5 | Status: SHIPPED | OUTPATIENT
Start: 2022-01-13 | End: 2022-08-01

## 2022-01-13 RX ORDER — TIOTROPIUM BROMIDE 18 UG/1
CAPSULE ORAL; RESPIRATORY (INHALATION)
Qty: 30 CAPSULE | Refills: 5 | Status: SHIPPED | OUTPATIENT
Start: 2022-01-13 | End: 2022-08-01

## 2022-01-27 ENCOUNTER — OFFICE VISIT (OUTPATIENT)
Dept: FAMILY MEDICINE CLINIC | Facility: CLINIC | Age: 62
End: 2022-01-27

## 2022-01-27 VITALS
OXYGEN SATURATION: 98 % | DIASTOLIC BLOOD PRESSURE: 90 MMHG | WEIGHT: 199.3 LBS | BODY MASS INDEX: 28.53 KG/M2 | HEIGHT: 70 IN | HEART RATE: 80 BPM | TEMPERATURE: 98.3 F | SYSTOLIC BLOOD PRESSURE: 160 MMHG

## 2022-01-27 DIAGNOSIS — E11.8 TYPE 2 DIABETES MELLITUS WITH COMPLICATION, WITHOUT LONG-TERM CURRENT USE OF INSULIN: ICD-10-CM

## 2022-01-27 DIAGNOSIS — J44.9 CHRONIC OBSTRUCTIVE PULMONARY DISEASE, UNSPECIFIED COPD TYPE: ICD-10-CM

## 2022-01-27 DIAGNOSIS — I10 ESSENTIAL HYPERTENSION: Primary | ICD-10-CM

## 2022-01-27 DIAGNOSIS — E83.42 HYPOMAGNESEMIA: ICD-10-CM

## 2022-01-27 DIAGNOSIS — F17.210 SMOKING GREATER THAN 40 PACK YEARS: ICD-10-CM

## 2022-01-27 DIAGNOSIS — R05.8 PRODUCTIVE COUGH: ICD-10-CM

## 2022-01-27 DIAGNOSIS — K21.00 GERD WITH ESOPHAGITIS: ICD-10-CM

## 2022-01-27 PROCEDURE — 99214 OFFICE O/P EST MOD 30 MIN: CPT | Performed by: STUDENT IN AN ORGANIZED HEALTH CARE EDUCATION/TRAINING PROGRAM

## 2022-01-27 RX ORDER — GUAIFENESIN 600 MG/1
1200 TABLET, EXTENDED RELEASE ORAL 2 TIMES DAILY
Qty: 60 TABLET | Refills: 0 | Status: SHIPPED | OUTPATIENT
Start: 2022-01-27

## 2022-01-27 RX ORDER — LOSARTAN POTASSIUM AND HYDROCHLOROTHIAZIDE 12.5; 1 MG/1; MG/1
1 TABLET ORAL DAILY
Qty: 30 TABLET | Refills: 1 | Status: SHIPPED | OUTPATIENT
Start: 2022-01-27 | End: 2022-02-18

## 2022-01-27 RX ORDER — ASPIRIN 81 MG/1
81 TABLET ORAL DAILY
Qty: 30 TABLET | Refills: 2 | Status: SHIPPED | OUTPATIENT
Start: 2022-01-27 | End: 2022-02-21 | Stop reason: SDUPTHER

## 2022-01-27 RX ORDER — ALBUTEROL SULFATE 90 UG/1
2 AEROSOL, METERED RESPIRATORY (INHALATION) EVERY 4 HOURS PRN
Qty: 18 G | Refills: 5 | Status: SHIPPED | OUTPATIENT
Start: 2022-01-27

## 2022-01-27 NOTE — PROGRESS NOTES
Family Medicine Residency  Catrachito Milligan MD    Subjective:     Jameel Dozier is a 61 y.o. male who presents for follow-up for his hypertension, diabetes, and GERD. BP remains elevated today but patient reports that he smoked a couple of cigarettes this am. Has not obtained a bp cuff from the pharmacy. Tolerating mediciations.     GERD is well controlled no issues. Tolerating medications.     Diabetes - tolerating metformin. No issues.     COPD - uses albuterol inhaler 1-2x per month. No issues.     The following portions of the patient's history were reviewed and updated as appropriate: allergies, current medications, past family history, past medical history, past social history, past surgical history and problem list.    Past Medical Hx:  Past Medical History:   Diagnosis Date   • Abdominal pain    • Acquired achalasia of esophagus    • Adenomatous polyp of colon    • Adverse drug effect    • Alcohol dependence with alcohol-induced disorder (HCC)    • Allergic rhinitis    • Anal fissure    • Anemia due to blood loss    • Benign essential hypertension    • Central obesity    • Cerebrovascular disease    • Chronic obstructive lung disease (HCC)    • Claudication (HCC)    • Disorder of peripheral nervous system    • Diverticular disease of colon     completed antibx, ? neoplasm on CT   • Dysphagia    • Dyspnea    • ED (erectile dysfunction)    • Epigastric pain    • Esophageal dysmotility    • Esophageal reflux    • Esophagitis    • Essential hypertension    • External hemorrhoids    • Gastritis    • GERD with esophagitis    • Heavy tobacco smoker    • Hemorrhoids    • Hiatal hernia    • Hiccough    • History of colon polyps    • History of echocardiogram     Normal LV funciton with Ef of 65% to 70% without regional wall motion abnormalities.Mild CLVH. Normal RV size and function. No significant valvular regurgitation or stenosis. 09/19/2014       • History of EKG    • History of TIA (transient  ischemic attack)    • Hypercholesterolemia    • Hypertension    • Left ventricular hypertrophy    • Male erectile disorder    • Obstructive sleep apnea syndrome    • Primary osteoarthritis of knees, bilateral    • Rectal bleed     painful   • Rectal hemorrhage    • Sleep disorder    • Transient cerebral ischemia    • Upper respiratory infection    • Weakness     Attacks of weakness       Past Surgical Hx:  Past Surgical History:   Procedure Laterality Date   • COLONOSCOPY  04/13/2015    Internal and external hemorrhoids found. 04/13/2015    • COLONOSCOPY N/A 9/18/2020    Procedure: COLONOSCOPY;  Surgeon: Alli Dockery MD;  Location: [facility] ENDOSCOPY;  Service: Gastroenterology;  Laterality: N/A;   • ENDOSCOPY      Internal & external hemorrhoids found. 1 polyp in sigmoid colon; removed by snare cautery polypectomy. 09/26/2012    • ENDOSCOPY      Gastritis found in the stomach. Biopsy taken.Enlarged folds were found in the body of the stomach.Biopsy taken.Normal duodenum.A hiatus hernia was found in the esophagus. 04/13/2015    • ENDOSCOPY      Hiatus hernia in esophagus. Gastritis in stomach. Biopsy taken. Normal duodenum. Biopsy taken. 09/26/2012       • ENDOSCOPY N/A 12/18/2017    Procedure: ESOPHAGOGASTRODUODENOSCOPY--attn mid esophagus, abnl on CT;  Surgeon: Alli Dockery MD;  Location: [facility] ENDOSCOPY;  Service:    • ENDOSCOPY N/A 3/6/2019    Procedure: ESOPHAGOGASTRODUODENOSCOPY WITH DILATATION;  Surgeon: Alli Dockery MD;  Location: [facility] ENDOSCOPY;  Service: Gastroenterology   • ENDOSCOPY N/A 9/18/2020    Procedure: ESOPHAGOGASTRODUODENOSCOPY eval varices;  Surgeon: Alli Dockery MD;  Location: [facility] ENDOSCOPY;  Service: Gastroenterology;  Laterality: N/A;   • ENDOSCOPY N/A 4/28/2021    Procedure: ESOPHAGOGASTRODUODENOSCOPY WITH ANESTHESIA--with botox, attn antrum poss malig;  Surgeon: Alli Dockery MD;  Location: [facility] ENDOSCOPY;  Service: Gastroenterology;  Laterality: N/A;   • UPPER  GASTROINTESTINAL ENDOSCOPY  08/24/2016   • UPPER GASTROINTESTINAL ENDOSCOPY  04/13/2015   • UPPER GASTROINTESTINAL ENDOSCOPY  12/18/2017   • UPPER GASTROINTESTINAL ENDOSCOPY  03/06/2019   • UPPER GASTROINTESTINAL ENDOSCOPY  09/18/2020   • UPPER GASTROINTESTINAL ENDOSCOPY  04/28/2021       Current Meds:    Current Outpatient Medications:   •  amLODIPine (NORVASC) 10 MG tablet, Take 1 tablet by mouth Daily., Disp: 30 tablet, Rfl: 11  •  Blood Pressure Monitoring (Adult Blood Pressure Cuff Lg) kit, Check BP daily, Disp: 1 each, Rfl: 0  •  cetirizine (zyrTEC) 10 MG tablet, TAKE 1 TAB DAILY IF NEEDED FOR ALLERGIES., Disp: 30 tablet, Rfl: 5  •  fluticasone (FLONASE) 50 MCG/ACT nasal spray, 2 sprays into each nostril Daily for 30 days., Disp: 1 bottle, Rfl: 11  •  fluticasone (FLONASE) 50 MCG/ACT nasal spray, 2 sprays into the nostril(s) as directed by provider Daily., Disp: 15.8 mL, Rfl: 5  •  magnesium oxide (MAGOX) 400 (241.3 Mg) MG tablet tablet, Take 1 tablet by mouth Daily., Disp: 30 tablet, Rfl: 4  •  metFORMIN (GLUCOPHAGE) 500 MG tablet, Take 1 tablet by mouth Daily With Breakfast., Disp: 30 tablet, Rfl: 4  •  omeprazole (priLOSEC) 20 MG capsule, Take 1 capsule by mouth 2 (Two) Times a Day., Disp: 60 capsule, Rfl: 3  •  propranolol (INDERAL) 40 MG tablet, Take 1 tablet by mouth Daily., Disp: 30 tablet, Rfl: 3  •  Spiriva HandiHaler 18 MCG per inhalation capsule, PLACE 1 CAPSULE INTO INHALER AND INHALE DAILY., Disp: 30 capsule, Rfl: 5  •  albuterol sulfate  (90 Base) MCG/ACT inhaler, Inhale 2 puffs Every 4 (Four) Hours As Needed for Wheezing or Shortness of Air., Disp: 18 g, Rfl: 5  •  aspirin (Aspirin Adult Low Strength) 81 MG EC tablet, Take 1 tablet by mouth Daily., Disp: 30 tablet, Rfl: 2  •  guaiFENesin (Mucinex) 600 MG 12 hr tablet, Take 2 tablets by mouth 2 (Two) Times a Day., Disp: 60 tablet, Rfl: 0  •  losartan-hydrochlorothiazide (Hyzaar) 100-12.5 MG per tablet, Take 1 tablet by mouth Daily., Disp:  "30 tablet, Rfl: 1    Allergies:  Allergies   Allergen Reactions   • Ace Inhibitors Angioedema     hypotension   • Lisinopril Other (See Comments)     cough   • Vistaril [Hydroxyzine Hcl] Other (See Comments)     Patient is unsure        Family Hx:  Family History   Problem Relation Age of Onset   • Cancer Mother    • Cirrhosis Father    • Diabetes Other    • Hypertension Other         Social History:  Social History     Socioeconomic History   • Marital status: Single   • Number of children: 0   • Years of education: 12   Tobacco Use   • Smoking status: Current Every Day Smoker     Packs/day: 1.50     Years: 40.00     Pack years: 60.00     Types: Cigarettes   • Smokeless tobacco: Never Used   Vaping Use   • Vaping Use: Never used   Substance and Sexual Activity   • Alcohol use: Yes     Alcohol/week: 6.0 standard drinks     Types: 6 Cans of beer per week     Comment: when can get   • Drug use: No   • Sexual activity: Defer     Partners: Female       Review of Systems  Review of Systems   Constitutional: Negative for fever.   HENT: Positive for congestion and rhinorrhea.    Respiratory: Positive for cough and shortness of breath.    Cardiovascular: Negative for chest pain, palpitations and leg swelling.   Gastrointestinal: Negative for abdominal pain, constipation, diarrhea, nausea and vomiting.   Genitourinary: Negative for dysuria.   Neurological: Negative for dizziness, light-headedness and headaches.       Objective:     /90   Pulse 80   Temp 98.3 °F (36.8 °C)   Ht 177.8 cm (70\")   Wt 90.4 kg (199 lb 4.8 oz)   SpO2 98%   BMI 28.60 kg/m²   Physical Exam  Vitals reviewed.   Constitutional:       General: He is not in acute distress.  HENT:      Head: Normocephalic and atraumatic.   Cardiovascular:      Rate and Rhythm: Normal rate and regular rhythm.      Pulses: Normal pulses.      Heart sounds: Normal heart sounds.   Pulmonary:      Effort: Pulmonary effort is normal.      Breath sounds: No rhonchi or " rales.   Abdominal:      Palpations: Abdomen is soft.      Tenderness: There is no abdominal tenderness.   Musculoskeletal:      Right lower leg: No edema.      Left lower leg: No edema.   Skin:     General: Skin is warm.   Neurological:      General: No focal deficit present.      Mental Status: He is alert.   Psychiatric:         Speech: Speech normal.         Behavior: Behavior is cooperative.          Assessment/Plan:     Diagnoses and all orders for this visit:    1. Essential hypertension (Primary)  -     aspirin (Aspirin Adult Low Strength) 81 MG EC tablet; Take 1 tablet by mouth Daily.  Dispense: 30 tablet; Refill: 2  -     losartan-hydrochlorothiazide (Hyzaar) 100-12.5 MG per tablet; Take 1 tablet by mouth Daily.  Dispense: 30 tablet; Refill: 1  -     Lipid panel; Future  - Due to continued uncontrolled BP in office will switch pt losartan to Hyzaar. Continued to encourage pt obtain BP cuff and keep log at home.   - After visit, pharmacist brought to my attention that pt is not on statin anymore due to insurance coverage. Pt had myalgias with previous statin therapy and was on Mevacor. Will determine if other statin is covered by insurance and restart therapy. Will call and inform patient if medication able to be added to current regimen.     2. GERD with esophagitis  - Stable on current medical therapy. GI following and per pt, has no recommendations at this time.     3. Chronic obstructive pulmonary disease, unspecified COPD type (HCC)  -     albuterol sulfate  (90 Base) MCG/ACT inhaler; Inhale 2 puffs Every 4 (Four) Hours As Needed for Wheezing or Shortness of Air.  Dispense: 18 g; Refill: 5  - Stable on current medical therapy.     4. Hypomagnesemia  -     Magnesium; Future  - Per pt report, will obtain level and determine if pt needs continued magnesium supplementation.     5. Type 2 diabetes mellitus with complication, without long-term current use of insulin (HCC)  -     Hemoglobin A1c;  Future  - stable. Will obtain A1c at next visit and make therapy adjustments accordingly.     6. Smoking greater than 40 pack years  -     CT Chest Low Dose Wo; Future    7. Productive cough  -     guaiFENesin (Mucinex) 600 MG 12 hr tablet; Take 2 tablets by mouth 2 (Two) Times a Day.  Dispense: 60 tablet; Refill: 0    · Rx changes: refer to a/p    Depression screening: Patient screened positive for depression based on a PHQ-9 score of 0 on 4/2/2021. Follow-up recommendations include: none.     Follow-up:     Return in about 1 month (around 2/27/2022) for Recheck.    Preventative:  Health Maintenance   Topic Date Due   • ZOSTER VACCINE (1 of 2) Never done   • ANNUAL WELLNESS VISIT  Never done   • LIPID PANEL  02/07/2020   • INFLUENZA VACCINE  08/01/2021   • COVID-19 Vaccine (3 - Booster for Pfizer series) 09/16/2021   • LUNG CANCER SCREENING  01/13/2022   • Hepatitis B (1 of 3 - Risk 3-dose series) 09/16/2022 (Originally 7/17/1979)   • HEMOGLOBIN A1C  05/30/2022   • DIABETIC FOOT EXAM  09/16/2022   • DIABETIC EYE EXAM  11/09/2022   • URINE MICROALBUMIN  11/30/2022   • Pneumococcal Vaccine 0-64 (2 of 2 - PPSV23) 07/17/2023   • TDAP/TD VACCINES (2 - Td or Tdap) 08/15/2024   • COLORECTAL CANCER SCREENING  09/18/2025   • HEPATITIS C SCREENING  Completed     Male Preventative: Colon cancer screening is up to date  Recommended: Influenza  Vaccine Counseling:  at future visit    Weight  -Class: Overweight: 25.0-29.9kg/m2   -Patient's Body mass index is 28.6 kg/m². indicating that he is overweight (BMI 25-29.9). Obesity-related health conditions include the following: hypertension, diabetes mellitus and GERD. Obesity is worsening. BMI is is above average; BMI management plan is completed. We discussed portion control and increasing exercise..   increase water intake, increase physical activity and reduce portion size    Alcohol use:  reports current alcohol use of about 6.0 standard drinks of alcohol per  week.  Nicotine status  reports that he has been smoking cigarettes. He has a 60.00 pack-year smoking history. He has never used smokeless tobacco.    Goals     •  Quit smoking / using tobacco (pt-stated)           RISK SCORE: 3      This document has been electronically signed by Catrachito Milligan MD on January 30, 2022 10:10 CST

## 2022-02-18 DIAGNOSIS — I10 ESSENTIAL HYPERTENSION: ICD-10-CM

## 2022-02-18 RX ORDER — LOSARTAN POTASSIUM AND HYDROCHLOROTHIAZIDE 12.5; 1 MG/1; MG/1
1 TABLET ORAL DAILY
Qty: 30 TABLET | Refills: 1 | Status: SHIPPED | OUTPATIENT
Start: 2022-02-18 | End: 2022-02-21 | Stop reason: SDUPTHER

## 2022-02-21 ENCOUNTER — TELEPHONE (OUTPATIENT)
Dept: FAMILY MEDICINE CLINIC | Facility: CLINIC | Age: 62
End: 2022-02-21

## 2022-02-21 ENCOUNTER — LAB (OUTPATIENT)
Dept: LAB | Facility: HOSPITAL | Age: 62
End: 2022-02-21

## 2022-02-21 ENCOUNTER — OFFICE VISIT (OUTPATIENT)
Dept: FAMILY MEDICINE CLINIC | Facility: CLINIC | Age: 62
End: 2022-02-21

## 2022-02-21 VITALS
HEIGHT: 70 IN | DIASTOLIC BLOOD PRESSURE: 72 MMHG | WEIGHT: 205.8 LBS | BODY MASS INDEX: 29.46 KG/M2 | TEMPERATURE: 97.9 F | HEART RATE: 97 BPM | SYSTOLIC BLOOD PRESSURE: 140 MMHG | OXYGEN SATURATION: 98 %

## 2022-02-21 DIAGNOSIS — I10 ESSENTIAL HYPERTENSION: ICD-10-CM

## 2022-02-21 DIAGNOSIS — J44.9 CHRONIC OBSTRUCTIVE PULMONARY DISEASE, UNSPECIFIED COPD TYPE: ICD-10-CM

## 2022-02-21 DIAGNOSIS — I10 ESSENTIAL HYPERTENSION: Primary | ICD-10-CM

## 2022-02-21 DIAGNOSIS — E11.8 TYPE 2 DIABETES MELLITUS WITH COMPLICATION, WITHOUT LONG-TERM CURRENT USE OF INSULIN: ICD-10-CM

## 2022-02-21 DIAGNOSIS — E83.42 HYPOMAGNESEMIA: ICD-10-CM

## 2022-02-21 DIAGNOSIS — E78.00 HYPERCHOLESTEROLEMIA: ICD-10-CM

## 2022-02-21 LAB
CHOLEST SERPL-MCNC: 206 MG/DL (ref 0–200)
HBA1C MFR BLD: 5.8 % (ref 4.8–5.6)
HDLC SERPL-MCNC: 65 MG/DL (ref 40–60)
LDLC SERPL CALC-MCNC: 129 MG/DL (ref 0–100)
LDLC/HDLC SERPL: 1.96 {RATIO}
MAGNESIUM SERPL-MCNC: 1.6 MG/DL (ref 1.6–2.4)
TRIGL SERPL-MCNC: 69 MG/DL (ref 0–150)
VLDLC SERPL-MCNC: 12 MG/DL (ref 5–40)

## 2022-02-21 PROCEDURE — 83036 HEMOGLOBIN GLYCOSYLATED A1C: CPT

## 2022-02-21 PROCEDURE — 80061 LIPID PANEL: CPT

## 2022-02-21 PROCEDURE — 36415 COLL VENOUS BLD VENIPUNCTURE: CPT

## 2022-02-21 PROCEDURE — 83735 ASSAY OF MAGNESIUM: CPT

## 2022-02-21 PROCEDURE — 99214 OFFICE O/P EST MOD 30 MIN: CPT | Performed by: STUDENT IN AN ORGANIZED HEALTH CARE EDUCATION/TRAINING PROGRAM

## 2022-02-21 RX ORDER — ASPIRIN 81 MG/1
81 TABLET ORAL DAILY
Qty: 30 TABLET | Refills: 2 | Status: SHIPPED | OUTPATIENT
Start: 2022-02-21

## 2022-02-21 RX ORDER — ASPIRIN 81 MG/1
81 TABLET ORAL DAILY
Qty: 30 TABLET | Refills: 2 | Status: SHIPPED | OUTPATIENT
Start: 2022-02-21 | End: 2022-02-21 | Stop reason: SDUPTHER

## 2022-02-21 RX ORDER — LOSARTAN POTASSIUM AND HYDROCHLOROTHIAZIDE 12.5; 1 MG/1; MG/1
1 TABLET ORAL DAILY
Qty: 30 TABLET | Refills: 1 | Status: SHIPPED | OUTPATIENT
Start: 2022-02-21 | End: 2022-05-02 | Stop reason: SDUPTHER

## 2022-02-21 NOTE — PROGRESS NOTES
Family Medicine Residency  Catrachito Milligan MD    Subjective:     Jameel Dozier is a 61 y.o. male who presents for follow-up for his hypertension, diabetes, and COPD.    Hypertension -patient has still been unable to obtain blood pressure cuff from pharmacy.  Reports compliance with his medications denies any adverse effects.    Diabetes -patient reports compliance with Metformin denies any adverse effects.    Low-dose CT scan ordered at last visit however patient reports that he did not receive phone call about appointment for the scan. Upon chart review patient has scheduled appointment in 2 days for this.     COPD -patient reports is stable at this time has no complaints.  Reports rare usage of his albuterol inhaler.    The following portions of the patient's history were reviewed and updated as appropriate: allergies, current medications, past family history, past medical history, past social history, past surgical history and problem list.    Past Medical Hx:  Past Medical History:   Diagnosis Date   • Abdominal pain    • Acquired achalasia of esophagus    • Adenomatous polyp of colon    • Adverse drug effect    • Alcohol dependence with alcohol-induced disorder (HCC)    • Allergic rhinitis    • Anal fissure    • Anemia due to blood loss    • Benign essential hypertension    • Central obesity    • Cerebrovascular disease    • Chronic obstructive lung disease (HCC)    • Claudication (HCC)    • Disorder of peripheral nervous system    • Diverticular disease of colon     completed antibx, ? neoplasm on CT   • Dysphagia    • Dyspnea    • ED (erectile dysfunction)    • Epigastric pain    • Esophageal dysmotility    • Esophageal reflux    • Esophagitis    • Essential hypertension    • External hemorrhoids    • Gastritis    • GERD with esophagitis    • Heavy tobacco smoker    • Hemorrhoids    • Hiatal hernia    • Hiccough    • History of colon polyps    • History of echocardiogram     Normal LV funciton  with Ef of 65% to 70% without regional wall motion abnormalities.Mild CLVH. Normal RV size and function. No significant valvular regurgitation or stenosis. 09/19/2014       • History of EKG    • History of TIA (transient ischemic attack)    • Hypercholesterolemia    • Hypertension    • Left ventricular hypertrophy    • Male erectile disorder    • Obstructive sleep apnea syndrome    • Primary osteoarthritis of knees, bilateral    • Rectal bleed     painful   • Rectal hemorrhage    • Sleep disorder    • Transient cerebral ischemia    • Upper respiratory infection    • Weakness     Attacks of weakness       Past Surgical Hx:  Past Surgical History:   Procedure Laterality Date   • COLONOSCOPY  04/13/2015    Internal and external hemorrhoids found. 04/13/2015    • COLONOSCOPY N/A 9/18/2020    Procedure: COLONOSCOPY;  Surgeon: Alli Dockery MD;  Location: [facility] ENDOSCOPY;  Service: Gastroenterology;  Laterality: N/A;   • ENDOSCOPY      Internal & external hemorrhoids found. 1 polyp in sigmoid colon; removed by snare cautery polypectomy. 09/26/2012    • ENDOSCOPY      Gastritis found in the stomach. Biopsy taken.Enlarged folds were found in the body of the stomach.Biopsy taken.Normal duodenum.A hiatus hernia was found in the esophagus. 04/13/2015    • ENDOSCOPY      Hiatus hernia in esophagus. Gastritis in stomach. Biopsy taken. Normal duodenum. Biopsy taken. 09/26/2012       • ENDOSCOPY N/A 12/18/2017    Procedure: ESOPHAGOGASTRODUODENOSCOPY--attn mid esophagus, abnl on CT;  Surgeon: Alli Dockery MD;  Location: [facility] ENDOSCOPY;  Service:    • ENDOSCOPY N/A 3/6/2019    Procedure: ESOPHAGOGASTRODUODENOSCOPY WITH DILATATION;  Surgeon: Alli Dockery MD;  Location: [facility] ENDOSCOPY;  Service: Gastroenterology   • ENDOSCOPY N/A 9/18/2020    Procedure: ESOPHAGOGASTRODUODENOSCOPY eval varices;  Surgeon: Alli Dockery MD;  Location: [facility] ENDOSCOPY;  Service: Gastroenterology;  Laterality: N/A;   • ENDOSCOPY N/A  4/28/2021    Procedure: ESOPHAGOGASTRODUODENOSCOPY WITH ANESTHESIA--with botox, attn antrum matilda malnina;  Surgeon: Alli Dockery MD;  Location: [facility] ENDOSCOPY;  Service: Gastroenterology;  Laterality: N/A;   • UPPER GASTROINTESTINAL ENDOSCOPY  08/24/2016   • UPPER GASTROINTESTINAL ENDOSCOPY  04/13/2015   • UPPER GASTROINTESTINAL ENDOSCOPY  12/18/2017   • UPPER GASTROINTESTINAL ENDOSCOPY  03/06/2019   • UPPER GASTROINTESTINAL ENDOSCOPY  09/18/2020   • UPPER GASTROINTESTINAL ENDOSCOPY  04/28/2021       Current Meds:    Current Outpatient Medications:   •  albuterol sulfate  (90 Base) MCG/ACT inhaler, Inhale 2 puffs Every 4 (Four) Hours As Needed for Wheezing or Shortness of Air., Disp: 18 g, Rfl: 5  •  amLODIPine (NORVASC) 10 MG tablet, Take 1 tablet by mouth Daily., Disp: 30 tablet, Rfl: 11  •  aspirin (Aspirin Adult Low Strength) 81 MG EC tablet, Take 1 tablet by mouth Daily., Disp: 30 tablet, Rfl: 2  •  Blood Pressure Monitoring (Adult Blood Pressure Cuff Lg) kit, Check BP daily, Disp: 1 each, Rfl: 0  •  cetirizine (zyrTEC) 10 MG tablet, TAKE 1 TAB DAILY IF NEEDED FOR ALLERGIES., Disp: 30 tablet, Rfl: 5  •  fluticasone (FLONASE) 50 MCG/ACT nasal spray, 2 sprays into each nostril Daily for 30 days., Disp: 1 bottle, Rfl: 11  •  fluticasone (FLONASE) 50 MCG/ACT nasal spray, 2 sprays into the nostril(s) as directed by provider Daily., Disp: 15.8 mL, Rfl: 5  •  guaiFENesin (Mucinex) 600 MG 12 hr tablet, Take 2 tablets by mouth 2 (Two) Times a Day., Disp: 60 tablet, Rfl: 0  •  losartan-hydrochlorothiazide (HYZAAR) 100-12.5 MG per tablet, Take 1 tablet by mouth Daily., Disp: 30 tablet, Rfl: 1  •  magnesium oxide (MAGOX) 400 (241.3 Mg) MG tablet tablet, Take 1 tablet by mouth Daily., Disp: 30 tablet, Rfl: 4  •  metFORMIN (GLUCOPHAGE) 500 MG tablet, Take 1 tablet by mouth Daily With Breakfast., Disp: 30 tablet, Rfl: 4  •  omeprazole (priLOSEC) 20 MG capsule, Take 1 capsule by mouth 2 (Two) Times a Day., Disp: 60  "capsule, Rfl: 3  •  propranolol (INDERAL) 40 MG tablet, Take 1 tablet by mouth Daily., Disp: 30 tablet, Rfl: 3  •  Spiriva HandiHaler 18 MCG per inhalation capsule, PLACE 1 CAPSULE INTO INHALER AND INHALE DAILY., Disp: 30 capsule, Rfl: 5  •  lovastatin (ALTOPREV) 40 MG 24 hr tablet, Take 1 tablet by mouth Every Night., Disp: 30 tablet, Rfl: 3    Allergies:  Allergies   Allergen Reactions   • Ace Inhibitors Angioedema     hypotension   • Lisinopril Other (See Comments)     cough   • Vistaril [Hydroxyzine Hcl] Other (See Comments)     Patient is unsure        Family Hx:  Family History   Problem Relation Age of Onset   • Cancer Mother    • Cirrhosis Father    • Diabetes Other    • Hypertension Other         Social History:  Social History     Socioeconomic History   • Marital status: Single   • Number of children: 0   • Years of education: 12   Tobacco Use   • Smoking status: Current Every Day Smoker     Packs/day: 1.50     Years: 40.00     Pack years: 60.00     Types: Cigarettes   • Smokeless tobacco: Never Used   Vaping Use   • Vaping Use: Never used   Substance and Sexual Activity   • Alcohol use: Yes     Alcohol/week: 6.0 standard drinks     Types: 6 Cans of beer per week     Comment: when can get   • Drug use: No   • Sexual activity: Defer     Partners: Female       Review of Systems  Review of Systems   Constitutional: Negative for fever.   Respiratory: Negative for shortness of breath.    Cardiovascular: Negative for chest pain and leg swelling.   Gastrointestinal: Negative for abdominal pain, constipation, diarrhea, nausea and vomiting.   Genitourinary: Negative for dysuria.   Skin: Negative for rash.   Neurological: Negative for dizziness and light-headedness.       Objective:     /72   Pulse 97   Temp 97.9 °F (36.6 °C)   Ht 177.8 cm (70\")   Wt 93.4 kg (205 lb 12.8 oz)   SpO2 98%   BMI 29.53 kg/m²   Physical Exam  Vitals reviewed.   Constitutional:       General: He is not in acute " distress.  HENT:      Head: Normocephalic and atraumatic.   Eyes:      Conjunctiva/sclera: Conjunctivae normal.   Cardiovascular:      Rate and Rhythm: Normal rate and regular rhythm.      Pulses: Normal pulses.      Heart sounds: Normal heart sounds.   Pulmonary:      Effort: Pulmonary effort is normal.      Breath sounds: Wheezing present. No rhonchi or rales.   Abdominal:      Palpations: Abdomen is soft.      Tenderness: There is no abdominal tenderness.   Musculoskeletal:      Right lower leg: No edema.      Left lower leg: No edema.   Skin:     General: Skin is warm.   Neurological:      General: No focal deficit present.      Mental Status: He is alert.   Psychiatric:         Mood and Affect: Mood normal.         Behavior: Behavior normal.          Assessment/Plan:     Diagnoses and all orders for this visit:    1. Essential hypertension (Primary)  -     aspirin (Aspirin Adult Low Strength) 81 MG EC tablet; Take 1 tablet by mouth Daily.  Dispense: 30 tablet; Refill: 2  -     losartan-hydrochlorothiazide (HYZAAR) 100-12.5 MG per tablet; Take 1 tablet by mouth Daily.  Dispense: 30 tablet; Refill: 1  -Blood pressure today shows better control.  Patient encouraged to continue to try to obtain blood pressure cuff and keep blood pressure log at home.  Patient expressed understanding.  We will continue to monitor and make therapy adjustments accordingly.    2. Type 2 diabetes mellitus with complication, without long-term current use of insulin (HCC)  Stable on current medical therapy.  Hemoglobin A1c labs ordered previously patient encouraged to obtain these today.  Patient expressed understanding.  Will follow up with results and make therapy adjustments accordingly.    3. Hypercholesterolemia  -     lovastatin (ALTOPREV) 40 MG 24 hr tablet; Take 1 tablet by mouth Every Night.  Dispense: 30 tablet; Refill: 3  -Patient previously on lovastatin 40 mg but due to insurance this was discontinued.  Tried other statins  previously and had side effect of myalgias.  Have restarted lovastatin at this time.  If insurance does not cover it we canchange to other medication covered by insurance.  Lipid panel ordered previously and patient encouraged to obtain this today.    4. COPD   - Stable on current medical therapy.  Will continue to monitor and make therapy adjustments accordingly.    · Rx changes: Refer to a/p  · Patient Education: Educated on need for statin medication for prevention of stroke and MI  · Compliance at present is estimated to be good.   · Efforts to improve compliance (if necessary) will be directed at increased exercise and BP monitoring at home.    Depression screening: Patient screened positive for depression based on a PHQ-9 score of 0 on 4/2/2021. Follow-up recommendations include: N/A.     Follow-up:     Return in about 3 months (around 5/21/2022) for Recheck.    Preventative:  Health Maintenance   Topic Date Due   • ZOSTER VACCINE (1 of 2) Never done   • ANNUAL WELLNESS VISIT  Never done   • LIPID PANEL  02/07/2020   • INFLUENZA VACCINE  08/01/2021   • LUNG CANCER SCREENING  01/13/2022   • Hepatitis B (1 of 3 - Risk 3-dose series) 09/16/2022 (Originally 7/17/1979)   • HEMOGLOBIN A1C  05/30/2022   • DIABETIC FOOT EXAM  09/16/2022   • DIABETIC EYE EXAM  11/09/2022   • URINE MICROALBUMIN  11/30/2022   • Pneumococcal Vaccine 0-64 (2 of 2 - PPSV23) 07/17/2023   • TDAP/TD VACCINES (2 - Td or Tdap) 08/15/2024   • COLORECTAL CANCER SCREENING  09/18/2025   • HEPATITIS C SCREENING  Completed   • COVID-19 Vaccine  Completed     Male Preventative: Cholesterol screening is due  Recommended: zoster  Vaccine Counseling:  at future visit    Weight  -Class: Overweight: 25.0-29.9kg/m2   -Patient's Body mass index is 29.53 kg/m². indicating that he is overweight (BMI 25-29.9). Patient's (Body mass index is 29.53 kg/m².) indicates that they are overweight with health conditions that include hypertension and dyslipidemias .  Weight is worsening. BMI is is above average; BMI management plan is completed. We discussed portion control and increasing exercise. .   increase water intake, increase physical activity and reduce portion size    Alcohol use:  reports current alcohol use of about 6.0 standard drinks of alcohol per week.  Nicotine status  reports that he has been smoking cigarettes. He has a 60.00 pack-year smoking history. He has never used smokeless tobacco.    Goals     •  Quit smoking / using tobacco (pt-stated)           RISK SCORE: 3      This document has been electronically signed by Catrachito Milligan MD on February 21, 2022 11:03 CST

## 2022-02-21 NOTE — TELEPHONE ENCOUNTER
Incoming Refill Request      Medication requested (name and dose):   aspirin (Aspirin Adult Low Strength) 81 MG EC tablet    Pharmacy where request should be sent: [facility]    Additional details provided by patient:     Best call back number: [phone]    Does the patient have less than a 3 day supply:  [] Yes  [x] No    Concepción Milligan  02/21/22, 14:03 CST

## 2022-02-22 NOTE — PROGRESS NOTES
Jameel Dozier  2/21/22  Subjective:     Jameel Dozier is a 61 y.o. male who presents for   Chief Complaint   Patient presents with   • Follow-up     1mo   • Diabetes       61-year-old male with history of hypertension diabetes and COPD here today for 1 month follow-up.  Please see Dr. Milligan's note for more detailed information regarding today's encounter physical exam findings and plan of care.  Blood pressures reportedly are under better control however patient has been unable to obtain his blood pressures at home.  Patient is still due for his low-dose chest CT.  Pulmonary status is otherwise stable he denies cough fevers night sweats or chest pain.  He does get short of breath with exertion.  Patient currently denies any signs or symptoms of hypo or hyperglycemia.        Past Medical Hx:  Past Medical History:   Diagnosis Date   • Abdominal pain    • Acquired achalasia of esophagus    • Adenomatous polyp of colon    • Adverse drug effect    • Alcohol dependence with alcohol-induced disorder (HCC)    • Allergic rhinitis    • Anal fissure    • Anemia due to blood loss    • Benign essential hypertension    • Central obesity    • Cerebrovascular disease    • Chronic obstructive lung disease (HCC)    • Claudication (HCC)    • Disorder of peripheral nervous system    • Diverticular disease of colon     completed antibx, ? neoplasm on CT   • Dysphagia    • Dyspnea    • ED (erectile dysfunction)    • Epigastric pain    • Esophageal dysmotility    • Esophageal reflux    • Esophagitis    • Essential hypertension    • External hemorrhoids    • Gastritis    • GERD with esophagitis    • Heavy tobacco smoker    • Hemorrhoids    • Hiatal hernia    • Hiccough    • History of colon polyps    • History of echocardiogram     Normal LV funciton with Ef of 65% to 70% without regional wall motion abnormalities.Mild CLVH. Normal RV size and function. No significant valvular regurgitation or stenosis. 09/19/2014       •  History of EKG    • History of TIA (transient ischemic attack)    • Hypercholesterolemia    • Hypertension    • Left ventricular hypertrophy    • Male erectile disorder    • Obstructive sleep apnea syndrome    • Primary osteoarthritis of knees, bilateral    • Rectal bleed     painful   • Rectal hemorrhage    • Sleep disorder    • Transient cerebral ischemia    • Upper respiratory infection    • Weakness     Attacks of weakness       Past Surgical Hx:  Past Surgical History:   Procedure Laterality Date   • COLONOSCOPY  04/13/2015    Internal and external hemorrhoids found. 04/13/2015    • COLONOSCOPY N/A 9/18/2020    Procedure: COLONOSCOPY;  Surgeon: Alli Dockery MD;  Location: [facility] ENDOSCOPY;  Service: Gastroenterology;  Laterality: N/A;   • ENDOSCOPY      Internal & external hemorrhoids found. 1 polyp in sigmoid colon; removed by snare cautery polypectomy. 09/26/2012    • ENDOSCOPY      Gastritis found in the stomach. Biopsy taken.Enlarged folds were found in the body of the stomach.Biopsy taken.Normal duodenum.A hiatus hernia was found in the esophagus. 04/13/2015    • ENDOSCOPY      Hiatus hernia in esophagus. Gastritis in stomach. Biopsy taken. Normal duodenum. Biopsy taken. 09/26/2012       • ENDOSCOPY N/A 12/18/2017    Procedure: ESOPHAGOGASTRODUODENOSCOPY--attn mid esophagus, abnl on CT;  Surgeon: Alli Dockery MD;  Location: [facility] ENDOSCOPY;  Service:    • ENDOSCOPY N/A 3/6/2019    Procedure: ESOPHAGOGASTRODUODENOSCOPY WITH DILATATION;  Surgeon: Alli Dockery MD;  Location: [facility] ENDOSCOPY;  Service: Gastroenterology   • ENDOSCOPY N/A 9/18/2020    Procedure: ESOPHAGOGASTRODUODENOSCOPY eval varices;  Surgeon: Alli Dockery MD;  Location: [facility] ENDOSCOPY;  Service: Gastroenterology;  Laterality: N/A;   • ENDOSCOPY N/A 4/28/2021    Procedure: ESOPHAGOGASTRODUODENOSCOPY WITH ANESTHESIA--with botox, attn antrum poss malig;  Surgeon: Alli Dockery MD;  Location: [facility] ENDOSCOPY;  Service:  Gastroenterology;  Laterality: N/A;   • UPPER GASTROINTESTINAL ENDOSCOPY  08/24/2016   • UPPER GASTROINTESTINAL ENDOSCOPY  04/13/2015   • UPPER GASTROINTESTINAL ENDOSCOPY  12/18/2017   • UPPER GASTROINTESTINAL ENDOSCOPY  03/06/2019   • UPPER GASTROINTESTINAL ENDOSCOPY  09/18/2020   • UPPER GASTROINTESTINAL ENDOSCOPY  04/28/2021       Health Maintenance:  Health Maintenance   Topic Date Due   • ZOSTER VACCINE (1 of 2) Never done   • ANNUAL WELLNESS VISIT  Never done   • INFLUENZA VACCINE  08/01/2021   • LUNG CANCER SCREENING  01/13/2022   • Hepatitis B (1 of 3 - Risk 3-dose series) 09/16/2022 (Originally 7/17/1979)   • HEMOGLOBIN A1C  08/21/2022   • DIABETIC FOOT EXAM  09/16/2022   • DIABETIC EYE EXAM  11/09/2022   • URINE MICROALBUMIN  11/30/2022   • LIPID PANEL  02/21/2023   • Pneumococcal Vaccine 0-64 (2 of 2 - PPSV23) 07/17/2023   • TDAP/TD VACCINES (2 - Td or Tdap) 08/15/2024   • COLORECTAL CANCER SCREENING  09/18/2025   • HEPATITIS C SCREENING  Completed   • COVID-19 Vaccine  Completed       Current Meds:    Current Outpatient Medications:   •  albuterol sulfate  (90 Base) MCG/ACT inhaler, Inhale 2 puffs Every 4 (Four) Hours As Needed for Wheezing or Shortness of Air., Disp: 18 g, Rfl: 5  •  amLODIPine (NORVASC) 10 MG tablet, Take 1 tablet by mouth Daily., Disp: 30 tablet, Rfl: 11  •  Blood Pressure Monitoring (Adult Blood Pressure Cuff Lg) kit, Check BP daily, Disp: 1 each, Rfl: 0  •  cetirizine (zyrTEC) 10 MG tablet, TAKE 1 TAB DAILY IF NEEDED FOR ALLERGIES., Disp: 30 tablet, Rfl: 5  •  fluticasone (FLONASE) 50 MCG/ACT nasal spray, 2 sprays into each nostril Daily for 30 days., Disp: 1 bottle, Rfl: 11  •  fluticasone (FLONASE) 50 MCG/ACT nasal spray, 2 sprays into the nostril(s) as directed by provider Daily., Disp: 15.8 mL, Rfl: 5  •  guaiFENesin (Mucinex) 600 MG 12 hr tablet, Take 2 tablets by mouth 2 (Two) Times a Day., Disp: 60 tablet, Rfl: 0  •  losartan-hydrochlorothiazide (HYZAAR) 100-12.5 MG  "per tablet, Take 1 tablet by mouth Daily., Disp: 30 tablet, Rfl: 1  •  magnesium oxide (MAGOX) 400 (241.3 Mg) MG tablet tablet, Take 1 tablet by mouth Daily., Disp: 30 tablet, Rfl: 4  •  metFORMIN (GLUCOPHAGE) 500 MG tablet, Take 1 tablet by mouth Daily With Breakfast., Disp: 30 tablet, Rfl: 4  •  omeprazole (priLOSEC) 20 MG capsule, Take 1 capsule by mouth 2 (Two) Times a Day., Disp: 60 capsule, Rfl: 3  •  propranolol (INDERAL) 40 MG tablet, Take 1 tablet by mouth Daily., Disp: 30 tablet, Rfl: 3  •  Spiriva HandiHaler 18 MCG per inhalation capsule, PLACE 1 CAPSULE INTO INHALER AND INHALE DAILY., Disp: 30 capsule, Rfl: 5  •  aspirin (Aspirin Adult Low Strength) 81 MG EC tablet, Take 1 tablet by mouth Daily., Disp: 30 tablet, Rfl: 2  •  lovastatin (ALTOPREV) 40 MG 24 hr tablet, Take 1 tablet by mouth Every Night., Disp: 30 tablet, Rfl: 3    Allergies:  Ace inhibitors, Lisinopril, and Vistaril [hydroxyzine hcl]    Family Hx:  Family History   Problem Relation Age of Onset   • Cancer Mother    • Cirrhosis Father    • Diabetes Other    • Hypertension Other         Social History:  Social History     Socioeconomic History   • Marital status: Single   • Number of children: 0   • Years of education: 12   Tobacco Use   • Smoking status: Current Every Day Smoker     Packs/day: 1.50     Years: 40.00     Pack years: 60.00     Types: Cigarettes   • Smokeless tobacco: Never Used   Vaping Use   • Vaping Use: Never used   Substance and Sexual Activity   • Alcohol use: Yes     Alcohol/week: 6.0 standard drinks     Types: 6 Cans of beer per week     Comment: when can get   • Drug use: No   • Sexual activity: Defer     Partners: Female       Review of Systems  Review of Systems    Objective:     /72   Pulse 97   Temp 97.9 °F (36.6 °C)   Ht 177.8 cm (70\")   Wt 93.4 kg (205 lb 12.8 oz)   SpO2 98%   BMI 29.53 kg/m²   Physical Exam alert no distress ENT grossly normal please see Dr. Milligan's note for more detailed information " regarding physical exam findings.    Lab Review  Results for orders placed or performed in visit on 11/30/21   MicroAlbumin, Urine, Random - Urine, Clean Catch    Specimen: Urine, Clean Catch   Result Value Ref Range    Microalbumin, Urine 218.1 mg/dL   Hemoglobin A1c    Specimen: Blood   Result Value Ref Range    Hemoglobin A1C 5.64 (H) 4.80 - 5.60 %            Assessment:     Diagnoses and all orders for this visit:    1. Essential hypertension (Primary)  -     Discontinue: aspirin (Aspirin Adult Low Strength) 81 MG EC tablet; Take 1 tablet by mouth Daily.  Dispense: 30 tablet; Refill: 2  -     losartan-hydrochlorothiazide (HYZAAR) 100-12.5 MG per tablet; Take 1 tablet by mouth Daily.  Dispense: 30 tablet; Refill: 1    2. Type 2 diabetes mellitus with complication, without long-term current use of insulin (HCC)    3. Hypercholesterolemia  -     lovastatin (ALTOPREV) 40 MG 24 hr tablet; Take 1 tablet by mouth Every Night.  Dispense: 30 tablet; Refill: 3    4. Chronic obstructive pulmonary disease, unspecified COPD type (HCC)        Plan:     I was present onsite throughout the encounter and saw the patient alongside Dr. Milligan.  Please see his note for more detailed information regarding plan of care.  I have seen and examined the patient.  I have reviewed the notes, assessments, and/or procedures performed by Catrachito Milligan MD, I concur with her/his documentation and assessment and plan for Jameel Dozier.            This document has been electronically signed by Son Li MD on February 22, 2022 15:52 CST

## 2022-02-24 ENCOUNTER — APPOINTMENT (OUTPATIENT)
Dept: CT IMAGING | Facility: HOSPITAL | Age: 62
End: 2022-02-24

## 2022-03-08 ENCOUNTER — HOSPITAL ENCOUNTER (OUTPATIENT)
Dept: CT IMAGING | Facility: HOSPITAL | Age: 62
Discharge: HOME | End: 2022-03-08

## 2022-03-08 DIAGNOSIS — F17.210 SMOKING GREATER THAN 40 PACK YEARS: ICD-10-CM

## 2022-03-08 PROCEDURE — 71271 CT THORAX LUNG CANCER SCR C-: CPT

## 2022-04-08 ENCOUNTER — HOSPITAL ENCOUNTER (OUTPATIENT)
Dept: CT IMAGING | Facility: HOSPITAL | Age: 62
Discharge: HOME | End: 2022-04-08
Admitting: FAMILY MEDICINE

## 2022-04-08 ENCOUNTER — TELEPHONE (OUTPATIENT)
Dept: FAMILY MEDICINE CLINIC | Facility: CLINIC | Age: 62
End: 2022-04-08

## 2022-04-08 DIAGNOSIS — F17.210 SMOKING GREATER THAN 40 PACK YEARS: ICD-10-CM

## 2022-04-08 PROCEDURE — 71271 CT THORAX LUNG CANCER SCR C-: CPT

## 2022-04-08 NOTE — TELEPHONE ENCOUNTER
BREANNA FROM [facility] CALLED TO GET CLARIFICATION ON WHETHER PATIENT IS TO HOWIE 1 OR 2 PILLS DAILY OF OMEPRAZOLE. HER CALL BACK NUMBER [phone]

## 2022-05-02 ENCOUNTER — TELEPHONE (OUTPATIENT)
Dept: FAMILY MEDICINE CLINIC | Facility: CLINIC | Age: 62
End: 2022-05-02

## 2022-05-02 DIAGNOSIS — I10 ESSENTIAL HYPERTENSION: ICD-10-CM

## 2022-05-02 RX ORDER — LOSARTAN POTASSIUM AND HYDROCHLOROTHIAZIDE 12.5; 1 MG/1; MG/1
1 TABLET ORAL DAILY
Qty: 30 TABLET | Refills: 1 | Status: SHIPPED | OUTPATIENT
Start: 2022-05-02 | End: 2022-10-04 | Stop reason: SDUPTHER

## 2022-05-02 NOTE — TELEPHONE ENCOUNTER
Incoming Refill Request      Medication requested (name and dose): losartan-hydrochlorothiazide (HYZAAR) 100-12.5 MG per tablet  [facility] where [facility] be sent: [facility]    Additional details provided by patient:     Best call back number:     Does the patient have less than a 3 day supply:  [x] Yes  [] No    Marti Fitzpatrick  05/02/22, 09:24 CDT

## 2022-05-16 ENCOUNTER — OFFICE VISIT (OUTPATIENT)
Dept: GASTROENTEROLOGY | Facility: CLINIC | Age: 62
End: 2022-05-16

## 2022-05-16 VITALS
SYSTOLIC BLOOD PRESSURE: 104 MMHG | BODY MASS INDEX: 27.69 KG/M2 | HEART RATE: 110 BPM | WEIGHT: 193.4 LBS | DIASTOLIC BLOOD PRESSURE: 64 MMHG | HEIGHT: 70 IN

## 2022-05-16 DIAGNOSIS — R93.3 ABNORMAL FINDINGS ON DIAGNOSTIC IMAGING OF OTHER PARTS OF DIGESTIVE TRACT: ICD-10-CM

## 2022-05-16 DIAGNOSIS — K22.0 ACHALASIA: Primary | ICD-10-CM

## 2022-05-16 DIAGNOSIS — K21.00 GASTROESOPHAGEAL REFLUX DISEASE WITH ESOPHAGITIS WITHOUT HEMORRHAGE: ICD-10-CM

## 2022-05-16 DIAGNOSIS — K74.00 HEPATIC FIBROSIS: ICD-10-CM

## 2022-05-16 DIAGNOSIS — K76.6 PORTAL HYPERTENSION: ICD-10-CM

## 2022-05-16 DIAGNOSIS — I85.00 ESOPHAGEAL VARICES WITHOUT BLEEDING, UNSPECIFIED ESOPHAGEAL VARICES TYPE: ICD-10-CM

## 2022-05-16 DIAGNOSIS — E71.30 DISORDER OF FATTY-ACID METABOLISM, UNSPECIFIED: ICD-10-CM

## 2022-05-16 PROCEDURE — 99214 OFFICE O/P EST MOD 30 MIN: CPT | Performed by: PHYSICIAN ASSISTANT

## 2022-05-16 RX ORDER — OMEPRAZOLE 20 MG/1
20 CAPSULE, DELAYED RELEASE ORAL 2 TIMES DAILY
Qty: 60 CAPSULE | Refills: 3 | Status: SHIPPED | OUTPATIENT
Start: 2022-05-16

## 2022-05-16 RX ORDER — DEXTROSE AND SODIUM CHLORIDE 5; .45 G/100ML; G/100ML
30 INJECTION, SOLUTION INTRAVENOUS CONTINUOUS PRN
Status: CANCELLED | OUTPATIENT
Start: 2022-06-15

## 2022-05-16 RX ORDER — PROPRANOLOL HYDROCHLORIDE 40 MG/1
40 TABLET ORAL DAILY
Qty: 30 TABLET | Refills: 3 | Status: SHIPPED | OUTPATIENT
Start: 2022-05-16 | End: 2022-10-04 | Stop reason: SDUPTHER

## 2022-05-16 NOTE — PROGRESS NOTES
Chief Complaint   Patient presents with   • Heartburn   • Esophageal Varices   • Achalasia       ENDO PROCEDURE ORDERED:    Subjective    Jameel Dozier is a 61 y.o. male. he is here today for follow-up.    History of Present Illness    Patient is seen on a recheck of his achalasia, GERD, esophageal varices, chronic hiccups, abdominal pain. Last seen on 12/15/2021. Patient is again having a lot of difficulty swallowing. He states he feels like he does better with the dilations, not as well with the Botox. It is not clear if he has been taking propranolol. It has not been filled in a while. He is on Prilosec. Bowels are moving without blood or mucus. Weight is up 2 pounds since last visit. We are awaiting on his pharmacy to fax his medication list as the patient could not confirm his medications. Last colonoscopy, he has a history of polyps on 09/18/2020.     Laboratory on 02/21/2022, A1c of 5.8%, normal magnesium, cholesterol 206, . Low-dose CT scan of the chest on 04/08/2022 was negative.     ASSESSMENT/PLAN:  Patient with dysphagia with known achalasia. Previously he feels he has done better with dilation. We will plan EGD with dilatation. We will do biopsies as indicated. We will evaluate his varices. I refilled the propranolol and we will try to confirm his medications with the pharmacy. We will plan follow-up laboratories in 1 month with ammonia, REYNOSO FibroSURE, CBC, CMP, AFP, INR prior. Further pending clinical course and the results of the above.      The following portions of the patient's history were reviewed and updated as appropriate:   Past Medical History:   Diagnosis Date   • Abdominal pain    • Acquired achalasia of esophagus    • Adenomatous polyp of colon    • Adverse drug effect    • Alcohol dependence with alcohol-induced disorder (HCC)    • Allergic rhinitis    • Anal fissure    • Anemia due to blood loss    • Benign essential hypertension    • Central obesity    • Cerebrovascular  disease    • Chronic obstructive lung disease (HCC)    • Claudication (HCC)    • Disorder of peripheral nervous system    • Diverticular disease of colon     completed antibx, ? neoplasm on CT   • Dysphagia    • Dyspnea    • ED (erectile dysfunction)    • Epigastric pain    • Esophageal dysmotility    • Esophageal reflux    • Esophagitis    • Essential hypertension    • External hemorrhoids    • Gastritis    • GERD with esophagitis    • Heavy tobacco smoker    • Hemorrhoids    • Hiatal hernia    • Hiccough    • History of colon polyps    • History of echocardiogram     Normal LV funciton with Ef of 65% to 70% without regional wall motion abnormalities.Mild CLVH. Normal RV size and function. No significant valvular regurgitation or stenosis. 09/19/2014       • History of EKG    • History of TIA (transient ischemic attack)    • Hypercholesterolemia    • Hypertension    • Left ventricular hypertrophy    • Male erectile disorder    • Obstructive sleep apnea syndrome    • Primary osteoarthritis of knees, bilateral    • Rectal bleed     painful   • Rectal hemorrhage    • Sleep disorder    • Transient cerebral ischemia    • Upper respiratory infection    • Weakness     Attacks of weakness     Past Surgical History:   Procedure Laterality Date   • COLONOSCOPY  04/13/2015    Internal and external hemorrhoids found. 04/13/2015    • COLONOSCOPY N/A 9/18/2020    Procedure: COLONOSCOPY;  Surgeon: Alli Dockery MD;  Location: [facility] ENDOSCOPY;  Service: Gastroenterology;  Laterality: N/A;   • ENDOSCOPY      Internal & external hemorrhoids found. 1 polyp in sigmoid colon; removed by snare cautery polypectomy. 09/26/2012    • ENDOSCOPY      Gastritis found in the stomach. Biopsy taken.Enlarged folds were found in the body of the stomach.Biopsy taken.Normal duodenum.A hiatus hernia was found in the esophagus. 04/13/2015    • ENDOSCOPY      Hiatus hernia in esophagus. Gastritis in stomach. Biopsy taken. Normal duodenum. Biopsy  taken. 09/26/2012       • ENDOSCOPY N/A 12/18/2017    Procedure: ESOPHAGOGASTRODUODENOSCOPY--attn mid esophagus, abnl on CT;  Surgeon: Alli Dockery MD;  Location: [facility] ENDOSCOPY;  Service:    • ENDOSCOPY N/A 3/6/2019    Procedure: ESOPHAGOGASTRODUODENOSCOPY WITH DILATATION;  Surgeon: Alli Dockery MD;  Location: [facility] ENDOSCOPY;  Service: Gastroenterology   • ENDOSCOPY N/A 9/18/2020    Procedure: ESOPHAGOGASTRODUODENOSCOPY eval varices;  Surgeon: Alli Dockery MD;  Location: [facility] ENDOSCOPY;  Service: Gastroenterology;  Laterality: N/A;   • ENDOSCOPY N/A 4/28/2021    Procedure: ESOPHAGOGASTRODUODENOSCOPY WITH ANESTHESIA--with botox, attn antrum poss malig;  Surgeon: Alli Dockery MD;  Location: [facility] ENDOSCOPY;  Service: Gastroenterology;  Laterality: N/A;   • UPPER GASTROINTESTINAL ENDOSCOPY  08/24/2016   • UPPER GASTROINTESTINAL ENDOSCOPY  04/13/2015   • UPPER GASTROINTESTINAL ENDOSCOPY  12/18/2017   • UPPER GASTROINTESTINAL ENDOSCOPY  03/06/2019   • UPPER GASTROINTESTINAL ENDOSCOPY  09/18/2020   • UPPER GASTROINTESTINAL ENDOSCOPY  04/28/2021     Family History   Problem Relation Age of Onset   • Cancer Mother    • Cirrhosis Father    • Diabetes Other    • Hypertension Other        Allergies   Allergen Reactions   • Ace Inhibitors Angioedema     hypotension   • Lisinopril Other (See Comments)     cough   • Vistaril [Hydroxyzine Hcl] Other (See Comments)     Patient is unsure      Social History     Socioeconomic History   • Marital status: Single   • Number of children: 0   • Years of education: 12   Tobacco Use   • Smoking status: Current Every Day Smoker     Packs/day: 1.50     Years: 40.00     Pack years: 60.00     Types: Cigarettes   • Smokeless tobacco: Never Used   Vaping Use   • Vaping Use: Never used   Substance and Sexual Activity   • Alcohol use: Yes     Alcohol/week: 6.0 standard drinks     Types: 6 Cans of beer per week     Comment: when can get   • Drug use: No   • Sexual activity:  Defer     Partners: Female     Current Medications:  Prior to Admission medications    Medication Sig Start Date End Date Taking? Authorizing Provider   albuterol sulfate  (90 Base) MCG/ACT inhaler Inhale 2 puffs Every 4 (Four) Hours As Needed for Wheezing or Shortness of Air. 1/27/22   Son Li MD   amLODIPine (NORVASC) 10 MG tablet Take 1 tablet by mouth Daily. 12/8/21   Son Li MD   aspirin (Aspirin Adult Low Strength) 81 MG EC tablet Take 1 tablet by mouth Daily. 2/21/22   Son Li MD   Blood Pressure Monitoring (Adult Blood Pressure Cuff Lg) kit Check BP daily 8/13/21   Sincere Narayanan MD   cetirizine (zyrTEC) 10 MG tablet TAKE 1 TAB DAILY IF NEEDED FOR ALLERGIES. 1/13/22   Son Li MD   fluticasone (FLONASE) 50 MCG/ACT nasal spray 2 sprays into each nostril Daily for 30 days. 11/27/17 7/14/22  Yolanda Phan MD   fluticasone (FLONASE) 50 MCG/ACT nasal spray 2 sprays into the nostril(s) as directed by provider Daily. 7/14/21   Son Li MD   guaiFENesin (Mucinex) 600 MG 12 hr tablet Take 2 tablets by mouth 2 (Two) Times a Day. 1/27/22   Son Li MD   losartan-hydrochlorothiazide (HYZAAR) 100-12.5 MG per tablet Take 1 tablet by mouth Daily. 5/2/22   Son Li MD   lovastatin (ALTOPREV) 40 MG 24 hr tablet Take 1 tablet by mouth Every Night. 2/21/22   Son Li MD   magnesium oxide (MAGOX) 400 (241.3 Mg) MG tablet tablet Take 1 tablet by mouth Daily. 12/20/21   Son Li MD   metFORMIN (GLUCOPHAGE) 500 MG tablet Take 1 tablet by mouth Daily With Breakfast. 7/22/21   Son Li MD   omeprazole (priLOSEC) 20 MG capsule Take 1 capsule by mouth 2 (Two) Times a Day. 7/14/21   Son Li MD   propranolol (INDERAL) 40 MG tablet Take 1 tablet by mouth Daily. 7/14/21   Son Li MD   Spiriva HandiHaler 18 MCG per inhalation capsule PLACE 1 CAPSULE INTO INHALER AND INHALE DAILY. 1/13/22   Guillermo  "Sno GONSALES MD     Review of Systems  Review of Systems   Constitutional: Negative for unexpected weight change.   HENT: Positive for trouble swallowing.    Gastrointestinal: Positive for abdominal pain and nausea. Negative for abdominal distention, anal bleeding, blood in stool, constipation, diarrhea, rectal pain and vomiting.          Objective    /64   Pulse 110   Ht 177.8 cm (70\")   Wt 87.7 kg (193 lb 6.4 oz)   BMI 27.75 kg/m²   Physical Exam  Vitals and nursing note reviewed.   Constitutional:       General: He is not in acute distress.     Appearance: He is well-developed. He is ill-appearing.      Comments: AA   HENT:      Head: Normocephalic and atraumatic.   Eyes:      Pupils: Pupils are equal, round, and reactive to light.   Cardiovascular:      Rate and Rhythm: Normal rate and regular rhythm.      Heart sounds: Normal heart sounds.   Pulmonary:      Effort: Pulmonary effort is normal.      Breath sounds: Normal breath sounds.   Abdominal:      General: Bowel sounds are normal. There is no distension or abdominal bruit.      Palpations: Abdomen is soft. Abdomen is not rigid. There is no shifting dullness or mass.      Tenderness: There is abdominal tenderness. There is no guarding or rebound.      Hernia: No hernia is present. There is no hernia in the ventral area.   Musculoskeletal:         General: Normal range of motion.      Cervical back: Normal range of motion.   Skin:     General: Skin is warm and dry.   Neurological:      Mental Status: He is alert and oriented to person, place, and time.   Psychiatric:         Behavior: Behavior normal.         Thought Content: Thought content normal.         Judgment: Judgment normal.       Assessment & Plan      1. Achalasia    2. Gastroesophageal reflux disease with esophagitis without hemorrhage    3. Esophageal varices without bleeding, unspecified esophageal varices type (HCC)    4. Hepatic fibrosis    5. Portal hypertension (HCC)    6. Abnormal " findings on diagnostic imaging of other parts of digestive tract     7. Disorder of fatty-acid metabolism, unspecified     .   Diagnoses and all orders for this visit:    1. Achalasia (Primary)  -     Case Request; Standing  -     Case Request    2. Gastroesophageal reflux disease with esophagitis without hemorrhage  -     Case Request; Standing  -     Case Request    3. Esophageal varices without bleeding, unspecified esophageal varices type (HCC)  -     Ammonia  -     CBC & Differential  -     Comprehensive Metabolic Panel  -     Iron  -     Protime-INR  -     AFP Tumor Marker  -     REYNOSO Fibrosure  -     Case Request; Standing  -     Case Request    4. Hepatic fibrosis  -     Ammonia  -     CBC & Differential  -     Comprehensive Metabolic Panel  -     Iron  -     Protime-INR  -     AFP Tumor Marker  -     REYNOSO Fibrosure    5. Portal hypertension (HCC)  -     Ammonia  -     CBC & Differential  -     Comprehensive Metabolic Panel  -     Iron  -     Protime-INR  -     AFP Tumor Marker  -     REYNOSO Fibrosure    6. Abnormal findings on diagnostic imaging of other parts of digestive tract   -     REYNOSO Fibrosure    7. Disorder of fatty-acid metabolism, unspecified   -     REYNOSO Fibrosure    Other orders  -     Follow Anesthesia Guidelines / Standing Orders; Future  -     Obtain Informed Consent; Future  -     propranolol (INDERAL) 40 MG tablet; Take 1 tablet by mouth Daily.  Dispense: 30 tablet; Refill: 3  -     omeprazole (priLOSEC) 20 MG capsule; Take 1 capsule by mouth 2 (Two) Times a Day.  Dispense: 60 capsule; Refill: 3        Orders placed during this encounter include:  Orders Placed This Encounter   Procedures   • Ammonia     Order Specific Question:   Release to patient     Answer:   Immediate   • Comprehensive Metabolic Panel     Order Specific Question:   Release to patient     Answer:   Immediate   • Iron   • Protime-INR   • AFP Tumor Marker     Order Specific Question:   Release to patient     Answer:    Immediate   • REYNOSO Fibrosure     Order Specific Question:   Release to patient     Answer:   Immediate   • Follow Anesthesia Guidelines / Standing Orders     Standing Status:   Future   • Obtain Informed Consent     Standing Status:   Future     Order Specific Question:   Informed Consent Given For     Answer:   ESOPHAGOGASTRODUODENOSCOPY WITH DILATATION   • CBC & Differential     Order Specific Question:   Manual Differential     Answer:   No       Medications prescribed:  New Medications Ordered This Visit   Medications   • propranolol (INDERAL) 40 MG tablet     Sig: Take 1 tablet by mouth Daily.     Dispense:  30 tablet     Refill:  3   • omeprazole (priLOSEC) 20 MG capsule     Sig: Take 1 capsule by mouth 2 (Two) Times a Day.     Dispense:  60 capsule     Refill:  3       Requested Prescriptions     Signed Prescriptions Disp Refills   • propranolol (INDERAL) 40 MG tablet 30 tablet 3     Sig: Take 1 tablet by mouth Daily.   • omeprazole (priLOSEC) 20 MG capsule 60 capsule 3     Sig: Take 1 capsule by mouth 2 (Two) Times a Day.       Review and/or summary of lab tests, radiology, procedures, medications. Review and summary of old records and obtaining of history. The risks and benefits of my recommendations, as well as other treatment options were discussed with the patient today. Questions were answered.    Follow-up: Return in about 1 month (around 6/16/2022), or if symptoms worsen or fail to improve, for lab prior.     ESOPHAGOGASTRODUODENOSCOPY WITH DILATATION (N/A)      This document has been electronically signed by Tim Chen PA-C on May 16, 2022 19:06 CDT      Results for orders placed or performed in visit on 02/21/22   Magnesium    Specimen: Blood   Result Value Ref Range    Magnesium 1.6 1.6 - 2.4 mg/dL   Hemoglobin A1c    Specimen: Blood   Result Value Ref Range    Hemoglobin A1C 5.80 (H) 4.80 - 5.60 %   Lipid panel    Specimen: Blood   Result Value Ref Range    Total Cholesterol 206 (H) 0 - 200  mg/dL    Triglycerides 69 0 - 150 mg/dL    HDL Cholesterol 65 (H) 40 - 60 mg/dL    LDL Cholesterol  129 (H) 0 - 100 mg/dL    VLDL Cholesterol 12 5 - 40 mg/dL    LDL/HDL Ratio 1.96    Results for orders placed or performed in visit on 11/30/21   MicroAlbumin, Urine, Random - Urine, Clean Catch    Specimen: Urine, Clean Catch   Result Value Ref Range    Microalbumin, Urine 218.1 mg/dL   Hemoglobin A1c    Specimen: Blood   Result Value Ref Range    Hemoglobin A1C 5.64 (H) 4.80 - 5.60 %   Results for orders placed or performed in visit on 11/30/21   CBC Auto Differential    Specimen: Blood   Result Value Ref Range    WBC 4.23 3.40 - 10.80 10*3/mm3    RBC 6.00 (H) 4.14 - 5.80 10*6/mm3    Hemoglobin 15.3 13.0 - 17.7 g/dL    Hematocrit 47.6 37.5 - 51.0 %    MCV 79.3 79.0 - 97.0 fL    MCH 25.5 (L) 26.6 - 33.0 pg    MCHC 32.1 31.5 - 35.7 g/dL    RDW 14.5 12.3 - 15.4 %    RDW-SD 40.1 37.0 - 54.0 fl    MPV 12.1 (H) 6.0 - 12.0 fL    Platelets 279 140 - 450 10*3/mm3    Neutrophil % 43.3 42.7 - 76.0 %    Lymphocyte % 40.4 19.6 - 45.3 %    Monocyte % 8.5 5.0 - 12.0 %    Eosinophil % 5.9 0.3 - 6.2 %    Basophil % 1.7 (H) 0.0 - 1.5 %    Immature Grans % 0.2 0.0 - 0.5 %    Neutrophils, Absolute 1.83 1.70 - 7.00 10*3/mm3    Lymphocytes, Absolute 1.71 0.70 - 3.10 10*3/mm3    Monocytes, Absolute 0.36 0.10 - 0.90 10*3/mm3    Eosinophils, Absolute 0.25 0.00 - 0.40 10*3/mm3    Basophils, Absolute 0.07 0.00 - 0.20 10*3/mm3    Immature Grans, Absolute 0.01 0.00 - 0.05 10*3/mm3    nRBC 0.2 0.0 - 0.2 /100 WBC   Comprehensive Metabolic Panel    Specimen: Blood   Result Value Ref Range    Glucose 87 65 - 99 mg/dL    BUN 9 8 - 23 mg/dL    Creatinine 1.04 0.76 - 1.27 mg/dL    Sodium 142 136 - 145 mmol/L    Potassium 3.5 3.5 - 5.2 mmol/L    Chloride 101 98 - 107 mmol/L    CO2 28.4 22.0 - 29.0 mmol/L    Calcium 9.7 8.6 - 10.5 mg/dL    Total Protein 7.9 6.0 - 8.5 g/dL    Albumin 4.60 3.50 - 5.20 g/dL    ALT (SGPT) 11 1 - 41 U/L    AST (SGOT) 21 1 -  40 U/L    Alkaline Phosphatase 60 39 - 117 U/L    Total Bilirubin 0.8 0.0 - 1.2 mg/dL    eGFR  African Amer 87 >60 mL/min/1.73    Globulin 3.3 gm/dL    A/G Ratio 1.4 g/dL    BUN/Creatinine Ratio 8.7 7.0 - 25.0    Anion Gap 12.6 5.0 - 15.0 mmol/L   Results for orders placed or performed in visit on 08/13/21   CBC Auto Differential    Specimen: Blood   Result Value Ref Range    WBC 6.94 3.40 - 10.80 10*3/mm3    RBC 4.87 4.14 - 5.80 10*6/mm3    Hemoglobin 12.8 (L) 13.0 - 17.7 g/dL    Hematocrit 39.3 37.5 - 51.0 %    MCV 80.7 79.0 - 97.0 fL    MCH 26.3 (L) 26.6 - 33.0 pg    MCHC 32.6 31.5 - 35.7 g/dL    RDW 15.3 12.3 - 15.4 %    RDW-SD 43.7 37.0 - 54.0 fl    MPV 10.6 6.0 - 12.0 fL    Platelets 540 (H) 140 - 450 10*3/mm3    Neutrophil % 73.9 42.7 - 76.0 %    Lymphocyte % 17.9 (L) 19.6 - 45.3 %    Monocyte % 5.5 5.0 - 12.0 %    Eosinophil % 1.9 0.3 - 6.2 %    Basophil % 0.4 0.0 - 1.5 %    Immature Grans % 0.4 0.0 - 0.5 %    Neutrophils, Absolute 5.13 1.70 - 7.00 10*3/mm3    Lymphocytes, Absolute 1.24 0.70 - 3.10 10*3/mm3    Monocytes, Absolute 0.38 0.10 - 0.90 10*3/mm3    Eosinophils, Absolute 0.13 0.00 - 0.40 10*3/mm3    Basophils, Absolute 0.03 0.00 - 0.20 10*3/mm3    Immature Grans, Absolute 0.03 0.00 - 0.05 10*3/mm3    nRBC 0.1 0.0 - 0.2 /100 WBC   Iron Profile    Specimen: Blood   Result Value Ref Range    Iron 149 59 - 158 mcg/dL    Iron Saturation 40 20 - 50 %    Transferrin 253 200 - 360 mg/dL    TIBC 377 298 - 536 mcg/dL   Protime-INR    Specimen: Blood   Result Value Ref Range    Protime 12.5 11.1 - 15.3 Seconds    INR 0.94 0.80 - 1.20   Ferritin    Specimen: Blood   Result Value Ref Range    Ferritin 166.00 30.00 - 400.00 ng/mL   Ammonia    Specimen: Blood   Result Value Ref Range    Ammonia 32 16 - 60 umol/L   Comprehensive Metabolic Panel    Specimen: Blood   Result Value Ref Range    Glucose 80 65 - 99 mg/dL    BUN 7 (L) 8 - 23 mg/dL    Creatinine 0.74 (L) 0.76 - 1.27 mg/dL    Sodium 141 136 - 145 mmol/L     Potassium 3.2 (L) 3.5 - 5.2 mmol/L    Chloride 102 98 - 107 mmol/L    CO2 26.4 22.0 - 29.0 mmol/L    Calcium 9.0 8.6 - 10.5 mg/dL    Total Protein 7.6 6.0 - 8.5 g/dL    Albumin 4.30 3.50 - 5.20 g/dL    ALT (SGPT) 16 1 - 41 U/L    AST (SGOT) 23 1 - 40 U/L    Alkaline Phosphatase 70 39 - 117 U/L    Total Bilirubin 0.5 0.0 - 1.2 mg/dL    eGFR  African Amer 129 >60 mL/min/1.73    Globulin 3.3 gm/dL    A/G Ratio 1.3 g/dL    BUN/Creatinine Ratio 9.5 7.0 - 25.0    Anion Gap 12.6 5.0 - 15.0 mmol/L     *Note: Due to a large number of results and/or encounters for the requested time period, some results have not been displayed. A complete set of results can be found in Results Review.

## 2022-05-23 ENCOUNTER — OFFICE VISIT (OUTPATIENT)
Dept: FAMILY MEDICINE CLINIC | Facility: CLINIC | Age: 62
End: 2022-05-23

## 2022-05-23 VITALS
WEIGHT: 193.7 LBS | DIASTOLIC BLOOD PRESSURE: 80 MMHG | SYSTOLIC BLOOD PRESSURE: 130 MMHG | HEART RATE: 96 BPM | HEIGHT: 70 IN | OXYGEN SATURATION: 98 % | BODY MASS INDEX: 27.73 KG/M2

## 2022-05-23 DIAGNOSIS — E88.81 METABOLIC SYNDROME: ICD-10-CM

## 2022-05-23 DIAGNOSIS — Z00.00 HEALTHCARE MAINTENANCE: Primary | ICD-10-CM

## 2022-05-23 DIAGNOSIS — K08.89 PAIN IN A TOOTH OR TEETH: ICD-10-CM

## 2022-05-23 PROCEDURE — 99213 OFFICE O/P EST LOW 20 MIN: CPT | Performed by: STUDENT IN AN ORGANIZED HEALTH CARE EDUCATION/TRAINING PROGRAM

## 2022-05-23 RX ORDER — HYDRALAZINE HYDROCHLORIDE 10 MG/1
10 TABLET, FILM COATED ORAL DAILY
COMMUNITY
Start: 2022-05-16

## 2022-05-23 RX ORDER — AMOXICILLIN AND CLAVULANATE POTASSIUM 875; 125 MG/1; MG/1
TABLET, FILM COATED ORAL
COMMUNITY
Start: 2022-05-19 | End: 2022-06-13

## 2022-05-23 RX ORDER — LIDOCAINE HYDROCHLORIDE 20 MG/ML
SOLUTION OROPHARYNGEAL AS NEEDED
COMMUNITY
Start: 2022-05-19

## 2022-05-23 NOTE — PROGRESS NOTES
I have reviewed the notes, assessments, and/or procedures performed by Dr. Debra Gallegos, I concur with his  documentation and assessment and plan for Jameel Berkwoitz Jacoby        This document has been electronically signed by Ham Gruber MD on May 23, 2022 14:25 CDT

## 2022-05-23 NOTE — PROGRESS NOTES
Family Medicine Residency  Debra Gallegos MD    Subjective:     Jameel Dozier is a 61 y.o. male who presents for:    HTN: Hasn't been checking BP at home. Taking norvasc, hyzaar, inderal. 130/80 in clinic today    Tooth ache: Edentulous. Two loose molars on left side.  Pain started last week on its own.    Already completed zoster vaccine. Refused pneumococcal vaccine.     Phq2: 1    The following portions of the patient's history were reviewed and updated as appropriate: allergies, current medications, past family history, past medical history, past social history, past surgical history and problem list.    Past Medical Hx:  Past Medical History:   Diagnosis Date   • Abdominal pain    • Acquired achalasia of esophagus    • Adenomatous polyp of colon    • Adverse drug effect    • Alcohol dependence with alcohol-induced disorder (HCC)    • Allergic rhinitis    • Anal fissure    • Anemia due to blood loss    • Benign essential hypertension    • Central obesity    • Cerebrovascular disease    • Chronic obstructive lung disease (HCC)    • Claudication (HCC)    • Disorder of peripheral nervous system    • Diverticular disease of colon     completed antibx, ? neoplasm on CT   • Dysphagia    • Dyspnea    • ED (erectile dysfunction)    • Epigastric pain    • Esophageal dysmotility    • Esophageal reflux    • Esophagitis    • Essential hypertension    • External hemorrhoids    • Gastritis    • GERD with esophagitis    • Heavy tobacco smoker    • Hemorrhoids    • Hiatal hernia    • Hiccough    • History of colon polyps    • History of echocardiogram     Normal LV funciton with Ef of 65% to 70% without regional wall motion abnormalities.Mild CLVH. Normal RV size and function. No significant valvular regurgitation or stenosis. 09/19/2014       • History of EKG    • History of TIA (transient ischemic attack)    • Hypercholesterolemia    • Hypertension    • Left ventricular hypertrophy    • Male erectile disorder    •  Obstructive sleep apnea syndrome    • Primary osteoarthritis of knees, bilateral    • Rectal bleed     painful   • Rectal hemorrhage    • Sleep disorder    • Transient cerebral ischemia    • Upper respiratory infection    • Weakness     Attacks of weakness       Past Surgical Hx:  Past Surgical History:   Procedure Laterality Date   • COLONOSCOPY  04/13/2015    Internal and external hemorrhoids found. 04/13/2015    • COLONOSCOPY N/A 9/18/2020    Procedure: COLONOSCOPY;  Surgeon: Alli Dockery MD;  Location: [facility] ENDOSCOPY;  Service: Gastroenterology;  Laterality: N/A;   • ENDOSCOPY      Internal & external hemorrhoids found. 1 polyp in sigmoid colon; removed by snare cautery polypectomy. 09/26/2012    • ENDOSCOPY      Gastritis found in the stomach. Biopsy taken.Enlarged folds were found in the body of the stomach.Biopsy taken.Normal duodenum.A hiatus hernia was found in the esophagus. 04/13/2015    • ENDOSCOPY      Hiatus hernia in esophagus. Gastritis in stomach. Biopsy taken. Normal duodenum. Biopsy taken. 09/26/2012       • ENDOSCOPY N/A 12/18/2017    Procedure: ESOPHAGOGASTRODUODENOSCOPY--attn mid esophagus, abnl on CT;  Surgeon: Alli Dockery MD;  Location: [facility] ENDOSCOPY;  Service:    • ENDOSCOPY N/A 3/6/2019    Procedure: ESOPHAGOGASTRODUODENOSCOPY WITH DILATATION;  Surgeon: Alli Dockery MD;  Location: [facility] ENDOSCOPY;  Service: Gastroenterology   • ENDOSCOPY N/A 9/18/2020    Procedure: ESOPHAGOGASTRODUODENOSCOPY eval varices;  Surgeon: Alli Dockery MD;  Location: [facility] ENDOSCOPY;  Service: Gastroenterology;  Laterality: N/A;   • ENDOSCOPY N/A 4/28/2021    Procedure: ESOPHAGOGASTRODUODENOSCOPY WITH ANESTHESIA--with botox, attn antrum poss malig;  Surgeon: Alli Dockery MD;  Location: [facility] ENDOSCOPY;  Service: Gastroenterology;  Laterality: N/A;   • UPPER GASTROINTESTINAL ENDOSCOPY  08/24/2016   • UPPER GASTROINTESTINAL ENDOSCOPY  04/13/2015   • UPPER GASTROINTESTINAL ENDOSCOPY   12/18/2017   • UPPER GASTROINTESTINAL ENDOSCOPY  03/06/2019   • UPPER GASTROINTESTINAL ENDOSCOPY  09/18/2020   • UPPER GASTROINTESTINAL ENDOSCOPY  04/28/2021       Current Meds:    Current Outpatient Medications:   •  albuterol sulfate  (90 Base) MCG/ACT inhaler, Inhale 2 puffs Every 4 (Four) Hours As Needed for Wheezing or Shortness of Air., Disp: 18 g, Rfl: 5  •  amLODIPine (NORVASC) 10 MG tablet, Take 1 tablet by mouth Daily., Disp: 30 tablet, Rfl: 11  •  amoxicillin-clavulanate (AUGMENTIN) 875-125 MG per tablet, , Disp: , Rfl:   •  aspirin (Aspirin Adult Low Strength) 81 MG EC tablet, Take 1 tablet by mouth Daily., Disp: 30 tablet, Rfl: 2  •  Blood Pressure Monitoring (Adult Blood Pressure Cuff Lg) kit, Check BP daily, Disp: 1 each, Rfl: 0  •  cetirizine (zyrTEC) 10 MG tablet, TAKE 1 TAB DAILY IF NEEDED FOR ALLERGIES., Disp: 30 tablet, Rfl: 5  •  fluticasone (FLONASE) 50 MCG/ACT nasal spray, 2 sprays into each nostril Daily for 30 days., Disp: 1 bottle, Rfl: 11  •  guaiFENesin (Mucinex) 600 MG 12 hr tablet, Take 2 tablets by mouth 2 (Two) Times a Day., Disp: 60 tablet, Rfl: 0  •  hydrALAZINE (APRESOLINE) 10 MG tablet, , Disp: , Rfl:   •  Lidocaine Viscous HCl (XYLOCAINE) 2 % solution, , Disp: , Rfl:   •  losartan-hydrochlorothiazide (HYZAAR) 100-12.5 MG per tablet, Take 1 tablet by mouth Daily., Disp: 30 tablet, Rfl: 1  •  lovastatin (ALTOPREV) 40 MG 24 hr tablet, Take 1 tablet by mouth Every Night., Disp: 30 tablet, Rfl: 3  •  magnesium oxide (MAGOX) 400 (241.3 Mg) MG tablet tablet, Take 1 tablet by mouth Daily., Disp: 30 tablet, Rfl: 4  •  metFORMIN (GLUCOPHAGE) 500 MG tablet, Take 1 tablet by mouth Daily With Breakfast., Disp: 30 tablet, Rfl: 4  •  omeprazole (priLOSEC) 20 MG capsule, Take 1 capsule by mouth 2 (Two) Times a Day., Disp: 60 capsule, Rfl: 3  •  propranolol (INDERAL) 40 MG tablet, Take 1 tablet by mouth Daily., Disp: 30 tablet, Rfl: 3  •  Spiriva HandiHaler 18 MCG per inhalation capsule,  "PLACE 1 CAPSULE INTO INHALER AND INHALE DAILY., Disp: 30 capsule, Rfl: 5    Allergies:  Allergies   Allergen Reactions   • Ace Inhibitors Angioedema     hypotension   • Lisinopril Other (See Comments)     cough   • Vistaril [Hydroxyzine Hcl] Other (See Comments)     Patient is unsure        Family Hx:  Family History   Problem Relation Age of Onset   • Cancer Mother    • Cirrhosis Father    • Diabetes Other    • Hypertension Other         Social History:  Social History     Socioeconomic History   • Marital status: Single   • Number of children: 0   • Years of education: 12   Tobacco Use   • Smoking status: Current Every Day Smoker     Packs/day: 1.50     Years: 40.00     Pack years: 60.00     Types: Cigarettes   • Smokeless tobacco: Never Used   Vaping Use   • Vaping Use: Never used   Substance and Sexual Activity   • Alcohol use: Yes     Alcohol/week: 6.0 standard drinks     Types: 6 Cans of beer per week     Comment: when can get   • Drug use: No   • Sexual activity: Defer     Partners: Female       Review of Systems  Review of Systems   Constitutional: Negative for chills and fever.   HENT: Positive for dental problem. Negative for facial swelling, nosebleeds and trouble swallowing.    Eyes: Negative for photophobia and visual disturbance.   Respiratory: Negative for choking and stridor.    Gastrointestinal: Negative for abdominal distention, diarrhea, nausea and vomiting.   Genitourinary: Negative for difficulty urinating and dysuria.   Musculoskeletal: Negative for arthralgias, gait problem and myalgias.   Skin: Negative for pallor and rash.   Neurological: Negative for seizures and syncope.   Psychiatric/Behavioral: Negative for dysphoric mood and hallucinations.       Objective:     /80   Pulse 96   Ht 177.8 cm (70\")   Wt 87.9 kg (193 lb 11.2 oz)   SpO2 98%   BMI 27.79 kg/m²   Physical Exam  Constitutional:       General: He is not in acute distress.     Appearance: He is well-developed.   HENT: "      Head: Normocephalic and atraumatic.      Nose: Nose normal.      Mouth/Throat:      Dentition: Abnormal dentition.      Comments: Edentulous  Eyes:      Conjunctiva/sclera: Conjunctivae normal.      Pupils: Pupils are equal, round, and reactive to light.   Cardiovascular:      Rate and Rhythm: Normal rate and regular rhythm.      Pulses: Normal pulses.      Heart sounds: Normal heart sounds.   Pulmonary:      Effort: Pulmonary effort is normal.   Abdominal:      General: Bowel sounds are normal.      Palpations: Abdomen is soft.   Musculoskeletal:         General: Normal range of motion.      Cervical back: Normal range of motion and neck supple.   Skin:     General: Skin is warm and dry.   Neurological:      Mental Status: He is alert. Mental status is at baseline.      Cranial Nerves: No cranial nerve deficit.      Coordination: Coordination normal.   Psychiatric:         Mood and Affect: Mood normal.         Behavior: Behavior normal.          Assessment/Plan:     Diagnoses and all orders for this visit:    1. Healthcare maintenance (Primary)    2. Pain in a tooth or teeth  -     Ambulatory Referral to Dentistry    3. Metabolic syndrome      -Hemoglobin A1c has been in appropriate range for his age.  Continue current dose of metformin.  No need to repeat A1c today.  -Encouraged patient to obtain blood pressure device if possible.  Continue Norvasc, Inderal, Hyzaar.  -Encouraged patient to exercise regularly for half an hour 5 days a week ending include resistance training.  Encouraged patient to consume a healthy diet rich in fruits and vegetables, white meats, avoidance of sugar.  -Patient reportedly obtain zoster vaccine.  He refused pneumococcal vaccine today.  -PHq2 score of 1. Will recheck in 6 months    Follow-up:     Return in about 6 months (around 11/23/2022), or htn.    Preventative:  Health Maintenance   Topic Date Due   • ZOSTER VACCINE (1 of 2) Never done   • Pneumococcal Vaccine 0-64 (2 - PCV)  08/15/2015   • ANNUAL WELLNESS VISIT  Never done   • Hepatitis B (1 of 3 - Risk 3-dose series) 09/16/2022 (Originally 7/17/1979)   • INFLUENZA VACCINE  08/01/2022   • HEMOGLOBIN A1C  08/21/2022   • DIABETIC FOOT EXAM  09/16/2022   • DIABETIC EYE EXAM  11/09/2022   • URINE MICROALBUMIN  11/30/2022   • LIPID PANEL  02/21/2023   • LUNG CANCER SCREENING  04/08/2023   • TDAP/TD VACCINES (2 - Td or Tdap) 08/15/2024   • COLORECTAL CANCER SCREENING  09/18/2025   • HEPATITIS C SCREENING  Completed   • COVID-19 Vaccine  Completed       Alcohol use:  reports current alcohol use of about 6.0 standard drinks of alcohol per week.  Nicotine status  reports that he has been smoking cigarettes. He has a 60.00 pack-year smoking history. He has never used smokeless tobacco.     Goals     •  Quit smoking / using tobacco (pt-stated)           RISK SCORE: 3          PGY-3  [facility]  This document has been electronically signed by Debra Gallegos MD on May 23, 2022 09:24 CDT

## 2022-06-15 ENCOUNTER — ANESTHESIA EVENT (OUTPATIENT)
Dept: GASTROENTEROLOGY | Facility: HOSPITAL | Age: 62
End: 2022-06-15

## 2022-06-15 ENCOUNTER — ANESTHESIA (OUTPATIENT)
Dept: GASTROENTEROLOGY | Facility: HOSPITAL | Age: 62
End: 2022-06-15

## 2022-06-15 ENCOUNTER — HOSPITAL ENCOUNTER (OUTPATIENT)
Facility: HOSPITAL | Age: 62
Setting detail: HOSPITAL OUTPATIENT SURGERY
Discharge: HOME | End: 2022-06-15
Attending: INTERNAL MEDICINE | Admitting: INTERNAL MEDICINE

## 2022-06-15 VITALS
WEIGHT: 174 LBS | TEMPERATURE: 97.1 F | HEIGHT: 70 IN | RESPIRATION RATE: 18 BRPM | HEART RATE: 85 BPM | SYSTOLIC BLOOD PRESSURE: 101 MMHG | BODY MASS INDEX: 24.91 KG/M2 | DIASTOLIC BLOOD PRESSURE: 67 MMHG | OXYGEN SATURATION: 99 %

## 2022-06-15 DIAGNOSIS — K21.00 GASTROESOPHAGEAL REFLUX DISEASE WITH ESOPHAGITIS WITHOUT HEMORRHAGE: ICD-10-CM

## 2022-06-15 DIAGNOSIS — I85.00 ESOPHAGEAL VARICES WITHOUT BLEEDING, UNSPECIFIED ESOPHAGEAL VARICES TYPE: ICD-10-CM

## 2022-06-15 DIAGNOSIS — K22.0 ACHALASIA: ICD-10-CM

## 2022-06-15 PROCEDURE — 88305 TISSUE EXAM BY PATHOLOGIST: CPT

## 2022-06-15 PROCEDURE — C1769 GUIDE WIRE: HCPCS | Performed by: INTERNAL MEDICINE

## 2022-06-15 PROCEDURE — 25010000002 PROPOFOL 10 MG/ML EMULSION: Performed by: NURSE ANESTHETIST, CERTIFIED REGISTERED

## 2022-06-15 PROCEDURE — 43239 EGD BIOPSY SINGLE/MULTIPLE: CPT | Performed by: INTERNAL MEDICINE

## 2022-06-15 PROCEDURE — 43248 EGD GUIDE WIRE INSERTION: CPT | Performed by: INTERNAL MEDICINE

## 2022-06-15 RX ORDER — DEXTROSE AND SODIUM CHLORIDE 5; .45 G/100ML; G/100ML
30 INJECTION, SOLUTION INTRAVENOUS CONTINUOUS PRN
Status: DISCONTINUED | OUTPATIENT
Start: 2022-06-15 | End: 2022-06-15 | Stop reason: HOSPADM

## 2022-06-15 RX ORDER — PROPOFOL 10 MG/ML
VIAL (ML) INTRAVENOUS AS NEEDED
Status: DISCONTINUED | OUTPATIENT
Start: 2022-06-15 | End: 2022-06-15 | Stop reason: SURG

## 2022-06-15 RX ORDER — LIDOCAINE HYDROCHLORIDE 20 MG/ML
INJECTION, SOLUTION EPIDURAL; INFILTRATION; INTRACAUDAL; PERINEURAL AS NEEDED
Status: DISCONTINUED | OUTPATIENT
Start: 2022-06-15 | End: 2022-06-15 | Stop reason: SURG

## 2022-06-15 RX ORDER — ONDANSETRON 2 MG/ML
4 INJECTION INTRAMUSCULAR; INTRAVENOUS ONCE AS NEEDED
Status: DISCONTINUED | OUTPATIENT
Start: 2022-06-15 | End: 2022-06-15 | Stop reason: HOSPADM

## 2022-06-15 RX ADMIN — PROPOFOL 40 MG: 10 INJECTION, EMULSION INTRAVENOUS at 09:49

## 2022-06-15 RX ADMIN — PROPOFOL 100 MG: 10 INJECTION, EMULSION INTRAVENOUS at 09:47

## 2022-06-15 RX ADMIN — LIDOCAINE HYDROCHLORIDE 80 MG: 20 INJECTION, SOLUTION EPIDURAL; INFILTRATION; INTRACAUDAL; PERINEURAL at 09:47

## 2022-06-15 RX ADMIN — PROPOFOL 40 MG: 10 INJECTION, EMULSION INTRAVENOUS at 09:52

## 2022-06-15 RX ADMIN — PROPOFOL 20 MG: 10 INJECTION, EMULSION INTRAVENOUS at 09:53

## 2022-06-15 RX ADMIN — DEXTROSE AND SODIUM CHLORIDE 30 ML/HR: 5; 450 INJECTION, SOLUTION INTRAVENOUS at 09:18

## 2022-06-15 NOTE — ANESTHESIA POSTPROCEDURE EVALUATION
Patient: Jameel Dozier    Procedure Summary     Date: 06/15/22 Room / Location: [facility] ENDOSCOPY 1 / [facility] ENDOSCOPY    Anesthesia Start: 0943 Anesthesia Stop: 0956    Procedure: ESOPHAGOGASTRODUODENOSCOPY WITH DILATATION (N/A ) Diagnosis:       Achalasia      Gastroesophageal reflux disease with esophagitis without hemorrhage      Esophageal varices without bleeding, unspecified esophageal varices type (HCC)      (Achalasia [K22.0])      (Gastroesophageal reflux disease with esophagitis without hemorrhage [K21.00])      (Esophageal varices without bleeding, unspecified esophageal varices type (HCC) [I85.00])    Surgeons: Alli Dockery MD Provider: Madelyn Lea CRNA    Anesthesia Type: MAC ASA Status: 3          Anesthesia Type: MAC    Vitals  No vitals data found for the desired time range.          Post Anesthesia Care and Evaluation    Patient location during evaluation: bedside  Patient participation: complete - patient participated  Level of consciousness: sleepy but conscious  Pain score: 0  Pain management: adequate    Airway patency: patent  Anesthetic complications: No anesthetic complications  PONV Status: none  Cardiovascular status: acceptable  Respiratory status: acceptable  Hydration status: acceptable    Comments: ---------------------------               06/15/22         ---------------------------   BP:          158/83         Pulse:         91           Resp:          18           Temp:   97.6 °F (36.4 °C)   SpO2:          98%         ---------------------------  No anesthesia care post op

## 2022-06-15 NOTE — ANESTHESIA PREPROCEDURE EVALUATION
Anesthesia Evaluation     Patient summary reviewed and Nursing notes reviewed   no history of anesthetic complications:  NPO Solid Status: > 8 hours  NPO Liquid Status: > 8 hours           Airway   Mallampati: III  TM distance: >3 FB  Neck ROM: full  Possible difficult intubation  Dental    (+) poor dentition        Pulmonary    (+) a smoker Current, COPD moderate, sleep apnea (non-compliant, prescribed but doesn't use) on CPAP,   Cardiovascular     Patient on routine beta blocker    (+) hypertension 2 medications or greater, hyperlipidemia,     ROS comment: CONCLUSIONS:  1. Normal left ventricular function with an estimated ejection fraction        of 65% to 70% without regional wall motion abnormalities.  2. Mild concentric left ventricular hypertrophy.  3. Normal right ventricular size with normal function.  4. No significant valvular regurgitation or stenosis.      Neuro/Psych  (+) weakness, numbness,    (-) TIA (listed in chart, pt denies), CVA  GI/Hepatic/Renal/Endo    (+)  hiatal hernia, GERD poorly controlled, GI bleeding resolved, liver disease (esophageal varices) fatty liver disease, diabetes mellitus type 2 well controlled,     Musculoskeletal     Abdominal    Substance History   (+) alcohol use,      OB/GYN          Other   arthritis,                      Anesthesia Plan    ASA 3     MAC     intravenous induction     Anesthetic plan, risks, benefits, and alternatives have been provided, discussed and informed consent has been obtained with: patient.        CODE STATUS:

## 2022-06-16 LAB — REF LAB TEST METHOD: NORMAL

## 2022-06-20 ENCOUNTER — TELEPHONE (OUTPATIENT)
Dept: FAMILY MEDICINE CLINIC | Facility: CLINIC | Age: 62
End: 2022-06-20

## 2022-07-25 ENCOUNTER — OFFICE VISIT (OUTPATIENT)
Dept: GASTROENTEROLOGY | Facility: CLINIC | Age: 62
End: 2022-07-25

## 2022-07-25 VITALS
HEIGHT: 69 IN | HEART RATE: 87 BPM | BODY MASS INDEX: 28.58 KG/M2 | SYSTOLIC BLOOD PRESSURE: 141 MMHG | DIASTOLIC BLOOD PRESSURE: 98 MMHG | WEIGHT: 193 LBS

## 2022-07-25 DIAGNOSIS — K21.00 GASTROESOPHAGEAL REFLUX DISEASE WITH ESOPHAGITIS WITHOUT HEMORRHAGE: ICD-10-CM

## 2022-07-25 DIAGNOSIS — K74.00 HEPATIC FIBROSIS: ICD-10-CM

## 2022-07-25 DIAGNOSIS — I85.00 ESOPHAGEAL VARICES WITHOUT BLEEDING, UNSPECIFIED ESOPHAGEAL VARICES TYPE: ICD-10-CM

## 2022-07-25 DIAGNOSIS — K22.0 ACHALASIA: Primary | ICD-10-CM

## 2022-07-25 DIAGNOSIS — K76.6 PORTAL HYPERTENSION: ICD-10-CM

## 2022-07-25 PROCEDURE — 99214 OFFICE O/P EST MOD 30 MIN: CPT | Performed by: PHYSICIAN ASSISTANT

## 2022-07-25 NOTE — PROGRESS NOTES
Chief Complaint   Patient presents with   • EGD Performed 06/15/2022     Achalasia       ENDO PROCEDURE ORDERED:    Subjective    Jameel Dozier is a 62 y.o. male. he is here today for follow-up.    History of Present Illness    Patient seen on a recheck of his GERD, achalasia, abdominal pain, chronic hiccups. Last seen 05/16/2022. He forgot to get his laboratories drawn. He did undergo EGD on 06/15/2022 showed esophageal stricture dilated to 54 French with Savory dilator. Also showed gastritis. Biopsy showed minimal inflammation, negative for H. Pylori, did show grade 1 varices, erythema of the duodenum but no biopsy was taken of the duodenum. Patient denies NSAID's. He does drink. His dysphagia is doing better. He has tried doing Botox for his achalasia but he has not felt that it helped with his dysphagia. Weight is down half a pound but he states he is eating better. Last colonoscopy with history of polyps 09/18/2020.     A/P: Patient with achalasia with dysphagia with stricturing improved post dilatation. Will repeat dilatation as needed. I have encouraged him to get his laboratories drawn. We discussed potentia other treatments for the hiccups. Will plan follow-up in 6 weeks, further pending clinical course and the results of the above.   Consider trial of combination olanzapine/zyprexa and baclofen. Will review with patient at follow up.    The following portions of the patient's history were reviewed and updated as appropriate:   Past Medical History:   Diagnosis Date   • Abdominal pain    • Acquired achalasia of esophagus    • Adenomatous polyp of colon    • Adverse drug effect    • Alcohol dependence with alcohol-induced disorder (HCC)    • Allergic rhinitis    • Anal fissure    • Anemia due to blood loss    • Benign essential hypertension    • Central obesity    • Cerebrovascular disease    • Chronic obstructive lung disease (HCC)    • Claudication (HCC)    • Disorder of peripheral nervous system     • Diverticular disease of colon     completed antibx, ? neoplasm on CT   • Dysphagia    • Dyspnea    • ED (erectile dysfunction)    • Epigastric pain    • Esophageal dysmotility    • Esophageal reflux    • Esophagitis    • Essential hypertension    • External hemorrhoids    • Gastritis    • GERD with esophagitis    • Heavy tobacco smoker    • Hemorrhoids    • Hiatal hernia    • Hiccough    • History of colon polyps    • History of echocardiogram     Normal LV funciton with Ef of 65% to 70% without regional wall motion abnormalities.Mild CLVH. Normal RV size and function. No significant valvular regurgitation or stenosis. 09/19/2014       • History of EKG    • History of TIA (transient ischemic attack)    • Hypercholesterolemia    • Hypertension    • Left ventricular hypertrophy    • Male erectile disorder    • Obstructive sleep apnea syndrome    • Primary osteoarthritis of knees, bilateral    • Rectal bleed     painful   • Rectal hemorrhage    • Sleep disorder    • Transient cerebral ischemia    • Upper respiratory infection    • Weakness     Attacks of weakness     Past Surgical History:   Procedure Laterality Date   • COLONOSCOPY  04/13/2015    Internal and external hemorrhoids found. 04/13/2015    • COLONOSCOPY N/A 9/18/2020    Procedure: COLONOSCOPY;  Surgeon: Alli Dockery MD;  Location: [facility] ENDOSCOPY;  Service: Gastroenterology;  Laterality: N/A;   • ENDOSCOPY      Internal & external hemorrhoids found. 1 polyp in sigmoid colon; removed by snare cautery polypectomy. 09/26/2012    • ENDOSCOPY      Gastritis found in the stomach. Biopsy taken.Enlarged folds were found in the body of the stomach.Biopsy taken.Normal duodenum.A hiatus hernia was found in the esophagus. 04/13/2015    • ENDOSCOPY      Hiatus hernia in esophagus. Gastritis in stomach. Biopsy taken. Normal duodenum. Biopsy taken. 09/26/2012       • ENDOSCOPY N/A 12/18/2017    Procedure: ESOPHAGOGASTRODUODENOSCOPY--attn mid esophagus, abnl on  CT;  Surgeon: Alli Dockery MD;  Location: [facility] ENDOSCOPY;  Service:    • ENDOSCOPY N/A 3/6/2019    Procedure: ESOPHAGOGASTRODUODENOSCOPY WITH DILATATION;  Surgeon: Alli Dockery MD;  Location: [facility] ENDOSCOPY;  Service: Gastroenterology   • ENDOSCOPY N/A 9/18/2020    Procedure: ESOPHAGOGASTRODUODENOSCOPY eval varices;  Surgeon: Alli Dockery MD;  Location: [facility] ENDOSCOPY;  Service: Gastroenterology;  Laterality: N/A;   • ENDOSCOPY N/A 4/28/2021    Procedure: ESOPHAGOGASTRODUODENOSCOPY WITH ANESTHESIA--with botox, attn antrum poss malig;  Surgeon: Alli Dockery MD;  Location: [facility] ENDOSCOPY;  Service: Gastroenterology;  Laterality: N/A;   • ENDOSCOPY N/A 6/15/2022    Procedure: ESOPHAGOGASTRODUODENOSCOPY WITH DILATATION;  Surgeon: Alli Dockery MD;  Location: [facility] ENDOSCOPY;  Service: Gastroenterology;  Laterality: N/A;   • UPPER GASTROINTESTINAL ENDOSCOPY  08/24/2016   • UPPER GASTROINTESTINAL ENDOSCOPY  04/13/2015   • UPPER GASTROINTESTINAL ENDOSCOPY  12/18/2017   • UPPER GASTROINTESTINAL ENDOSCOPY  03/06/2019   • UPPER GASTROINTESTINAL ENDOSCOPY  09/18/2020   • UPPER GASTROINTESTINAL ENDOSCOPY  04/28/2021     Family History   Problem Relation Age of Onset   • Cancer Mother    • Cirrhosis Father    • Diabetes Other    • Hypertension Other        Allergies   Allergen Reactions   • Ace Inhibitors Angioedema     hypotension   • Lisinopril Other (See Comments)     cough   • Vistaril [Hydroxyzine Hcl] Other (See Comments)     Patient is unsure      Social History     Socioeconomic History   • Marital status: Single   • Number of children: 0   • Years of education: 12   Tobacco Use   • Smoking status: Current Every Day Smoker     Packs/day: 1.50     Years: 40.00     Pack years: 60.00     Types: Cigarettes   • Smokeless tobacco: Never Used   Vaping Use   • Vaping Use: Never used   Substance and Sexual Activity   • Alcohol use: Yes     Alcohol/week: 6.0 standard drinks     Types: 6 Cans of beer per  week     Comment: when can get   • Drug use: No   • Sexual activity: Defer     Partners: Female     Current Medications:  Prior to Admission medications    Medication Sig Start Date End Date Taking? Authorizing Provider   albuterol sulfate  (90 Base) MCG/ACT inhaler Inhale 2 puffs Every 4 (Four) Hours As Needed for Wheezing or Shortness of Air. 1/27/22  Yes Son Li MD   amLODIPine (NORVASC) 10 MG tablet Take 1 tablet by mouth Daily. 12/8/21  Yes Son Li MD   aspirin (Aspirin Adult Low Strength) 81 MG EC tablet Take 1 tablet by mouth Daily. 2/21/22  Yes Son Li MD   cetirizine (zyrTEC) 10 MG tablet TAKE 1 TAB DAILY IF NEEDED FOR ALLERGIES. 1/13/22  Yes Son Li MD   guaiFENesin (Mucinex) 600 MG 12 hr tablet Take 2 tablets by mouth 2 (Two) Times a Day. 1/27/22  Yes Son Li MD   hydrALAZINE (APRESOLINE) 10 MG tablet Take 10 mg by mouth Daily. 5/16/22  Yes Radha Burrell MD   Lidocaine Viscous HCl (XYLOCAINE) 2 % solution As Needed. 5/19/22  Yes Radha Burrell MD   losartan-hydrochlorothiazide (HYZAAR) 100-12.5 MG per tablet Take 1 tablet by mouth Daily. 5/2/22  Yes Son Li MD   lovastatin (ALTOPREV) 40 MG 24 hr tablet Take 1 tablet by mouth Every Night. 2/21/22  Yes Son Li MD   magnesium oxide (MAGOX) 400 (241.3 Mg) MG tablet tablet Take 1 tablet by mouth Daily. 12/20/21  Yes Son Li MD   metFORMIN (GLUCOPHAGE) 500 MG tablet Take 1 tablet by mouth Daily With Breakfast. 7/22/21  Yes Son Li MD   omeprazole (priLOSEC) 20 MG capsule Take 1 capsule by mouth 2 (Two) Times a Day. 5/16/22  Yes Tim Chen PA-C   propranolol (INDERAL) 40 MG tablet Take 1 tablet by mouth Daily. 5/16/22  Yes Tim Chen PA-C   Spiriva HandiHaler 18 MCG per inhalation capsule PLACE 1 CAPSULE INTO INHALER AND INHALE DAILY. 1/13/22  Yes Son Li MD   Blood Pressure Monitoring (Adult Blood Pressure Cuff Lg)  "kit Check BP daily 8/13/21   Sincere Narayanan MD   fluticasone (FLONASE) 50 MCG/ACT nasal spray 2 sprays into each nostril Daily for 30 days. 11/27/17 7/14/22  Yolanda Phan MD     Review of Systems  Review of Systems       Objective    /98 Comment: Patient has not taken blood pressure medication this A.M.  Pulse 87   Ht 175.3 cm (69\")   Wt 87.5 kg (193 lb)   BMI 28.50 kg/m²   Physical Exam  Vitals and nursing note reviewed.   Constitutional:       General: He is not in acute distress.     Appearance: He is well-developed.      Comments: AA   HENT:      Head: Normocephalic and atraumatic.   Eyes:      Pupils: Pupils are equal, round, and reactive to light.   Cardiovascular:      Rate and Rhythm: Normal rate and regular rhythm.      Heart sounds: Normal heart sounds.   Pulmonary:      Effort: Pulmonary effort is normal.      Breath sounds: Normal breath sounds.   Abdominal:      General: Bowel sounds are normal. There is no distension or abdominal bruit.      Palpations: Abdomen is soft. Abdomen is not rigid. There is no shifting dullness or mass.      Tenderness: There is abdominal tenderness. There is no guarding or rebound.      Hernia: No hernia is present. There is no hernia in the ventral area.   Musculoskeletal:         General: Normal range of motion.      Cervical back: Normal range of motion.   Skin:     General: Skin is warm and dry.   Neurological:      Mental Status: He is alert and oriented to person, place, and time.   Psychiatric:         Behavior: Behavior normal.         Thought Content: Thought content normal.         Judgment: Judgment normal.       Assessment & Plan      1. Achalasia    2. Esophageal varices without bleeding, unspecified esophageal varices type (HCC)    3. Gastroesophageal reflux disease with esophagitis without hemorrhage    4. Hepatic fibrosis    5. Portal hypertension (HCC)    .   Diagnoses and all orders for this visit:    1. Achalasia (Primary)    2. Esophageal " varices without bleeding, unspecified esophageal varices type (HCC)    3. Gastroesophageal reflux disease with esophagitis without hemorrhage    4. Hepatic fibrosis    5. Portal hypertension (HCC)        Orders placed during this encounter include:  No orders of the defined types were placed in this encounter.      Medications prescribed:  No orders of the defined types were placed in this encounter.      Requested Prescriptions      No prescriptions requested or ordered in this encounter       Review and/or summary of lab tests, radiology, procedures, medications. Review and summary of old records and obtaining of history. The risks and benefits of my recommendations, as well as other treatment options were discussed with the patient today. Questions were answered.    Follow-up: Return in about 6 weeks (around 2022), or if symptoms worsen or fail to improve.     * Surgery not found *      This document has been electronically signed by Tim Chen PA-C on 2022 19:54 CDT      Results for orders placed or performed during the hospital encounter of 06/15/22   TISSUE EXAM, [facility] ([facility])    Specimen: Gastric, Antrum; Tissue   Result Value Ref Range    Reference Lab Report       [facility]  [address] [place] [address]  Phone: [phone] or [phone]  Fax: [phone]  Riki Harden M.D., Medical Director    PATIENT NAME                           LABORATORY NO.  MARSHA ARGUETA.                 [id number]  [id number]                         AGE              SEX  SSN           CLIENT REF #  [facility]           61      1960      [id number]   [id number]    [place]                       REQUESTING M.D.     ATTENDING M.D.     COPY [address]                 [place] [address]             DATE COLLECTED      DATE RECEIVED      DATE REPORTED  06/15/2022        "   06/15/2022         06/16/2022    DIAGNOSIS:  GASTRIC ANTRUM, BIOPSY:  Minimal chronic gastritis  Negative for H. pylori, metaplasia, dysplasia or malignancy    RLL/pah    CLINICAL HISTORY:  Achalasia, gastroesophageal reflux disease with esophagitis without  hemorrhage,  esophageal varices without bleeding, unspecified esophageal varices type    SPECIMENS RECEIVED:  GASTRIC ANTRUM, BIOPSY    MICROSCOPIC DESCRIPTION:  Tissue blocks are prepared and slides are examined microscopically on all  specimens. See diagnosis for details.    Professional interpretation rendered by Riki Harden M.D., JANEADENIA at  [facility], [address] [place] [address].    GROSS DESCRIPTION:  Specimen is received in 1 formalin filled container labeled \"gastric antrum\" and  consists of 2 portions of tan soft tissue measuring 0.5 x 0.4 x 0.2 cm.  The  specimen is submitted entirely in 1 cassette.  BW    REVIEWED, DIAGNOSED AND ELECTRONICALLY  SIGNED BY:    Riki Harden M.D., JANEA.P.  CPT CODES:  54350     Results for orders placed or performed in visit on 02/21/22   Magnesium    Specimen: Blood   Result Value Ref Range    Magnesium 1.6 1.6 - 2.4 mg/dL   Hemoglobin A1c    Specimen: Blood   Result Value Ref Range    Hemoglobin A1C 5.80 (H) 4.80 - 5.60 %   Lipid panel    Specimen: Blood   Result Value Ref Range    Total Cholesterol 206 (H) 0 - 200 mg/dL    Triglycerides 69 0 - 150 mg/dL    HDL Cholesterol 65 (H) 40 - 60 mg/dL    LDL Cholesterol  129 (H) 0 - 100 mg/dL    VLDL Cholesterol 12 5 - 40 mg/dL    LDL/HDL Ratio 1.96    Results for orders placed or performed in visit on 11/30/21   MicroAlbumin, Urine, Random - Urine, Clean Catch    Specimen: Urine, Clean Catch   Result Value Ref Range    Microalbumin, Urine 218.1 mg/dL   Hemoglobin A1c    Specimen: Blood   Result Value Ref Range    Hemoglobin A1C 5.64 (H) 4.80 - 5.60 %   Results for orders placed or performed in visit on 11/30/21   CBC Auto Differential    " Specimen: Blood   Result Value Ref Range    WBC 4.23 3.40 - 10.80 10*3/mm3    RBC 6.00 (H) 4.14 - 5.80 10*6/mm3    Hemoglobin 15.3 13.0 - 17.7 g/dL    Hematocrit 47.6 37.5 - 51.0 %    MCV 79.3 79.0 - 97.0 fL    MCH 25.5 (L) 26.6 - 33.0 pg    MCHC 32.1 31.5 - 35.7 g/dL    RDW 14.5 12.3 - 15.4 %    RDW-SD 40.1 37.0 - 54.0 fl    MPV 12.1 (H) 6.0 - 12.0 fL    Platelets 279 140 - 450 10*3/mm3    Neutrophil % 43.3 42.7 - 76.0 %    Lymphocyte % 40.4 19.6 - 45.3 %    Monocyte % 8.5 5.0 - 12.0 %    Eosinophil % 5.9 0.3 - 6.2 %    Basophil % 1.7 (H) 0.0 - 1.5 %    Immature Grans % 0.2 0.0 - 0.5 %    Neutrophils, Absolute 1.83 1.70 - 7.00 10*3/mm3    Lymphocytes, Absolute 1.71 0.70 - 3.10 10*3/mm3    Monocytes, Absolute 0.36 0.10 - 0.90 10*3/mm3    Eosinophils, Absolute 0.25 0.00 - 0.40 10*3/mm3    Basophils, Absolute 0.07 0.00 - 0.20 10*3/mm3    Immature Grans, Absolute 0.01 0.00 - 0.05 10*3/mm3    nRBC 0.2 0.0 - 0.2 /100 WBC   Comprehensive Metabolic Panel    Specimen: Blood   Result Value Ref Range    Glucose 87 65 - 99 mg/dL    BUN 9 8 - 23 mg/dL    Creatinine 1.04 0.76 - 1.27 mg/dL    Sodium 142 136 - 145 mmol/L    Potassium 3.5 3.5 - 5.2 mmol/L    Chloride 101 98 - 107 mmol/L    CO2 28.4 22.0 - 29.0 mmol/L    Calcium 9.7 8.6 - 10.5 mg/dL    Total Protein 7.9 6.0 - 8.5 g/dL    Albumin 4.60 3.50 - 5.20 g/dL    ALT (SGPT) 11 1 - 41 U/L    AST (SGOT) 21 1 - 40 U/L    Alkaline Phosphatase 60 39 - 117 U/L    Total Bilirubin 0.8 0.0 - 1.2 mg/dL    eGFR  African Amer 87 >60 mL/min/1.73    Globulin 3.3 gm/dL    A/G Ratio 1.4 g/dL    BUN/Creatinine Ratio 8.7 7.0 - 25.0    Anion Gap 12.6 5.0 - 15.0 mmol/L   Results for orders placed or performed in visit on 08/13/21   CBC Auto Differential    Specimen: Blood   Result Value Ref Range    WBC 6.94 3.40 - 10.80 10*3/mm3    RBC 4.87 4.14 - 5.80 10*6/mm3    Hemoglobin 12.8 (L) 13.0 - 17.7 g/dL    Hematocrit 39.3 37.5 - 51.0 %    MCV 80.7 79.0 - 97.0 fL    MCH 26.3 (L) 26.6 - 33.0 pg     MCHC 32.6 31.5 - 35.7 g/dL    RDW 15.3 12.3 - 15.4 %    RDW-SD 43.7 37.0 - 54.0 fl    MPV 10.6 6.0 - 12.0 fL    Platelets 540 (H) 140 - 450 10*3/mm3    Neutrophil % 73.9 42.7 - 76.0 %    Lymphocyte % 17.9 (L) 19.6 - 45.3 %    Monocyte % 5.5 5.0 - 12.0 %    Eosinophil % 1.9 0.3 - 6.2 %    Basophil % 0.4 0.0 - 1.5 %    Immature Grans % 0.4 0.0 - 0.5 %    Neutrophils, Absolute 5.13 1.70 - 7.00 10*3/mm3    Lymphocytes, Absolute 1.24 0.70 - 3.10 10*3/mm3    Monocytes, Absolute 0.38 0.10 - 0.90 10*3/mm3    Eosinophils, Absolute 0.13 0.00 - 0.40 10*3/mm3    Basophils, Absolute 0.03 0.00 - 0.20 10*3/mm3    Immature Grans, Absolute 0.03 0.00 - 0.05 10*3/mm3    nRBC 0.1 0.0 - 0.2 /100 WBC   Iron Profile    Specimen: Blood   Result Value Ref Range    Iron 149 59 - 158 mcg/dL    Iron Saturation 40 20 - 50 %    Transferrin 253 200 - 360 mg/dL    TIBC 377 298 - 536 mcg/dL   Protime-INR    Specimen: Blood   Result Value Ref Range    Protime 12.5 11.1 - 15.3 Seconds    INR 0.94 0.80 - 1.20   Ferritin    Specimen: Blood   Result Value Ref Range    Ferritin 166.00 30.00 - 400.00 ng/mL   Ammonia    Specimen: Blood   Result Value Ref Range    Ammonia 32 16 - 60 umol/L   Comprehensive Metabolic Panel    Specimen: Blood   Result Value Ref Range    Glucose 80 65 - 99 mg/dL    BUN 7 (L) 8 - 23 mg/dL    Creatinine 0.74 (L) 0.76 - 1.27 mg/dL    Sodium 141 136 - 145 mmol/L    Potassium 3.2 (L) 3.5 - 5.2 mmol/L    Chloride 102 98 - 107 mmol/L    CO2 26.4 22.0 - 29.0 mmol/L    Calcium 9.0 8.6 - 10.5 mg/dL    Total Protein 7.6 6.0 - 8.5 g/dL    Albumin 4.30 3.50 - 5.20 g/dL    ALT (SGPT) 16 1 - 41 U/L    AST (SGOT) 23 1 - 40 U/L    Alkaline Phosphatase 70 39 - 117 U/L    Total Bilirubin 0.5 0.0 - 1.2 mg/dL    eGFR  African Amer 129 >60 mL/min/1.73    Globulin 3.3 gm/dL    A/G Ratio 1.3 g/dL    BUN/Creatinine Ratio 9.5 7.0 - 25.0    Anion Gap 12.6 5.0 - 15.0 mmol/L     *Note: Due to a large number of results and/or encounters for the requested  time period, some results have not been displayed. A complete set of results can be found in Results Review.

## 2022-08-01 DIAGNOSIS — E11.8 TYPE 2 DIABETES MELLITUS WITH COMPLICATION, WITHOUT LONG-TERM CURRENT USE OF INSULIN: ICD-10-CM

## 2022-08-01 DIAGNOSIS — K21.00 GERD WITH ESOPHAGITIS: ICD-10-CM

## 2022-08-01 RX ORDER — LOVASTATIN 40 MG/1
TABLET ORAL
Qty: 30 TABLET | Refills: 0 | Status: SHIPPED | OUTPATIENT
Start: 2022-08-01 | End: 2022-09-21

## 2022-08-01 RX ORDER — CETIRIZINE HYDROCHLORIDE 10 MG/1
TABLET ORAL
Qty: 30 TABLET | Refills: 5 | Status: SHIPPED | OUTPATIENT
Start: 2022-08-01 | End: 2023-01-06 | Stop reason: SDUPTHER

## 2022-08-01 RX ORDER — TIOTROPIUM BROMIDE 18 UG/1
CAPSULE ORAL; RESPIRATORY (INHALATION)
Qty: 30 CAPSULE | Refills: 5 | Status: SHIPPED | OUTPATIENT
Start: 2022-08-01 | End: 2023-02-21

## 2022-08-16 ENCOUNTER — OFFICE VISIT (OUTPATIENT)
Dept: FAMILY MEDICINE CLINIC | Facility: CLINIC | Age: 62
End: 2022-08-16

## 2022-08-16 VITALS
HEART RATE: 94 BPM | OXYGEN SATURATION: 98 % | BODY MASS INDEX: 28.93 KG/M2 | HEIGHT: 69 IN | DIASTOLIC BLOOD PRESSURE: 84 MMHG | TEMPERATURE: 97.6 F | WEIGHT: 195.3 LBS | SYSTOLIC BLOOD PRESSURE: 140 MMHG

## 2022-08-16 DIAGNOSIS — Z00.00 MEDICARE ANNUAL WELLNESS VISIT, INITIAL: Primary | ICD-10-CM

## 2022-08-16 PROCEDURE — 1170F FXNL STATUS ASSESSED: CPT | Performed by: STUDENT IN AN ORGANIZED HEALTH CARE EDUCATION/TRAINING PROGRAM

## 2022-08-16 PROCEDURE — G0438 PPPS, INITIAL VISIT: HCPCS | Performed by: STUDENT IN AN ORGANIZED HEALTH CARE EDUCATION/TRAINING PROGRAM

## 2022-08-16 PROCEDURE — 1160F RVW MEDS BY RX/DR IN RCRD: CPT | Performed by: STUDENT IN AN ORGANIZED HEALTH CARE EDUCATION/TRAINING PROGRAM
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
## 2022-08-18 NOTE — PROGRESS NOTES
I have reviewed the notes, assessments, and/or procedures performed by Catrachito Milligan MD during office visit. I concur with her/his documentation and assessment and plan for Jameel Dozier.          This document has been electronically signed by Leida Danielson MD on August 18, 2022 13:55 CDT

## 2022-09-21 RX ORDER — LOVASTATIN 40 MG/1
TABLET ORAL
Qty: 30 TABLET | Refills: 0 | Status: SHIPPED | OUTPATIENT
Start: 2022-09-21 | End: 2022-10-04 | Stop reason: SDUPTHER

## 2022-09-26 ENCOUNTER — OFFICE VISIT (OUTPATIENT)
Dept: GASTROENTEROLOGY | Facility: CLINIC | Age: 62
End: 2022-09-26

## 2022-09-26 VITALS
HEIGHT: 70 IN | WEIGHT: 197 LBS | HEART RATE: 72 BPM | BODY MASS INDEX: 28.2 KG/M2 | SYSTOLIC BLOOD PRESSURE: 140 MMHG | DIASTOLIC BLOOD PRESSURE: 92 MMHG

## 2022-09-26 DIAGNOSIS — K22.0 ACHALASIA: Primary | ICD-10-CM

## 2022-09-26 DIAGNOSIS — K74.00 HEPATIC FIBROSIS: ICD-10-CM

## 2022-09-26 DIAGNOSIS — K21.00 GASTROESOPHAGEAL REFLUX DISEASE WITH ESOPHAGITIS WITHOUT HEMORRHAGE: ICD-10-CM

## 2022-09-26 DIAGNOSIS — I85.00 ESOPHAGEAL VARICES WITHOUT BLEEDING, UNSPECIFIED ESOPHAGEAL VARICES TYPE: ICD-10-CM

## 2022-09-26 PROCEDURE — 99214 OFFICE O/P EST MOD 30 MIN: CPT | Performed by: PHYSICIAN ASSISTANT

## 2022-09-26 NOTE — PROGRESS NOTES
Chief Complaint   Patient presents with   • Follow-up   • Hepatic Fibrosis       ENDO PROCEDURE ORDERED:    Subjective    Jameel Dozier is a 62 y.o. male. he is here today for follow-up.    History of Present Illness    The patient is seen on a recheck of his hepatic fibrosis, achalasia, and chronic hiccups. Last seen 07/25/2022. Patient states overall he is doing okay. No significant swallowing difficulties on the Prilosec 20 mg daily. He continues to have hiccups. Bowels are moving without blood or mucus. Weight is up 4 pounds since last visit. Last EGD with dilatation 06/15/2022. He also had grade 1 varices. He is on propranolol. He has a history of colon polyps with last colonoscopy 09/18/2020. He had a fairly normal CT scan of the chest 04/08/2022.     A/P: Patient with hepatic fibrosis with known varices. Continue on the propranolol. He is due for hepatoma screening. Will schedule abdominal ultrasound, AFP, CBC, CMP, ammonia, INR, iron and REYNOSO. He has not gotten his laboratories drawn as previously requested but states he will do that. Will plan followup in a few weeks, sooner if needed, further pending clinical course and the results of the above.       The following portions of the patient's history were reviewed and updated as appropriate:   Past Medical History:   Diagnosis Date   • Abdominal pain    • Acquired achalasia of esophagus    • Adenomatous polyp of colon    • Adverse drug effect    • Alcohol dependence with alcohol-induced disorder (HCC)    • Allergic rhinitis    • Anal fissure    • Anemia due to blood loss    • Benign essential hypertension    • Central obesity    • Cerebrovascular disease    • Chronic obstructive lung disease (HCC)    • Claudication (HCC)    • Disorder of peripheral nervous system    • Diverticular disease of colon     completed antibx, ? neoplasm on CT   • Dysphagia    • Dyspnea    • ED (erectile dysfunction)    • Epigastric pain    • Esophageal dysmotility    •  Esophageal reflux    • Esophagitis    • Essential hypertension    • External hemorrhoids    • Gastritis    • GERD with esophagitis    • Heavy tobacco smoker    • Hemorrhoids    • Hiatal hernia    • Hiccough    • History of colon polyps    • History of echocardiogram     Normal LV funciton with Ef of 65% to 70% without regional wall motion abnormalities.Mild CLVH. Normal RV size and function. No significant valvular regurgitation or stenosis. 09/19/2014       • History of EKG    • History of TIA (transient ischemic attack)    • Hypercholesterolemia    • Hypertension    • Left ventricular hypertrophy    • Male erectile disorder    • Obstructive sleep apnea syndrome    • Primary osteoarthritis of knees, bilateral    • Rectal bleed     painful   • Rectal hemorrhage    • Sleep disorder    • Transient cerebral ischemia    • Upper respiratory infection    • Weakness     Attacks of weakness     Past Surgical History:   Procedure Laterality Date   • COLONOSCOPY  04/13/2015    Internal and external hemorrhoids found. 04/13/2015    • COLONOSCOPY N/A 9/18/2020    Procedure: COLONOSCOPY;  Surgeon: Alli Dockery MD;  Location: [facility] ENDOSCOPY;  Service: Gastroenterology;  Laterality: N/A;   • ENDOSCOPY      Internal & external hemorrhoids found. 1 polyp in sigmoid colon; removed by snare cautery polypectomy. 09/26/2012    • ENDOSCOPY      Gastritis found in the stomach. Biopsy taken.Enlarged folds were found in the body of the stomach.Biopsy taken.Normal duodenum.A hiatus hernia was found in the esophagus. 04/13/2015    • ENDOSCOPY      Hiatus hernia in esophagus. Gastritis in stomach. Biopsy taken. Normal duodenum. Biopsy taken. 09/26/2012       • ENDOSCOPY N/A 12/18/2017    Procedure: ESOPHAGOGASTRODUODENOSCOPY--attn mid esophagus, abnl on CT;  Surgeon: Alli Dockery MD;  Location: [facility] ENDOSCOPY;  Service:    • ENDOSCOPY N/A 3/6/2019    Procedure: ESOPHAGOGASTRODUODENOSCOPY WITH DILATATION;  Surgeon: Alli Dockery  MD;  Location: [facility] ENDOSCOPY;  Service: Gastroenterology   • ENDOSCOPY N/A 9/18/2020    Procedure: ESOPHAGOGASTRODUODENOSCOPY eval varices;  Surgeon: Alli Dockery MD;  Location: [facility] ENDOSCOPY;  Service: Gastroenterology;  Laterality: N/A;   • ENDOSCOPY N/A 4/28/2021    Procedure: ESOPHAGOGASTRODUODENOSCOPY WITH ANESTHESIA--with botox, attn antrum poss malig;  Surgeon: Alli Dockery MD;  Location: [facility] ENDOSCOPY;  Service: Gastroenterology;  Laterality: N/A;   • ENDOSCOPY N/A 6/15/2022    Procedure: ESOPHAGOGASTRODUODENOSCOPY WITH DILATATION;  Surgeon: Alli Dockery MD;  Location: [facility] ENDOSCOPY;  Service: Gastroenterology;  Laterality: N/A;   • UPPER GASTROINTESTINAL ENDOSCOPY  08/24/2016   • UPPER GASTROINTESTINAL ENDOSCOPY  04/13/2015   • UPPER GASTROINTESTINAL ENDOSCOPY  12/18/2017   • UPPER GASTROINTESTINAL ENDOSCOPY  03/06/2019   • UPPER GASTROINTESTINAL ENDOSCOPY  09/18/2020   • UPPER GASTROINTESTINAL ENDOSCOPY  04/28/2021     Family History   Problem Relation Age of Onset   • Cancer Mother    • Cirrhosis Father    • Diabetes Other    • Hypertension Other        Allergies   Allergen Reactions   • Ace Inhibitors Angioedema     hypotension   • Lisinopril Other (See Comments)     cough   • Vistaril [Hydroxyzine Hcl] Other (See Comments)     Patient is unsure      Social History     Socioeconomic History   • Marital status: Single   • Number of children: 0   • Years of education: 12   Tobacco Use   • Smoking status: Current Every Day Smoker     Packs/day: 1.50     Years: 40.00     Pack years: 60.00     Types: Cigarettes   • Smokeless tobacco: Never Used   Vaping Use   • Vaping Use: Never used   Substance and Sexual Activity   • Alcohol use: Yes     Alcohol/week: 6.0 standard drinks     Types: 6 Cans of beer per week     Comment: when can get   • Drug use: No   • Sexual activity: Defer     Partners: Female     Current Medications:  Prior to Admission medications    Medication Sig Start Date End  Date Taking? Authorizing Provider   albuterol sulfate  (90 Base) MCG/ACT inhaler Inhale 2 puffs Every 4 (Four) Hours As Needed for Wheezing or Shortness of Air. 1/27/22   Son Li MD   amLODIPine (NORVASC) 10 MG tablet Take 1 tablet by mouth Daily. 12/8/21   Son Li MD   aspirin (Aspirin Adult Low Strength) 81 MG EC tablet Take 1 tablet by mouth Daily. 2/21/22   Son Li MD   Blood Pressure Monitoring (Adult Blood Pressure Cuff Lg) kit Check BP daily 8/13/21   Sincere Narayanan MD   cetirizine (zyrTEC) 10 MG tablet TAKE 1 TAB DAILY IF NEEDED FOR ALLERGIES. 8/1/22   Son Li MD   fluticasone (FLONASE) 50 MCG/ACT nasal spray 2 sprays into each nostril Daily for 30 days. 11/27/17 7/14/22  Yolanda Phan MD   guaiFENesin (Mucinex) 600 MG 12 hr tablet Take 2 tablets by mouth 2 (Two) Times a Day. 1/27/22   Son Li MD   hydrALAZINE (APRESOLINE) 10 MG tablet Take 10 mg by mouth Daily. 5/16/22   Radha Burrell MD   Lidocaine Viscous HCl (XYLOCAINE) 2 % solution As Needed. 5/19/22   Radha Burrell MD   losartan-hydrochlorothiazide (HYZAAR) 100-12.5 MG per tablet Take 1 tablet by mouth Daily. 5/2/22   Son Li MD   lovastatin (MEVACOR) 40 MG tablet TAKE 1 TABLET  EVERY NIGHT AT BEDTIME FOR CHOLESTEROL. 9/21/22   Catrachito Milligan MD   magnesium oxide (MAGOX) 400 (241.3 Mg) MG tablet tablet Take 1 tablet by mouth Daily. 12/20/21   Son Li MD   metFORMIN (GLUCOPHAGE) 500 MG tablet TAKE 1 TABLET DAILY WITH BREAKFAST 8/1/22   Son Li MD   omeprazole (priLOSEC) 20 MG capsule Take 1 capsule by mouth 2 (Two) Times a Day. 5/16/22   Tim Chen PA-C   propranolol (INDERAL) 40 MG tablet Take 1 tablet by mouth Daily. 5/16/22   Tim Chen PA-C   Spiriva HandiHaler 18 MCG per inhalation capsule PLACE 1 CAPSULE INTO INHALER AND INHALE DAILY. 8/1/22   Son Li MD     Review of Systems  Review of Systems      "  Objective    /92   Pulse 72   Ht 177.8 cm (70\")   Wt 89.4 kg (197 lb)   BMI 28.27 kg/m²   Physical Exam  Vitals and nursing note reviewed.   Constitutional:       General: He is not in acute distress.     Appearance: He is well-developed.      Comments: NATALIA   HENT:      Head: Normocephalic and atraumatic.   Eyes:      Pupils: Pupils are equal, round, and reactive to light.   Cardiovascular:      Rate and Rhythm: Normal rate and regular rhythm.      Heart sounds: Normal heart sounds.   Pulmonary:      Effort: Pulmonary effort is normal.      Breath sounds: Normal breath sounds.   Abdominal:      General: Bowel sounds are normal. There is no distension or abdominal bruit.      Palpations: Abdomen is soft. Abdomen is not rigid. There is no shifting dullness or mass.      Tenderness: There is abdominal tenderness. There is no guarding or rebound.      Hernia: No hernia is present. There is no hernia in the ventral area.   Musculoskeletal:         General: Normal range of motion.      Cervical back: Normal range of motion.   Skin:     General: Skin is warm and dry.   Neurological:      Mental Status: He is alert and oriented to person, place, and time.   Psychiatric:         Behavior: Behavior normal.         Thought Content: Thought content normal.         Judgment: Judgment normal.       Assessment & Plan      1. Achalasia    2. Esophageal varices without bleeding, unspecified esophageal varices type (HCC)    3. Gastroesophageal reflux disease with esophagitis without hemorrhage    4. Hepatic fibrosis    .   Diagnoses and all orders for this visit:    1. Achalasia (Primary)  -     Ammonia  -     CBC & Differential  -     Comprehensive Metabolic Panel  -     Iron  -     Protime-INR  -     AFP Tumor Marker  -     US Abdomen Complete    2. Esophageal varices without bleeding, unspecified esophageal varices type (HCC)  -     Ammonia  -     CBC & Differential  -     Comprehensive Metabolic Panel  -     Iron  -    "  Protime-INR  -     AFP Tumor Marker  -     US Abdomen Complete    3. Gastroesophageal reflux disease with esophagitis without hemorrhage  -     Ammonia  -     CBC & Differential  -     Comprehensive Metabolic Panel  -     Iron  -     Protime-INR  -     AFP Tumor Marker  -     US Abdomen Complete    4. Hepatic fibrosis  -     Ammonia  -     CBC & Differential  -     Comprehensive Metabolic Panel  -     Iron  -     Protime-INR  -     AFP Tumor Marker  -     US Abdomen Complete        Orders placed during this encounter include:  Orders Placed This Encounter   Procedures   • US Abdomen Complete     Order Specific Question:   Reason for Exam:     Answer:   cirrhosis   • Ammonia     Order Specific Question:   Release to patient     Answer:   Routine Release   • Comprehensive Metabolic Panel     Order Specific Question:   Release to patient     Answer:   Routine Release   • Iron   • Protime-INR   • AFP Tumor Marker     Order Specific Question:   Release to patient     Answer:   Routine Release   • CBC & Differential     Order Specific Question:   Manual Differential     Answer:   No       Medications prescribed:  No orders of the defined types were placed in this encounter.      Requested Prescriptions      No prescriptions requested or ordered in this encounter       Review and/or summary of lab tests, radiology, procedures, medications. Review and summary of old records and obtaining of history. The risks and benefits of my recommendations, as well as other treatment options were discussed with the patient today. Questions were answered.    Follow-up: Return in about 6 weeks (around 11/7/2022), or if symptoms worsen or fail to improve, for lab/US prior.     * Surgery not found *      This document has been electronically signed by Tim Chen PA-C on September 26, 2022 18:58 CDT      Results for orders placed or performed during the hospital encounter of 06/15/22   TISSUE EXAM, [facility] ([facility])    Specimen:  "Gastric, Antrum; Tissue   Result Value Ref Range    Reference Lab Report       [facility]  [address] [place] [address]  Phone: [phone] or [phone]  Fax: [phone]  Riki Harden M.D., Medical Director    PATIENT NAME                           LABORATORY NO.  MARSHA ARGUETA.                 [id number]  [id number]                         AGE              SEX  N           CLIENT REF #  [facility]           61      1960      [id number]   [id number]    [place]                       REQUESTING M.D.     ATTENDING MHomerD.     COPY TO[address]                 [place] [address]             DATE COLLECTED      DATE RECEIVED      DATE REPORTED  06/15/2022          06/15/2022         2022    DIAGNOSIS:  GASTRIC ANTRUM, BIOPSY:  Minimal chronic gastritis  Negative for H. pylori, metaplasia, dysplasia or malignancy    RLL/pah    CLINICAL HISTORY:  Achalasia, gastroesophageal reflux disease with esophagitis without  hemorrhage,  esophageal varices without bleeding, unspecified esophageal varices type    SPECIMENS RECEIVED:  GASTRIC ANTRUM, BIOPSY    MICROSCOPIC DESCRIPTION:  Tissue blocks are prepared and slides are examined microscopically on all  specimens. See diagnosis for details.    Professional interpretation rendered by Riki Harden M.D., F.C.A.P. at  [facility], [address] [place] [address].    GROSS DESCRIPTION:  Specimen is received in 1 formalin filled container labeled \"gastric antrum\" and  consists of 2 portions of tan soft tissue measuring 0.5 x 0.4 x 0.2 cm.  The  specimen is submitted entirely in 1 cassette.  BW    REVIEWED, DIAGNOSED AND ELECTRONICALLY  SIGNED BY:    Riki Harden M.D., F.C.A.P.  CPT CODES:  36346     Results for orders placed or performed in visit on 22   Magnesium    Specimen: Blood   Result Value Ref Range    " Magnesium 1.6 1.6 - 2.4 mg/dL   Hemoglobin A1c    Specimen: Blood   Result Value Ref Range    Hemoglobin A1C 5.80 (H) 4.80 - 5.60 %   Lipid panel    Specimen: Blood   Result Value Ref Range    Total Cholesterol 206 (H) 0 - 200 mg/dL    Triglycerides 69 0 - 150 mg/dL    HDL Cholesterol 65 (H) 40 - 60 mg/dL    LDL Cholesterol  129 (H) 0 - 100 mg/dL    VLDL Cholesterol 12 5 - 40 mg/dL    LDL/HDL Ratio 1.96    Results for orders placed or performed in visit on 11/30/21   MicroAlbumin, Urine, Random - Urine, Clean Catch    Specimen: Urine, Clean Catch   Result Value Ref Range    Microalbumin, Urine 218.1 mg/dL   Hemoglobin A1c    Specimen: Blood   Result Value Ref Range    Hemoglobin A1C 5.64 (H) 4.80 - 5.60 %   Results for orders placed or performed in visit on 11/30/21   CBC Auto Differential    Specimen: Blood   Result Value Ref Range    WBC 4.23 3.40 - 10.80 10*3/mm3    RBC 6.00 (H) 4.14 - 5.80 10*6/mm3    Hemoglobin 15.3 13.0 - 17.7 g/dL    Hematocrit 47.6 37.5 - 51.0 %    MCV 79.3 79.0 - 97.0 fL    MCH 25.5 (L) 26.6 - 33.0 pg    MCHC 32.1 31.5 - 35.7 g/dL    RDW 14.5 12.3 - 15.4 %    RDW-SD 40.1 37.0 - 54.0 fl    MPV 12.1 (H) 6.0 - 12.0 fL    Platelets 279 140 - 450 10*3/mm3    Neutrophil % 43.3 42.7 - 76.0 %    Lymphocyte % 40.4 19.6 - 45.3 %    Monocyte % 8.5 5.0 - 12.0 %    Eosinophil % 5.9 0.3 - 6.2 %    Basophil % 1.7 (H) 0.0 - 1.5 %    Immature Grans % 0.2 0.0 - 0.5 %    Neutrophils, Absolute 1.83 1.70 - 7.00 10*3/mm3    Lymphocytes, Absolute 1.71 0.70 - 3.10 10*3/mm3    Monocytes, Absolute 0.36 0.10 - 0.90 10*3/mm3    Eosinophils, Absolute 0.25 0.00 - 0.40 10*3/mm3    Basophils, Absolute 0.07 0.00 - 0.20 10*3/mm3    Immature Grans, Absolute 0.01 0.00 - 0.05 10*3/mm3    nRBC 0.2 0.0 - 0.2 /100 WBC   Comprehensive Metabolic Panel    Specimen: Blood   Result Value Ref Range    Glucose 87 65 - 99 mg/dL    BUN 9 8 - 23 mg/dL    Creatinine 1.04 0.76 - 1.27 mg/dL    Sodium 142 136 - 145 mmol/L    Potassium 3.5 3.5  - 5.2 mmol/L    Chloride 101 98 - 107 mmol/L    CO2 28.4 22.0 - 29.0 mmol/L    Calcium 9.7 8.6 - 10.5 mg/dL    Total Protein 7.9 6.0 - 8.5 g/dL    Albumin 4.60 3.50 - 5.20 g/dL    ALT (SGPT) 11 1 - 41 U/L    AST (SGOT) 21 1 - 40 U/L    Alkaline Phosphatase 60 39 - 117 U/L    Total Bilirubin 0.8 0.0 - 1.2 mg/dL    eGFR  African Amer 87 >60 mL/min/1.73    Globulin 3.3 gm/dL    A/G Ratio 1.4 g/dL    BUN/Creatinine Ratio 8.7 7.0 - 25.0    Anion Gap 12.6 5.0 - 15.0 mmol/L   Results for orders placed or performed in visit on 08/13/21   CBC Auto Differential    Specimen: Blood   Result Value Ref Range    WBC 6.94 3.40 - 10.80 10*3/mm3    RBC 4.87 4.14 - 5.80 10*6/mm3    Hemoglobin 12.8 (L) 13.0 - 17.7 g/dL    Hematocrit 39.3 37.5 - 51.0 %    MCV 80.7 79.0 - 97.0 fL    MCH 26.3 (L) 26.6 - 33.0 pg    MCHC 32.6 31.5 - 35.7 g/dL    RDW 15.3 12.3 - 15.4 %    RDW-SD 43.7 37.0 - 54.0 fl    MPV 10.6 6.0 - 12.0 fL    Platelets 540 (H) 140 - 450 10*3/mm3    Neutrophil % 73.9 42.7 - 76.0 %    Lymphocyte % 17.9 (L) 19.6 - 45.3 %    Monocyte % 5.5 5.0 - 12.0 %    Eosinophil % 1.9 0.3 - 6.2 %    Basophil % 0.4 0.0 - 1.5 %    Immature Grans % 0.4 0.0 - 0.5 %    Neutrophils, Absolute 5.13 1.70 - 7.00 10*3/mm3    Lymphocytes, Absolute 1.24 0.70 - 3.10 10*3/mm3    Monocytes, Absolute 0.38 0.10 - 0.90 10*3/mm3    Eosinophils, Absolute 0.13 0.00 - 0.40 10*3/mm3    Basophils, Absolute 0.03 0.00 - 0.20 10*3/mm3    Immature Grans, Absolute 0.03 0.00 - 0.05 10*3/mm3    nRBC 0.1 0.0 - 0.2 /100 WBC   Iron Profile    Specimen: Blood   Result Value Ref Range    Iron 149 59 - 158 mcg/dL    Iron Saturation 40 20 - 50 %    Transferrin 253 200 - 360 mg/dL    TIBC 377 298 - 536 mcg/dL   Protime-INR    Specimen: Blood   Result Value Ref Range    Protime 12.5 11.1 - 15.3 Seconds    INR 0.94 0.80 - 1.20   Ferritin    Specimen: Blood   Result Value Ref Range    Ferritin 166.00 30.00 - 400.00 ng/mL   Ammonia    Specimen: Blood   Result Value Ref Range     Ammonia 32 16 - 60 umol/L   Comprehensive Metabolic Panel    Specimen: Blood   Result Value Ref Range    Glucose 80 65 - 99 mg/dL    BUN 7 (L) 8 - 23 mg/dL    Creatinine 0.74 (L) 0.76 - 1.27 mg/dL    Sodium 141 136 - 145 mmol/L    Potassium 3.2 (L) 3.5 - 5.2 mmol/L    Chloride 102 98 - 107 mmol/L    CO2 26.4 22.0 - 29.0 mmol/L    Calcium 9.0 8.6 - 10.5 mg/dL    Total Protein 7.6 6.0 - 8.5 g/dL    Albumin 4.30 3.50 - 5.20 g/dL    ALT (SGPT) 16 1 - 41 U/L    AST (SGOT) 23 1 - 40 U/L    Alkaline Phosphatase 70 39 - 117 U/L    Total Bilirubin 0.5 0.0 - 1.2 mg/dL    eGFR  African Amer 129 >60 mL/min/1.73    Globulin 3.3 gm/dL    A/G Ratio 1.3 g/dL    BUN/Creatinine Ratio 9.5 7.0 - 25.0    Anion Gap 12.6 5.0 - 15.0 mmol/L     *Note: Due to a large number of results and/or encounters for the requested time period, some results have not been displayed. A complete set of results can be found in Results Review.

## 2022-10-04 ENCOUNTER — OFFICE VISIT (OUTPATIENT)
Dept: FAMILY MEDICINE CLINIC | Facility: CLINIC | Age: 62
End: 2022-10-04

## 2022-10-04 VITALS
OXYGEN SATURATION: 97 % | BODY MASS INDEX: 28.24 KG/M2 | HEART RATE: 71 BPM | HEIGHT: 70 IN | DIASTOLIC BLOOD PRESSURE: 90 MMHG | WEIGHT: 197.3 LBS | SYSTOLIC BLOOD PRESSURE: 150 MMHG

## 2022-10-04 DIAGNOSIS — E78.00 HYPERCHOLESTEROLEMIA: ICD-10-CM

## 2022-10-04 DIAGNOSIS — I10 ESSENTIAL HYPERTENSION: Primary | ICD-10-CM

## 2022-10-04 DIAGNOSIS — Z72.0 DECLINED SMOKING CESSATION: ICD-10-CM

## 2022-10-04 DIAGNOSIS — J42 CHRONIC BRONCHITIS, UNSPECIFIED CHRONIC BRONCHITIS TYPE: ICD-10-CM

## 2022-10-04 DIAGNOSIS — I85.00 ESOPHAGEAL VARICES WITHOUT BLEEDING, UNSPECIFIED ESOPHAGEAL VARICES TYPE: ICD-10-CM

## 2022-10-04 PROCEDURE — 99214 OFFICE O/P EST MOD 30 MIN: CPT | Performed by: STUDENT IN AN ORGANIZED HEALTH CARE EDUCATION/TRAINING PROGRAM

## 2022-10-04 RX ORDER — LOSARTAN POTASSIUM AND HYDROCHLOROTHIAZIDE 12.5; 1 MG/1; MG/1
1 TABLET ORAL DAILY
Qty: 30 TABLET | Refills: 2 | Status: SHIPPED | OUTPATIENT
Start: 2022-10-04

## 2022-10-04 RX ORDER — LOSARTAN POTASSIUM AND HYDROCHLOROTHIAZIDE 12.5; 1 MG/1; MG/1
1 TABLET ORAL DAILY
Qty: 30 TABLET | Refills: 1 | Status: SHIPPED | OUTPATIENT
Start: 2022-10-04 | End: 2022-10-04

## 2022-10-04 RX ORDER — LOVASTATIN 40 MG/1
40 TABLET ORAL NIGHTLY
Qty: 30 TABLET | Refills: 2 | Status: SHIPPED | OUTPATIENT
Start: 2022-10-04 | End: 2023-01-23

## 2022-10-04 RX ORDER — PROPRANOLOL HYDROCHLORIDE 40 MG/1
40 TABLET ORAL DAILY
Qty: 30 TABLET | Refills: 3 | Status: SHIPPED | OUTPATIENT
Start: 2022-10-04 | End: 2023-03-02

## 2022-10-04 NOTE — PROGRESS NOTES
Family Medicine Residency  Catrachito Milligan MD    Subjective:     Jameel Dozier is a 62 y.o. male who presents for HTN and COPD.     COPD - no shortness of breath, no wheezing. Some chronic coughing. Using spiriva 1x per day. Not having to use albuterol inhaler. COPD controlled.     HTN - elevated in office 150/90. Patient has been smoking cigarettes this morning. Has not checked blood pressure at home. Reports compliance with Hyzaar without any adverse effects. Did not take his medicine this am since he has been out of it.     Smoking cessation - does not wish to quit at this time.     Intractable hiccups - reports that this has been going on for years. Intermittent. Occurs every now and then. Staying the same, no changes. No pattern that he is aware of. Not worse after food or during any time of the day.      Patient has had several labs put in since May that he hasn't obtained yet. Reports that he rides with someone and doesn't usually have time to wait. Patient was requested to bring medications with him at this visit but forgot to do so.     Past Medical History   Past Medical History:   Diagnosis Date   • Abdominal pain    • Acquired achalasia of esophagus    • Adenomatous polyp of colon    • Adverse drug effect    • Alcohol dependence with alcohol-induced disorder (HCC)    • Allergic rhinitis    • Anal fissure    • Anemia due to blood loss    • Benign essential hypertension    • Central obesity    • Cerebrovascular disease    • Chronic obstructive lung disease (HCC)    • Claudication (HCC)    • Disorder of peripheral nervous system    • Diverticular disease of colon     completed antibx, ? neoplasm on CT   • Dysphagia    • Dyspnea    • ED (erectile dysfunction)    • Epigastric pain    • Esophageal dysmotility    • Esophageal reflux    • Esophagitis    • Essential hypertension    • External hemorrhoids    • Gastritis    • GERD with esophagitis    • Heavy tobacco smoker    • Hemorrhoids    •  Hiatal hernia    • Hiccough    • History of colon polyps    • History of echocardiogram     Normal LV funciton with Ef of 65% to 70% without regional wall motion abnormalities.Mild CLVH. Normal RV size and function. No significant valvular regurgitation or stenosis. 09/19/2014       • History of EKG    • History of TIA (transient ischemic attack)    • Hypercholesterolemia    • Hypertension    • Left ventricular hypertrophy    • Male erectile disorder    • Obstructive sleep apnea syndrome    • Primary osteoarthritis of knees, bilateral    • Rectal bleed     painful   • Rectal hemorrhage    • Sleep disorder    • Transient cerebral ischemia    • Upper respiratory infection    • Weakness     Attacks of weakness       Past Surgical Hx:  Past Surgical History:   Procedure Laterality Date   • COLONOSCOPY  04/13/2015    Internal and external hemorrhoids found. 04/13/2015    • COLONOSCOPY N/A 9/18/2020    Procedure: COLONOSCOPY;  Surgeon: Alli Dockery MD;  Location: [facility] ENDOSCOPY;  Service: Gastroenterology;  Laterality: N/A;   • ENDOSCOPY      Internal & external hemorrhoids found. 1 polyp in sigmoid colon; removed by snare cautery polypectomy. 09/26/2012    • ENDOSCOPY      Gastritis found in the stomach. Biopsy taken.Enlarged folds were found in the body of the stomach.Biopsy taken.Normal duodenum.A hiatus hernia was found in the esophagus. 04/13/2015    • ENDOSCOPY      Hiatus hernia in esophagus. Gastritis in stomach. Biopsy taken. Normal duodenum. Biopsy taken. 09/26/2012       • ENDOSCOPY N/A 12/18/2017    Procedure: ESOPHAGOGASTRODUODENOSCOPY--attn mid esophagus, abnl on CT;  Surgeon: Alli Dockery MD;  Location: [facility] ENDOSCOPY;  Service:    • ENDOSCOPY N/A 3/6/2019    Procedure: ESOPHAGOGASTRODUODENOSCOPY WITH DILATATION;  Surgeon: Alli Dockery MD;  Location: [facility] ENDOSCOPY;  Service: Gastroenterology   • ENDOSCOPY N/A 9/18/2020    Procedure: ESOPHAGOGASTRODUODENOSCOPY eval varices;  Surgeon:  Alli Dockery MD;  Location: [facility] ENDOSCOPY;  Service: Gastroenterology;  Laterality: N/A;   • ENDOSCOPY N/A 4/28/2021    Procedure: ESOPHAGOGASTRODUODENOSCOPY WITH ANESTHESIA--with botox, attn antrum poss malig;  Surgeon: Alli Dockery MD;  Location: [facility] ENDOSCOPY;  Service: Gastroenterology;  Laterality: N/A;   • ENDOSCOPY N/A 6/15/2022    Procedure: ESOPHAGOGASTRODUODENOSCOPY WITH DILATATION;  Surgeon: Alli Dockery MD;  Location: [facility] ENDOSCOPY;  Service: Gastroenterology;  Laterality: N/A;   • UPPER GASTROINTESTINAL ENDOSCOPY  08/24/2016   • UPPER GASTROINTESTINAL ENDOSCOPY  04/13/2015   • UPPER GASTROINTESTINAL ENDOSCOPY  12/18/2017   • UPPER GASTROINTESTINAL ENDOSCOPY  03/06/2019   • UPPER GASTROINTESTINAL ENDOSCOPY  09/18/2020   • UPPER GASTROINTESTINAL ENDOSCOPY  04/28/2021       Current Meds:    Current Outpatient Medications:   •  albuterol sulfate  (90 Base) MCG/ACT inhaler, Inhale 2 puffs Every 4 (Four) Hours As Needed for Wheezing or Shortness of Air., Disp: 18 g, Rfl: 5  •  amLODIPine (NORVASC) 10 MG tablet, Take 1 tablet by mouth Daily., Disp: 30 tablet, Rfl: 11  •  aspirin (Aspirin Adult Low Strength) 81 MG EC tablet, Take 1 tablet by mouth Daily., Disp: 30 tablet, Rfl: 2  •  Blood Pressure Monitoring (Adult Blood Pressure Cuff Lg) kit, Check BP daily, Disp: 1 each, Rfl: 0  •  cetirizine (zyrTEC) 10 MG tablet, TAKE 1 TAB DAILY IF NEEDED FOR ALLERGIES., Disp: 30 tablet, Rfl: 5  •  guaiFENesin (Mucinex) 600 MG 12 hr tablet, Take 2 tablets by mouth 2 (Two) Times a Day., Disp: 60 tablet, Rfl: 0  •  hydrALAZINE (APRESOLINE) 10 MG tablet, Take 10 mg by mouth Daily., Disp: , Rfl:   •  Lidocaine Viscous HCl (XYLOCAINE) 2 % solution, As Needed., Disp: , Rfl:   •  losartan-hydrochlorothiazide (HYZAAR) 100-12.5 MG per tablet, Take 1 tablet by mouth Daily., Disp: 30 tablet, Rfl: 2  •  lovastatin (MEVACOR) 40 MG tablet, Take 1 tablet by mouth Every Night., Disp: 30 tablet, Rfl: 2  •   magnesium oxide (MAGOX) 400 (241.3 Mg) MG tablet tablet, Take 1 tablet by mouth Daily., Disp: 30 tablet, Rfl: 4  •  metFORMIN (GLUCOPHAGE) 500 MG tablet, TAKE 1 TABLET DAILY WITH BREAKFAST, Disp: 30 tablet, Rfl: 4  •  omeprazole (priLOSEC) 20 MG capsule, Take 1 capsule by mouth 2 (Two) Times a Day., Disp: 60 capsule, Rfl: 3  •  propranolol (INDERAL) 40 MG tablet, Take 1 tablet by mouth Daily., Disp: 30 tablet, Rfl: 3  •  Spiriva HandiHaler 18 MCG per inhalation capsule, PLACE 1 CAPSULE INTO INHALER AND INHALE DAILY., Disp: 30 capsule, Rfl: 5  •  fluticasone (FLONASE) 50 MCG/ACT nasal spray, 2 sprays into each nostril Daily for 30 days., Disp: 1 bottle, Rfl: 11    Allergies:  Allergies   Allergen Reactions   • Ace Inhibitors Angioedema     hypotension   • Lisinopril Other (See Comments)     cough   • Vistaril [Hydroxyzine Hcl] Other (See Comments)     Patient is unsure        Family Hx:  Family History   Problem Relation Age of Onset   • Cancer Mother    • Cirrhosis Father    • Diabetes Other    • Hypertension Other         Social History:  Social History     Socioeconomic History   • Marital status: Single   • Number of children: 0   • Years of education: 12   Tobacco Use   • Smoking status: Current Every Day Smoker     Packs/day: 1.50     Years: 40.00     Pack years: 60.00     Types: Cigarettes   • Smokeless tobacco: Never Used   Vaping Use   • Vaping Use: Never used   Substance and Sexual Activity   • Alcohol use: Yes     Alcohol/week: 6.0 standard drinks     Types: 6 Cans of beer per week     Comment: when can get   • Drug use: No   • Sexual activity: Defer     Partners: Female       Review of Systems  Review of Systems   Constitutional: Negative for fever.   Respiratory: Positive for cough. Negative for shortness of breath.    Cardiovascular: Negative for chest pain and leg swelling.   Gastrointestinal: Negative for abdominal pain, constipation, diarrhea, nausea and vomiting.   Genitourinary: Negative for  "dysuria.   Neurological: Negative for dizziness, light-headedness and headaches.       Objective:     /90   Pulse 71   Ht 177.8 cm (70\")   Wt 89.5 kg (197 lb 4.8 oz)   SpO2 97%   BMI 28.31 kg/m²   Physical Exam  Vitals reviewed.   Constitutional:       General: He is not in acute distress.  HENT:      Head: Normocephalic and atraumatic.   Eyes:      Conjunctiva/sclera: Conjunctivae normal.   Cardiovascular:      Rate and Rhythm: Normal rate and regular rhythm.      Pulses: Normal pulses.   Pulmonary:      Effort: Pulmonary effort is normal. No respiratory distress.      Breath sounds: No rhonchi or rales.   Abdominal:      Palpations: Abdomen is soft.      Tenderness: There is no abdominal tenderness.   Musculoskeletal:      Right lower leg: No edema.      Left lower leg: No edema.   Skin:     General: Skin is warm.   Neurological:      General: No focal deficit present.      Mental Status: He is alert.   Psychiatric:         Mood and Affect: Mood normal.         Behavior: Behavior normal.          Assessment/Plan:     Diagnoses and all orders for this visit:    1. Essential hypertension (Primary)  -     losartan-hydrochlorothiazide (HYZAAR) 100-12.5 MG per tablet; Take 1 tablet by mouth Daily.  Dispense: 30 tablet; Refill: 2    2. Hypercholesterolemia  -     lovastatin (MEVACOR) 40 MG tablet; Take 1 tablet by mouth Every Night.  Dispense: 30 tablet; Refill: 2    3. Esophageal varices without bleeding, unspecified esophageal varices type (HCC)  -     propranolol (INDERAL) 40 MG tablet; Take 1 tablet by mouth Daily.  Dispense: 30 tablet; Refill: 3    4. Chronic bronchitis, unspecified (HCC)    5. Declined smoking cessation      Patient's hypertension uncontrolled at this visit but patient did smoke this morning and has not taking blood pressure medicine today. COPD/chronic bronchitis well controlled.   We will continue with patient's current medical therapy.  Hypertension likely exacerbated by patient's " continued smoking. Counseled patient on benefits of smoking cessation including improving blood pressure, decreasing risk of stroke and heart attack, and slowing progression of COPD.  Patient does not wish to quit at this time but was counseled that if he changes his mind that we have materials that can help.  Ideally patient would be checking blood pressures at home but has been noncompliant with obtaining blood pressure cuff.     Advised patient that if he is started on new medications for intractable hiccups per GI and he begins to have side effects (such as involuntary movements) he is to call GI office or call our office and let us know and discontinue medication. Patient expressed understanding.      Follow-up:     Return in about 3 months (around 1/4/2023) for Recheck.    Preventative:  Health Maintenance   Topic Date Due   • Hepatitis B (1 of 3 - Risk 3-dose series) Never done   • ZOSTER VACCINE (1 of 2) Never done   • Pneumococcal Vaccine 0-64 (2 - PCV) 08/15/2015   • COVID-19 Vaccine (4 - Booster for Pfizer series) 03/28/2022   • INFLUENZA VACCINE  08/01/2022   • HEMOGLOBIN A1C  08/21/2022   • DIABETIC FOOT EXAM  09/16/2022   • DIABETIC EYE EXAM  11/09/2022   • URINE MICROALBUMIN  11/30/2022   • LIPID PANEL  02/21/2023   • LUNG CANCER SCREENING  04/08/2023   • ANNUAL WELLNESS VISIT  08/16/2023   • TDAP/TD VACCINES (2 - Td or Tdap) 08/15/2024   • COLORECTAL CANCER SCREENING  09/18/2025   • HEPATITIS C SCREENING  Completed     Alcohol use:  reports current alcohol use of about 6.0 standard drinks of alcohol per week.  Nicotine status  reports that he has been smoking cigarettes. He has a 60.00 pack-year smoking history. He has never used smokeless tobacco.     Goals     •  Quit smoking / using tobacco (pt-stated)           RISK SCORE: 3      This document has been electronically signed by Catrachito Milligan MD on October 4, 2022 12:07 CDT

## 2022-10-04 NOTE — PROGRESS NOTES
I was present onsite throughout the encounter and discussed the patient's presentation and plan of care at length with Dr. Milligan.  I have reviewed the notes, assessments, and/or procedures performed by Catrachito Milligan MDduring office visit. I concur with her/his documentation and assessment and plan for Jameel Sosawayne Jacoby.           This document has been electronically signed by Son Li MD on October 4, 2022 16:19 CDT

## 2022-11-01 ENCOUNTER — HOSPITAL ENCOUNTER (OUTPATIENT)
Dept: ULTRASOUND IMAGING | Facility: HOSPITAL | Age: 62
Discharge: HOME | End: 2022-11-01
Admitting: PHYSICIAN ASSISTANT

## 2022-11-01 PROCEDURE — 76700 US EXAM ABDOM COMPLETE: CPT

## 2022-11-07 ENCOUNTER — OFFICE VISIT (OUTPATIENT)
Dept: GASTROENTEROLOGY | Facility: CLINIC | Age: 62
End: 2022-11-07

## 2022-11-07 VITALS
HEIGHT: 70 IN | WEIGHT: 190 LBS | BODY MASS INDEX: 27.2 KG/M2 | SYSTOLIC BLOOD PRESSURE: 135 MMHG | HEART RATE: 84 BPM | DIASTOLIC BLOOD PRESSURE: 95 MMHG

## 2022-11-07 DIAGNOSIS — R06.6 CHRONIC HICCUPS: ICD-10-CM

## 2022-11-07 DIAGNOSIS — K22.0 ACHALASIA: Primary | ICD-10-CM

## 2022-11-07 DIAGNOSIS — I85.00 ESOPHAGEAL VARICES WITHOUT BLEEDING, UNSPECIFIED ESOPHAGEAL VARICES TYPE: ICD-10-CM

## 2022-11-07 DIAGNOSIS — K76.6 PORTAL HYPERTENSION: ICD-10-CM

## 2022-11-07 DIAGNOSIS — K21.00 GASTROESOPHAGEAL REFLUX DISEASE WITH ESOPHAGITIS WITHOUT HEMORRHAGE: ICD-10-CM

## 2022-11-07 PROCEDURE — 99214 OFFICE O/P EST MOD 30 MIN: CPT | Performed by: PHYSICIAN ASSISTANT

## 2022-11-07 RX ORDER — AMOXICILLIN AND CLAVULANATE POTASSIUM 875; 125 MG/1; MG/1
1 TABLET, FILM COATED ORAL 2 TIMES DAILY
Qty: 20 TABLET | Refills: 0 | Status: SHIPPED | OUTPATIENT
Start: 2022-11-07 | End: 2022-11-30 | Stop reason: SDUPTHER

## 2022-11-07 RX ORDER — LANOLIN ALCOHOL/MO/W.PET/CERES
CREAM (GRAM) TOPICAL
Qty: 30 TABLET | Refills: 11 | Status: SHIPPED | OUTPATIENT
Start: 2022-11-07

## 2022-11-07 NOTE — PROGRESS NOTES
Chief Complaint   Patient presents with   • Follow-up       ENDO PROCEDURE ORDERED:    Subjective    Jameel Dozier is a 62 y.o. male. he is here today for follow-up.    History of Present Illness    The patient is seen on a recheck of his chronic hiccups, achalasia, hepatic fibrosis, and GERD.  Last seen 09/26/2022.  He did get his abdominal ultrasound on 11/02/2022 which showed a cyst in the left kidney, was previously present, a 2 mm common bile duct and normal appearing liver.  He did not get his laboratories as requested.  He is complaining of some swelling in his left mouth.  He had some previous Augmentin that was helping.  He states he cannot get in to see the dentist.  He has lost 7 pounds because he is having difficulty eating because of discomfort.  He is on Prilosec for chronic heartburn. He is on propranolol for the previous varices.  Bowels are moving without blood or mucus. He has a history of colon polyps with last colonoscopy 09/18/2020.  Last EGD 06/15/2022 showed esophageal stricture, dilated and grade 1 varices.     ASSESSMENT AND PLAN:  Patient with hepatic fibrosis with small varices, and longstanding GERD.  Encouraged to continue current medications.  He denied dysphagia presently.  I refilled his Augmentin.  I encouraged him to get his laboratories as previously ordered and to try to get into the dentist as soon as he can.  We will plan followup in 3 months with further pending clinical course and the results of the above.       The following portions of the patient's history were reviewed and updated as appropriate:   Past Medical History:   Diagnosis Date   • Abdominal pain    • Acquired achalasia of esophagus    • Adenomatous polyp of colon    • Adverse drug effect    • Alcohol dependence with alcohol-induced disorder (HCC)    • Allergic rhinitis    • Anal fissure    • Anemia due to blood loss    • Benign essential hypertension    • Central obesity    • Cerebrovascular disease    •  Chronic obstructive lung disease (HCC)    • Claudication (HCC)    • Disorder of peripheral nervous system    • Diverticular disease of colon     completed antibx, ? neoplasm on CT   • Dysphagia    • Dyspnea    • ED (erectile dysfunction)    • Epigastric pain    • Esophageal dysmotility    • Esophageal reflux    • Esophagitis    • Essential hypertension    • External hemorrhoids    • Gastritis    • GERD with esophagitis    • Heavy tobacco smoker    • Hemorrhoids    • Hiatal hernia    • Hiccough    • History of colon polyps    • History of echocardiogram     Normal LV funciton with Ef of 65% to 70% without regional wall motion abnormalities.Mild CLVH. Normal RV size and function. No significant valvular regurgitation or stenosis. 09/19/2014       • History of EKG    • History of TIA (transient ischemic attack)    • Hypercholesterolemia    • Hypertension    • Left ventricular hypertrophy    • Male erectile disorder    • Obstructive sleep apnea syndrome    • Primary osteoarthritis of knees, bilateral    • Rectal bleed     painful   • Rectal hemorrhage    • Sleep disorder    • Transient cerebral ischemia    • Upper respiratory infection    • Weakness     Attacks of weakness     Past Surgical History:   Procedure Laterality Date   • COLONOSCOPY  04/13/2015    Internal and external hemorrhoids found. 04/13/2015    • COLONOSCOPY N/A 9/18/2020    Procedure: COLONOSCOPY;  Surgeon: Alli Dockery MD;  Location: [facility] ENDOSCOPY;  Service: Gastroenterology;  Laterality: N/A;   • ENDOSCOPY      Internal & external hemorrhoids found. 1 polyp in sigmoid colon; removed by snare cautery polypectomy. 09/26/2012    • ENDOSCOPY      Gastritis found in the stomach. Biopsy taken.Enlarged folds were found in the body of the stomach.Biopsy taken.Normal duodenum.A hiatus hernia was found in the esophagus. 04/13/2015    • ENDOSCOPY      Hiatus hernia in esophagus. Gastritis in stomach. Biopsy taken. Normal duodenum. Biopsy taken.  09/26/2012       • ENDOSCOPY N/A 12/18/2017    Procedure: ESOPHAGOGASTRODUODENOSCOPY--attn mid esophagus, abnl on CT;  Surgeon: Alli Dockery MD;  Location: [facility] ENDOSCOPY;  Service:    • ENDOSCOPY N/A 3/6/2019    Procedure: ESOPHAGOGASTRODUODENOSCOPY WITH DILATATION;  Surgeon: Alli Dockery MD;  Location: [facility] ENDOSCOPY;  Service: Gastroenterology   • ENDOSCOPY N/A 9/18/2020    Procedure: ESOPHAGOGASTRODUODENOSCOPY eval varices;  Surgeon: Alli Dockery MD;  Location: [facility] ENDOSCOPY;  Service: Gastroenterology;  Laterality: N/A;   • ENDOSCOPY N/A 4/28/2021    Procedure: ESOPHAGOGASTRODUODENOSCOPY WITH ANESTHESIA--with botox, attn antrum poss malig;  Surgeon: Alli Dockery MD;  Location: [facility] ENDOSCOPY;  Service: Gastroenterology;  Laterality: N/A;   • ENDOSCOPY N/A 6/15/2022    Procedure: ESOPHAGOGASTRODUODENOSCOPY WITH DILATATION;  Surgeon: Alli Dockery MD;  Location: [facility] ENDOSCOPY;  Service: Gastroenterology;  Laterality: N/A;   • UPPER GASTROINTESTINAL ENDOSCOPY  08/24/2016   • UPPER GASTROINTESTINAL ENDOSCOPY  04/13/2015   • UPPER GASTROINTESTINAL ENDOSCOPY  12/18/2017   • UPPER GASTROINTESTINAL ENDOSCOPY  03/06/2019   • UPPER GASTROINTESTINAL ENDOSCOPY  09/18/2020   • UPPER GASTROINTESTINAL ENDOSCOPY  04/28/2021     Family History   Problem Relation Age of Onset   • Cancer Mother    • Cirrhosis Father    • Diabetes Other    • Hypertension Other        Allergies   Allergen Reactions   • Ace Inhibitors Angioedema     hypotension   • Lisinopril Other (See Comments)     cough   • Vistaril [Hydroxyzine Hcl] Other (See Comments)     Patient is unsure      Social History     Socioeconomic History   • Marital status: Single   • Number of children: 0   • Years of education: 12   Tobacco Use   • Smoking status: Every Day     Packs/day: 1.50     Years: 40.00     Pack years: 60.00     Types: Cigarettes   • Smokeless tobacco: Never   Vaping Use   • Vaping Use: Never used   Substance and Sexual  Activity   • Alcohol use: Yes     Alcohol/week: 6.0 standard drinks     Types: 6 Cans of beer per week     Comment: when can get   • Drug use: No   • Sexual activity: Defer     Partners: Female     Current Medications:  Prior to Admission medications    Medication Sig Start Date End Date Taking? Authorizing Provider   albuterol sulfate  (90 Base) MCG/ACT inhaler Inhale 2 puffs Every 4 (Four) Hours As Needed for Wheezing or Shortness of Air. 1/27/22  Yes Son Li MD   amLODIPine (NORVASC) 10 MG tablet Take 1 tablet by mouth Daily. 12/8/21  Yes Son Li MD   aspirin (Aspirin Adult Low Strength) 81 MG EC tablet Take 1 tablet by mouth Daily. 2/21/22  Yes Son Li MD   Blood Pressure Monitoring (Adult Blood Pressure Cuff Lg) kit Check BP daily 8/13/21  Yes Sincere Narayanan MD   cetirizine (zyrTEC) 10 MG tablet TAKE 1 TAB DAILY IF NEEDED FOR ALLERGIES. 8/1/22  Yes Son Li MD   guaiFENesin (Mucinex) 600 MG 12 hr tablet Take 2 tablets by mouth 2 (Two) Times a Day. 1/27/22  Yes Son Li MD   hydrALAZINE (APRESOLINE) 10 MG tablet Take 10 mg by mouth Daily. 5/16/22  Yes Radha Burrell MD   Lidocaine Viscous HCl (XYLOCAINE) 2 % solution As Needed. 5/19/22  Yes Radha Burrell MD   losartan-hydrochlorothiazide (HYZAAR) 100-12.5 MG per tablet Take 1 tablet by mouth Daily. 10/4/22  Yes Catrachito Milligan MD   lovastatin (MEVACOR) 40 MG tablet Take 1 tablet by mouth Every Night. 10/4/22  Yes Catrachito Milligan MD   magnesium oxide (MAGOX) 400 (241.3 Mg) MG tablet tablet Take 1 tablet by mouth Daily. 12/20/21  Yes Son Li MD   metFORMIN (GLUCOPHAGE) 500 MG tablet TAKE 1 TABLET DAILY WITH BREAKFAST 8/1/22  Yes Son Li MD   omeprazole (priLOSEC) 20 MG capsule Take 1 capsule by mouth 2 (Two) Times a Day. 5/16/22  Yes Tim Chen PA-C   propranolol (INDERAL) 40 MG tablet Take 1 tablet by mouth Daily. 10/4/22  Yes Catrachito Milligan  "MD [place]a   Spiriva HandiHaler 18 MCG per inhalation capsule PLACE 1 CAPSULE INTO INHALER AND INHALE DAILY. 8/1/22  Yes Son Li MD   fluticasone (FLONASE) 50 MCG/ACT nasal spray 2 sprays into each nostril Daily for 30 days. 11/27/17 7/14/22  Yolanda Phan MD     Review of Systems  Review of Systems       Objective    /95 (BP Location: Left arm)   Pulse 84   Ht 177.8 cm (70\")   Wt 86.2 kg (190 lb)   BMI 27.26 kg/m²   Physical Exam  Vitals and nursing note reviewed.   Constitutional:       General: He is not in acute distress.     Appearance: He is well-developed.      Comments: AA   HENT:      Head: Normocephalic and atraumatic.   Eyes:      Pupils: Pupils are equal, round, and reactive to light.   Cardiovascular:      Rate and Rhythm: Normal rate and regular rhythm.      Heart sounds: Normal heart sounds.   Pulmonary:      Effort: Pulmonary effort is normal.      Breath sounds: Normal breath sounds.   Abdominal:      General: Bowel sounds are normal. There is no distension or abdominal bruit.      Palpations: Abdomen is soft. Abdomen is not rigid. There is no shifting dullness or mass.      Tenderness: There is abdominal tenderness. There is no guarding or rebound.      Hernia: No hernia is present. There is no hernia in the ventral area.   Musculoskeletal:         General: Normal range of motion.      Cervical back: Normal range of motion.   Skin:     General: Skin is warm and dry.   Neurological:      Mental Status: He is alert and oriented to person, place, and time.   Psychiatric:         Behavior: Behavior normal.         Thought Content: Thought content normal.         Judgment: Judgment normal.       Assessment & Plan      1. Achalasia    2. Gastroesophageal reflux disease with esophagitis without hemorrhage    3. Portal hypertension (HCC)    4. Esophageal varices without bleeding, unspecified esophageal varices type (HCC)    5. Chronic hiccups    .   Diagnoses and all orders for this " visit:    1. Achalasia (Primary)    2. Gastroesophageal reflux disease with esophagitis without hemorrhage    3. Portal hypertension (HCC)    4. Esophageal varices without bleeding, unspecified esophageal varices type (HCC)    5. Chronic hiccups    Other orders  -     amoxicillin-clavulanate (Augmentin) 875-125 MG per tablet; Take 1 tablet by mouth 2 (Two) Times a Day.  Dispense: 20 tablet; Refill: 0        Orders placed during this encounter include:  No orders of the defined types were placed in this encounter.      Medications prescribed:  New Medications Ordered This Visit   Medications   • amoxicillin-clavulanate (Augmentin) 875-125 MG per tablet     Sig: Take 1 tablet by mouth 2 (Two) Times a Day.     Dispense:  20 tablet     Refill:  0       Requested Prescriptions     Signed Prescriptions Disp Refills   • amoxicillin-clavulanate (Augmentin) 875-125 MG per tablet 20 tablet 0     Sig: Take 1 tablet by mouth 2 (Two) Times a Day.       Review and/or summary of lab tests, radiology, procedures, medications. Review and summary of old records and obtaining of history. The risks and benefits of my recommendations, as well as other treatment options were discussed with the patient today. Questions were answered.    Follow-up: Return in about 3 months (around 2/7/2023), or if symptoms worsen or fail to improve, for lab prior.     * Surgery not found *      This document has been electronically signed by Tim Chen PA-C on November 23, 2022 16:40 CST      Results for orders placed or performed during the hospital encounter of 06/15/22   TISSUE EXAM, [facility] ([facility])    Specimen: Gastric, Antrum; Tissue   Result Value Ref Range    Reference Lab Report       [facility]  [address] [place] [address]  Phone: [phone] or [phone]  Fax: [phone]  Riki Harden M.D., Medical Director    PATIENT NAME                           LABORATORY NO.  [id number]  MARSHA RUSSELL  "JOHNNY.                 [id number]  [id number]                         AGE              SEX  N           CLIENT REF #  [facility]           61      1960      [id number]   [id number]    [place]                       REQUESTING M.D.     ATTENDING M.D.     COPY TO.  [facility]                 [place] [address]             DATE COLLECTED      DATE RECEIVED      DATE REPORTED  06/15/2022          06/15/2022         2022    DIAGNOSIS:  GASTRIC ANTRUM, BIOPSY:  Minimal chronic gastritis  Negative for H. pylori, metaplasia, dysplasia or malignancy    RLL/pah    CLINICAL HISTORY:  Achalasia, gastroesophageal reflux disease with esophagitis without  hemorrhage,  esophageal varices without bleeding, unspecified esophageal varices type    SPECIMENS RECEIVED:  GASTRIC ANTRUM, BIOPSY    MICROSCOPIC DESCRIPTION:  Tissue blocks are prepared and slides are examined microscopically on all  specimens. See diagnosis for details.    Professional interpretation rendered by Riki Harden M.D., F.C.A.P. at  [facility] [place], [address] [place] [address].    GROSS DESCRIPTION:  Specimen is received in 1 formalin filled container labeled \"gastric antrum\" and  consists of 2 portions of tan soft tissue measuring 0.5 x 0.4 x 0.2 cm.  The  specimen is submitted entirely in 1 cassette.  BW    REVIEWED, DIAGNOSED AND ELECTRONICALLY  SIGNED BY:    Riki Harden M.D., F.C.A.P.  CPT CODES:  04730     Results for orders placed or performed in visit on 22   Magnesium    Specimen: Blood   Result Value Ref Range    Magnesium 1.6 1.6 - 2.4 mg/dL   Hemoglobin A1c    Specimen: Blood   Result Value Ref Range    Hemoglobin A1C 5.80 (H) 4.80 - 5.60 %   Lipid panel    Specimen: Blood   Result Value Ref Range    Total Cholesterol 206 (H) 0 - 200 mg/dL    Triglycerides 69 0 - 150 mg/dL    HDL Cholesterol 65 (H) 40 - 60 mg/dL    LDL Cholesterol  129 (H) " 0 - 100 mg/dL    VLDL Cholesterol 12 5 - 40 mg/dL    LDL/HDL Ratio 1.96    Results for orders placed or performed in visit on 11/30/21   MicroAlbumin, Urine, Random - Urine, Clean Catch    Specimen: Urine, Clean Catch   Result Value Ref Range    Microalbumin, Urine 218.1 mg/dL   Hemoglobin A1c    Specimen: Blood   Result Value Ref Range    Hemoglobin A1C 5.64 (H) 4.80 - 5.60 %   Results for orders placed or performed in visit on 11/30/21   CBC Auto Differential    Specimen: Blood   Result Value Ref Range    WBC 4.23 3.40 - 10.80 10*3/mm3    RBC 6.00 (H) 4.14 - 5.80 10*6/mm3    Hemoglobin 15.3 13.0 - 17.7 g/dL    Hematocrit 47.6 37.5 - 51.0 %    MCV 79.3 79.0 - 97.0 fL    MCH 25.5 (L) 26.6 - 33.0 pg    MCHC 32.1 31.5 - 35.7 g/dL    RDW 14.5 12.3 - 15.4 %    RDW-SD 40.1 37.0 - 54.0 fl    MPV 12.1 (H) 6.0 - 12.0 fL    Platelets 279 140 - 450 10*3/mm3    Neutrophil % 43.3 42.7 - 76.0 %    Lymphocyte % 40.4 19.6 - 45.3 %    Monocyte % 8.5 5.0 - 12.0 %    Eosinophil % 5.9 0.3 - 6.2 %    Basophil % 1.7 (H) 0.0 - 1.5 %    Immature Grans % 0.2 0.0 - 0.5 %    Neutrophils, Absolute 1.83 1.70 - 7.00 10*3/mm3    Lymphocytes, Absolute 1.71 0.70 - 3.10 10*3/mm3    Monocytes, Absolute 0.36 0.10 - 0.90 10*3/mm3    Eosinophils, Absolute 0.25 0.00 - 0.40 10*3/mm3    Basophils, Absolute 0.07 0.00 - 0.20 10*3/mm3    Immature Grans, Absolute 0.01 0.00 - 0.05 10*3/mm3    nRBC 0.2 0.0 - 0.2 /100 WBC   Comprehensive Metabolic Panel    Specimen: Blood   Result Value Ref Range    Glucose 87 65 - 99 mg/dL    BUN 9 8 - 23 mg/dL    Creatinine 1.04 0.76 - 1.27 mg/dL    Sodium 142 136 - 145 mmol/L    Potassium 3.5 3.5 - 5.2 mmol/L    Chloride 101 98 - 107 mmol/L    CO2 28.4 22.0 - 29.0 mmol/L    Calcium 9.7 8.6 - 10.5 mg/dL    Total Protein 7.9 6.0 - 8.5 g/dL    Albumin 4.60 3.50 - 5.20 g/dL    ALT (SGPT) 11 1 - 41 U/L    AST (SGOT) 21 1 - 40 U/L    Alkaline Phosphatase 60 39 - 117 U/L    Total Bilirubin 0.8 0.0 - 1.2 mg/dL    eGFR  African Amer  87 >60 mL/min/1.73    Globulin 3.3 gm/dL    A/G Ratio 1.4 g/dL    BUN/Creatinine Ratio 8.7 7.0 - 25.0    Anion Gap 12.6 5.0 - 15.0 mmol/L   Results for orders placed or performed in visit on 08/13/21   CBC Auto Differential    Specimen: Blood   Result Value Ref Range    WBC 6.94 3.40 - 10.80 10*3/mm3    RBC 4.87 4.14 - 5.80 10*6/mm3    Hemoglobin 12.8 (L) 13.0 - 17.7 g/dL    Hematocrit 39.3 37.5 - 51.0 %    MCV 80.7 79.0 - 97.0 fL    MCH 26.3 (L) 26.6 - 33.0 pg    MCHC 32.6 31.5 - 35.7 g/dL    RDW 15.3 12.3 - 15.4 %    RDW-SD 43.7 37.0 - 54.0 fl    MPV 10.6 6.0 - 12.0 fL    Platelets 540 (H) 140 - 450 10*3/mm3    Neutrophil % 73.9 42.7 - 76.0 %    Lymphocyte % 17.9 (L) 19.6 - 45.3 %    Monocyte % 5.5 5.0 - 12.0 %    Eosinophil % 1.9 0.3 - 6.2 %    Basophil % 0.4 0.0 - 1.5 %    Immature Grans % 0.4 0.0 - 0.5 %    Neutrophils, Absolute 5.13 1.70 - 7.00 10*3/mm3    Lymphocytes, Absolute 1.24 0.70 - 3.10 10*3/mm3    Monocytes, Absolute 0.38 0.10 - 0.90 10*3/mm3    Eosinophils, Absolute 0.13 0.00 - 0.40 10*3/mm3    Basophils, Absolute 0.03 0.00 - 0.20 10*3/mm3    Immature Grans, Absolute 0.03 0.00 - 0.05 10*3/mm3    nRBC 0.1 0.0 - 0.2 /100 WBC   Iron Profile    Specimen: Blood   Result Value Ref Range    Iron 149 59 - 158 mcg/dL    Iron Saturation 40 20 - 50 %    Transferrin 253 200 - 360 mg/dL    TIBC 377 298 - 536 mcg/dL   Protime-INR    Specimen: Blood   Result Value Ref Range    Protime 12.5 11.1 - 15.3 Seconds    INR 0.94 0.80 - 1.20   Ferritin    Specimen: Blood   Result Value Ref Range    Ferritin 166.00 30.00 - 400.00 ng/mL   Ammonia    Specimen: Blood   Result Value Ref Range    Ammonia 32 16 - 60 umol/L   Comprehensive Metabolic Panel    Specimen: Blood   Result Value Ref Range    Glucose 80 65 - 99 mg/dL    BUN 7 (L) 8 - 23 mg/dL    Creatinine 0.74 (L) 0.76 - 1.27 mg/dL    Sodium 141 136 - 145 mmol/L    Potassium 3.2 (L) 3.5 - 5.2 mmol/L    Chloride 102 98 - 107 mmol/L    CO2 26.4 22.0 - 29.0 mmol/L    Calcium  9.0 8.6 - 10.5 mg/dL    Total Protein 7.6 6.0 - 8.5 g/dL    Albumin 4.30 3.50 - 5.20 g/dL    ALT (SGPT) 16 1 - 41 U/L    AST (SGOT) 23 1 - 40 U/L    Alkaline Phosphatase 70 39 - 117 U/L    Total Bilirubin 0.5 0.0 - 1.2 mg/dL    eGFR  African Amer 129 >60 mL/min/1.73    Globulin 3.3 gm/dL    A/G Ratio 1.3 g/dL    BUN/Creatinine Ratio 9.5 7.0 - 25.0    Anion Gap 12.6 5.0 - 15.0 mmol/L     *Note: Due to a large number of results and/or encounters for the requested time period, some results have not been displayed. A complete set of results can be found in Results Review.

## 2022-11-30 RX ORDER — AMOXICILLIN AND CLAVULANATE POTASSIUM 875; 125 MG/1; MG/1
1 TABLET, FILM COATED ORAL 2 TIMES DAILY
Qty: 20 TABLET | Refills: 0 | Status: SHIPPED | OUTPATIENT
Start: 2022-11-30 | End: 2023-01-10

## 2022-12-01 ENCOUNTER — TELEPHONE (OUTPATIENT)
Dept: GASTROENTEROLOGY | Facility: CLINIC | Age: 62
End: 2022-12-01

## 2022-12-01 ENCOUNTER — TELEPHONE (OUTPATIENT)
Dept: FAMILY MEDICINE CLINIC | Facility: CLINIC | Age: 62
End: 2022-12-01

## 2022-12-01 NOTE — TELEPHONE ENCOUNTER
Incoming Refill Request      Medication requested (name and dose):   amoxicillin-clavulanate (Augmentin) 875-125 MG per tablet  Pharm[facility] where request should be sent:   [facility]  Additional details provided by patient:   FOR TOOTH ACHE                                Best call back number:   [phone]  Does the patient have less than a 3 day supply:  [x] Yes  [] No    Sanjuana Soto Rep  12/01/22, 08:05 CST

## 2022-12-01 NOTE — TELEPHONE ENCOUNTER
Called Pt advised that a refill had been called in and to take as directed and get int o see his dentist immediately.    ----- Message from Tim Chen PA-C sent at 12/1/2022  9:57 AM CST -----  Regarding: RE: Medication  I refilled it yesterday. He needs to go see his Dentist immediately.   ----- Message -----  From: Domi Clifton RegSched Rep  Sent: 12/1/2022   8:53 AM CST  To: Tim Chen PA-C  Subject: Medication                                       Silvia Fabian called in for Mr. Dozier. She wanted a refill of the amoxicillin-clavulanate (Augmentin) 875-125 MG per tablet [[id number]] he received on his 11.7.2022 appointment. She said that you px the medication for a tooth ache that he still has. She also said that he did not take it as directed instead he took it as needed for pain. I advised her it was an antibiotic and not a pain medication. She requested a refill but denied the offer to make a new appointment to be seen.

## 2023-01-03 DIAGNOSIS — E11.8 TYPE 2 DIABETES MELLITUS WITH COMPLICATION, WITHOUT LONG-TERM CURRENT USE OF INSULIN: ICD-10-CM

## 2023-01-04 RX ORDER — AMLODIPINE BESYLATE 10 MG/1
10 TABLET ORAL DAILY
Qty: 30 TABLET | Refills: 11 | Status: SHIPPED | OUTPATIENT
Start: 2023-01-04

## 2023-01-06 ENCOUNTER — OFFICE VISIT (OUTPATIENT)
Dept: FAMILY MEDICINE CLINIC | Facility: CLINIC | Age: 63
End: 2023-01-06
Payer: MEDICARE

## 2023-01-06 VITALS
DIASTOLIC BLOOD PRESSURE: 82 MMHG | OXYGEN SATURATION: 98 % | SYSTOLIC BLOOD PRESSURE: 130 MMHG | WEIGHT: 189.3 LBS | HEART RATE: 84 BPM | HEIGHT: 70 IN | TEMPERATURE: 97.6 F | BODY MASS INDEX: 27.1 KG/M2

## 2023-01-06 DIAGNOSIS — I10 ESSENTIAL HYPERTENSION: ICD-10-CM

## 2023-01-06 DIAGNOSIS — M21.612 BILATERAL BUNIONS: ICD-10-CM

## 2023-01-06 DIAGNOSIS — Z71.6 ENCOUNTER FOR TOBACCO USE CESSATION COUNSELING: ICD-10-CM

## 2023-01-06 DIAGNOSIS — J30.89 ENVIRONMENTAL AND SEASONAL ALLERGIES: ICD-10-CM

## 2023-01-06 DIAGNOSIS — J42 CHRONIC BRONCHITIS, UNSPECIFIED CHRONIC BRONCHITIS TYPE: Primary | ICD-10-CM

## 2023-01-06 DIAGNOSIS — M21.611 BILATERAL BUNIONS: ICD-10-CM

## 2023-01-06 PROCEDURE — 99213 OFFICE O/P EST LOW 20 MIN: CPT | Performed by: STUDENT IN AN ORGANIZED HEALTH CARE EDUCATION/TRAINING PROGRAM

## 2023-01-06 RX ORDER — CETIRIZINE HYDROCHLORIDE 10 MG/1
10 TABLET ORAL DAILY
Qty: 30 TABLET | Refills: 5 | Status: SHIPPED | OUTPATIENT
Start: 2023-01-06

## 2023-01-06 NOTE — PROGRESS NOTES
Family Medicine Residency  Catrachito Milligan MD    Subjective:     Jameel Dozier is a 62 y.o. male who presents for HTN and COPD.     Reports using spiriva once per day. Not having to use albuterol inhaler. Denies any shortness, wheeze, has a cough every now and then. Continues to smoke. Does not wish to quit smoking.     HTN - does not check blood pressure at home. Reports compliance with norvasc and Hyzaar without any adverse effects.     Feet pain - has tried OTC fungal cream without benefit.   Ongoing for last several months. Intermittent. Both feet and toes. Sometimes has minimal swelling. Pain is on medial aspects of big toes bilaterally. Pain is an ache. No radiation. Pain worse with activity. Reports some shoes/boots make pain worse.     Needs refill of zyrtec that he takes for allergies. MedicineControls allergies well.     GI placed labs in September but patient has not obtained these yet. Reports the lab is too busy when he wants to come get the lab done.      Past Medical Hx:  Past Medical History:   Diagnosis Date   • Abdominal pain    • Acquired achalasia of esophagus    • Adenomatous polyp of colon    • Adverse drug effect    • Alcohol dependence with alcohol-induced disorder (HCC)    • Allergic rhinitis    • Anal fissure    • Anemia due to blood loss    • Benign essential hypertension    • Central obesity    • Cerebrovascular disease    • Chronic obstructive lung disease (HCC)    • Claudication (HCC)    • Disorder of peripheral nervous system    • Diverticular disease of colon     completed antibx, ? neoplasm on CT   • Dysphagia    • Dyspnea    • ED (erectile dysfunction)    • Epigastric pain    • Esophageal dysmotility    • Esophageal reflux    • Esophagitis    • Essential hypertension    • External hemorrhoids    • Gastritis    • GERD with esophagitis    • Heavy tobacco smoker    • Hemorrhoids    • Hiatal hernia    • Hiccough    • History of colon polyps    • History of echocardiogram      Normal LV funciton with Ef of 65% to 70% without regional wall motion abnormalities.Mild CLVH. Normal RV size and function. No significant valvular regurgitation or stenosis. 09/19/2014       • History of EKG    • History of TIA (transient ischemic attack)    • Hypercholesterolemia    • Hypertension    • Left ventricular hypertrophy    • Male erectile disorder    • Obstructive sleep apnea syndrome    • Primary osteoarthritis of knees, bilateral    • Rectal bleed     painful   • Rectal hemorrhage    • Sleep disorder    • Transient cerebral ischemia    • Upper respiratory infection    • Weakness     Attacks of weakness       Past Surgical Hx:  Past Surgical History:   Procedure Laterality Date   • COLONOSCOPY  04/13/2015    Internal and external hemorrhoids found. 04/13/2015    • COLONOSCOPY N/A 9/18/2020    Procedure: COLONOSCOPY;  Surgeon: Alli Dockery MD;  Location: [facility] ENDOSCOPY;  Service: Gastroenterology;  Laterality: N/A;   • ENDOSCOPY      Internal & external hemorrhoids found. 1 polyp in sigmoid colon; removed by snare cautery polypectomy. 09/26/2012    • ENDOSCOPY      Gastritis found in the stomach. Biopsy taken.Enlarged folds were found in the body of the stomach.Biopsy taken.Normal duodenum.A hiatus hernia was found in the esophagus. 04/13/2015    • ENDOSCOPY      Hiatus hernia in esophagus. Gastritis in stomach. Biopsy taken. Normal duodenum. Biopsy taken. 09/26/2012       • ENDOSCOPY N/A 12/18/2017    Procedure: ESOPHAGOGASTRODUODENOSCOPY--attn mid esophagus, abnl on CT;  Surgeon: Alli Dockery MD;  Location: [facility] ENDOSCOPY;  Service:    • ENDOSCOPY N/A 3/6/2019    Procedure: ESOPHAGOGASTRODUODENOSCOPY WITH DILATATION;  Surgeon: Alli Dockery MD;  Location: [facility] ENDOSCOPY;  Service: Gastroenterology   • ENDOSCOPY N/A 9/18/2020    Procedure: ESOPHAGOGASTRODUODENOSCOPY eval varices;  Surgeon: Alli Dockery MD;  Location: [facility] ENDOSCOPY;  Service: Gastroenterology;  Laterality: N/A;    • ENDOSCOPY N/A 4/28/2021    Procedure: ESOPHAGOGASTRODUODENOSCOPY WITH ANESTHESIA--with botox, attn antrum poss malig;  Surgeon: Alli Dockery MD;  Location: [facility] ENDOSCOPY;  Service: Gastroenterology;  Laterality: N/A;   • ENDOSCOPY N/A 6/15/2022    Procedure: ESOPHAGOGASTRODUODENOSCOPY WITH DILATATION;  Surgeon: Alli Dockery MD;  Location: [facility] ENDOSCOPY;  Service: Gastroenterology;  Laterality: N/A;   • UPPER GASTROINTESTINAL ENDOSCOPY  08/24/2016   • UPPER GASTROINTESTINAL ENDOSCOPY  04/13/2015   • UPPER GASTROINTESTINAL ENDOSCOPY  12/18/2017   • UPPER GASTROINTESTINAL ENDOSCOPY  03/06/2019   • UPPER GASTROINTESTINAL ENDOSCOPY  09/18/2020   • UPPER GASTROINTESTINAL ENDOSCOPY  04/28/2021       Current Meds:    Current Outpatient Medications:   •  albuterol sulfate  (90 Base) MCG/ACT inhaler, Inhale 2 puffs Every 4 (Four) Hours As Needed for Wheezing or Shortness of Air., Disp: 18 g, Rfl: 5  •  amLODIPine (NORVASC) 10 MG tablet, TAKE 1 TABLET BY MOUTH DAILY, Disp: 30 tablet, Rfl: 11  •  aspirin (Aspirin Adult Low Strength) 81 MG EC tablet, Take 1 tablet by mouth Daily., Disp: 30 tablet, Rfl: 2  •  Blood Pressure Monitoring (Adult Blood Pressure Cuff Lg) kit, Check BP daily, Disp: 1 each, Rfl: 0  •  cetirizine (zyrTEC) 10 MG tablet, Take 1 tablet by mouth Daily., Disp: 30 tablet, Rfl: 5  •  guaiFENesin (Mucinex) 600 MG 12 hr tablet, Take 2 tablets by mouth 2 (Two) Times a Day., Disp: 60 tablet, Rfl: 0  •  hydrALAZINE (APRESOLINE) 10 MG tablet, Take 10 mg by mouth Daily., Disp: , Rfl:   •  Lidocaine Viscous HCl (XYLOCAINE) 2 % solution, As Needed., Disp: , Rfl:   •  losartan-hydrochlorothiazide (HYZAAR) 100-12.5 MG per tablet, Take 1 tablet by mouth Daily., Disp: 30 tablet, Rfl: 2  •  lovastatin (MEVACOR) 40 MG tablet, Take 1 tablet by mouth Every Night., Disp: 30 tablet, Rfl: 2  •  magnesium oxide (MAGOX) 400 (241.3 Mg) MG tablet tablet, Take 1 tablet by mouth Daily., Disp: 30 tablet, Rfl: 4  •   Magnesium Oxide 400 (240 Mg) MG tablet, TAKE 1 TABLET BY MOUTH DAILY., Disp: 30 tablet, Rfl: 11  •  metFORMIN (GLUCOPHAGE) 500 MG tablet, TAKE 1 TABLET DAILY WITH BREAKFAST, Disp: 30 tablet, Rfl: 4  •  omeprazole (priLOSEC) 20 MG capsule, Take 1 capsule by mouth 2 (Two) Times a Day., Disp: 60 capsule, Rfl: 3  •  propranolol (INDERAL) 40 MG tablet, Take 1 tablet by mouth Daily., Disp: 30 tablet, Rfl: 3  •  Spiriva HandiHaler 18 MCG per inhalation capsule, PLACE 1 CAPSULE INTO INHALER AND INHALE DAILY., Disp: 30 capsule, Rfl: 5  •  amoxicillin-clavulanate (Augmentin) 875-125 MG per tablet, Take 1 tablet by mouth 2 (Two) Times a Day., Disp: 20 tablet, Rfl: 0  •  fluticasone (FLONASE) 50 MCG/ACT nasal spray, 2 sprays into each nostril Daily for 30 days., Disp: 1 bottle, Rfl: 11    Allergies:  Allergies   Allergen Reactions   • Ace Inhibitors Angioedema     hypotension   • Lisinopril Other (See Comments)     cough   • Vistaril [Hydroxyzine Hcl] Other (See Comments)     Patient is unsure        Family Hx:  Family History   Problem Relation Age of Onset   • Cancer Mother    • Cirrhosis Father    • Diabetes Other    • Hypertension Other         Social History:  Social History     Socioeconomic History   • Marital status: Single   • Number of children: 0   • Years of education: 12   Tobacco Use   • Smoking status: Every Day     Packs/day: 1.50     Years: 40.00     Pack years: 60.00     Types: Cigarettes   • Smokeless tobacco: Never   Vaping Use   • Vaping Use: Never used   Substance and Sexual Activity   • Alcohol use: Yes     Alcohol/week: 6.0 standard drinks     Types: 6 Cans of beer per week     Comment: when can get   • Drug use: No   • Sexual activity: Defer     Partners: Female       Review of Systems  Review of Systems   Constitutional: Negative for fever.   HENT: Positive for rhinorrhea.    Respiratory: Positive for cough. Negative for shortness of breath and wheezing.    Cardiovascular: Negative for chest pain and  leg swelling.   Gastrointestinal: Negative for abdominal pain, constipation and diarrhea.   Genitourinary: Negative for difficulty urinating.   Musculoskeletal:        Toe pain bilaterally       Objective:     /82   Pulse 84   Temp 97.6 °F (36.4 °C)   Ht 177.8 cm (70\")   Wt 85.9 kg (189 lb 4.8 oz)   SpO2 98%   BMI 27.16 kg/m²   Physical Exam  Constitutional:       General: He is not in acute distress.  HENT:      Head: Normocephalic and atraumatic.   Eyes:      Conjunctiva/sclera: Conjunctivae normal.   Cardiovascular:      Rate and Rhythm: Normal rate.   Pulmonary:      Effort: Pulmonary effort is normal. No respiratory distress.      Breath sounds: No rhonchi or rales.   Musculoskeletal:      Right lower leg: No edema.      Left lower leg: No edema.      Comments: Slight redness and tenderness to palpation of medial aspects of distal big toes bilaterally   Skin:     General: Skin is warm.   Neurological:      Mental Status: He is alert. Mental status is at baseline.   Psychiatric:         Mood and Affect: Mood normal.         Behavior: Behavior normal.          Assessment/Plan:     Diagnoses and all orders for this visit:    1. Chronic bronchitis, unspecified chronic bronchitis type (HCC) (Primary)    2. Essential hypertension    3. Environmental and seasonal allergies  -     cetirizine (zyrTEC) 10 MG tablet; Take 1 tablet by mouth Daily.  Dispense: 30 tablet; Refill: 5    4. Encounter for tobacco use cessation counseling    5. Bilateral bunions      1. Chronic, controlled. Good symptom control with spiriva requiring infrequent albuterol use. Continue to monitor. Smoking likely contributing to COPD.     2. Chronic, controlled. Continue current medical therapy. Smoking likely contributing to high blood pressure. Usually patient smokes before appointments resulting in increased blood pressure levels read at appointments. Patient counseled in the past on measuring blood pressures at home but has been  non-compliant with obtaining blood pressure cuff.     3. Chronic, controlled. Continue current medical therapy.     4. Jameel Dozier  reports that he has been smoking cigarettes. He has a 60.00 pack-year smoking history. He has never used smokeless tobacco.. I have educated him on the risk of diseases from using tobacco products such as cancer and COPD.   I advised him to quit and he is not willing to quit.  I spent 5 minutes counseling the patient.      5. Chronic, stable. Counseled patient on obtaining cushioning pads to place on areas of concern vs wearing larger/wider footwear to prevent rubbing and irritation.     Follow-up:     Return in about 6 months (around 7/6/2023) for Recheck, Annual.    RISK SCORE: 3      This document has been electronically signed by Catrachito Milligan MD on January 10, 2023 19:02 CST

## 2023-01-12 NOTE — PROGRESS NOTES
I have reviewed the notes, assessments, and/or procedures performed by Dr. Catrachito Milligan, I concur with his  documentation and assessment and plan for Jameel Berkowitz Jacoby        This document has been electronically signed by Ham Gruber MD on January 12, 2023 15:38 CST

## 2023-01-23 DIAGNOSIS — E78.00 HYPERCHOLESTEROLEMIA: ICD-10-CM

## 2023-01-23 RX ORDER — LOVASTATIN 40 MG/1
TABLET ORAL
Qty: 90 TABLET | Refills: 1 | Status: SHIPPED | OUTPATIENT
Start: 2023-01-23

## 2023-02-21 RX ORDER — TIOTROPIUM BROMIDE 18 UG/1
CAPSULE ORAL; RESPIRATORY (INHALATION)
Qty: 30 CAPSULE | Refills: 5 | Status: SHIPPED | OUTPATIENT
Start: 2023-02-21 | End: 2023-02-28 | Stop reason: SDUPTHER

## 2023-02-28 RX ORDER — UMECLIDINIUM 62.5 UG/1
1 AEROSOL, POWDER ORAL DAILY
Qty: 30 EACH | Refills: 5 | Status: SHIPPED | OUTPATIENT
Start: 2023-02-28

## 2023-02-28 NOTE — TELEPHONE ENCOUNTER
Incoming Refill Request      Medication requested (name and dose): Spiriva HandiHaler 18 MCG per inhalation capsule    Pharmacy where request should be sent: [facility]    Additional details provided by patient:     Best call back number: [phone]    Does the patient have less than a 3 day supply:  [x] Yes  [] No    Meghan Rebolledo  02/28/23, 08:08 CST

## 2023-03-01 DIAGNOSIS — I85.00 ESOPHAGEAL VARICES WITHOUT BLEEDING, UNSPECIFIED ESOPHAGEAL VARICES TYPE: ICD-10-CM

## 2023-03-02 RX ORDER — PROPRANOLOL HYDROCHLORIDE 40 MG/1
TABLET ORAL
Qty: 30 TABLET | Refills: 3 | Status: SHIPPED | OUTPATIENT
Start: 2023-03-02

## 2023-03-10 ENCOUNTER — LAB (OUTPATIENT)
Dept: LAB | Facility: HOSPITAL | Age: 63
End: 2023-03-10
Payer: MEDICARE

## 2023-03-10 LAB
ALBUMIN SERPL-MCNC: 4.6 G/DL (ref 3.5–5.2)
ALBUMIN/GLOB SERPL: 1.4 G/DL
ALP SERPL-CCNC: 60 U/L (ref 39–117)
ALPHA-FETOPROTEIN: <2 NG/ML (ref 0–8.3)
ALT SERPL W P-5'-P-CCNC: 15 U/L (ref 1–41)
AMMONIA BLD-SCNC: 35 UMOL/L (ref 16–60)
ANION GAP SERPL CALCULATED.3IONS-SCNC: 11.8 MMOL/L (ref 5–15)
AST SERPL-CCNC: 16 U/L (ref 1–40)
BASOPHILS # BLD AUTO: 0.05 10*3/MM3 (ref 0–0.2)
BASOPHILS NFR BLD AUTO: 1.1 % (ref 0–1.5)
BILIRUB SERPL-MCNC: 0.8 MG/DL (ref 0–1.2)
BUN SERPL-MCNC: 17 MG/DL (ref 8–23)
BUN/CREAT SERPL: 14.3 (ref 7–25)
CALCIUM SPEC-SCNC: 10.2 MG/DL (ref 8.6–10.5)
CHLORIDE SERPL-SCNC: 106 MMOL/L (ref 98–107)
CO2 SERPL-SCNC: 23.2 MMOL/L (ref 22–29)
CREAT SERPL-MCNC: 1.19 MG/DL (ref 0.76–1.27)
DEPRECATED RDW RBC AUTO: 41 FL (ref 37–54)
EGFRCR SERPLBLD CKD-EPI 2021: 68.2 ML/MIN/1.73
EOSINOPHIL # BLD AUTO: 0.33 10*3/MM3 (ref 0–0.4)
EOSINOPHIL NFR BLD AUTO: 7.4 % (ref 0.3–6.2)
ERYTHROCYTE [DISTWIDTH] IN BLOOD BY AUTOMATED COUNT: 15.1 % (ref 12.3–15.4)
GLOBULIN UR ELPH-MCNC: 3.3 GM/DL
GLUCOSE SERPL-MCNC: 110 MG/DL (ref 65–99)
HCT VFR BLD AUTO: 42 % (ref 37.5–51)
HGB BLD-MCNC: 13.4 G/DL (ref 13–17.7)
IMM GRANULOCYTES # BLD AUTO: 0.01 10*3/MM3 (ref 0–0.05)
IMM GRANULOCYTES NFR BLD AUTO: 0.2 % (ref 0–0.5)
INR PPP: 1 (ref 0.8–1.2)
IRON 24H UR-MRATE: 78 MCG/DL (ref 59–158)
LYMPHOCYTES # BLD AUTO: 1.39 10*3/MM3 (ref 0.7–3.1)
LYMPHOCYTES NFR BLD AUTO: 31 % (ref 19.6–45.3)
MCH RBC QN AUTO: 24.5 PG (ref 26.6–33)
MCHC RBC AUTO-ENTMCNC: 31.9 G/DL (ref 31.5–35.7)
MCV RBC AUTO: 76.6 FL (ref 79–97)
MONOCYTES # BLD AUTO: 0.36 10*3/MM3 (ref 0.1–0.9)
MONOCYTES NFR BLD AUTO: 8 % (ref 5–12)
NEUTROPHILS NFR BLD AUTO: 2.34 10*3/MM3 (ref 1.7–7)
NEUTROPHILS NFR BLD AUTO: 52.3 % (ref 42.7–76)
NRBC BLD AUTO-RTO: 0 /100 WBC (ref 0–0.2)
PLATELET # BLD AUTO: 256 10*3/MM3 (ref 140–450)
PMV BLD AUTO: 11.6 FL (ref 6–12)
POTASSIUM SERPL-SCNC: 3.8 MMOL/L (ref 3.5–5.2)
PROT SERPL-MCNC: 7.9 G/DL (ref 6–8.5)
PROTHROMBIN TIME: 13.1 SECONDS (ref 11.1–15.3)
RBC # BLD AUTO: 5.48 10*6/MM3 (ref 4.14–5.8)
SODIUM SERPL-SCNC: 141 MMOL/L (ref 136–145)
WBC NRBC COR # BLD: 4.48 10*3/MM3 (ref 3.4–10.8)

## 2023-03-10 PROCEDURE — 84478 ASSAY OF TRIGLYCERIDES: CPT | Performed by: PHYSICIAN ASSISTANT

## 2023-03-10 PROCEDURE — 85610 PROTHROMBIN TIME: CPT | Performed by: PHYSICIAN ASSISTANT

## 2023-03-10 PROCEDURE — 83540 ASSAY OF IRON: CPT | Performed by: PHYSICIAN ASSISTANT

## 2023-03-10 PROCEDURE — 85025 COMPLETE CBC W/AUTO DIFF WBC: CPT | Performed by: PHYSICIAN ASSISTANT

## 2023-03-10 PROCEDURE — 36415 COLL VENOUS BLD VENIPUNCTURE: CPT | Performed by: PHYSICIAN ASSISTANT

## 2023-03-10 PROCEDURE — 83883 ASSAY NEPHELOMETRY NOT SPEC: CPT | Performed by: PHYSICIAN ASSISTANT

## 2023-03-10 PROCEDURE — 80053 COMPREHEN METABOLIC PANEL: CPT | Performed by: PHYSICIAN ASSISTANT

## 2023-03-10 PROCEDURE — 82977 ASSAY OF GGT: CPT | Performed by: PHYSICIAN ASSISTANT

## 2023-03-10 PROCEDURE — 82140 ASSAY OF AMMONIA: CPT | Performed by: PHYSICIAN ASSISTANT

## 2023-03-10 PROCEDURE — 83010 ASSAY OF HAPTOGLOBIN QUANT: CPT | Performed by: PHYSICIAN ASSISTANT

## 2023-03-10 PROCEDURE — 82172 ASSAY OF APOLIPOPROTEIN: CPT | Performed by: PHYSICIAN ASSISTANT

## 2023-03-10 PROCEDURE — 82465 ASSAY BLD/SERUM CHOLESTEROL: CPT | Performed by: PHYSICIAN ASSISTANT

## 2023-03-10 PROCEDURE — 82105 ALPHA-FETOPROTEIN SERUM: CPT | Performed by: PHYSICIAN ASSISTANT

## 2023-03-14 LAB
A2 MACROGLOB SERPL-MCNC: 239 MG/DL (ref 110–276)
ALT SERPL W P-5'-P-CCNC: 16 IU/L (ref 0–55)
APO A-I SERPL-MCNC: 148 MG/DL (ref 101–178)
AST SERPL W P-5'-P-CCNC: 19 IU/L (ref 0–40)
BILIRUB SERPL-MCNC: 0.5 MG/DL (ref 0–1.2)
CHOLEST SERPL-MCNC: 147 MG/DL (ref 100–199)
FIBROSIS SCORING:: ABNORMAL
FIBROSIS STAGE SERPL QL: ABNORMAL
GGT SERPL-CCNC: 29 IU/L (ref 0–65)
GLUCOSE SERPL-MCNC: 110 MG/DL (ref 70–99)
HAPTOGLOB SERPL-MCNC: 141 MG/DL (ref 32–363)
INTERPRETATIONS: (REFERENCE): ABNORMAL
LABORATORY COMMENT REPORT: ABNORMAL
LIVER FIBR SCORE SERPL CALC.FIBROSURE: 0.36 (ref 0–0.21)
NASH SCORING (REFERENCE): ABNORMAL
NECROINFLAMMATORY ACT GRADE SERPL QL: ABNORMAL
NECROINFLAMMATORY ACT SCORE SERPL: 0.25
SERVICE CMNT-IMP: ABNORMAL
STEATOSIS GRADE (REFERENCE): ABNORMAL
STEATOSIS GRADING (REFERENCE): ABNORMAL
STEATOSIS SCORE (REFERENCE): 0.3 (ref 0–0.3)
TRIGL SERPL-MCNC: 82 MG/DL (ref 0–149)

## 2023-03-23 ENCOUNTER — OFFICE VISIT (OUTPATIENT)
Dept: GASTROENTEROLOGY | Facility: CLINIC | Age: 63
End: 2023-03-23
Payer: MEDICARE

## 2023-03-23 VITALS
DIASTOLIC BLOOD PRESSURE: 84 MMHG | HEIGHT: 70 IN | OXYGEN SATURATION: 98 % | HEART RATE: 71 BPM | WEIGHT: 196.6 LBS | BODY MASS INDEX: 28.15 KG/M2 | SYSTOLIC BLOOD PRESSURE: 134 MMHG

## 2023-03-23 DIAGNOSIS — I85.00 ESOPHAGEAL VARICES WITHOUT BLEEDING, UNSPECIFIED ESOPHAGEAL VARICES TYPE: ICD-10-CM

## 2023-03-23 DIAGNOSIS — K76.6 PORTAL HYPERTENSION: ICD-10-CM

## 2023-03-23 DIAGNOSIS — K22.0 ACHALASIA: Primary | ICD-10-CM

## 2023-03-23 DIAGNOSIS — K74.00 HEPATIC FIBROSIS: ICD-10-CM

## 2023-03-23 PROCEDURE — 99213 OFFICE O/P EST LOW 20 MIN: CPT | Performed by: PHYSICIAN ASSISTANT

## 2023-03-23 PROCEDURE — 3075F SYST BP GE 130 - 139MM HG: CPT | Performed by: PHYSICIAN ASSISTANT

## 2023-03-23 PROCEDURE — 3079F DIAST BP 80-89 MM HG: CPT | Performed by: PHYSICIAN ASSISTANT

## 2023-03-23 NOTE — PROGRESS NOTES
Chief Complaint   Patient presents with    Follow-up     3 Month    Esophageal Varices    Heartburn    achalasia       ENDO PROCEDURE ORDERED:    Subjective    Jameel Dozier is a 62 y.o. male. he is here today for follow-up.    History of Present Illness    Patient seen on a recheck of his achalasia, esophageal varices, chronic hiccups, GERD. Last seen 11/07/2022. He is on, still on Prilosec and Propranolol 20 mg for chronic heartburn, he is still having chronic hiccups. Denied dysphagia. Bowels are moving without blood or mucus. Weight is up 6-1/2 pounds since last visit. Last EGD 06/15/2022 showed esophageal stricture, grade 1 varices. Last colonoscopy 09/18/2020.     Patient had laboratories 03/10/2023, showed normal ammonia, CBC, CMP showed a glucose 110, creatinine 1.19, GFR 68.2, iron 78, INR 1.0, normal AFP. REYNOSO FibroSure 0.36/F1-F2, steatosis 0.30/S0, 0.25/N0, glucose 110.     A/P: Patient with small varices, chronic hiccups, chronic GERD, achalasia, appears stable on current regimen. Will continue current medications, continue to avoid alcohol, continue dietary modification and weight loss. Will plan follow-up in 4 to 6 months, sooner if needed.            The following portions of the patient's history were reviewed and updated as appropriate:   Past Medical History:   Diagnosis Date    Abdominal pain     Acquired achalasia of esophagus     Adenomatous polyp of colon     Adverse drug effect     Alcohol dependence with alcohol-induced disorder     Allergic rhinitis     Anal fissure     Anemia due to blood loss     Benign essential hypertension     Central obesity     Cerebrovascular disease     Chronic obstructive lung disease     Claudication     Disorder of peripheral nervous system     Diverticular disease of colon     completed antibx, ? neoplasm on CT    Dysphagia     Dyspnea     ED (erectile dysfunction)     Epigastric pain     Esophageal dysmotility     Esophageal reflux     Esophagitis      Essential hypertension     External hemorrhoids     Gastritis     GERD with esophagitis     Heavy tobacco smoker     Hemorrhoids     Hiatal hernia     Hiccough     History of colon polyps     History of echocardiogram     Normal LV funciton with Ef of 65% to 70% without regional wall motion abnormalities.Mild CLVH. Normal RV size and function. No significant valvular regurgitation or stenosis. 09/19/2014        History of EKG     History of TIA (transient ischemic attack)     Hypercholesterolemia     Hypertension     Left ventricular hypertrophy     Male erectile disorder     Obstructive sleep apnea syndrome     Primary osteoarthritis of knees, bilateral     Rectal bleed     painful    Rectal hemorrhage     Sleep disorder     Transient cerebral ischemia     Upper respiratory infection     Weakness     Attacks of weakness     Past Surgical History:   Procedure Laterality Date    COLONOSCOPY  04/13/2015    Internal and external hemorrhoids found. 04/13/2015     COLONOSCOPY N/A 9/18/2020    Procedure: COLONOSCOPY;  Surgeon: Alli Dockery MD;  Location: [facility] ENDOSCOPY;  Service: Gastroenterology;  Laterality: N/A;    ENDOSCOPY      Internal & external hemorrhoids found. 1 polyp in sigmoid colon; removed by snare cautery polypectomy. 09/26/2012     ENDOSCOPY      Gastritis found in the stomach. Biopsy taken.Enlarged folds were found in the body of the stomach.Biopsy taken.Normal duodenum.A hiatus hernia was found in the esophagus. 04/13/2015     ENDOSCOPY      Hiatus hernia in esophagus. Gastritis in stomach. Biopsy taken. Normal duodenum. Biopsy taken. 09/26/2012        ENDOSCOPY N/A 12/18/2017    Procedure: ESOPHAGOGASTRODUODENOSCOPY--attn mid esophagus, abnl on CT;  Surgeon: Alli Dockery MD;  Location: [facility] ENDOSCOPY;  Service:     ENDOSCOPY N/A 3/6/2019    Procedure: ESOPHAGOGASTRODUODENOSCOPY WITH DILATATION;  Surgeon: Alli Dockery MD;  Location: [facility] ENDOSCOPY;  Service: Gastroenterology     ENDOSCOPY N/A 9/18/2020    Procedure: ESOPHAGOGASTRODUODENOSCOPY eval varices;  Surgeon: Alli Dockery MD;  Location: [facility] ENDOSCOPY;  Service: Gastroenterology;  Laterality: N/A;    ENDOSCOPY N/A 4/28/2021    Procedure: ESOPHAGOGASTRODUODENOSCOPY WITH ANESTHESIA--with botox, attn antrum poss malig;  Surgeon: Alli Dockery MD;  Location: [facility] ENDOSCOPY;  Service: Gastroenterology;  Laterality: N/A;    ENDOSCOPY N/A 6/15/2022    Procedure: ESOPHAGOGASTRODUODENOSCOPY WITH DILATATION;  Surgeon: Alli Dockery MD;  Location: [facility] ENDOSCOPY;  Service: Gastroenterology;  Laterality: N/A;    UPPER GASTROINTESTINAL ENDOSCOPY  08/24/2016    UPPER GASTROINTESTINAL ENDOSCOPY  04/13/2015    UPPER GASTROINTESTINAL ENDOSCOPY  12/18/2017    UPPER GASTROINTESTINAL ENDOSCOPY  03/06/2019    UPPER GASTROINTESTINAL ENDOSCOPY  09/18/2020    UPPER GASTROINTESTINAL ENDOSCOPY  04/28/2021     Family History   Problem Relation Age of Onset    Cancer Mother     Cirrhosis Father     Diabetes Other     Hypertension Other        Allergies   Allergen Reactions    Ace Inhibitors Angioedema     hypotension    Lisinopril Other (See Comments)     cough    Vistaril [Hydroxyzine Hcl] Other (See Comments)     Patient is unsure      Social History     Socioeconomic History    Marital status: Single    Number of children: 0    Years of education: 12   Tobacco Use    Smoking status: Every Day     Packs/day: 1.50     Years: 40.00     Pack years: 60.00     Types: Cigarettes    Smokeless tobacco: Never   Vaping Use    Vaping Use: Never used   Substance and Sexual Activity    Alcohol use: Yes     Alcohol/week: 6.0 standard drinks     Types: 6 Cans of beer per week     Comment: when can get    Drug use: No    Sexual activity: Defer     Partners: Female     Current Medications:  Prior to Admission medications    Medication Sig Start Date End Date Taking? Authorizing Provider   albuterol sulfate  (90 Base) MCG/ACT inhaler Inhale 2 puffs Every  4 (Four) Hours As Needed for Wheezing or Shortness of Air. 1/27/22  Yes Son Li MD   amLODIPine (NORVASC) 10 MG tablet TAKE 1 TABLET BY MOUTH DAILY 1/4/23  Yes Catrachito Milligan MD   aspirin (Aspirin Adult Low Strength) 81 MG EC tablet Take 1 tablet by mouth Daily. 2/21/22  Yes Son Li MD   Blood Pressure Monitoring (Adult Blood Pressure Cuff Lg) kit Check BP daily 8/13/21  Yes Sincere Narayanan MD   cetirizine (zyrTEC) 10 MG tablet Take 1 tablet by mouth Daily. 1/6/23  Yes Catrachito Milligan MD   hydrALAZINE (APRESOLINE) 10 MG tablet Take 1 tablet by mouth Daily. 5/16/22  Yes Radha Burrell MD   losartan-hydrochlorothiazide (HYZAAR) 100-12.5 MG per tablet Take 1 tablet by mouth Daily. 10/4/22  Yes Catrachito Milligan MD   lovastatin (MEVACOR) 40 MG tablet TAKE 1 TABLET  EVERY NIGHT AT BEDTIME FOR CHOLESTEROL. 1/23/23  Yes Catrachito Milligan MD   magnesium oxide (MAGOX) 400 (241.3 Mg) MG tablet tablet Take 1 tablet by mouth Daily. 12/20/21  Yes Son Li MD   Magnesium Oxide 400 (240 Mg) MG tablet TAKE 1 TABLET BY MOUTH DAILY. 11/7/22  Yes Catrachito Milligan MD   metFORMIN (GLUCOPHAGE) 500 MG tablet TAKE 1 TABLET DAILY WITH BREAKFAST 1/4/23  Yes Catrachito Milligan MD   omeprazole (priLOSEC) 20 MG capsule Take 1 capsule by mouth 2 (Two) Times a Day. 5/16/22  Yes Tim Chen PA-C   propranolol (INDERAL) 40 MG tablet TAKE 1 TABLET  DAILY 3/2/23  Yes Catrachito Milligan MD   Umeclidinium Bromide (Incruse Ellipta) 62.5 MCG/ACT aerosol powder  Inhale 1 puff Daily. 2/28/23  Yes Catrachito Milligan MD   fluticasone (FLONASE) 50 MCG/ACT nasal spray 2 sprays into each nostril Daily for 30 days. 11/27/17 7/14/22  Yolanda Phan MD   guaiFENesin (Mucinex) 600 MG 12 hr tablet Take 2 tablets by mouth 2 (Two) Times a Day.  Patient not taking: Reported on 3/23/2023 1/27/22   Son Li MD   Lidocaine Viscous HCl (XYLOCAINE) 2 % solution As  "Needed.  Patient not taking: Reported on 3/23/2023 5/19/22   Provider, MD Radha   tiotropium (Spiriva HandiHaler) 18 MCG per inhalation capsule Place 1 capsule into inhaler and inhale Daily.  Patient not taking: Reported on 3/23/2023 2/28/23   Catrachito Milligan MD     Review of Systems  Review of Systems       Objective    /84 (BP Location: Left arm, Patient Position: Sitting, Cuff Size: Large Adult)   Pulse 71   Ht 177.8 cm (70\")   Wt 89.2 kg (196 lb 9.6 oz)   SpO2 98%   BMI 28.21 kg/m²   Physical Exam  Vitals and nursing note reviewed.   Constitutional:       General: He is not in acute distress.     Appearance: He is well-developed.      Comments: NATALIA   HENT:      Head: Normocephalic and atraumatic.   Eyes:      Pupils: Pupils are equal, round, and reactive to light.   Cardiovascular:      Rate and Rhythm: Normal rate and regular rhythm.      Heart sounds: Normal heart sounds.   Pulmonary:      Effort: Pulmonary effort is normal.      Breath sounds: Normal breath sounds.   Abdominal:      General: Bowel sounds are normal. There is no distension or abdominal bruit.      Palpations: Abdomen is soft. Abdomen is not rigid. There is no shifting dullness or mass.      Tenderness: There is abdominal tenderness. There is no guarding or rebound.      Hernia: No hernia is present. There is no hernia in the ventral area.   Musculoskeletal:         General: Normal range of motion.      Cervical back: Normal range of motion.   Skin:     General: Skin is warm and dry.   Neurological:      Mental Status: He is alert and oriented to person, place, and time.   Psychiatric:         Behavior: Behavior normal.         Thought Content: Thought content normal.         Judgment: Judgment normal.       Assessment & Plan      1. Achalasia    2. Portal hypertension    3. Hepatic fibrosis    4. Esophageal varices without bleeding, unspecified esophageal varices type    .   Diagnoses and all orders for this visit:    1. " Achalasia (Primary)    2. Portal hypertension    3. Hepatic fibrosis    4. Esophageal varices without bleeding, unspecified esophageal varices type        Orders placed during this encounter include:  No orders of the defined types were placed in this encounter.      Medications prescribed:  No orders of the defined types were placed in this encounter.      Requested Prescriptions      No prescriptions requested or ordered in this encounter       Review and/or summary of lab tests, radiology, procedures, medications. Review and summary of old records and obtaining of history. The risks and benefits of my recommendations, as well as other treatment options were discussed with the patient today. Questions were answered.    Follow-up: Return in about 4 months (around 2023), or if symptoms worsen or fail to improve.     * Surgery not found *      This document has been electronically signed by Tim Chen PA-C on 2023 15:10 CDT      Results for orders placed or performed during the hospital encounter of 06/15/22   TISSUE EXAM, [facility] ([facility])    Specimen: Gastric, Antrum; Tissue   Result Value Ref Range    Reference Lab Report       [facility]  [address] [place] [address]  Phone: [phone] or [phone]  Fax: [phone]  Riki Harden M.D., Medical Director    PATIENT NAME                           LABORATORY NO.  MARSHA ARGUETA.                 [id number]  [id number]                         AGE              SEX  SSN           CLIENT REF #  [facility]           61      1960      [id number]   [id number]    [place]                       REQUESTING M.D.     ATTENDING M.D.     COPY TO[address]                 [place] [address]             DATE COLLECTED      DATE RECEIVED      DATE REPORTED  06/15/2022          06/15/2022          "06/16/2022    DIAGNOSIS:  GASTRIC ANTRUM, BIOPSY:  Minimal chronic gastritis  Negative for H. pylori, metaplasia, dysplasia or malignancy    RLL/pah    CLINICAL HISTORY:  Achalasia, gastroesophageal reflux disease with esophagitis without  hemorrhage,  esophageal varices without bleeding, unspecified esophageal varices type    SPECIMENS RECEIVED:  GASTRIC ANTRUM, BIOPSY    MICROSCOPIC DESCRIPTION:  Tissue blocks are prepared and slides are examined microscopically on all  specimens. See diagnosis for details.    Professional interpretation rendered by Riki Harden M.D., ZACH at  [facility], [address] [place] [address].    GROSS DESCRIPTION:  Specimen is received in 1 formalin filled container labeled \"gastric antrum\" and  consists of 2 portions of tan soft tissue measuring 0.5 x 0.4 x 0.2 cm.  The  specimen is submitted entirely in 1 cassette.  BW    REVIEWED, DIAGNOSED AND ELECTRONICALLY  SIGNED BY:    Riki Harden M.D., ZACH  CPT CODES:  07905     Results for orders placed or performed in visit on 05/16/22   REYNOSO Fibrosure    Specimen: Blood   Result Value Ref Range    Fibrosis Score (References) 0.36 (H) 0.00 - 0.21    Fibrosis Stage (Reference) F1-F2     Steatosis Score (Reference) 0.30 0.00 - 0.30    Steatosis Grade (Reference) Comment     REYNOSO Score (Reference) 0.25 0.25    Reynoso Grade (Reference) Comment     Height: (Reference) 70 in    Weight: (Reference) 189 LBS    Alpha 2-Macroglobulins, Qn 239 110 - 276 mg/dL    Haptoglobin 141 32 - 363 mg/dL    Apolipoprotein A-1 148 101 - 178 mg/dL    Total Bilirubin 0.5 0.0 - 1.2 mg/dL    GGT 29 0 - 65 IU/L    ALT (SGPT) 16 0 - 55 IU/L    AST (SGOT) P5P (Reference) 19 0 - 40 IU/L    Cholesterol, Total (Reference) 147 100 - 199 mg/dL    Glucose, Serum (Reference) 110 (H) 70 - 99 mg/dL    Triglycerides 82 0 - 149 mg/dL    Interpretations: (Reference) Comment     Fibrosis Scoring: Comment     Steatosis Grading (Reference) Comment     Reynoso " Scoring (Reference) Comment     Limitations: (Reference) Comment     Comment (Reference) Comment    CBC Auto Differential    Specimen: Blood   Result Value Ref Range    WBC 4.48 3.40 - 10.80 10*3/mm3    RBC 5.48 4.14 - 5.80 10*6/mm3    Hemoglobin 13.4 13.0 - 17.7 g/dL    Hematocrit 42.0 37.5 - 51.0 %    MCV 76.6 (L) 79.0 - 97.0 fL    MCH 24.5 (L) 26.6 - 33.0 pg    MCHC 31.9 31.5 - 35.7 g/dL    RDW 15.1 12.3 - 15.4 %    RDW-SD 41.0 37.0 - 54.0 fl    MPV 11.6 6.0 - 12.0 fL    Platelets 256 140 - 450 10*3/mm3    Neutrophil % 52.3 42.7 - 76.0 %    Lymphocyte % 31.0 19.6 - 45.3 %    Monocyte % 8.0 5.0 - 12.0 %    Eosinophil % 7.4 (H) 0.3 - 6.2 %    Basophil % 1.1 0.0 - 1.5 %    Immature Grans % 0.2 0.0 - 0.5 %    Neutrophils, Absolute 2.34 1.70 - 7.00 10*3/mm3    Lymphocytes, Absolute 1.39 0.70 - 3.10 10*3/mm3    Monocytes, Absolute 0.36 0.10 - 0.90 10*3/mm3    Eosinophils, Absolute 0.33 0.00 - 0.40 10*3/mm3    Basophils, Absolute 0.05 0.00 - 0.20 10*3/mm3    Immature Grans, Absolute 0.01 0.00 - 0.05 10*3/mm3    nRBC 0.0 0.0 - 0.2 /100 WBC   AFP Tumor Marker    Specimen: Blood   Result Value Ref Range    ALPHA-FETOPROTEIN <2 0 - 8.3 ng/mL   Protime-INR    Specimen: Blood   Result Value Ref Range    Protime 13.1 11.1 - 15.3 Seconds    INR 1.00 0.80 - 1.20   Iron    Specimen: Blood   Result Value Ref Range    Iron 78 59 - 158 mcg/dL   Ammonia    Specimen: Blood   Result Value Ref Range    Ammonia 35 16 - 60 umol/L   Comprehensive Metabolic Panel    Specimen: Blood   Result Value Ref Range    Glucose 110 (H) 65 - 99 mg/dL    BUN 17 8 - 23 mg/dL    Creatinine 1.19 0.76 - 1.27 mg/dL    Sodium 141 136 - 145 mmol/L    Potassium 3.8 3.5 - 5.2 mmol/L    Chloride 106 98 - 107 mmol/L    CO2 23.2 22.0 - 29.0 mmol/L    Calcium 10.2 8.6 - 10.5 mg/dL    Total Protein 7.9 6.0 - 8.5 g/dL    Albumin 4.6 3.5 - 5.2 g/dL    ALT (SGPT) 15 1 - 41 U/L    AST (SGOT) 16 1 - 40 U/L    Alkaline Phosphatase 60 39 - 117 U/L    Total Bilirubin 0.8 0.0  - 1.2 mg/dL    Globulin 3.3 gm/dL    A/G Ratio 1.4 g/dL    BUN/Creatinine Ratio 14.3 7.0 - 25.0    Anion Gap 11.8 5.0 - 15.0 mmol/L    eGFR 68.2 >60.0 mL/min/1.73   Results for orders placed or performed in visit on 02/21/22   Magnesium    Specimen: Blood   Result Value Ref Range    Magnesium 1.6 1.6 - 2.4 mg/dL   Hemoglobin A1c    Specimen: Blood   Result Value Ref Range    Hemoglobin A1C 5.80 (H) 4.80 - 5.60 %   Lipid panel    Specimen: Blood   Result Value Ref Range    Total Cholesterol 206 (H) 0 - 200 mg/dL    Triglycerides 69 0 - 150 mg/dL    HDL Cholesterol 65 (H) 40 - 60 mg/dL    LDL Cholesterol  129 (H) 0 - 100 mg/dL    VLDL Cholesterol 12 5 - 40 mg/dL    LDL/HDL Ratio 1.96    Results for orders placed or performed in visit on 11/30/21   MicroAlbumin, Urine, Random - Urine, Clean Catch    Specimen: Urine, Clean Catch   Result Value Ref Range    Microalbumin, Urine 218.1 mg/dL   Hemoglobin A1c    Specimen: Blood   Result Value Ref Range    Hemoglobin A1C 5.64 (H) 4.80 - 5.60 %   Results for orders placed or performed in visit on 11/30/21   CBC Auto Differential    Specimen: Blood   Result Value Ref Range    WBC 4.23 3.40 - 10.80 10*3/mm3    RBC 6.00 (H) 4.14 - 5.80 10*6/mm3    Hemoglobin 15.3 13.0 - 17.7 g/dL    Hematocrit 47.6 37.5 - 51.0 %    MCV 79.3 79.0 - 97.0 fL    MCH 25.5 (L) 26.6 - 33.0 pg    MCHC 32.1 31.5 - 35.7 g/dL    RDW 14.5 12.3 - 15.4 %    RDW-SD 40.1 37.0 - 54.0 fl    MPV 12.1 (H) 6.0 - 12.0 fL    Platelets 279 140 - 450 10*3/mm3    Neutrophil % 43.3 42.7 - 76.0 %    Lymphocyte % 40.4 19.6 - 45.3 %    Monocyte % 8.5 5.0 - 12.0 %    Eosinophil % 5.9 0.3 - 6.2 %    Basophil % 1.7 (H) 0.0 - 1.5 %    Immature Grans % 0.2 0.0 - 0.5 %    Neutrophils, Absolute 1.83 1.70 - 7.00 10*3/mm3    Lymphocytes, Absolute 1.71 0.70 - 3.10 10*3/mm3    Monocytes, Absolute 0.36 0.10 - 0.90 10*3/mm3    Eosinophils, Absolute 0.25 0.00 - 0.40 10*3/mm3    Basophils, Absolute 0.07 0.00 - 0.20 10*3/mm3    Immature  Grans, Absolute 0.01 0.00 - 0.05 10*3/mm3    nRBC 0.2 0.0 - 0.2 /100 WBC     *Note: Due to a large number of results and/or encounters for the requested time period, some results have not been displayed. A complete set of results can be found in Results Review.

## 2023-04-04 ENCOUNTER — DOCUMENTATION (OUTPATIENT)
Dept: ONCOLOGY | Facility: CLINIC | Age: 63
End: 2023-04-04
Payer: MEDICARE

## 2023-04-10 ENCOUNTER — TELEPHONE (OUTPATIENT)
Dept: FAMILY MEDICINE CLINIC | Facility: CLINIC | Age: 63
End: 2023-04-10
Payer: MEDICARE

## 2023-04-10 NOTE — TELEPHONE ENCOUNTER
Patients caregiver Prosper called and stated he needs all his medications refilled and sent to [facility]. She stated they would like for them to be prepackaged if possible.    Her#[phone]

## 2023-04-11 DIAGNOSIS — J44.9 CHRONIC OBSTRUCTIVE PULMONARY DISEASE, UNSPECIFIED COPD TYPE: ICD-10-CM

## 2023-04-17 ENCOUNTER — TELEPHONE (OUTPATIENT)
Dept: FAMILY MEDICINE CLINIC | Facility: CLINIC | Age: 63
End: 2023-04-17
Payer: MEDICARE

## 2023-04-17 NOTE — TELEPHONE ENCOUNTER
Patient called stating he needs all of his medications refilled and mailed to him. He said he made a request last week.    His#[phone]

## 2023-04-18 DIAGNOSIS — J44.9 CHRONIC OBSTRUCTIVE PULMONARY DISEASE, UNSPECIFIED COPD TYPE: ICD-10-CM

## 2023-04-18 DIAGNOSIS — E11.8 TYPE 2 DIABETES MELLITUS WITH COMPLICATION, WITHOUT LONG-TERM CURRENT USE OF INSULIN: ICD-10-CM

## 2023-04-18 DIAGNOSIS — K21.00 GASTROESOPHAGEAL REFLUX DISEASE WITH ESOPHAGITIS, UNSPECIFIED WHETHER HEMORRHAGE: Primary | ICD-10-CM

## 2023-04-18 DIAGNOSIS — I10 ESSENTIAL HYPERTENSION: ICD-10-CM

## 2023-04-18 RX ORDER — OMEPRAZOLE 20 MG/1
20 CAPSULE, DELAYED RELEASE ORAL 2 TIMES DAILY
Qty: 60 CAPSULE | Refills: 3 | Status: SHIPPED | OUTPATIENT
Start: 2023-04-18 | End: 2023-04-24 | Stop reason: SDUPTHER

## 2023-04-18 RX ORDER — ALBUTEROL SULFATE 90 UG/1
2 AEROSOL, METERED RESPIRATORY (INHALATION) EVERY 4 HOURS PRN
Qty: 18 G | Refills: 5 | Status: SHIPPED | OUTPATIENT
Start: 2023-04-18

## 2023-04-18 RX ORDER — LOSARTAN POTASSIUM AND HYDROCHLOROTHIAZIDE 12.5; 1 MG/1; MG/1
1 TABLET ORAL DAILY
Qty: 30 TABLET | Refills: 2 | Status: SHIPPED | OUTPATIENT
Start: 2023-04-18 | End: 2023-04-21 | Stop reason: SDUPTHER

## 2023-04-21 DIAGNOSIS — I85.00 ESOPHAGEAL VARICES WITHOUT BLEEDING, UNSPECIFIED ESOPHAGEAL VARICES TYPE: ICD-10-CM

## 2023-04-21 DIAGNOSIS — E11.8 TYPE 2 DIABETES MELLITUS WITH COMPLICATION, WITHOUT LONG-TERM CURRENT USE OF INSULIN: ICD-10-CM

## 2023-04-21 DIAGNOSIS — E78.00 HYPERCHOLESTEROLEMIA: ICD-10-CM

## 2023-04-21 DIAGNOSIS — I10 ESSENTIAL HYPERTENSION: ICD-10-CM

## 2023-04-21 RX ORDER — UMECLIDINIUM 62.5 UG/1
1 AEROSOL, POWDER ORAL DAILY
Qty: 30 EACH | Refills: 5 | Status: SHIPPED | OUTPATIENT
Start: 2023-04-21 | End: 2023-04-24 | Stop reason: SDUPTHER

## 2023-04-21 RX ORDER — PROPRANOLOL HYDROCHLORIDE 40 MG/1
40 TABLET ORAL DAILY
Qty: 30 TABLET | Refills: 3 | Status: SHIPPED | OUTPATIENT
Start: 2023-04-21 | End: 2023-04-24 | Stop reason: SDUPTHER

## 2023-04-21 RX ORDER — LOVASTATIN 40 MG/1
40 TABLET ORAL NIGHTLY
Qty: 90 TABLET | Refills: 1 | Status: SHIPPED | OUTPATIENT
Start: 2023-04-21 | End: 2023-04-24 | Stop reason: SDUPTHER

## 2023-04-21 RX ORDER — AMLODIPINE BESYLATE 10 MG/1
10 TABLET ORAL DAILY
Qty: 30 TABLET | Refills: 11 | Status: SHIPPED | OUTPATIENT
Start: 2023-04-21 | End: 2023-04-24 | Stop reason: SDUPTHER

## 2023-04-21 RX ORDER — LOSARTAN POTASSIUM AND HYDROCHLOROTHIAZIDE 12.5; 1 MG/1; MG/1
1 TABLET ORAL DAILY
Qty: 30 TABLET | Refills: 2 | Status: SHIPPED | OUTPATIENT
Start: 2023-04-21 | End: 2023-04-24 | Stop reason: SDUPTHER

## 2023-04-24 DIAGNOSIS — I85.00 ESOPHAGEAL VARICES WITHOUT BLEEDING, UNSPECIFIED ESOPHAGEAL VARICES TYPE: ICD-10-CM

## 2023-04-24 DIAGNOSIS — I10 ESSENTIAL HYPERTENSION: ICD-10-CM

## 2023-04-24 DIAGNOSIS — K21.00 GASTROESOPHAGEAL REFLUX DISEASE WITH ESOPHAGITIS, UNSPECIFIED WHETHER HEMORRHAGE: ICD-10-CM

## 2023-04-24 DIAGNOSIS — E78.00 HYPERCHOLESTEROLEMIA: ICD-10-CM

## 2023-04-24 DIAGNOSIS — E11.8 TYPE 2 DIABETES MELLITUS WITH COMPLICATION, WITHOUT LONG-TERM CURRENT USE OF INSULIN: ICD-10-CM

## 2023-04-24 RX ORDER — OMEPRAZOLE 20 MG/1
20 CAPSULE, DELAYED RELEASE ORAL 2 TIMES DAILY
Qty: 60 CAPSULE | Refills: 3 | Status: SHIPPED | OUTPATIENT
Start: 2023-04-24

## 2023-04-24 RX ORDER — LOSARTAN POTASSIUM AND HYDROCHLOROTHIAZIDE 12.5; 1 MG/1; MG/1
1 TABLET ORAL DAILY
Qty: 90 TABLET | Refills: 3 | Status: SHIPPED | OUTPATIENT
Start: 2023-04-24

## 2023-04-24 RX ORDER — AMLODIPINE BESYLATE 10 MG/1
10 TABLET ORAL DAILY
Qty: 90 TABLET | Refills: 3 | Status: SHIPPED | OUTPATIENT
Start: 2023-04-24

## 2023-04-24 RX ORDER — LOVASTATIN 40 MG/1
40 TABLET ORAL NIGHTLY
Qty: 90 TABLET | Refills: 1 | Status: SHIPPED | OUTPATIENT
Start: 2023-04-24

## 2023-04-24 RX ORDER — UMECLIDINIUM 62.5 UG/1
1 AEROSOL, POWDER ORAL DAILY
Qty: 90 EACH | Refills: 3 | Status: SHIPPED | OUTPATIENT
Start: 2023-04-24

## 2023-04-24 RX ORDER — PROPRANOLOL HYDROCHLORIDE 40 MG/1
40 TABLET ORAL DAILY
Qty: 90 TABLET | Refills: 1 | Status: SHIPPED | OUTPATIENT
Start: 2023-04-24

## 2023-05-08 ENCOUNTER — OFFICE VISIT (OUTPATIENT)
Dept: FAMILY MEDICINE CLINIC | Facility: CLINIC | Age: 63
End: 2023-05-08
Payer: MEDICARE

## 2023-05-08 VITALS
HEART RATE: 95 BPM | HEIGHT: 70 IN | TEMPERATURE: 97 F | WEIGHT: 186.7 LBS | SYSTOLIC BLOOD PRESSURE: 154 MMHG | DIASTOLIC BLOOD PRESSURE: 78 MMHG | OXYGEN SATURATION: 98 % | BODY MASS INDEX: 26.73 KG/M2

## 2023-05-08 DIAGNOSIS — K21.00 GASTROESOPHAGEAL REFLUX DISEASE WITH ESOPHAGITIS, UNSPECIFIED WHETHER HEMORRHAGE: ICD-10-CM

## 2023-05-08 DIAGNOSIS — E11.8 TYPE 2 DIABETES MELLITUS WITH COMPLICATION, WITHOUT LONG-TERM CURRENT USE OF INSULIN: Primary | ICD-10-CM

## 2023-05-08 DIAGNOSIS — I85.00 ESOPHAGEAL VARICES WITHOUT BLEEDING, UNSPECIFIED ESOPHAGEAL VARICES TYPE: ICD-10-CM

## 2023-05-08 DIAGNOSIS — I10 PRIMARY HYPERTENSION: ICD-10-CM

## 2023-05-08 DIAGNOSIS — E78.00 HYPERCHOLESTEROLEMIA: ICD-10-CM

## 2023-05-08 DIAGNOSIS — F17.210 CIGARETTE SMOKER: ICD-10-CM

## 2023-05-08 DIAGNOSIS — I10 ESSENTIAL HYPERTENSION: ICD-10-CM

## 2023-05-08 DIAGNOSIS — J30.89 ENVIRONMENTAL AND SEASONAL ALLERGIES: ICD-10-CM

## 2023-05-08 DIAGNOSIS — E11.9 ENCOUNTER FOR DIABETIC FOOT EXAM: ICD-10-CM

## 2023-05-08 DIAGNOSIS — E78.5 HYPERLIPIDEMIA, UNSPECIFIED HYPERLIPIDEMIA TYPE: ICD-10-CM

## 2023-05-08 DIAGNOSIS — J44.9 CHRONIC OBSTRUCTIVE PULMONARY DISEASE, UNSPECIFIED COPD TYPE: ICD-10-CM

## 2023-05-08 DIAGNOSIS — F17.200 CURRENT EVERY DAY SMOKER: ICD-10-CM

## 2023-05-08 RX ORDER — ALBUTEROL SULFATE 90 UG/1
2 AEROSOL, METERED RESPIRATORY (INHALATION) EVERY 4 HOURS PRN
Qty: 18 G | Refills: 5 | Status: SHIPPED | OUTPATIENT
Start: 2023-05-08

## 2023-05-08 RX ORDER — ASPIRIN 81 MG/1
81 TABLET ORAL DAILY
Qty: 30 TABLET | Refills: 2 | Status: SHIPPED | OUTPATIENT
Start: 2023-05-08

## 2023-05-08 RX ORDER — AMLODIPINE BESYLATE 10 MG/1
10 TABLET ORAL DAILY
Qty: 90 TABLET | Refills: 3 | Status: SHIPPED | OUTPATIENT
Start: 2023-05-08

## 2023-05-08 RX ORDER — OMEPRAZOLE 20 MG/1
20 CAPSULE, DELAYED RELEASE ORAL 2 TIMES DAILY
Qty: 60 CAPSULE | Refills: 3 | Status: SHIPPED | OUTPATIENT
Start: 2023-05-08

## 2023-05-08 RX ORDER — LOVASTATIN 40 MG/1
40 TABLET ORAL NIGHTLY
Qty: 90 TABLET | Refills: 1 | Status: SHIPPED | OUTPATIENT
Start: 2023-05-08

## 2023-05-08 RX ORDER — CETIRIZINE HYDROCHLORIDE 10 MG/1
10 TABLET ORAL DAILY
Qty: 30 TABLET | Refills: 5 | Status: SHIPPED | OUTPATIENT
Start: 2023-05-08

## 2023-05-08 RX ORDER — LOSARTAN POTASSIUM AND HYDROCHLOROTHIAZIDE 12.5; 1 MG/1; MG/1
1 TABLET ORAL DAILY
Qty: 90 TABLET | Refills: 3 | Status: SHIPPED | OUTPATIENT
Start: 2023-05-08

## 2023-05-08 RX ORDER — PROPRANOLOL HYDROCHLORIDE 40 MG/1
40 TABLET ORAL DAILY
Qty: 90 TABLET | Refills: 1 | Status: SHIPPED | OUTPATIENT
Start: 2023-05-08

## 2023-05-08 RX ORDER — UMECLIDINIUM 62.5 UG/1
1 AEROSOL, POWDER ORAL DAILY
Qty: 90 EACH | Refills: 3 | Status: SHIPPED | OUTPATIENT
Start: 2023-05-08

## 2023-05-08 NOTE — PROGRESS NOTES
Family Medicine [facility]idency  Janet Navarrete MD    Subjective:     Jameel Dozier is a 62 y.o. male who presents for DM follow up. Patient requested for refill on all his medication today as he has been out of all of it for few months now. Patient has no new complaints today. Patient's BP is elevated today as he has been out of his medications for new months. Patient is a chronic smoker and smokes about 1-1.5 packs a day.       The following portions of the patient's history were reviewed and updated as appropriate: allergies, current medications, past family history, past medical history, past social history, past surgical history and problem list.    Past Medical Hx:  Past Medical History:   Diagnosis Date   • Abdominal pain    • Acquired achalasia of esophagus    • Adenomatous polyp of colon    • Adverse drug effect    • Alcohol dependence with alcohol-induced disorder    • Allergic rhinitis    • Anal fissure    • Anemia due to blood loss    • Benign essential hypertension    • Central obesity    • Cerebrovascular disease    • Chronic obstructive lung disease    • Claudication    • Disorder of peripheral nervous system    • Diverticular disease of colon     completed antibx, ? neoplasm on CT   • Dysphagia    • Dyspnea    • ED (erectile dysfunction)    • Epigastric pain    • Esophageal dysmotility    • Esophageal reflux    • Esophagitis    • Essential hypertension    • External hemorrhoids    • Gastritis    • GERD with esophagitis    • Heavy tobacco smoker    • Hemorrhoids    • Hiatal hernia    • Hiccough    • History of colon polyps    • History of echocardiogram     Normal LV funciton with Ef of 65% to 70% without regional wall motion abnormalities.Mild CLVH. Normal RV size and function. No significant valvular regurgitation or stenosis. 09/19/2014       • History of EKG    • History of TIA (transient ischemic attack)    • Hypercholesterolemia    • Hypertension    • Left ventricular hypertrophy    • Male  erectile disorder    • Obstructive sleep apnea syndrome    • Primary osteoarthritis of knees, bilateral    • Rectal bleed     painful   • Rectal hemorrhage    • Sleep disorder    • Transient cerebral ischemia    • Upper respiratory infection    • Weakness     Attacks of weakness       Past Surgical Hx:  Past Surgical History:   Procedure Laterality Date   • COLONOSCOPY  04/13/2015    Internal and external hemorrhoids found. 04/13/2015    • COLONOSCOPY N/A 9/18/2020    Procedure: COLONOSCOPY;  Surgeon: Alli Dockery MD;  Location: [facility] ENDOSCOPY;  Service: Gastroenterology;  Laterality: N/A;   • ENDOSCOPY      Internal & external hemorrhoids found. 1 polyp in sigmoid colon; removed by snare cautery polypectomy. 09/26/2012    • ENDOSCOPY      Gastritis found in the stomach. Biopsy taken.Enlarged folds were found in the body of the stomach.Biopsy taken.Normal duodenum.A hiatus hernia was found in the esophagus. 04/13/2015    • ENDOSCOPY      Hiatus hernia in esophagus. Gastritis in stomach. Biopsy taken. Normal duodenum. Biopsy taken. 09/26/2012       • ENDOSCOPY N/A 12/18/2017    Procedure: ESOPHAGOGASTRODUODENOSCOPY--attn mid esophagus, abnl on CT;  Surgeon: Alli Dockery MD;  Location: [facility] ENDOSCOPY;  Service:    • ENDOSCOPY N/A 3/6/2019    Procedure: ESOPHAGOGASTRODUODENOSCOPY WITH DILATATION;  Surgeon: Alli Dockery MD;  Location: [facility] ENDOSCOPY;  Service: Gastroenterology   • ENDOSCOPY N/A 9/18/2020    Procedure: ESOPHAGOGASTRODUODENOSCOPY eval varices;  Surgeon: Alli Dockery MD;  Location: [facility] ENDOSCOPY;  Service: Gastroenterology;  Laterality: N/A;   • ENDOSCOPY N/A 4/28/2021    Procedure: ESOPHAGOGASTRODUODENOSCOPY WITH ANESTHESIA--with botox, attn antrum poss malig;  Surgeon: Alli Dockery MD;  Location: [facility] ENDOSCOPY;  Service: Gastroenterology;  Laterality: N/A;   • ENDOSCOPY N/A 6/15/2022    Procedure: ESOPHAGOGASTRODUODENOSCOPY WITH DILATATION;  Surgeon: Alli Dockery MD;   Location: [facility] ENDOSCOPY;  Service: Gastroenterology;  Laterality: N/A;   • UPPER GASTROINTESTINAL ENDOSCOPY  08/24/2016   • UPPER GASTROINTESTINAL ENDOSCOPY  04/13/2015   • UPPER GASTROINTESTINAL ENDOSCOPY  12/18/2017   • UPPER GASTROINTESTINAL ENDOSCOPY  03/06/2019   • UPPER GASTROINTESTINAL ENDOSCOPY  09/18/2020   • UPPER GASTROINTESTINAL ENDOSCOPY  04/28/2021       Current Meds:    Current Outpatient Medications:   •  albuterol sulfate  (90 Base) MCG/ACT inhaler, Inhale 2 puffs Every 4 (Four) Hours As Needed for Wheezing or Shortness of Air., Disp: 18 g, Rfl: 5  •  amLODIPine (NORVASC) 10 MG tablet, Take 1 tablet by mouth Daily., Disp: 90 tablet, Rfl: 3  •  aspirin (Aspirin Adult Low Strength) 81 MG EC tablet, Take 1 tablet by mouth Daily., Disp: 30 tablet, Rfl: 2  •  Blood Pressure Monitoring (Adult Blood Pressure Cuff Lg) kit, Check BP daily, Disp: 1 each, Rfl: 0  •  cetirizine (zyrTEC) 10 MG tablet, Take 1 tablet by mouth Daily., Disp: 30 tablet, Rfl: 5  •  hydrALAZINE (APRESOLINE) 10 MG tablet, Take 1 tablet by mouth Daily., Disp: , Rfl:   •  losartan-hydrochlorothiazide (HYZAAR) 100-12.5 MG per tablet, Take 1 tablet by mouth Daily., Disp: 90 tablet, Rfl: 3  •  lovastatin (MEVACOR) 40 MG tablet, Take 1 tablet by mouth Every Night., Disp: 90 tablet, Rfl: 1  •  magnesium oxide (MAGOX) 400 (241.3 Mg) MG tablet tablet, Take 1 tablet by mouth Daily., Disp: 30 tablet, Rfl: 4  •  metFORMIN (GLUCOPHAGE) 500 MG tablet, Take 1 tablet by mouth Daily With Breakfast., Disp: 90 tablet, Rfl: 3  •  omeprazole (priLOSEC) 20 MG capsule, Take 1 capsule by mouth 2 (Two) Times a Day., Disp: 60 capsule, Rfl: 3  •  propranolol (INDERAL) 40 MG tablet, Take 1 tablet by mouth Daily., Disp: 90 tablet, Rfl: 1  •  tiotropium (Spiriva HandiHaler) 18 MCG per inhalation capsule, Place 1 capsule into inhaler and inhale Daily., Disp: 30 capsule, Rfl: 5  •  Umeclidinium Bromide (Incruse Ellipta) 62.5 MCG/ACT aerosol powder , Inhale 1  puff Daily., Disp: 90 each, Rfl: 3  •  fluticasone (FLONASE) 50 MCG/ACT nasal spray, 2 sprays into each nostril Daily for 30 days., Disp: 1 bottle, Rfl: 11  •  guaiFENesin (Mucinex) 600 MG 12 hr tablet, Take 2 tablets by mouth 2 (Two) Times a Day. (Patient not taking: Reported on 3/23/2023), Disp: 60 tablet, Rfl: 0  •  Lidocaine Viscous HCl (XYLOCAINE) 2 % solution, As Needed. (Patient not taking: Reported on 3/23/2023), Disp: , Rfl:   •  Magnesium Oxide 400 (240 Mg) MG tablet, TAKE 1 TABLET BY MOUTH DAILY. (Patient not taking: Reported on 5/8/2023), Disp: 30 tablet, Rfl: 11    Allergies:  Allergies   Allergen Reactions   • Ace Inhibitors Angioedema     hypotension   • Lisinopril Other (See Comments)     cough   • Vistaril [Hydroxyzine Hcl] Other (See Comments)     Patient is unsure        Family Hx:  Family History   Problem Relation Age of Onset   • Cancer Mother    • Cirrhosis Father    • Diabetes Other    • Hypertension Other         Social History:  Social History     Socioeconomic History   • Marital status: Single   • Number of children: 0   • Years of education: 12   Tobacco Use   • Smoking status: Every Day     Packs/day: 1.50     Years: 40.00     Pack years: 60.00     Types: Cigarettes   • Smokeless tobacco: Never   Vaping Use   • Vaping Use: Never used   Substance and Sexual Activity   • Alcohol use: Yes     Alcohol/week: 6.0 standard drinks     Types: 6 Cans of beer per week     Comment: when can get   • Drug use: No   • Sexual activity: Defer     Partners: Female       Review of Systems  Review of Systems   Constitutional: Negative for activity change, appetite change and fatigue.   HENT: Negative for congestion.    Respiratory: Negative for chest tightness, shortness of breath and wheezing.    Cardiovascular: Negative for chest pain and palpitations.   Gastrointestinal: Negative for abdominal pain, diarrhea, nausea and vomiting.   Genitourinary: Negative for difficulty urinating.   Musculoskeletal:  "Negative for arthralgias and myalgias.   Skin: Negative for color change and rash.   Neurological: Negative for dizziness, weakness, light-headedness and headaches.   Psychiatric/Behavioral: Negative for behavioral problems. The patient is not nervous/anxious.        Objective:     /78   Pulse 95   Temp 97 °F (36.1 °C)   Ht 177.8 cm (70\")   Wt 84.7 kg (186 lb 11.2 oz)   SpO2 98%   BMI 26.79 kg/m²   Physical Exam  Vitals and nursing note reviewed.   Constitutional:       General: He is not in acute distress.     Appearance: Normal appearance. He is obese. He is not ill-appearing, toxic-appearing or diaphoretic.   Cardiovascular:      Rate and Rhythm: Normal rate and regular rhythm.      Pulses: Normal pulses.      Heart sounds: No murmur heard.  Pulmonary:      Effort: Pulmonary effort is normal. No respiratory distress.      Breath sounds: Normal breath sounds. No wheezing.   Abdominal:      General: Bowel sounds are normal.      Palpations: Abdomen is soft.      Tenderness: There is no abdominal tenderness. There is no guarding.   Musculoskeletal:         General: Normal range of motion.      Right foot: Normal range of motion. No deformity.      Left foot: Normal range of motion. No deformity.   Feet:      Right foot:      Protective Sensation: 10 sites tested. 10 sites sensed.      Skin integrity: Skin integrity normal.      Toenail Condition: Right toenails are abnormally thick, long and ingrown.      Left foot:      Protective Sensation: 10 sites tested. 10 sites sensed.      Skin integrity: Skin integrity normal.      Toenail Condition: Left toenails are abnormally thick, long and ingrown.      Comments: Diabetic foot exam was normal with filament test   Skin:     General: Skin is warm and dry.      Capillary Refill: Capillary refill takes less than 2 seconds.   Neurological:      General: No focal deficit present.      Mental Status: He is alert and oriented to person, place, and time. "   Psychiatric:         Mood and Affect: Mood normal.         Behavior: Behavior normal.          Assessment/Plan:     Diagnoses and all orders for this visit:    1. Type 2 diabetes mellitus with complication, without long-term current use of insulin (Primary)  -     Hemoglobin A1c  -     Microalbumin / Creatinine Urine Ratio - Urine, Clean Catch  -     metFORMIN (GLUCOPHAGE) 500 MG tablet; Take 1 tablet by mouth Daily With Breakfast.  Dispense: 90 tablet; Refill: 3    2. Essential hypertension  -     losartan-hydrochlorothiazide (HYZAAR) 100-12.5 MG per tablet; Take 1 tablet by mouth Daily.  Dispense: 90 tablet; Refill: 3  -     aspirin (Aspirin Adult Low Strength) 81 MG EC tablet; Take 1 tablet by mouth Daily.  Dispense: 30 tablet; Refill: 2    3. Chronic obstructive pulmonary disease, unspecified COPD type  -     albuterol sulfate  (90 Base) MCG/ACT inhaler; Inhale 2 puffs Every 4 (Four) Hours As Needed for Wheezing or Shortness of Air.  Dispense: 18 g; Refill: 5  -     Umeclidinium Bromide (Incruse Ellipta) 62.5 MCG/ACT aerosol powder ; Inhale 1 puff Daily.  Dispense: 90 each; Refill: 3    4. Hypercholesterolemia  -     lovastatin (MEVACOR) 40 MG tablet; Take 1 tablet by mouth Every Night.  Dispense: 90 tablet; Refill: 1    5. Gastroesophageal reflux disease with esophagitis, unspecified whether hemorrhage  -     omeprazole (priLOSEC) 20 MG capsule; Take 1 capsule by mouth 2 (Two) Times a Day.  Dispense: 60 capsule; Refill: 3    6. Esophageal varices without bleeding, unspecified esophageal varices type  -     propranolol (INDERAL) 40 MG tablet; Take 1 tablet by mouth Daily.  Dispense: 90 tablet; Refill: 1    7. Environmental and seasonal allergies  -     cetirizine (zyrTEC) 10 MG tablet; Take 1 tablet by mouth Daily.  Dispense: 30 tablet; Refill: 5    8. Primary hypertension  -     amLODIPine (NORVASC) 10 MG tablet; Take 1 tablet by mouth Daily.  Dispense: 90 tablet; Refill: 3    9. Hyperlipidemia,  unspecified hyperlipidemia type  -     Lipid panel    10. Current every day smoker  -     CT Chest Low Dose Wo    11. Cigarette smoker  -     CT Chest Low Dose Wo    12. Encounter for diabetic foot exam        · Rx changes: none  · Patient Education: smoking cessation   · Compliance at present is estimated to be good.   · Efforts to improve compliance (if necessary) will be directed at increased exercise.    Depression screening: Up to date; last screen 8/16/2022     Follow-up:     Return in about 4 weeks (around 6/5/2023) for Recheck.    Preventative:  Health Maintenance   Topic Date Due   • HEMOGLOBIN A1C  08/21/2022   • URINE MICROALBUMIN  11/30/2022   • LIPID PANEL  02/21/2023   • LUNG CANCER SCREENING  04/08/2023   • ZOSTER VACCINE (1 of 2) 05/08/2023 (Originally 7/17/2008)   • COVID-19 Vaccine (4 - Booster for Pfizer series) 06/17/2023 (Originally 3/28/2022)   • Pneumococcal Vaccine 0-64 (2 - PCV) 01/06/2024 (Originally 8/15/2015)   • Hepatitis B (1 of 3 - Risk 3-dose series) 05/08/2024 (Originally 7/17/2018)   • INFLUENZA VACCINE  08/01/2023   • ANNUAL WELLNESS VISIT  08/16/2023   • DIABETIC EYE EXAM  11/22/2023   • DIABETIC FOOT EXAM  05/08/2024   • TDAP/TD VACCINES (2 - Td or Tdap) 08/15/2024   • COLORECTAL CANCER SCREENING  09/18/2025   • HEPATITIS C SCREENING  Completed         Alcohol use:  reports current alcohol use of about 6.0 standard drinks per week.  Nicotine status  reports that he has been smoking cigarettes. He has a 60.00 pack-year smoking history. He has never used smokeless tobacco.     Goals     •  Quit smoking / using tobacco (pt-stated)           RISK SCORE: 4        Janet Navarrete MD PGY-3  [facility]   This document has been electronically signed by Janet Navarrete MD on May 8, 2023 14:38 CDT

## 2023-05-15 NOTE — PROGRESS NOTES
I have reviewed the notes, assessments, and/or procedures performed by Dr. Navarrete during office visit. I concur with her/his documentation and assessment and plan for Jameel Dozier.           This document has been electronically signed by Son Li MD on May 15, 2023 14:27 CDT     Alert and oriented to person, place and time

## 2023-06-12 ENCOUNTER — LAB (OUTPATIENT)
Dept: LAB | Facility: HOSPITAL | Age: 63
End: 2023-06-12
Payer: MEDICARE

## 2023-06-12 ENCOUNTER — OFFICE VISIT (OUTPATIENT)
Dept: FAMILY MEDICINE CLINIC | Facility: CLINIC | Age: 63
End: 2023-06-12
Payer: MEDICARE

## 2023-06-12 VITALS
SYSTOLIC BLOOD PRESSURE: 118 MMHG | HEIGHT: 70 IN | BODY MASS INDEX: 25.27 KG/M2 | OXYGEN SATURATION: 99 % | WEIGHT: 176.5 LBS | TEMPERATURE: 97.5 F | HEART RATE: 72 BPM | DIASTOLIC BLOOD PRESSURE: 82 MMHG

## 2023-06-12 DIAGNOSIS — Z01.30 BP CHECK: Primary | ICD-10-CM

## 2023-06-12 LAB
CHOLEST SERPL-MCNC: 131 MG/DL (ref 0–200)
HBA1C MFR BLD: 5.8 % (ref 4.8–5.6)
HDLC SERPL-MCNC: 52 MG/DL (ref 40–60)
LDLC SERPL CALC-MCNC: 61 MG/DL (ref 0–100)
LDLC/HDLC SERPL: 1.16 {RATIO}
TRIGL SERPL-MCNC: 93 MG/DL (ref 0–150)
VLDLC SERPL-MCNC: 18 MG/DL (ref 5–40)

## 2023-06-12 PROCEDURE — 80061 LIPID PANEL: CPT | Performed by: STUDENT IN AN ORGANIZED HEALTH CARE EDUCATION/TRAINING PROGRAM

## 2023-06-12 PROCEDURE — 82043 UR ALBUMIN QUANTITATIVE: CPT | Performed by: STUDENT IN AN ORGANIZED HEALTH CARE EDUCATION/TRAINING PROGRAM

## 2023-06-12 PROCEDURE — 82570 ASSAY OF URINE CREATININE: CPT | Performed by: STUDENT IN AN ORGANIZED HEALTH CARE EDUCATION/TRAINING PROGRAM

## 2023-06-12 PROCEDURE — 83036 HEMOGLOBIN GLYCOSYLATED A1C: CPT | Performed by: STUDENT IN AN ORGANIZED HEALTH CARE EDUCATION/TRAINING PROGRAM

## 2023-06-12 NOTE — PROGRESS NOTES
Family Medicine Residency  Janet Navarrete MD    Subjective:     Jameel Dozier is a 62 y.o. male who presents for BP check. Pt was seen 4 weeks in office and noted to have elevated BP and was out of all his medications for few months. All his medications were reconciled and refilled. Care gaps were addressed and blood work was ordered which he got it done this morning. Patient is a chronic smoker and smokes about 1-1.5 packs a day. Low dose Ct scan was ordered at his last visit, which is scheduled for June 16th. No new complaints today.       The following portions of the patient's history were reviewed and updated as appropriate: allergies, current medications, past family history, past medical history, past social history, past surgical history and problem list.    Past Medical Hx:  Past Medical History:   Diagnosis Date   • Abdominal pain    • Acquired achalasia of esophagus    • Adenomatous polyp of colon    • Adverse drug effect    • Alcohol dependence with alcohol-induced disorder    • Allergic rhinitis    • Anal fissure    • Anemia due to blood loss    • Benign essential hypertension    • Central obesity    • Cerebrovascular disease    • Chronic obstructive lung disease    • Claudication    • Disorder of peripheral nervous system    • Diverticular disease of colon     completed antibx, ? neoplasm on CT   • Dysphagia    • Dyspnea    • ED (erectile dysfunction)    • Epigastric pain    • Esophageal dysmotility    • Esophageal reflux    • Esophagitis    • Essential hypertension    • External hemorrhoids    • Gastritis    • GERD with esophagitis    • Heavy tobacco smoker    • Hemorrhoids    • Hiatal hernia    • Hiccough    • History of colon polyps    • History of echocardiogram     Normal LV funciton with Ef of 65% to 70% without regional wall motion abnormalities.Mild CLVH. Normal RV size and function. No significant valvular regurgitation or stenosis. 09/19/2014       • History of EKG    • History of TIA  (transient ischemic attack)    • Hypercholesterolemia    • Hypertension    • Left ventricular hypertrophy    • Male erectile disorder    • Obstructive sleep apnea syndrome    • Primary osteoarthritis of knees, bilateral    • Rectal bleed     painful   • Rectal hemorrhage    • Sleep disorder    • Transient cerebral ischemia    • Upper respiratory infection    • Weakness     Attacks of weakness       Past Surgical Hx:  Past Surgical History:   Procedure Laterality Date   • COLONOSCOPY  04/13/2015    Internal and external hemorrhoids found. 04/13/2015    • COLONOSCOPY N/A 9/18/2020    Procedure: COLONOSCOPY;  Surgeon: Alli Dockery MD;  Location: [facility] ENDOSCOPY;  Service: Gastroenterology;  Laterality: N/A;   • ENDOSCOPY      Internal & external hemorrhoids found. 1 polyp in sigmoid colon; removed by snare cautery polypectomy. 09/26/2012    • ENDOSCOPY      Gastritis found in the stomach. Biopsy taken.Enlarged folds were found in the body of the stomach.Biopsy taken.Normal duodenum.A hiatus hernia was found in the esophagus. 04/13/2015    • ENDOSCOPY      Hiatus hernia in esophagus. Gastritis in stomach. Biopsy taken. Normal duodenum. Biopsy taken. 09/26/2012       • ENDOSCOPY N/A 12/18/2017    Procedure: ESOPHAGOGASTRODUODENOSCOPY--attn mid esophagus, abnl on CT;  Surgeon: Alli Dockery MD;  Location: [facility] ENDOSCOPY;  Service:    • ENDOSCOPY N/A 3/6/2019    Procedure: ESOPHAGOGASTRODUODENOSCOPY WITH DILATATION;  Surgeon: Alli Dockery MD;  Location: [facility] ENDOSCOPY;  Service: Gastroenterology   • ENDOSCOPY N/A 9/18/2020    Procedure: ESOPHAGOGASTRODUODENOSCOPY eval varices;  Surgeon: Alli Dockery MD;  Location: [facility] ENDOSCOPY;  Service: Gastroenterology;  Laterality: N/A;   • ENDOSCOPY N/A 4/28/2021    Procedure: ESOPHAGOGASTRODUODENOSCOPY WITH ANESTHESIA--with botox, attn antrum poss malig;  Surgeon: Alli Dockery MD;  Location: [facility] ENDOSCOPY;  Service: Gastroenterology;  Laterality: N/A;   •  ENDOSCOPY N/A 6/15/2022    Procedure: ESOPHAGOGASTRODUODENOSCOPY WITH DILATATION;  Surgeon: Alli Dockery MD;  Location: [facility] ENDOSCOPY;  Service: Gastroenterology;  Laterality: N/A;   • UPPER GASTROINTESTINAL ENDOSCOPY  08/24/2016   • UPPER GASTROINTESTINAL ENDOSCOPY  04/13/2015   • UPPER GASTROINTESTINAL ENDOSCOPY  12/18/2017   • UPPER GASTROINTESTINAL ENDOSCOPY  03/06/2019   • UPPER GASTROINTESTINAL ENDOSCOPY  09/18/2020   • UPPER GASTROINTESTINAL ENDOSCOPY  04/28/2021       Current Meds:    Current Outpatient Medications:   •  albuterol sulfate  (90 Base) MCG/ACT inhaler, Inhale 2 puffs Every 4 (Four) Hours As Needed for Wheezing or Shortness of Air., Disp: 18 g, Rfl: 5  •  amLODIPine (NORVASC) 10 MG tablet, Take 1 tablet by mouth Daily., Disp: 90 tablet, Rfl: 3  •  aspirin (Aspirin Adult Low Strength) 81 MG EC tablet, Take 1 tablet by mouth Daily., Disp: 30 tablet, Rfl: 2  •  Blood Pressure Monitoring (Adult Blood Pressure Cuff Lg) kit, Check BP daily, Disp: 1 each, Rfl: 0  •  cetirizine (zyrTEC) 10 MG tablet, Take 1 tablet by mouth Daily., Disp: 30 tablet, Rfl: 5  •  guaiFENesin (Mucinex) 600 MG 12 hr tablet, Take 2 tablets by mouth 2 (Two) Times a Day., Disp: 60 tablet, Rfl: 0  •  hydrALAZINE (APRESOLINE) 10 MG tablet, Take 1 tablet by mouth Daily., Disp: , Rfl:   •  Lidocaine Viscous HCl (XYLOCAINE) 2 % solution, As Needed., Disp: , Rfl:   •  losartan-hydrochlorothiazide (HYZAAR) 100-12.5 MG per tablet, Take 1 tablet by mouth Daily., Disp: 90 tablet, Rfl: 3  •  lovastatin (MEVACOR) 40 MG tablet, Take 1 tablet by mouth Every Night., Disp: 90 tablet, Rfl: 1  •  magnesium oxide (MAGOX) 400 (241.3 Mg) MG tablet tablet, Take 1 tablet by mouth Daily., Disp: 30 tablet, Rfl: 4  •  Magnesium Oxide 400 (240 Mg) MG tablet, TAKE 1 TABLET BY MOUTH DAILY., Disp: 30 tablet, Rfl: 11  •  metFORMIN (GLUCOPHAGE) 500 MG tablet, Take 1 tablet by mouth Daily With Breakfast., Disp: 90 tablet, Rfl: 3  •  omeprazole  (priLOSEC) 20 MG capsule, Take 1 capsule by mouth 2 (Two) Times a Day., Disp: 60 capsule, Rfl: 3  •  propranolol (INDERAL) 40 MG tablet, Take 1 tablet by mouth Daily., Disp: 90 tablet, Rfl: 1  •  tiotropium (Spiriva HandiHaler) 18 MCG per inhalation capsule, Place 1 capsule into inhaler and inhale Daily., Disp: 30 capsule, Rfl: 5  •  Umeclidinium Bromide (Incruse Ellipta) 62.5 MCG/ACT aerosol powder , Inhale 1 puff Daily., Disp: 90 each, Rfl: 3  •  fluticasone (FLONASE) 50 MCG/ACT nasal spray, 2 sprays into each nostril Daily for 30 days., Disp: 1 bottle, Rfl: 11    Allergies:  Allergies   Allergen Reactions   • Ace Inhibitors Angioedema     hypotension   • Lisinopril Other (See Comments)     cough   • Vistaril [Hydroxyzine Hcl] Other (See Comments)     Patient is unsure        Family Hx:  Family History   Problem Relation Age of Onset   • Cancer Mother    • Cirrhosis Father    • Diabetes Other    • Hypertension Other         Social History:  Social History     Socioeconomic History   • Marital status: Single   • Number of children: 0   • Years of education: 12   Tobacco Use   • Smoking status: Every Day     Packs/day: 1.50     Years: 40.00     Pack years: 60.00     Types: Cigarettes   • Smokeless tobacco: Never   Vaping Use   • Vaping Use: Never used   Substance and Sexual Activity   • Alcohol use: Yes     Alcohol/week: 6.0 standard drinks     Types: 6 Cans of beer per week     Comment: when can get   • Drug use: No   • Sexual activity: Defer     Partners: Female       Review of Systems  Review of Systems   Constitutional:  Negative for activity change, appetite change and fatigue.   HENT:  Negative for congestion.    Respiratory:  Negative for chest tightness, shortness of breath and wheezing.    Cardiovascular:  Negative for chest pain and palpitations.   Gastrointestinal:  Negative for abdominal pain, diarrhea, nausea and vomiting.   Genitourinary:  Negative for difficulty urinating.   Musculoskeletal:  Negative  "for arthralgias and myalgias.   Skin:  Negative for color change and rash.   Neurological:  Negative for dizziness, weakness, light-headedness and headaches.   Psychiatric/Behavioral:  Negative for behavioral problems. The patient is not nervous/anxious.      Objective:     /82   Pulse 72   Temp 97.5 °F (36.4 °C)   Ht 177.8 cm (70\")   Wt 80.1 kg (176 lb 8 oz)   SpO2 99%   BMI 25.33 kg/m²   Physical Exam  Vitals and nursing note reviewed.   Constitutional:       General: He is not in acute distress.     Appearance: Normal appearance. He is obese. He is not ill-appearing, toxic-appearing or diaphoretic.   Cardiovascular:      Rate and Rhythm: Normal rate and regular rhythm.      Pulses: Normal pulses.      Heart sounds: No murmur heard.  Pulmonary:      Effort: Pulmonary effort is normal. No respiratory distress.      Breath sounds: Normal breath sounds. No wheezing.   Neurological:      Mental Status: He is alert.          Assessment/Plan:     Diagnoses and all orders for this visit:    1. BP check (Primary)    BP improved since he restarted his medications. Smoking cessation counseling was done again, pt is not motivated to quit. Strongly encouraged to get his low dose CT scan done and further recs will be made based on the results. Pt brought all his medications in office today which was reconciled.     · Rx changes: none  · Patient Education: smoking cessation   · Compliance at present is estimated to be good.   · Efforts to improve compliance (if necessary) will be directed at increased exercise.    Depression screening: Up to date; last screen 8/16/2022     Follow-up:     Return in about 3 months (around 9/12/2023) for Recheck.    Preventative:  Health Maintenance   Topic Date Due   • ZOSTER VACCINE (1 of 2) Never done   • HEMOGLOBIN A1C  08/21/2022   • URINE MICROALBUMIN  11/30/2022   • LIPID PANEL  02/21/2023   • LUNG CANCER SCREENING  04/08/2023   • COVID-19 Vaccine (4 - Pfizer series) 06/17/2023 " (Originally 3/28/2022)   • Pneumococcal Vaccine 0-64 (2 - PCV) 01/06/2024 (Originally 8/15/2015)   • Hepatitis B (1 of 3 - Risk 3-dose series) 05/08/2024 (Originally 7/17/2018)   • INFLUENZA VACCINE  08/01/2023   • ANNUAL WELLNESS VISIT  08/16/2023   • DIABETIC EYE EXAM  11/22/2023   • DIABETIC FOOT EXAM  05/08/2024   • TDAP/TD VACCINES (2 - Td or Tdap) 08/15/2024   • COLORECTAL CANCER SCREENING  09/18/2025   • HEPATITIS C SCREENING  Completed         Alcohol use:  reports current alcohol use of about 6.0 standard drinks per week.  Nicotine status  reports that he has been smoking cigarettes. He has a 60.00 pack-year smoking history. He has never used smokeless tobacco.     Goals     •  Quit smoking / using tobacco (pt-stated)           RISK SCORE: 4        Janet Navarrete MD PGY-3  [facility] Family Medicine Residency   This document has been electronically signed by Janet Navarrete MD on June 12, 2023 09:11 CDT

## 2023-06-13 LAB
ALBUMIN UR-MCNC: 20.5 MG/DL
CREAT UR-MCNC: 233.7 MG/DL
MICROALBUMIN/CREAT UR: 87.7 MG/G

## 2023-06-15 ENCOUNTER — HOSPITAL ENCOUNTER (OUTPATIENT)
Dept: CT IMAGING | Facility: HOSPITAL | Age: 63
Discharge: HOME | End: 2023-06-15
Admitting: RADIOLOGY
Payer: MEDICARE

## 2023-06-15 PROCEDURE — 71271 CT THORAX LUNG CANCER SCR C-: CPT

## 2023-06-19 NOTE — PROGRESS NOTES
I have reviewed the notes, assessments, and/or procedures performed by dr Navarrete during office visit. I concur with her/his documentation and assessment and plan for Jameel Dozier.           This document has been electronically signed by Son Li MD on June 19, 2023 10:43 CDT

## 2023-08-23 NOTE — TELEPHONE ENCOUNTER
PATIENT CALLED ASKING FOR A MEDICATION REFILL ON HIS   omeprazole (priLOSEC) 20 MG capsule.    THEY WOULD LIKE THAT SENT TO [facility].    THEY WOULD ALSO LIKE ANY OTHER MEDICATIONS THAT MAY BE DUE TO BE REFILLED AS WELL.    CALL BACK NUMBER FOR THEM [phone].    THANKS,  QUINN   Reason for Disposition  • MILD rectal bleeding (more than just a few drops or streaks)    Answer Assessment - Initial Assessment Questions  1. APPEARANCE of BLOOD: "What color is it?" "Is it passed separately, on the surface of the stool, or mixed in with the stool?"       One stool mixed with feces and redness  2. AMOUNT: "How much blood was passed?"       unsure  3. FREQUENCY: "How many times has blood been passed with the stools?"       Just one time occured  4. ONSET: "When was the blood first seen in the stools?" (Days or weeks)       About 30 minutes ago, rectum still feels sore from disimpaction  5. DIARRHEA: "Is there also some diarrhea?" If Yes, ask: "How many diarrhea stools were passed in past 24 hours?"       denies  6. CONSTIPATION: "Do you have constipation?" If Yes, ask: "How bad is it?"      Getting better. 7. RECURRENT SYMPTOMS: "Have you had blood in your stools before?" If Yes, ask: "When was the last time?" and "What happened that time?"       Usually have loose stools  8. BLOOD THINNERS: "Do you take any blood thinners?" (e.g., Coumadin/warfarin, Pradaxa/dabigatran, aspirin)      denies  9. OTHER SYMPTOMS: "Do you have any other symptoms?"  (e.g., abdominal pain, vomiting, dizziness, fever)      Nausea for 3 weeks        ER yesterday for fecal impaction. Going to bathroom today, still hard but stool now is bright red.     Protocols used: RECTAL BLEEDING-ADULT-

## (undated) DEVICE — CANN SMPL SOFTECH BIFLO ETCO2 A/M 7FT

## (undated) DEVICE — SINGLE USE INJECTOR: Brand: SINGLE USE INJECTOR

## (undated) DEVICE — CLEANGUIDE DISPOSABLE MARKED SPRING TIP GUIDEWIRE, 1.86 MM X 210 CM: Brand: CLEANGUIDE

## (undated) DEVICE — SINGLE-USE BIOPSY FORCEPS: Brand: RADIAL JAW 4

## (undated) DEVICE — BITEBLOCK ENDO W/STRAP 60F A/ LF DISP